# Patient Record
Sex: FEMALE | Race: BLACK OR AFRICAN AMERICAN | Employment: UNEMPLOYED | URBAN - METROPOLITAN AREA
[De-identification: names, ages, dates, MRNs, and addresses within clinical notes are randomized per-mention and may not be internally consistent; named-entity substitution may affect disease eponyms.]

---

## 2017-03-02 ENCOUNTER — HOSPITAL ENCOUNTER (INPATIENT)
Facility: CLINIC | Age: 32
LOS: 7 days | Discharge: ANOTHER HOSPITAL | DRG: 885 | End: 2017-03-09
Attending: FAMILY MEDICINE | Admitting: PSYCHIATRY & NEUROLOGY
Payer: COMMERCIAL

## 2017-03-02 DIAGNOSIS — F20.1 DISORGANIZED SCHIZOPHRENIA (H): ICD-10-CM

## 2017-03-02 DIAGNOSIS — F12.20 CANNABIS DEPENDENCE (H): ICD-10-CM

## 2017-03-02 DIAGNOSIS — K21.9 GASTROESOPHAGEAL REFLUX DISEASE, ESOPHAGITIS PRESENCE NOT SPECIFIED: Primary | ICD-10-CM

## 2017-03-02 DIAGNOSIS — R45.1 AGITATION: ICD-10-CM

## 2017-03-02 DIAGNOSIS — F25.0 SCHIZOAFFECTIVE DISORDER, BIPOLAR TYPE (H): ICD-10-CM

## 2017-03-02 PROCEDURE — 25000125 ZZHC RX 250: Performed by: PSYCHIATRY & NEUROLOGY

## 2017-03-02 PROCEDURE — 25000132 ZZH RX MED GY IP 250 OP 250 PS 637: Performed by: FAMILY MEDICINE

## 2017-03-02 PROCEDURE — 99285 EMERGENCY DEPT VISIT HI MDM: CPT | Mod: Z6 | Performed by: FAMILY MEDICINE

## 2017-03-02 PROCEDURE — 90791 PSYCH DIAGNOSTIC EVALUATION: CPT

## 2017-03-02 PROCEDURE — 12400003 ZZH R&B MH CRITICAL UMMC

## 2017-03-02 PROCEDURE — 99285 EMERGENCY DEPT VISIT HI MDM: CPT | Mod: 25 | Performed by: FAMILY MEDICINE

## 2017-03-02 PROCEDURE — 25000132 ZZH RX MED GY IP 250 OP 250 PS 637: Performed by: PSYCHIATRY & NEUROLOGY

## 2017-03-02 PROCEDURE — 25000132 ZZH RX MED GY IP 250 OP 250 PS 637: Performed by: NURSE PRACTITIONER

## 2017-03-02 RX ORDER — ONDANSETRON 4 MG/1
4 TABLET, ORALLY DISINTEGRATING ORAL 3 TIMES DAILY PRN
Status: DISCONTINUED | OUTPATIENT
Start: 2017-03-02 | End: 2017-03-09 | Stop reason: HOSPADM

## 2017-03-02 RX ORDER — DIPHENHYDRAMINE HYDROCHLORIDE 50 MG/ML
50 INJECTION INTRAMUSCULAR; INTRAVENOUS EVERY 4 HOURS PRN
Status: DISCONTINUED | OUTPATIENT
Start: 2017-03-02 | End: 2017-03-09 | Stop reason: HOSPADM

## 2017-03-02 RX ORDER — LORAZEPAM 1 MG/1
1-2 TABLET ORAL EVERY 4 HOURS PRN
Status: DISCONTINUED | OUTPATIENT
Start: 2017-03-02 | End: 2017-03-09 | Stop reason: HOSPADM

## 2017-03-02 RX ORDER — ALUMINA, MAGNESIA, AND SIMETHICONE 2400; 2400; 240 MG/30ML; MG/30ML; MG/30ML
30 SUSPENSION ORAL EVERY 4 HOURS PRN
Status: DISCONTINUED | OUTPATIENT
Start: 2017-03-02 | End: 2017-03-09 | Stop reason: HOSPADM

## 2017-03-02 RX ORDER — TRIAMCINOLONE ACETONIDE 5 MG/G
OINTMENT TOPICAL 2 TIMES DAILY
Status: DISCONTINUED | OUTPATIENT
Start: 2017-03-02 | End: 2017-03-09 | Stop reason: HOSPADM

## 2017-03-02 RX ORDER — AMOXICILLIN 250 MG
1 CAPSULE ORAL 2 TIMES DAILY
Status: DISCONTINUED | OUTPATIENT
Start: 2017-03-02 | End: 2017-03-09 | Stop reason: HOSPADM

## 2017-03-02 RX ORDER — LITHIUM CARBONATE 300 MG/1
600 TABLET, FILM COATED, EXTENDED RELEASE ORAL AT BEDTIME
Status: DISCONTINUED | OUTPATIENT
Start: 2017-03-02 | End: 2017-03-06

## 2017-03-02 RX ORDER — DIPHENHYDRAMINE HCL 50 MG
50 CAPSULE ORAL EVERY 4 HOURS PRN
Status: DISCONTINUED | OUTPATIENT
Start: 2017-03-02 | End: 2017-03-09 | Stop reason: HOSPADM

## 2017-03-02 RX ORDER — OXYCODONE AND ACETAMINOPHEN 5; 325 MG/1; MG/1
1 TABLET ORAL EVERY 6 HOURS PRN
Status: DISCONTINUED | OUTPATIENT
Start: 2017-03-02 | End: 2017-03-02

## 2017-03-02 RX ORDER — OLANZAPINE 5 MG/1
10 TABLET, ORALLY DISINTEGRATING ORAL ONCE
Status: COMPLETED | OUTPATIENT
Start: 2017-03-02 | End: 2017-03-02

## 2017-03-02 RX ORDER — IBUPROFEN 600 MG/1
600 TABLET, FILM COATED ORAL EVERY 6 HOURS PRN
Status: DISCONTINUED | OUTPATIENT
Start: 2017-03-02 | End: 2017-03-06

## 2017-03-02 RX ORDER — OLANZAPINE 10 MG/1
10 TABLET ORAL
Status: DISCONTINUED | OUTPATIENT
Start: 2017-03-02 | End: 2017-03-09 | Stop reason: HOSPADM

## 2017-03-02 RX ORDER — HALOPERIDOL 5 MG/1
5 TABLET ORAL EVERY 4 HOURS PRN
Status: DISCONTINUED | OUTPATIENT
Start: 2017-03-02 | End: 2017-03-09 | Stop reason: HOSPADM

## 2017-03-02 RX ORDER — ACETAMINOPHEN 325 MG/1
650 TABLET ORAL EVERY 4 HOURS PRN
Status: DISCONTINUED | OUTPATIENT
Start: 2017-03-02 | End: 2017-03-09 | Stop reason: HOSPADM

## 2017-03-02 RX ORDER — TRIAMCINOLONE ACETONIDE 5 MG/G
OINTMENT TOPICAL 2 TIMES DAILY
Status: ON HOLD | COMMUNITY
End: 2017-04-04

## 2017-03-02 RX ORDER — LITHIUM CARBONATE 300 MG/1
600 TABLET, FILM COATED, EXTENDED RELEASE ORAL AT BEDTIME
Status: ON HOLD | COMMUNITY
End: 2017-03-09

## 2017-03-02 RX ORDER — AMOXICILLIN 250 MG
1 CAPSULE ORAL 2 TIMES DAILY
Status: ON HOLD | COMMUNITY
End: 2017-03-11

## 2017-03-02 RX ORDER — OXYCODONE AND ACETAMINOPHEN 5; 325 MG/1; MG/1
1 TABLET ORAL EVERY 6 HOURS PRN
Status: ON HOLD | COMMUNITY
End: 2017-03-10

## 2017-03-02 RX ORDER — HALOPERIDOL 5 MG/ML
5 INJECTION INTRAMUSCULAR EVERY 4 HOURS PRN
Status: DISCONTINUED | OUTPATIENT
Start: 2017-03-02 | End: 2017-03-09 | Stop reason: HOSPADM

## 2017-03-02 RX ORDER — OLANZAPINE 10 MG/2ML
10 INJECTION, POWDER, FOR SOLUTION INTRAMUSCULAR
Status: DISCONTINUED | OUTPATIENT
Start: 2017-03-02 | End: 2017-03-09 | Stop reason: HOSPADM

## 2017-03-02 RX ORDER — ONDANSETRON 4 MG/1
4 TABLET, ORALLY DISINTEGRATING ORAL 3 TIMES DAILY PRN
Status: ON HOLD | COMMUNITY
End: 2017-03-10

## 2017-03-02 RX ORDER — RISPERIDONE 2 MG/1
4 TABLET ORAL AT BEDTIME
Status: DISCONTINUED | OUTPATIENT
Start: 2017-03-02 | End: 2017-03-03

## 2017-03-02 RX ORDER — HYDROXYZINE HYDROCHLORIDE 25 MG/1
25-50 TABLET, FILM COATED ORAL EVERY 4 HOURS PRN
Status: DISCONTINUED | OUTPATIENT
Start: 2017-03-02 | End: 2017-03-09 | Stop reason: HOSPADM

## 2017-03-02 RX ORDER — LORAZEPAM 2 MG/ML
1-2 INJECTION INTRAMUSCULAR EVERY 4 HOURS PRN
Status: DISCONTINUED | OUTPATIENT
Start: 2017-03-02 | End: 2017-03-09 | Stop reason: HOSPADM

## 2017-03-02 RX ORDER — TRAZODONE HYDROCHLORIDE 50 MG/1
50 TABLET, FILM COATED ORAL
Status: DISCONTINUED | OUTPATIENT
Start: 2017-03-02 | End: 2017-03-09 | Stop reason: HOSPADM

## 2017-03-02 RX ADMIN — OLANZAPINE 10 MG: 5 TABLET, ORALLY DISINTEGRATING ORAL at 11:36

## 2017-03-02 RX ADMIN — IBUPROFEN 600 MG: 600 TABLET ORAL at 19:20

## 2017-03-02 RX ADMIN — DIPHENHYDRAMINE HYDROCHLORIDE 50 MG: 50 CAPSULE ORAL at 18:55

## 2017-03-02 RX ADMIN — ALUMINUM HYDROXIDE, MAGNESIUM HYDROXIDE, AND DIMETHICONE 30 ML: 400; 400; 40 SUSPENSION ORAL at 19:20

## 2017-03-02 RX ADMIN — HALOPERIDOL 5 MG: 5 TABLET ORAL at 18:56

## 2017-03-02 RX ADMIN — HYDROXYZINE HYDROCHLORIDE 50 MG: 25 TABLET ORAL at 16:06

## 2017-03-02 RX ADMIN — OLANZAPINE 10 MG: 10 TABLET, FILM COATED ORAL at 16:07

## 2017-03-02 RX ADMIN — LORAZEPAM 2 MG: 1 TABLET ORAL at 18:55

## 2017-03-02 RX ADMIN — OLANZAPINE 10 MG: 10 TABLET, FILM COATED ORAL at 18:08

## 2017-03-02 RX ADMIN — ONDANSETRON 4 MG: 4 TABLET, ORALLY DISINTEGRATING ORAL at 18:08

## 2017-03-02 ASSESSMENT — ACTIVITIES OF DAILY LIVING (ADL)
DRESS: INDEPENDENT
GROOMING: INDEPENDENT
LAUNDRY: WITH SUPERVISION
ORAL_HYGIENE: INDEPENDENT

## 2017-03-02 ASSESSMENT — ENCOUNTER SYMPTOMS
HALLUCINATIONS: 1
AGITATION: 1
SLEEP DISTURBANCE: 1

## 2017-03-02 NOTE — PHARMACY-ADMISSION MEDICATION HISTORY
Admission Medication History status for the 3/2/2017 admission is complete.  See EPIC admission navigator for Prior to Admission medications.    Medication history sources:  Medication list from [facility]     Medication history source reliability: Good; medication list sent from pharmacy updated 2/27/17    Medication adherence:  Poor; unable to interview patient due to mental status, but ACT team reports she hasn't taken her medications for at least a week    Changes made to PTA medication list (reason)  Added: All medications were added except for the antacid and clozapine  Deleted:   - Aripiprazole, benztropine, divalproex, famotidine  Changed:   - Clozapine 300 mg po qHS per medication list (updated dose)    Additional medication history information (including reliability of information, actions taken by pharmacist):   - Patient was too paranoid to be interviewed, but ACT team sent a list of her current medications on file with [facility]. The team reports they don't believe the patient has taken her medications for at least a week.  - Patient was prescribed Percocet (20 tablets) from [facility] on 2/27/2017.    Time spent in this activity: 15 minutes    Medication history completed by: Renée Kitchen, PharmD    Prior to Admission medications    Medication Sig Last Dose Taking? Auth Provider   CLOZAPINE PO (CLOZARIL) Take 300 mg by mouth At Bedtime Past Month Yes Unknown, Entered By History   oxyCODONE-acetaminophen (PERCOCET) 5-325 MG per tablet Take 1 tablet by mouth every 6 hours as needed for moderate to severe pain Past Month Yes Unknown, Entered By History   RISPERIDONE PO Take 4 mg by mouth At Bedtime Past Month Yes Unknown, Entered By History   triamcinolone (KENALOG) 0.5 % OINT ointment Apply topically 2 times daily Past Month Yes Unknown, Entered By History   senna-docusate (SENOKOT-S;PERICOLACE) 8.6-50 MG per tablet Take 1 tablet by mouth 2 times daily Past Month Yes Unknown, Entered By History    ondansetron (ZOFRAN-ODT) 4 MG ODT tab Take 4 mg by mouth 3 times daily as needed for nausea Past Month Yes Unknown, Entered By History   lithium (ESKALITH/LITHOBID) 300 MG CR tablet Take 600 mg by mouth At Bedtime Past Month Yes Unknown, Entered By History   nicotine polacrilex (NICORETTE) 4 MG gum Place 4 mg inside cheek as needed for smoking cessation Past Month Yes Unknown, Entered By History   Alum & Mag Hydroxide-Simeth (ANTACID I PO) Take 200 mg by mouth 4 times daily as needed (chew tab) Past Month Yes Unknown, Entered By History

## 2017-03-02 NOTE — ED NOTES
Pt becoming agitated, requesting to leave. Informed of plan to admit, became more upset. Requested to be left alone.

## 2017-03-02 NOTE — ED NOTES
Bed: HW01  Expected date: 3/2/17  Expected time:   Means of arrival: Ambulance  Comments:  N711 31 f psych eval on hold

## 2017-03-02 NOTE — PROGRESS NOTES
03/02/17 1656   Patient Belongings   Did you bring any home meds/supplements to the hospital?  No   Patient Belongings cell phone/electronics;clothing;purse   Disposition of Belongings locker   Belongings Search Yes   Clothing Search Yes   1 phone  1   1 purse with makeup and change  1 shoes w laces  1 mehrdad jacket  1 earrings  1 belt  1 jeans  1 blouse    Sent to security  $70 cash    ADMISSION:  I am responsible for any personal items that are not sent to the safe or pharmacy. [facility] is not responsible for loss, theft or damage of any property in my possession.    Patient Signature _____________________ Date/Time _____________________    Staff Signature _______________________ Date/Time _____________________    2nd Staff person, if patient is unable/unwilling to sign  ___________________________________ Date/Time _____________________    DISCHARGE:  My personal items have been returned to me.   Patient Signature _____________________ Date/Time _____________________

## 2017-03-02 NOTE — ED NOTES
Per EMS; Patient has been having issues with N/V due to hernia. She has not been able to keep medications down and has been acting erratically. Mom called 911 for patient behaviors. Arrives on [facility].

## 2017-03-02 NOTE — PLAN OF CARE
Occupational Therapy:  Pt has yet to attend OT-facilitated group sessions.  Plan:  Pt will be encouraged to attend groups and be provided a self assessment form.  OT staff will explain the value of OT including pt in her treatment plan and offer options to meet her needs and identified goals.

## 2017-03-02 NOTE — ED NOTES
Spoke with Ashlyn from the ACT team. Pt has commitment paperwork if needed. Ashlyn can be reached at [phone]. Would like to be kept informed.

## 2017-03-02 NOTE — PROGRESS NOTES
Patient began to yell at another patient racial profanities. Talking to herself, appears to be responding to internal stimuli. Needed to be redirected by staff. Put her hand in this writers face when speaking with her. Offered her prn Zyprexa and Vistaril which she took. Agreed to go to her room to relax.

## 2017-03-02 NOTE — ED PROVIDER NOTES
"  History     Chief Complaint   Patient presents with     Paranoid     HPI  Daisy Curtis is a 31 year old female with a history of chronic abdominal pain, known ventral hernia, schizoaffective disorder, and history of polysubstance abuse (marijuana, alcohol) who presents via 911 due to inability to be safe. The patient has disorganized thought and does not provide a reliable history at this time. Likely still using street drugs    The patient was seen in clinic earlier today for abdominal pain, nausea and vomiting in the setting of known ventral hernia. EMS reports after the clinic appointment, patient was found in the streets and \"became a public hazard,\" so PD became involved. They felt the patient could not take care of herself and called for transport so that she may be further evaluated.     Patient's mother reports that the patient has not been eating, sleeping, or taking her medications. See below for meds.  In review of CareEverywhere- seen at [facility] on 2/28/17 - CT done and showed stable hernia with no evidence for incarceration or cause for the chronic abd pain and nausea.    I have reviewed the Medications, Allergies, Past Medical and Surgical History, and Social History in the Epic system.    No current facility-administered medications for this encounter.      Current Outpatient Prescriptions   Medication     CLOZAPINE PO (CLOZARIL)     oxyCODONE-acetaminophen (PERCOCET) 5-325 MG per tablet     RISPERIDONE PO     triamcinolone (KENALOG) 0.5 % OINT ointment     senna-docusate (SENOKOT-S;PERICOLACE) 8.6-50 MG per tablet     ondansetron (ZOFRAN-ODT) 4 MG ODT tab     lithium (ESKALITH/LITHOBID) 300 MG CR tablet     nicotine polacrilex (NICORETTE) 4 MG gum     [DISCONTINUED] Clozapine (CLOZARIL) 200 MG tablet     Alum & Mag Hydroxide-Simeth (ANTACID I PO)     Past Medical History   Diagnosis Date     Anxiety      Substance abuse      Uncomplicated asthma        Past Surgical History   Procedure " Laterality Date     Cholecystectomy       Gyn surgery            Orthopedic surgery       scoliosis repair       No family history on file.    Social History   Substance Use Topics     Smoking status: Current Every Day Smoker     Packs/day: 1.00     Smokeless tobacco: Not on file     Alcohol use No        Allergies   Allergen Reactions     Codeine Sulfate        Review of Systems   Unable to perform ROS: Psychiatric disorder   Psychiatric/Behavioral: Positive for agitation, behavioral problems, hallucinations and sleep disturbance.       Physical Exam   BP: 130/83  Pulse: 91  Temp: 98.7  F (37.1  C)  Resp: 20  SpO2: 98 %  Physical Exam   Constitutional: She appears well-developed and well-nourished. No distress.   Eating and no nausea   Cardiovascular: Normal rate and regular rhythm.    Pulmonary/Chest: No respiratory distress.   Abdominal: Soft. There is no tenderness.   Really will not allow a full exam but there is no evidence for tenderness    Neurological: She is alert.   Skin: Skin is warm and dry. She is not diaphoretic.   Psychiatric:   The pt is agitated and very guarded and suspicious  She responds to questions with jumping out of her chair and dancing.   She is moving about the room and wanting to search and peek into the draws   Nursing note and vitals reviewed.      ED Course     ED Course     Procedures        Urine requested but nurse cannot get due to suspiciousness of patient  Labs Ordered and Resulted from Time of ED Arrival Up to the Time of Departure from the ED - No data to display    Assessments & Plan (with Medical Decision Making)   Will place on 72 hour hold    I have reviewed the nursing notes.    I have reviewed the findings, diagnosis, plan and need for follow up with the patient.    New Prescriptions    No medications on file       Final diagnoses:   Disorganized schizophrenia (H)     I, Mario Ha, am serving as a trained medical scribe to document services personally  performed by Jamil Darby MD, based on the provider's statements to me.      I, Jamil Darby MD, was physically present and have reviewed and verified the accuracy of this note documented by Mario Ha.    3/2/2017   [facility], [place], EMERGENCY DEPARTMENT     Jamil Darby MD  03/02/17 0437

## 2017-03-02 NOTE — IP AVS SNAPSHOT
MRN:[id number]                      After Visit Summary   3/2/2017    Daisy Curtis    MRN: [id number]           Thank you!     Thank you for choosing [facility] for your care. Our goal is always to provide you with excellent care.        Patient Information     Date Of Birth          1985        About your hospital stay     You were admitted on:  March 2, 2017 You last received care in the:  UR 30NR    You were discharged on:  March 9, 2017       Who to Call     For medical emergencies, please call 911.  For non-urgent questions about your medical care, please call your primary care provider or clinic, None          Attending Provider     Provider Specialty    Jamil Darby MD Emergency Medicine    Tim Vargas MD Psychiatry       Primary Care Provider    Physician No Ref-Primary       No address on file        Further instructions from your care team       Behavioral Discharge Planning and Instructions      Summary:  You were admitted on 3/2/2017  For Psychotic Symptomology.  You were treated by Dr. Tim Vargas MD and discharged on 03/09/2017 from [facility] so that you could be admitted to the medical unit 10A due to severe pain and vomiting.      You  recommitted to [facility] on April 7, 2016.  You were  Provisionally Discharged from [facility] on September 24, 2016.  You were admitted to the [facility] on March 2, 2017.  Your Provisional Discharge was revoked on March 7, 2017.  You are now ready to be discharged from the [facility] to the following address (your mother s home):[address] [place] [address] [place] [address].  Your treatment team has worked with your ACT Team and Allyson Galeas and Martha Yip at [facility] to complete a Remote Provisional Discharge so that you may return back to the community.    Main Diagnosis:    Schizoaffective disorder, bipolar type, manic.   Marijuana use disorder.    Health Care Follow-up Appointments:   1. Medication Management Appointment: Monday, March 13th, 2017 @ 2:00pm with Dr. Gisela Day MD  2. Case Management Appointment: Tuesday, March 14th, 2017 @ 1:00pm with RICH Tobin, [facility] (Ph: [phone])  3. Nursing Appointment: Thursday, March 16, 2017 @ unknown time with Manpreet Sheehan RN (Ph: [phone])  Attend all scheduled appointments with your outpatient providers. Call at least 24 hours in advance if you need to reschedule an appointment to ensure continued access to your outpatient providers.   Major Treatments, Procedures and Findings:  You were provided with: a psychiatric assessment, assessed for medical stability, medication evaluation and/or management, group therapy, individual therapy, milieu management and medical interventions    Symptoms to Report: feeling more aggressive, increased confusion, losing more sleep, mood getting worse or thoughts of suicide    Early warning signs can include: increased depression or anxiety sleep disturbances increased thoughts or behaviors of suicide or self-harm  increased unusual thinking, such as paranoia or hearing voices    Safety and Wellness:  Take all medicines as directed.  Make no changes unless your doctor suggests them.      Follow treatment recommendations.  Refrain from alcohol and non-prescribed drugs.  If there is a concern for safety, call 911.    Resources:   COPE (Community Outreach for Psychiatric Emergencies): [phone].Available 24-hours a day, 7 days a week. Call a COPE professional when a severe disturbance of mood or thinking is impacting your safety. COPE professionals are available to go where you are, handle an immediate crisis, and provide a clinical assessment. If needed, COPE will arrange an emergency evaluation for inpatient psychiatric services. Telephone consultations are also available. Ask them  if you meet criteria for a crisis residence.   *You can also talk to COPE about crisis stabilization services if you are experiencing difficulty with the transition from the hospital.  [facility] Acute Psychiatric Services  Acute Psychiatric Services/Crisis Intervention: [phone]  24-hour walk-in crisis intervention and treatment of behavioral emergencies    Located at:  o [facility] [facility] -  in the Emergency room on the first floor of the [facility] Crisis [facility]idence  [address] [place] [address]  Phone: [phone]  This is a crisis residence where you can stay for a short time if you feel like you need to stabilize but do not need to go to the hospital.    Crisis Connection 24/7 Community Call Center: [phone]  Phone: [phone] outside the [facility] area: [phone]  [email or web address]   Hours: 24 hours/day, 7 days/week  Services: Free and confidential telephone counseling provided by trained volunteers supervised by professional staff. Early intervention and brief supportive counseling for callers with issues of depression, stress and anxiety, domestic violence, parent/child conflicts, family issues, loneliness, grief and loss, suicidal ideation and chronic mental illness.    [employer]  You and your family would benefit from the support groups at [employer]- [facility].   Www.namihelps.org    www.[facility].org    The [employer] for Mental Illness [facility] has a parent support and educator staff - Cristian Del Rosario - that can be a support for your parents. There are also many classes that are available to you and your family.  Cristian can be reached at [phone] xt 480 or through e-mail at [email or web address].  Daisy,  please take care and make your recovery a daily recovery. The treatment team has appreciated the opportunity to work with you. If you have any questions or  "concerns our unit number is [phone].  You may be receiving a follow-up phone call within the next three days from a representative from behavioral health.  You have identified the best phone number to reach you as [phone].          Pending Results     No orders found from 2017 to 3/3/2017.            Statement of Approval     Ordered          17 1745  I have reviewed and agree with all the recommendations and orders detailed in this document.  EFFECTIVE NOW     Approved and electronically signed by:  Tim Vargas MD             Admission Information     Date & Time Department Dept. Phone    3/2/2017 UR 30NR [phone]      Your Vitals Were     Blood Pressure Pulse Temperature Respirations Weight Pulse Oximetry    109/62 92 99.1  F (37.3  C) (Oral) 18 78 kg (172 lb) 97%    BMI (Body Mass Index)                   31.97 kg/m2           [email or web address] Information     [facility] lets you send messages to your doctor, view your test results, renew your prescriptions, schedule appointments and more. To sign up, go to [email or web address] . Click on \"Log in\" on the left side of the screen, which will take you to the Welcome page. Then click on \"Sign up Now\" on the right side of the page.     You will be asked to enter the access code listed below, as well as some personal information. Please follow the directions to create your username and password.     Your access code is: [email or web address]  Expires: 2017  1:03 PM     Your access code will  in 90 days. If you need help or a new code, please call your [facility] clinic or [phone].        Care EveryWhere ID     This is your Care EveryWhere ID. This could be used by other organizations to access your [facility] medical records  [id number]           Review of your medicines      START taking        Dose / Directions    benztropine 0.5 MG tablet   Commonly known as:  COGENTIN   Used for:  Disorganized schizophrenia (H)        Dose:  0.5 mg "   Take 1 tablet (0.5 mg) by mouth 2 times daily   Quantity:  60 tablet   Refills:  0       diazepam 10 MG tablet   Commonly known as:  VALIUM   Used for:  Disorganized schizophrenia (H)        Dose:  10 mg   Take 1 tablet (10 mg) by mouth 2 times daily   Quantity:  60 tablet   Refills:  0       pantoprazole 40 MG EC tablet   Commonly known as:  PROTONIX   Used for:  Gastroesophageal reflux disease, esophagitis presence not specified        Dose:  40 mg   Take 1 tablet (40 mg) by mouth every morning   Quantity:  30 tablet   Refills:  0       traZODone 50 MG tablet   Commonly known as:  DESYREL   Used for:  Disorganized schizophrenia (H)        Dose:  50 mg   Take 1 tablet (50 mg) by mouth nightly as needed for sleep (may repeat X 1)   Quantity:  30 tablet   Refills:  0         CONTINUE these medicines which may have CHANGED, or have new prescriptions. If we are uncertain of the size of tablets/capsules you have at home, strength may be listed as something that might have changed.        Dose / Directions    lithium 300 MG CR tablet   Commonly known as:  ESKALITH/LITHOBID   This may have changed:  when to take this   Used for:  Disorganized schizophrenia (H)        Dose:  600 mg   Take 2 tablets (600 mg) by mouth 2 times daily   Quantity:  60 tablet   Refills:  0       * risperiDONE 3 MG Tbdp ODT tab   This may have changed:  You were already taking a medication with the same name, and this prescription was added. Make sure you understand how and when to take each.   Used for:  Disorganized schizophrenia (H)        Dose:  3 mg   Place 1 tablet (3 mg) under the tongue At Bedtime   Quantity:  30 tablet   Refills:  0       * risperiDONE 1 MG ODT tab   Commonly known as:  risperDAL M-TABS   This may have changed:    - medication strength  - how much to take  - how to take this  - when to take this   Used for:  Disorganized schizophrenia (H)        Dose:  1 mg   Place 1 tablet (1 mg) under the tongue every morning    Quantity:  30 tablet   Refills:  0       * Notice:  This list has 2 medication(s) that are the same as other medications prescribed for you. Read the directions carefully, and ask your doctor or other care provider to review them with you.      CONTINUE these medicines which have NOT CHANGED        Dose / Directions    nicotine polacrilex 4 MG gum   Commonly known as:  NICORETTE        Dose:  4 mg   Place 4 mg inside cheek as needed for smoking cessation   Refills:  0       ondansetron 4 MG ODT tab   Commonly known as:  ZOFRAN-ODT        Dose:  4 mg   Take 4 mg by mouth 3 times daily as needed for nausea   Refills:  0       oxyCODONE-acetaminophen 5-325 MG per tablet   Commonly known as:  PERCOCET        Dose:  1 tablet   Take 1 tablet by mouth every 6 hours as needed for moderate to severe pain   Refills:  0       senna-docusate 8.6-50 MG per tablet   Commonly known as:  SENOKOT-S;PERICOLACE        Dose:  1 tablet   Take 1 tablet by mouth 2 times daily   Refills:  0       triamcinolone 0.5 % Oint ointment   Commonly known as:  KENALOG        Apply topically 2 times daily   Refills:  0            Where to get your medicines      These medications were sent to [facility] [place], [place] - [address]  [address] [address]     Phone:  [phone]     benztropine 0.5 MG tablet    lithium 300 MG CR tablet    pantoprazole 40 MG EC tablet    risperiDONE 1 MG ODT tab    risperiDONE 3 MG Tbdp ODT tab    traZODone 50 MG tablet         Some of these will need a paper prescription and others can be bought over the counter. Ask your nurse if you have questions.     Bring a paper prescription for each of these medications     diazepam 10 MG tablet                Protect others around you: Learn how to safely use, store and throw away your medicines at www.disposemymeds.org.             Medication List: This is a list of all your medications and when to take them. Check marks below  indicate your daily home schedule. Keep this list as a reference.      Medications           Morning Afternoon Evening Bedtime As Needed    benztropine 0.5 MG tablet   Commonly known as:  COGENTIN   Take 1 tablet (0.5 mg) by mouth 2 times daily   Last time this was given:  0.5 mg on 3/10/2017  8:22 AM                                diazepam 10 MG tablet   Commonly known as:  VALIUM   Take 1 tablet (10 mg) by mouth 2 times daily   Last time this was given:  10 mg on 3/10/2017  8:21 AM                                lithium 300 MG CR tablet   Commonly known as:  ESKALITH/LITHOBID   Take 2 tablets (600 mg) by mouth 2 times daily   Last time this was given:  600 mg on 3/10/2017  8:21 AM                                nicotine polacrilex 4 MG gum   Commonly known as:  NICORETTE   Place 4 mg inside cheek as needed for smoking cessation   Last time this was given:  4 mg on 3/10/2017  8:23 AM                                ondansetron 4 MG ODT tab   Commonly known as:  ZOFRAN-ODT   Take 4 mg by mouth 3 times daily as needed for nausea   Last time this was given:  4 mg on 3/10/2017 11:47 AM                                oxyCODONE-acetaminophen 5-325 MG per tablet   Commonly known as:  PERCOCET   Take 1 tablet by mouth every 6 hours as needed for moderate to severe pain                                pantoprazole 40 MG EC tablet   Commonly known as:  PROTONIX   Take 1 tablet (40 mg) by mouth every morning   Last time this was given:  40 mg on 3/10/2017  8:21 AM                                * risperiDONE 3 MG Tbdp ODT tab   Place 1 tablet (3 mg) under the tongue At Bedtime   Last time this was given:  3 mg on 3/8/2017  8:24 PM                                * risperiDONE 1 MG ODT tab   Commonly known as:  risperDAL M-TABS   Place 1 tablet (1 mg) under the tongue every morning   Last time this was given:  3 mg on 3/8/2017  8:24 PM                                senna-docusate 8.6-50 MG per tablet   Commonly known as:   SENOKOT-S;PERICOLACE   Take 1 tablet by mouth 2 times daily   Last time this was given:  1 tablet on 3/10/2017  8:22 AM                                traZODone 50 MG tablet   Commonly known as:  DESYREL   Take 1 tablet (50 mg) by mouth nightly as needed for sleep (may repeat X 1)   Last time this was given:  50 mg on 3/9/2017  4:11 AM                                triamcinolone 0.5 % Oint ointment   Commonly known as:  KENALOG   Apply topically 2 times daily   Last time this was given:  3/10/2017  9:20 AM                                * Notice:  This list has 2 medication(s) that are the same as other medications prescribed for you. Read the directions carefully, and ask your doctor or other care provider to review them with you.

## 2017-03-02 NOTE — PLAN OF CARE
Problem: Psychotic Symptoms  Goal: Psychotic Symptoms  Signs and symptoms of listed problems will be absent or manageable.  Outcome: No Change  Patient was not cooperative with staff and search. Ran out of admitting room, screamming, looking for her room and a place to lay down.  Staff were able to talk her into the search with much reluctance.  She is now in room resting.

## 2017-03-03 PROCEDURE — 99223 1ST HOSP IP/OBS HIGH 75: CPT | Mod: AI | Performed by: PSYCHIATRY & NEUROLOGY

## 2017-03-03 PROCEDURE — 90853 GROUP PSYCHOTHERAPY: CPT

## 2017-03-03 PROCEDURE — 12400001 ZZH R&B MH UMMC

## 2017-03-03 PROCEDURE — 25000132 ZZH RX MED GY IP 250 OP 250 PS 637: Performed by: PSYCHIATRY & NEUROLOGY

## 2017-03-03 PROCEDURE — 97150 GROUP THERAPEUTIC PROCEDURES: CPT | Mod: GO

## 2017-03-03 PROCEDURE — 25000132 ZZH RX MED GY IP 250 OP 250 PS 637: Performed by: NURSE PRACTITIONER

## 2017-03-03 RX ORDER — RISPERIDONE 1 MG/1
1 TABLET ORAL EVERY MORNING
Status: DISCONTINUED | OUTPATIENT
Start: 2017-03-04 | End: 2017-03-09

## 2017-03-03 RX ORDER — BENZTROPINE MESYLATE 0.5 MG/1
0.5 TABLET ORAL 2 TIMES DAILY
Status: DISCONTINUED | OUTPATIENT
Start: 2017-03-03 | End: 2017-03-09 | Stop reason: HOSPADM

## 2017-03-03 RX ORDER — RISPERIDONE 3 MG/1
3 TABLET ORAL AT BEDTIME
Status: DISCONTINUED | OUTPATIENT
Start: 2017-03-03 | End: 2017-03-09

## 2017-03-03 RX ADMIN — OLANZAPINE 10 MG: 10 TABLET, FILM COATED ORAL at 22:26

## 2017-03-03 RX ADMIN — BENZTROPINE MESYLATE 0.5 MG: 0.5 TABLET ORAL at 20:29

## 2017-03-03 RX ADMIN — LORAZEPAM 2 MG: 1 TABLET ORAL at 10:42

## 2017-03-03 RX ADMIN — LITHIUM CARBONATE 600 MG: 300 TABLET, FILM COATED, EXTENDED RELEASE ORAL at 20:30

## 2017-03-03 RX ADMIN — SENNOSIDES AND DOCUSATE SODIUM 1 TABLET: 8.6; 5 TABLET ORAL at 20:31

## 2017-03-03 RX ADMIN — RISPERIDONE 3 MG: 3 TABLET ORAL at 20:32

## 2017-03-03 RX ADMIN — SENNOSIDES AND DOCUSATE SODIUM 1 TABLET: 8.6; 5 TABLET ORAL at 08:56

## 2017-03-03 RX ADMIN — NICOTINE POLACRILEX 4 MG: 4 GUM, CHEWING ORAL at 12:15

## 2017-03-03 RX ADMIN — NICOTINE POLACRILEX 4 MG: 4 GUM, CHEWING ORAL at 10:53

## 2017-03-03 RX ADMIN — LORAZEPAM 2 MG: 1 TABLET ORAL at 22:25

## 2017-03-03 RX ADMIN — OLANZAPINE 10 MG: 10 TABLET, FILM COATED ORAL at 14:52

## 2017-03-03 RX ADMIN — LORAZEPAM 2 MG: 1 TABLET ORAL at 18:15

## 2017-03-03 RX ADMIN — DIPHENHYDRAMINE HYDROCHLORIDE 50 MG: 50 CAPSULE ORAL at 18:15

## 2017-03-03 RX ADMIN — DIPHENHYDRAMINE HYDROCHLORIDE 50 MG: 50 CAPSULE ORAL at 10:43

## 2017-03-03 RX ADMIN — TRIAMCINOLONE ACETONIDE: 5 OINTMENT TOPICAL at 08:56

## 2017-03-03 RX ADMIN — TRIAMCINOLONE ACETONIDE: 5 OINTMENT TOPICAL at 20:33

## 2017-03-03 ASSESSMENT — ACTIVITIES OF DAILY LIVING (ADL)
DRESS: INDEPENDENT
ORAL_HYGIENE: INDEPENDENT
GROOMING: INDEPENDENT

## 2017-03-03 NOTE — PROGRESS NOTES
"   03/03/17 1200   General Information   Date Initially Attended OT 03/03/17   Clinical Impression   Affect Irritable;Restricted;Fluctuates;Other (see comments)  (tense)   Appearance and ADLs General cleanliness observed in most areas   Attention to Internal Stimuli No observed signs   Interaction Skills Intrusive   Ability to Communicate Needs Demanding   Verbal Content Pressured   Ability to Maintain Boundaries Other (see comments)  (little awareness of personal space/boundaries of others.)   Participation Initiates participation   Concentration Concentrates 5-10 minutes   Ability to Concentrate With structure;With refocus;Easily distracted   Follows and Comprehends Directions Other (see comments)  (unwilling/unready to accept guidance with tasks.)   Memory Needs further assessment   Organization Needs 1:1 staff assistance   Decision Making Impulsive   Planning and Problem Solving Impulsive;Needs assistance;Unable to plan/problem solve   Ability to Apply and Learn Concepts Unable to apply   Frustrations / Stress Tolerance Easily frustrated;Inappropriate responses to frustration   Level of Insight No insight   Self Esteem Other (see comments)  (unable to identify personal strengths/self positives.)   Social Supports Other (see comments)  (\"family and friends\")   General Observation/Plan   General Observations/Plan See Comments  (Note:  3/03/2017.)   Occupational Therapy:  Pt was given and completed a written self assessment.  OT staff explained the value of including pt in her treatment plan and provided options to address her current needs/self-identified goals. Pt noted coming to hospital because \"Mom made me go.\"  Pt was restless, continually pacing, moving her legs and feet, moving quickly about the room during OT clinic. Initially would not accept guidance with task and materials, then became insistent and impatient to have her wants met quickly.  Poorly organized, unable to follow a demonstration, ended up " "asking writer to complete task for her because: \"I've got ants in my pants.\"   Goals for hospitalization (selected from list):  Find resources and support.  Look at how my self-destructive behavior affects me. Identify and express feelings in a better way.  Plan:  Encourage ongoing participation to meet self-stated goals, implement positive coping skills, engage in graded opportunities for success, and provide assessment ongoing.  Encourage feelings identification and expression in healthy ways.       "

## 2017-03-03 NOTE — PROGRESS NOTES
"Patient was asked if she could provide us with a urine sample but she said that she could not. Patient was asked if she felt uncomfortable and if she had consumed any alcohol before coming in. Patient became very agitated yelling, \" you are not my drug counselor bitch\".    Came out of her room and was irritated and agitated. Eating her dinner in her room currently. On call psychiatrist paged for additional prn's. Will put patient on SIO for nights and assess if she needs it for this shift if she becomes confrontational with peers or staff.    "

## 2017-03-03 NOTE — PROGRESS NOTES
Pt. Complained of breakfast giving her heart burn (Jordanian toast and sausage moe.) Staff made buttered toast with jelly which Pt. Ate.

## 2017-03-03 NOTE — PLAN OF CARE
Problem: Psychotic Symptoms  Goal: Psychotic Symptoms  Signs and symptoms of listed problems will be absent or manageable.   Outcome: No Change  Patient has had intrusive behavior; walking up to close to R.N.'s face, dancing energetically in the rosales, rapping loudly in the rosales, yelling on the phone as well as cursing on the phone. Patient has been restless and pacing in the rosales. Patient has also sat through community meeting and attended an O.T. Group. Patient took some Ativan and Benadryl when it was offered to her and  Was noticeably quieter and able to do some quiet activity in her room. Patient has been able to be redirectable today. Has been yelling on the phone about wanting to go to [place]. Feedback received from Shea CEJA Was that Daisy was disorganized, grabbing things she shouldn't of, unable to follow directions, marching and restless, wandering around the O.T. Room, and talking and standing to close to people.
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
## 2017-03-04 PROCEDURE — 12400003 ZZH R&B MH CRITICAL UMMC

## 2017-03-04 PROCEDURE — 90853 GROUP PSYCHOTHERAPY: CPT

## 2017-03-04 PROCEDURE — 25000132 ZZH RX MED GY IP 250 OP 250 PS 637: Performed by: NURSE PRACTITIONER

## 2017-03-04 PROCEDURE — 25000132 ZZH RX MED GY IP 250 OP 250 PS 637: Performed by: PSYCHIATRY & NEUROLOGY

## 2017-03-04 RX ADMIN — BENZTROPINE MESYLATE 0.5 MG: 0.5 TABLET ORAL at 08:08

## 2017-03-04 RX ADMIN — LORAZEPAM 2 MG: 1 TABLET ORAL at 08:09

## 2017-03-04 RX ADMIN — OLANZAPINE 10 MG: 10 TABLET, FILM COATED ORAL at 11:33

## 2017-03-04 RX ADMIN — OLANZAPINE 10 MG: 10 TABLET, FILM COATED ORAL at 17:15

## 2017-03-04 RX ADMIN — LORAZEPAM 2 MG: 1 TABLET ORAL at 16:31

## 2017-03-04 RX ADMIN — OLANZAPINE 10 MG: 10 TABLET, FILM COATED ORAL at 08:08

## 2017-03-04 RX ADMIN — LORAZEPAM 2 MG: 1 TABLET ORAL at 12:13

## 2017-03-04 RX ADMIN — RISPERIDONE 1 MG: 1 TABLET ORAL at 08:08

## 2017-03-04 RX ADMIN — NICOTINE POLACRILEX 4 MG: 4 GUM, CHEWING ORAL at 14:06

## 2017-03-04 RX ADMIN — SENNOSIDES AND DOCUSATE SODIUM 1 TABLET: 8.6; 5 TABLET ORAL at 08:08

## 2017-03-04 RX ADMIN — DIPHENHYDRAMINE HYDROCHLORIDE 50 MG: 50 CAPSULE ORAL at 16:31

## 2017-03-04 ASSESSMENT — ACTIVITIES OF DAILY LIVING (ADL)
ORAL_HYGIENE: INDEPENDENT
ORAL_HYGIENE: INDEPENDENT
DRESS: STREET CLOTHES;INDEPENDENT
GROOMING: INDEPENDENT
LAUNDRY: UNABLE TO COMPLETE
GROOMING: INDEPENDENT
DRESS: INDEPENDENT
LAUNDRY: WITH SUPERVISION

## 2017-03-04 NOTE — PROGRESS NOTES
On-call note:   Nursing staff approached me to report that the patient not longer requires 1:1 staffing in the evening. Described as sleeping through the night, without acute issues or intervention needed last night from her 1:1 staff person. Report also indicated that the patient displays more problematic behaviors during the day shift where she exhibits poor boundaries and touches other patients. 1:1 staffing during the day was recommended instead of nights.     Notes reviewed.  Plan: Change 1:1 staffing to daytime and d/c 1:1 staffing at nights. Primary team to follow-up on Monday.

## 2017-03-04 NOTE — PROGRESS NOTES
Pt needed frequent redirections d/t swearing, bizarre behaviors, statements.  Pt did floor exercises in the lounge + attempted to style a peers hair.

## 2017-03-04 NOTE — PROGRESS NOTES
[place] has [place] [facility] [place] and threatening towards her 1 to 1 staff. Patient making comments about punching them if they don't stop following her. Given prn Ativan and Benadryl but doesn't appear to help with her agitation.

## 2017-03-04 NOTE — H&P
"The patient was seen for 70 minutes on 2017.      40 minutes out of this time was spent on counseling and coordinating care, clarifying diagnostic prognostic issues and presence of support in community.      CHIEF COMPLAINT AND REASON FOR ADMISSION:  Daisy Curtis is a 31-year-old -American female with a diagnosis of schizoaffective disorder who was seen at the primary clinic for abdominal pain, nausea and vomiting in the setting of known ventral hernia.  Because of disorganization, patient was brought to the Emergency Department by the police department.      HISTORY OF PRESENT ILLNESS:  The patient does not appear to be a reliable historian.  She, in fact, was constantly on the move talking nonstop getting very close into my personal space and I had to ask her a number of times to maintain appropriate distance.  She was wearing a short top, low jeans.  I had to ask her to pull them up or change them to hospital garbs.  She was talking nonstop, was very difficult to understand.  She did say that she was here because of her mother, but could not or would not say why her mother was so concerned to make her admitted.  She rambled about her desire to go to [place] where \"my auntie lives.\"  She was hardly redirectable.  In addition to the above-mentioned issues, she has not been taking medication because she did not like blood draws required for being treated with clozapine.  She said that she has act an ACT team but also said that she does not answer the calls.  She says that she has 2 children; apparently the father of her son was killed a few years ago or .  She said that son is currently with his grandmother who is apparently mother of son's father not of the patient and her daughter is currently with her dad.  The patient reports that she has not been sleeping for more than 3-4 hours per night and said that she lost some weight.  She refused to stop eating during the interview, was chewing an " apple.  She repeatedly denied auditory and visual hallucinations.  She denied using street drugs.  I do not have a urine drug screen from the Emergency Department.  Patient refused to give urine sample due to her suspiciousness.  Patient was admitted on a 72-hour hold.      PAST MEDICAL HISTORY:  Significant for multiple hospitalizations for mental health.  She does have a history of abusing marijuana.  Lives with her mother.  According to DEC clinician's note, patient wanted money from her mom to buy a ticket to go to [place].  Mom did not agree.  According to her mother, the patient had been moaning with pain in her abdomen for the past couple of weeks, had been vomiting.  The patient was brought first to [facility] for abdominal pain.  However, after that visit, she found on the street and disorganized so the police department was called and the patient was brought in.  The patient currently according the same report is on commitment.  The patient's prescriber is Dr. Gonzalez at [facility].  The patient reportedly stopped taking medications about 2 weeks ago due to abdominal pain, but also has a history of refusing medication for other reasons and mentioned that she did not like blood draws.  The patient has a  Jyotsna Catherine, through [facility] team.  Psychiatric prescriber is Dr. Sehffield from [facility]. She was hospitalized multiple times, last time at [facility] in 06/2016, was committed via [facility] who recommended/ had her on multiple psychotropic medications including Clozaril for patient and she had a lot of salivation and also difficulties with breathing.      FAMILY AND SOCIAL HISTORY:  Her aunt had some sort of mental health diagnosis.  Brother and uncle both struggled with chemical dependency.  The patient grew up in [place] and [place].  She is an only child.  Her father has 5 other children in [place].  He apparently murdered a police  officer.  She grew up with her mother.  The patient completed a GED and also did some college.  Worked in fast food in retail.  She has 2 children but neither one is in her custody.      PAST MEDICAL HISTORY:  Significant for abdominal pain, uncomplicated asthma.        ALLERGIES:  Allergic to codeine.      HOME MEDICATIONS (that she has not been taking):   1.  Risperdal 4 mg each day at bedtime.     Kenalog 0.5% ointment apply 2 times a day.   2.  Senokot 8.3/50 mg per tablet, 1 tablet 2 times a day.   3.  Zofran 4 mg 3 times a day as needed for nausea.    4.  Lithium 600 mg at bedtime.     5.  Nicorette 4 mg.     6.  ? as needed for smoking cessation.     7.  Percocet 5/325 mg every 6 hours as needed for moderate to severe pain.         PHYSICAL EXAMINATION:     VITAL SIGNS:  Temperature 99.8, respirations 16, blood pressure 118/84, heart rate 106.    For rest of physical examination please refer to Dr. Darby's note of 03/02/2017.        REVIEW OF SYSTEMS:  12-point review of systems positive for mental health and recent abdominal pain, otherwise negative.      MENTAL STATUS EXAMINATION:  The patient as overweight -American female who is constantly on the move, disorganized, dancing while talking nonstop, loud at times, mumbles with an exaggerated affect.  She is able to provide brief answers to questions.  Says she cannot stop.  She has poor boundaries and gets into personal space.  She denied auditory or visual hallucinations.  Also, I am not sure if it is true.  She denied suicidal or homicidal thoughts.  Her ability to focus and concentrate is very poor.  She appears to be manic/psychotic, thought processes are disorganized.  Insight and judgment are both poor.      IMPRESSION:  Historical diagnosis of schizoaffective disorder, bipolar type, manic.  Marijuana use disorder.      TREATMENT PLAN:  The patient is currently on 72-hour hold; however, thought her commitment status should be clarified.  It  will be done by [facility].  She has been off all her medications for 1-2 weeks; indicated that she would not like to be prescribed Clozaril and I do not believe that she is a very reliable person to be prescribed it in the future.  I am going to discontinue Clozaril but will continue her on Risperdal; we will give her 3 mg at night instead of 4 mg, but give 1 mg in the morning.  Will continue the same dose of lithium and further adjustment based on patient's symptoms.  I think she will benefit from being on injectable Risperdal Consta.  Will use PRNs such as Zyprexa and Ativan to slow her down.  Will also started on Cogentin 0.5 mg 2 times a day to prevent extrapyramidal side effects.         NORBERTO BRIGHT MD             D: 2017 20:02   T: 2017 23:27   MT:       Name:     JULY UNDERWOOD   MRN:      [id number]        Account:      [id number]   :      1985           Admitted:     [id number]      Document: [id number]

## 2017-03-04 NOTE — PROGRESS NOTES
Pt was in the milieu.  She required frequent redirection re: not swearing, not touching others, not exercising/streatching on the floor.  Pt denies anx, dep, SI, SIB.  Pt swore, yelled at staff.

## 2017-03-04 NOTE — PROGRESS NOTES
Patient on the phone crying hysterically and having trouble calming down. Patient was asked several times if she wanted to speak with staff or lay down in her room. Patient declined. Patient requested broth and crackers.

## 2017-03-04 NOTE — PROGRESS NOTES
03/03/17 1391   Behavioral Health   Hallucinations denies / not responding to hallucinations   Thinking distractable   Orientation person: oriented   Memory baseline memory   Insight poor   Judgement impaired   Eye Contact at examiner   Affect tense   Suicidality other (see comments)  (none reported or observed)   Self Injury other (see comment)  (none reported or observed)   Activity other (see comment)  (community meeting)   Activities of Daily Living   Hygiene/Grooming independent   Oral Hygiene independent   Dress independent   Room Organization independent   Behavioral Health Interventions   Psychotic Symptoms maintain safety precautions;maintain safe secure environment   Social and Therapeutic Interventions (Psychotic Symptoms) encourage socialization with peers;encourage effective boundaries with peers;encourage participation in therapeutic groups and milieu activities

## 2017-03-05 PROCEDURE — 25000125 ZZHC RX 250: Performed by: PSYCHIATRY & NEUROLOGY

## 2017-03-05 PROCEDURE — 25000132 ZZH RX MED GY IP 250 OP 250 PS 637: Performed by: PSYCHIATRY & NEUROLOGY

## 2017-03-05 PROCEDURE — 12400003 ZZH R&B MH CRITICAL UMMC

## 2017-03-05 PROCEDURE — 25000132 ZZH RX MED GY IP 250 OP 250 PS 637: Performed by: NURSE PRACTITIONER

## 2017-03-05 RX ADMIN — ONDANSETRON 4 MG: 4 TABLET, ORALLY DISINTEGRATING ORAL at 05:31

## 2017-03-05 RX ADMIN — RISPERIDONE 1 MG: 1 TABLET ORAL at 08:22

## 2017-03-05 RX ADMIN — LORAZEPAM 2 MG: 1 TABLET ORAL at 13:12

## 2017-03-05 RX ADMIN — LORAZEPAM 2 MG: 1 TABLET ORAL at 05:00

## 2017-03-05 RX ADMIN — BENZTROPINE MESYLATE 0.5 MG: 0.5 TABLET ORAL at 20:00

## 2017-03-05 RX ADMIN — NICOTINE POLACRILEX 4 MG: 4 GUM, CHEWING ORAL at 16:18

## 2017-03-05 RX ADMIN — DIPHENHYDRAMINE HYDROCHLORIDE 50 MG: 50 CAPSULE ORAL at 17:06

## 2017-03-05 RX ADMIN — HALOPERIDOL 5 MG: 5 TABLET ORAL at 13:12

## 2017-03-05 RX ADMIN — BENZTROPINE MESYLATE 0.5 MG: 0.5 TABLET ORAL at 08:22

## 2017-03-05 RX ADMIN — NICOTINE POLACRILEX 4 MG: 4 GUM, CHEWING ORAL at 16:17

## 2017-03-05 RX ADMIN — LORAZEPAM 2 MG: 1 TABLET ORAL at 17:06

## 2017-03-05 RX ADMIN — RISPERIDONE 3 MG: 3 TABLET ORAL at 20:00

## 2017-03-05 RX ADMIN — ACETAMINOPHEN 650 MG: 325 TABLET, FILM COATED ORAL at 05:00

## 2017-03-05 RX ADMIN — SENNOSIDES AND DOCUSATE SODIUM 1 TABLET: 8.6; 5 TABLET ORAL at 20:00

## 2017-03-05 RX ADMIN — LITHIUM CARBONATE 600 MG: 300 TABLET, FILM COATED, EXTENDED RELEASE ORAL at 20:00

## 2017-03-05 RX ADMIN — LORAZEPAM 2 MG: 1 TABLET ORAL at 08:21

## 2017-03-05 RX ADMIN — TRIAMCINOLONE ACETONIDE: 5 OINTMENT TOPICAL at 20:00

## 2017-03-05 RX ADMIN — ALUMINUM HYDROXIDE, MAGNESIUM HYDROXIDE, AND DIMETHICONE 30 ML: 400; 400; 40 SUSPENSION ORAL at 08:32

## 2017-03-05 RX ADMIN — NICOTINE POLACRILEX 4 MG: 4 GUM, CHEWING ORAL at 20:44

## 2017-03-05 RX ADMIN — NICOTINE POLACRILEX 4 MG: 4 GUM, CHEWING ORAL at 07:26

## 2017-03-05 ASSESSMENT — ACTIVITIES OF DAILY LIVING (ADL)
DRESS: INDEPENDENT
ORAL_HYGIENE: INDEPENDENT
GROOMING: INDEPENDENT;PROMPTS;SHOWER
GROOMING: INDEPENDENT
ORAL_HYGIENE: INDEPENDENT
LAUNDRY: UNABLE TO COMPLETE
LAUNDRY: WITH SUPERVISION

## 2017-03-05 NOTE — PROGRESS NOTES
03/05/17 1151   Behavioral Health   Hallucinations denies / not responding to hallucinations   Thinking poor concentration;distractable   Orientation time: oriented;date: oriented;place: oriented;person: oriented   Memory baseline memory   Insight poor   Judgement impaired   Eye Contact at examiner   Affect sad;tense;blunted, flat;irritable   Mood anxious;depressed;labile   Physical Appearance/Attire neat;attire appropriate to age and situation;appears stated age   Hygiene well groomed   Suicidality other (see comments)  (None Stated)   Self Injury other (see comment)  (None Stated)   Activity restless;other (see comment)  (group, pacing, visible)   Speech clear;coherent   Medication Sensitivity no observed side effects;no stated side effects   Psychomotor / Gait steady;balanced;paces     Pt. Was observed making phone calls. Pt. Would like to go back to [place]. Pt. Was pacing, intrusive at times, sleeping/napping, and minimal social with others. Pt. Attended and participated in community meeting. Pt. Had an upset stomach. Pt. Appeared depressed, sad, anxious, irritable, and labile at times.

## 2017-03-05 NOTE — PROGRESS NOTES
Pt slept well until approximately 04:30 when she awoke and began grunting and moaning loudly in her room. Pt came running out of her room clutching her stomach, stating that her hernia hurt, and that she needed a hot pack. Pt has a noticeable, protrusive belly. Pt was running with a very odd gait; quick little steps that appeared the way a person looks when they need to urinate and are trying to hold it in. Pt needed constant redirection in the hallway at this point as she was making a lot of noise and running in the hallway. Tylenol and ativan administered PO with little to moderate results. Zofran administered a bit later for nausea. Pt is now laying in bed but continues to intermittently groan and make gutteral noises while in her room.

## 2017-03-05 NOTE — PROGRESS NOTES
03/04/17 2037   Behavioral Health   Thinking poor concentration;distractable   Orientation person: oriented;place: oriented   Insight poor   Judgement impaired   Eye Contact into space   Affect incongruent   Mood labile   Physical Appearance/Attire disheveled   Hygiene neglected grooming - unclean body, hair, teeth   Suicidality other (see comments)  (none reported)   Self Injury other (see comment)  (none reported)   Activity restless   Speech tangential;rambling;pressured   Psychomotor / Gait balanced

## 2017-03-05 NOTE — PLAN OF CARE
When staff walked into patient room he noticed that she had vomited multiple times in different places. The room was in a big mess with old food, and half filled cups and glasses through out. Linens were stained with emesis. Patient was spitting into old cups, and then preceded to go into bathroom and throw up in the toilet.  She was given Zofran on night shift, but staff did offer her some sprite to settle her stomach.  She took shower , put on clean scrubs while staff cleaned her room. No temp but elevated . Will continue to monitor VS  , encourage fluids.

## 2017-03-05 NOTE — PLAN OF CARE
No more emesis this shift, HR down to 100. Less restless after cogentin given  Less intrusive behaviors. Will continue to monitor.

## 2017-03-06 PROCEDURE — 12400003 ZZH R&B MH CRITICAL UMMC

## 2017-03-06 PROCEDURE — 25000132 ZZH RX MED GY IP 250 OP 250 PS 637: Performed by: NURSE PRACTITIONER

## 2017-03-06 PROCEDURE — 99233 SBSQ HOSP IP/OBS HIGH 50: CPT | Performed by: PSYCHIATRY & NEUROLOGY

## 2017-03-06 PROCEDURE — 25000125 ZZHC RX 250: Performed by: PSYCHIATRY & NEUROLOGY

## 2017-03-06 PROCEDURE — 25000132 ZZH RX MED GY IP 250 OP 250 PS 637: Performed by: PSYCHIATRY & NEUROLOGY

## 2017-03-06 RX ORDER — DIAZEPAM 5 MG
10 TABLET ORAL 3 TIMES DAILY
Status: DISCONTINUED | OUTPATIENT
Start: 2017-03-06 | End: 2017-03-08

## 2017-03-06 RX ORDER — LITHIUM CARBONATE 300 MG/1
600 TABLET, FILM COATED, EXTENDED RELEASE ORAL 2 TIMES DAILY
Status: DISCONTINUED | OUTPATIENT
Start: 2017-03-06 | End: 2017-03-09 | Stop reason: HOSPADM

## 2017-03-06 RX ADMIN — DIAZEPAM 10 MG: 5 TABLET ORAL at 20:07

## 2017-03-06 RX ADMIN — TRIAMCINOLONE ACETONIDE: 5 OINTMENT TOPICAL at 09:05

## 2017-03-06 RX ADMIN — DIPHENHYDRAMINE HYDROCHLORIDE 50 MG: 50 CAPSULE ORAL at 10:28

## 2017-03-06 RX ADMIN — DIPHENHYDRAMINE HYDROCHLORIDE 50 MG: 50 CAPSULE ORAL at 14:19

## 2017-03-06 RX ADMIN — LORAZEPAM 2 MG: 1 TABLET ORAL at 23:19

## 2017-03-06 RX ADMIN — ONDANSETRON 4 MG: 4 TABLET, ORALLY DISINTEGRATING ORAL at 23:47

## 2017-03-06 RX ADMIN — OLANZAPINE 10 MG: 10 TABLET, FILM COATED ORAL at 14:34

## 2017-03-06 RX ADMIN — ACETAMINOPHEN 650 MG: 325 TABLET, FILM COATED ORAL at 23:49

## 2017-03-06 RX ADMIN — LORAZEPAM 2 MG: 1 TABLET ORAL at 14:19

## 2017-03-06 RX ADMIN — LORAZEPAM 2 MG: 1 TABLET ORAL at 10:28

## 2017-03-06 RX ADMIN — RISPERIDONE 3 MG: 3 TABLET ORAL at 20:07

## 2017-03-06 RX ADMIN — HALOPERIDOL 5 MG: 5 TABLET ORAL at 14:34

## 2017-03-06 RX ADMIN — SENNOSIDES AND DOCUSATE SODIUM 1 TABLET: 8.6; 5 TABLET ORAL at 09:05

## 2017-03-06 RX ADMIN — ALUMINUM HYDROXIDE, MAGNESIUM HYDROXIDE, AND DIMETHICONE 30 ML: 400; 400; 40 SUSPENSION ORAL at 23:47

## 2017-03-06 RX ADMIN — DIPHENHYDRAMINE HYDROCHLORIDE 50 MG: 50 CAPSULE ORAL at 23:19

## 2017-03-06 RX ADMIN — LITHIUM CARBONATE 600 MG: 300 TABLET, FILM COATED, EXTENDED RELEASE ORAL at 20:08

## 2017-03-06 RX ADMIN — OLANZAPINE 10 MG: 10 TABLET, FILM COATED ORAL at 10:59

## 2017-03-06 RX ADMIN — BENZTROPINE MESYLATE 0.5 MG: 0.5 TABLET ORAL at 20:07

## 2017-03-06 RX ADMIN — SENNOSIDES AND DOCUSATE SODIUM 1 TABLET: 8.6; 5 TABLET ORAL at 20:08

## 2017-03-06 RX ADMIN — NICOTINE POLACRILEX 4 MG: 4 GUM, CHEWING ORAL at 08:49

## 2017-03-06 RX ADMIN — BENZTROPINE MESYLATE 0.5 MG: 0.5 TABLET ORAL at 09:04

## 2017-03-06 RX ADMIN — RISPERIDONE 1 MG: 1 TABLET ORAL at 09:03
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
## 2017-03-06 NOTE — PROGRESS NOTES
[facility] met with patient's ACT RN DEBO Case [phone] To provide with an update of patient care.  Patient's  will be back in the office tomorrow, the ACT team with consult and connect with treatment team about appropriate discharge plans.      [facility] received voice mail. From Ashlyn Mendiola, ACT , she reported that she was requesting that the patient's provisional discharge be revoked.  Ashlny did send letter supporting revocation via fax this afternoon.  One copy placed in patient;s chart on unit.  Other copy placed in bin to be scanned immediately.

## 2017-03-06 NOTE — PROGRESS NOTES
Patient has been needing constant redirection this morning for being loud and leaving garbage on the tables. Quite restless.  Drinking coffee excessively.  Patient a bit hyper- verbal and irritable. Patient was given prn Ativan, Benadryl and Zyprexa.

## 2017-03-06 NOTE — PLAN OF CARE
Late Note:    Patient has been out and in the lounge majority of the shift. Patient continues to be restless and anxious. Patient denies suicidal ideation and self injurious thoughts. Patient denies auditory and visual hallucinations but appears distracted and like she responding to internal stimuli. Patient continues to need a bit of redirection for intrusive behaviors but much better then the past few days. Patient was upsetting other patient's in the lounge by being loud and leaving her garbage on the table, took redirection well. Requested prn Ativan and Benadryl once.    Patient ate dinner, attended community meeting and has been on the phone constantly.     Patient evaluation continues. Assessed mood,anxiety,thoughts and behavior.     Patient gradually progressing towards goals.    Patient is encouraged to participate in groups and assisted to develop healthy coping skills.     VS reviewed: /88  Pulse 146  Temp 98.1  F (36.7  C)  Resp 20  Wt 77.1 kg (170 lb)  SpO2 97%  BMI 31.6 kg/m2    Length of stay: 4    Refer to daily team meeting notes for individualized plan of care. Nursing will continue to assess.

## 2017-03-07 ENCOUNTER — APPOINTMENT (OUTPATIENT)
Dept: CT IMAGING | Facility: CLINIC | Age: 32
DRG: 885 | End: 2017-03-07
Attending: PHYSICIAN ASSISTANT
Payer: COMMERCIAL

## 2017-03-07 LAB
ALBUMIN SERPL-MCNC: 3.6 G/DL (ref 3.4–5)
ALP SERPL-CCNC: 87 U/L (ref 40–150)
ALT SERPL W P-5'-P-CCNC: 43 U/L (ref 0–50)
ANION GAP SERPL CALCULATED.3IONS-SCNC: 9 MMOL/L (ref 3–14)
AST SERPL W P-5'-P-CCNC: 24 U/L (ref 0–45)
BILIRUB SERPL-MCNC: 0.4 MG/DL (ref 0.2–1.3)
BUN SERPL-MCNC: 5 MG/DL (ref 7–30)
CALCIUM SERPL-MCNC: 9.5 MG/DL (ref 8.5–10.1)
CHLORIDE SERPL-SCNC: 107 MMOL/L (ref 94–109)
CO2 SERPL-SCNC: 26 MMOL/L (ref 20–32)
CREAT SERPL-MCNC: 0.77 MG/DL (ref 0.52–1.04)
CRP SERPL-MCNC: 9.7 MG/L (ref 0–8)
ERYTHROCYTE [DISTWIDTH] IN BLOOD BY AUTOMATED COUNT: 12.1 % (ref 10–15)
GFR SERPL CREATININE-BSD FRML MDRD: 87 ML/MIN/1.7M2
GLUCOSE SERPL-MCNC: 113 MG/DL (ref 70–99)
HCT VFR BLD AUTO: 37.1 % (ref 35–47)
HGB BLD-MCNC: 12.1 G/DL (ref 11.7–15.7)
LACTATE BLD-SCNC: 2.1 MMOL/L (ref 0.7–2.1)
MCH RBC QN AUTO: 31.2 PG (ref 26.5–33)
MCHC RBC AUTO-ENTMCNC: 32.6 G/DL (ref 31.5–36.5)
MCV RBC AUTO: 96 FL (ref 78–100)
PLATELET # BLD AUTO: 353 10E9/L (ref 150–450)
POTASSIUM SERPL-SCNC: 4.3 MMOL/L (ref 3.4–5.3)
PROT SERPL-MCNC: 7.5 G/DL (ref 6.8–8.8)
RBC # BLD AUTO: 3.88 10E12/L (ref 3.8–5.2)
SODIUM SERPL-SCNC: 142 MMOL/L (ref 133–144)
WBC # BLD AUTO: 8.9 10E9/L (ref 4–11)

## 2017-03-07 PROCEDURE — 25000128 H RX IP 250 OP 636: Performed by: NURSE PRACTITIONER

## 2017-03-07 PROCEDURE — 36415 COLL VENOUS BLD VENIPUNCTURE: CPT | Performed by: PHYSICIAN ASSISTANT

## 2017-03-07 PROCEDURE — 85027 COMPLETE CBC AUTOMATED: CPT | Performed by: PHYSICIAN ASSISTANT

## 2017-03-07 PROCEDURE — 74177 CT ABD & PELVIS W/CONTRAST: CPT

## 2017-03-07 PROCEDURE — 25000128 H RX IP 250 OP 636: Performed by: STUDENT IN AN ORGANIZED HEALTH CARE EDUCATION/TRAINING PROGRAM

## 2017-03-07 PROCEDURE — 86140 C-REACTIVE PROTEIN: CPT | Performed by: PHYSICIAN ASSISTANT

## 2017-03-07 PROCEDURE — 99221 1ST HOSP IP/OBS SF/LOW 40: CPT | Performed by: PHYSICIAN ASSISTANT

## 2017-03-07 PROCEDURE — 12400003 ZZH R&B MH CRITICAL UMMC

## 2017-03-07 PROCEDURE — 25000132 ZZH RX MED GY IP 250 OP 250 PS 637: Performed by: NURSE PRACTITIONER

## 2017-03-07 PROCEDURE — 25000128 H RX IP 250 OP 636: Performed by: PHYSICIAN ASSISTANT

## 2017-03-07 PROCEDURE — 83605 ASSAY OF LACTIC ACID: CPT | Performed by: PHYSICIAN ASSISTANT

## 2017-03-07 PROCEDURE — 25000125 ZZHC RX 250: Performed by: PSYCHIATRY & NEUROLOGY

## 2017-03-07 PROCEDURE — 80053 COMPREHEN METABOLIC PANEL: CPT | Performed by: PHYSICIAN ASSISTANT

## 2017-03-07 PROCEDURE — S0166 INJ OLANZAPINE 2.5MG: HCPCS | Performed by: PSYCHIATRY & NEUROLOGY

## 2017-03-07 PROCEDURE — 25500064 ZZH RX 255 OP 636: Performed by: PSYCHIATRY & NEUROLOGY

## 2017-03-07 PROCEDURE — 25000132 ZZH RX MED GY IP 250 OP 250 PS 637: Performed by: PSYCHIATRY & NEUROLOGY

## 2017-03-07 RX ORDER — IOPAMIDOL 755 MG/ML
84 INJECTION, SOLUTION INTRAVASCULAR ONCE
Status: COMPLETED | OUTPATIENT
Start: 2017-03-07 | End: 2017-03-07

## 2017-03-07 RX ORDER — KETOROLAC TROMETHAMINE 30 MG/ML
30 INJECTION, SOLUTION INTRAMUSCULAR; INTRAVENOUS EVERY 6 HOURS PRN
Status: DISCONTINUED | OUTPATIENT
Start: 2017-03-07 | End: 2017-03-09 | Stop reason: HOSPADM

## 2017-03-07 RX ORDER — HYDROMORPHONE HYDROCHLORIDE 1 MG/ML
0.4 INJECTION, SOLUTION INTRAMUSCULAR; INTRAVENOUS; SUBCUTANEOUS ONCE
Status: COMPLETED | OUTPATIENT
Start: 2017-03-07 | End: 2017-03-07

## 2017-03-07 RX ORDER — PANTOPRAZOLE SODIUM 40 MG/1
40 TABLET, DELAYED RELEASE ORAL EVERY MORNING
Status: DISCONTINUED | OUTPATIENT
Start: 2017-03-08 | End: 2017-03-09 | Stop reason: HOSPADM

## 2017-03-07 RX ORDER — KETOROLAC TROMETHAMINE 30 MG/ML
30 INJECTION, SOLUTION INTRAMUSCULAR; INTRAVENOUS EVERY 6 HOURS PRN
Status: DISCONTINUED | OUTPATIENT
Start: 2017-03-07 | End: 2017-03-07

## 2017-03-07 RX ADMIN — OLANZAPINE 10 MG: 10 TABLET, FILM COATED ORAL at 18:50

## 2017-03-07 RX ADMIN — KETOROLAC TROMETHAMINE 30 MG: 30 INJECTION, SOLUTION INTRAMUSCULAR at 06:26

## 2017-03-07 RX ADMIN — Medication 61 ML: at 13:20

## 2017-03-07 RX ADMIN — LORAZEPAM 2 MG: 2 INJECTION INTRAMUSCULAR; INTRAVENOUS at 04:14

## 2017-03-07 RX ADMIN — IOPAMIDOL 84 ML: 755 INJECTION, SOLUTION INTRAVENOUS at 13:19

## 2017-03-07 RX ADMIN — OLANZAPINE 10 MG: 10 INJECTION, POWDER, FOR SOLUTION INTRAMUSCULAR at 03:07

## 2017-03-07 RX ADMIN — SENNOSIDES AND DOCUSATE SODIUM 1 TABLET: 8.6; 5 TABLET ORAL at 14:43

## 2017-03-07 RX ADMIN — BENZTROPINE MESYLATE 0.5 MG: 0.5 TABLET ORAL at 14:43

## 2017-03-07 RX ADMIN — DIAZEPAM 10 MG: 5 TABLET ORAL at 09:12

## 2017-03-07 RX ADMIN — HALOPERIDOL 5 MG: 5 TABLET ORAL at 02:24

## 2017-03-07 RX ADMIN — HYDROMORPHONE HYDROCHLORIDE 0.4 MG: 10 INJECTION, SOLUTION INTRAMUSCULAR; INTRAVENOUS; SUBCUTANEOUS at 10:14

## 2017-03-07 RX ADMIN — RISPERIDONE 1 MG: 1 TABLET ORAL at 14:42

## 2017-03-07 RX ADMIN — LORAZEPAM 2 MG: 1 TABLET ORAL at 17:20

## 2017-03-07 RX ADMIN — LITHIUM CARBONATE 600 MG: 300 TABLET, FILM COATED, EXTENDED RELEASE ORAL at 14:43

## 2017-03-07 ASSESSMENT — ACTIVITIES OF DAILY LIVING (ADL)
DRESS: INDEPENDENT
ORAL_HYGIENE: INDEPENDENT
DRESS: SCRUBS (BEHAVIORAL HEALTH)
GROOMING: HANDWASHING
ORAL_HYGIENE: PROMPTS
HYGIENE/GROOMING: PROMPTS
LAUNDRY: UNABLE TO COMPLETE

## 2017-03-07 NOTE — PROGRESS NOTES
03/07/17 1100   Behavioral Health   Hallucinations denies / not responding to hallucinations   Thinking confused   Orientation situation, disoriented   Insight poor   Judgement impaired   Eye Contact at examiner   Affect irritable;tense   Mood irritable;labile   Physical Appearance/Attire attire appropriate to age and situation;disheveled   Hygiene well groomed;other (see comment)  (Showered)   Suicidality other (see comments)  (HARITHA)   Self Injury other (see comment)  (HARITHA)   Activity hyperactive (agitated, impulsive);isolative   Speech coherent;flight of ideas;pressured   Medication Sensitivity (WDL) WDL   Psychomotor / Gait unsteady;agitated;hyperactive;paces   Psycho Education   Type of Intervention 1:1 intervention   Response participates, initiates socially appropriate   Activities of Daily Living   Hygiene/Grooming prompts   Oral Hygiene prompts   Dress independent   Laundry unable to complete   Room Organization unable   patient spent the shift reporting high levels of pain or sleeping in her room.

## 2017-03-07 NOTE — PROGRESS NOTES
Daisy Curtis faxed over patient's medication list: dosages, start and end dates listed. Placed in patient's chart.

## 2017-03-07 NOTE — PROGRESS NOTES
03/06/17 2120   Behavioral Health   Thinking confused   Insight poor   Eye Contact into space   Affect angry;tense;irritable   Physical Appearance/Attire untidy   Antwon Risk Assessment   Sensory Perception 2-->very limited     Pt did not want to talk. Confused, disorganized partly unkempt and generally irritable.

## 2017-03-07 NOTE — PROGRESS NOTES
Court paperwork faxed @[phone] revoking patient's provisional discharge from [facility]. Recommitted as mentally ill to [facility].

## 2017-03-07 NOTE — PROGRESS NOTES
Pt awake again with similar behaviors; On-call psychiatry and house doctor notified. House doctor ordered PRN toradol for abd pain.

## 2017-03-07 NOTE — PROGRESS NOTES
Patient has had a very rough shift. Pt has not slept longer than 45 minutes all night. Pt has been frequently complaining of abdominal pain and has had 2 emeses of mostly undigested food and beverages. No blood noted in vomit. Pt has been loudly groaning, grunting, sobbing and running in and out of her room.  Pt has been given many medications overnight to assist with pain and disorganized behavior. Pt received tylenol 650mg at 23:49 along with maalox and zofran. These medications did not appear to be helpful with patient's pain, nor with her nausea as she vomited x2 after administration. Pt received haldol 5mg PO at 2:24 but this medication did nothing for the patient. She continued running in and out of her room, laying on the hallway floor, and loudly begging for help. At 3:07 pt was cooperative and agreeable to having an injection of IM Zyprexa 10mg administered. An hour later, pt was still displaying the same behaviors and still wanted another shot of something. Ativan 2mg IM administered without issue at 4:14am. Pt was finally able to get some rest after this medication.

## 2017-03-07 NOTE — PROGRESS NOTES
[facility]   CLINICAL TREATMENT COORDINATOR PROGRESS NOTE      DATA:     [facility] contacted patient's  Jyotsna [phone].  She reported that patient has been dealing with some physical health matters that have caused her to stop taking her medications consistently because she could not keep anything down.  She reported that patient decompensated.  Jyotsna stated that the ACT team has discussed having the hernia surgery as this may be helpful in decreasing some of her physical pain and discomfort.  She also stated that this may help get patient back to taking her medications and hopefully keeping them down.  She stated that at this time the hernia surgery is voluntary and that the patient has be declining it as it may leave scarring.    Jyotsna stated that patient typically returns to her mother's home and the ACT team tries to see her about 3 times a week.  She stated that this would be an appropriate discharge plan.  [facility] informed Jyotsna that MD wanted to discuss medication treatment plan with her ACT psychiatrist to discuss whether or not the Clozaril should be restarted as it was not when she was admitted.  Jyotsna did not have Dr. Gisela Day MD phone number but stated that she will be in the office tomorrow and that treatment team could contact her and she will assist with provider to provider contact to discuss medication plan.      INTERVENTION:     Collateral contact with     ASSESSMENT:     No contact with patient today.    PLAN:     Patient is transferring to [facility] under Dr. Schultz.  Treatment team should follow up with patient's ACT team about have a provider to provider contact regarding medication plan.      ACT team was also in the process of revoking patient's provisional discharge.  Letter of support is inpatient's chart.  Jyotsna stated that she did speak to someone at the Courts about this due to patient not being committed to [facility].  [facility] stated  that the team would more than likely have to complete a remote provisional discharge once she is ready.  Follow up with  if revocation of PD is not received in the next couple of days.     Primary Care: Khadijah Quintero [phone]  ACT Team Mental Health Rsources:  : Jyotsna Catherine [phone]  ACT RN: Manpreet Sheehan [phone]  ACT : Ai Mendiola [phone]  Psychiatrist: Dr. Gisela Day MD  [facility] [address] CTC will follow up with Jyotsna in the morning once made aware that the transfer actual took place and provider her with the necessary contact information for the CTC that will follow the patient.

## 2017-03-07 NOTE — CONSULTS
Internal Medicine Initial Visit    Daisy Curtis MRN# [id number]   Age: 31 year old YOB: 1985   Date of Admission: 3/2/2017     Reason for consult: Nausea, Abdominal Pain       Requesting physician Dr. Tim Vargas      SUBJECTIVE   HPI:   Daisy Curtis is a 31 year old female with history of substance abuse, anxiety, schizoaffective disorder, ventral hernia, and asthma admitted to [facility] [address] after being BIBA due to erratic behavior and safety concerns. Medicine was consulted to evaluate patient's nausea and abdominal pain.    Patient was initially brought into the ED on 3/2 due to safety concerns and was then transferred to psychiatry for stabilization.     Patient complains of significant abdominal pain. She states she has had this for 8+ years, although believes it has worsened over the past week. She has a known ventral hernia which was most recently evaluated 2/28/17 at [facility]. CT scan 2/26 read from [facility]: mild fat stranding involving the omentum contained within a ventral hernia, suggesting inflammation (but appears less severe than on previous exam 11/15/2016); wall thickening and hypoattenuation of a short segment of transverse colon underlying the hernia could be 2/2 technical factors but could also signify inflammation that could result from transient herniation; hepatic steatosis. Of note, patient has had 10 CT scans of abdomen since 2014 for evaluation of abdominal  Pain. Clinic evaluation on 2/28 noted hernia to be reducible, and planned on elective repair to be scheduled in the upcoming week or two. Patient has also been having persistent nausea/vomiting 2/2 the abdominal pain. Upon examination, patient screaming for a CT scan and IM toradol. No fevers or chills. No chest pain.    History obtained per patient and chart review.     Past Medical History:     Past Medical History   Diagnosis Date     Anxiety      Substance abuse      Uncomplicated asthma        Past Surgical History:      Past Surgical History   Procedure Laterality Date     Cholecystectomy       Gyn surgery            Orthopedic surgery       scoliosis repair       Medications prior to admission:     Prior to Admission Medications   Prescriptions Last Dose Informant Patient Reported? Taking?   RISPERIDONE PO Past Month Pharmacy Yes Yes   Sig: Take 4 mg by mouth At Bedtime   lithium (ESKALITH/LITHOBID) 300 MG CR tablet Past Month Pharmacy Yes Yes   Sig: Take 600 mg by mouth At Bedtime   nicotine polacrilex (NICORETTE) 4 MG gum Past Month Pharmacy Yes Yes   Sig: Place 4 mg inside cheek as needed for smoking cessation   ondansetron (ZOFRAN-ODT) 4 MG ODT tab Past Month Pharmacy Yes Yes   Sig: Take 4 mg by mouth 3 times daily as needed for nausea   oxyCODONE-acetaminophen (PERCOCET) 5-325 MG per tablet Past Month Pharmacy Yes Yes   Sig: Take 1 tablet by mouth every 6 hours as needed for moderate to severe pain   senna-docusate (SENOKOT-S;PERICOLACE) 8.6-50 MG per tablet Past Month Pharmacy Yes Yes   Sig: Take 1 tablet by mouth 2 times daily   triamcinolone (KENALOG) 0.5 % OINT ointment Past Month Pharmacy Yes Yes   Sig: Apply topically 2 times daily      Facility-Administered Medications: None         Allergies:     Allergies   Allergen Reactions     Codeine Sulfate          Social History:   Unable to obtain 2/2 patient's acute abdominal pain.     Family History:   No family history on file.      Review of Systems:   Ten point review of systems negative except as stated above in History of Present Illness.    OBJECTIVE   Physical Exam:   Blood pressure (!) 145/94, pulse 86, temperature 98.6  F (37  C), temperature source Oral, resp. rate 18, weight 77.1 kg (170 lb), SpO2 97 %, not currently breastfeeding.  General: Patient appears uncomfortable, rocking back and forth on bed, intermittently groaning/screaming.  HEENT: NC/AT. Anicteric sclera. Eyes symmetric and free of discharge bilaterally. Mucous  "membranes moist.  Neck: Supple  Cardiovascular: RRR. S1,S2. No murmurs appreciated.  Lungs: Normal respiratory effort on RA. Lungs CTAB without wheezes, rales, or rhonchi.  Abdomen: Soft, nondistended. Diffuse mild tenderness. Supra-umbilical hernia reducible. Bowel sounds normoactive.  Extremities: Warm & well perfused. No peripheral edema.  Skin: No rashes, jaundice, or lesions on exposed areas of skin.  Neurologic: Answers questions with screaming \"yes/no\". Moves all extremities symmetrically.     Laboratory Data:   CMP    Recent Labs  Lab 03/07/17  1037      POTASSIUM 4.3   CHLORIDE 107   CO2 26   ANIONGAP 9   *   BUN 5*   CR 0.77   GFRESTIMATED 87   GFRESTBLACK >90African American GFR Calc   GIBRAN 9.5   PROTTOTAL 7.5   ALBUMIN 3.6   BILITOTAL 0.4   ALKPHOS 87   AST 24   ALT 43       CBC    Recent Labs  Lab 03/07/17  1037   WBC 8.9   RBC 3.88   HGB 12.1   HCT 37.1   MCV 96   MCH 31.2   MCHC 32.6   RDW 12.1         Assessment and Plan/Recommendations:   Daisy Curtis is a 31 year old female with history of substance abuse, anxiety, schizoaffective disorder, ventral hernia, and asthma admitted to [facility] [address] after being BIBA due to erratic behavior and safety concerns. Medicine was consulted to evaluate patient's nausea and abdominal pain.    Substance Abuse, Schizoaffective Disorder, Anxiety. Admitted due to erratic behavior and safety concerns.  - Management per psychiatry    Ventral Hernia, Nausea, Abdominal Pain. Ventral hernia noted on CT 1/2013 from OSH, has had multiple scans since. CT Abd/Pelvis 2/26 with mild fat stranding involving the omentum contained within ventral hernia, suggesting inflammation (although improved from 11/15/16); wallthickening and hypoattenuation of a short segment of transverse colon underlying the hernia, possibly signifying inflammation from a transient hernia. Evaluated in clinic 2/28/17 at which time hernia was noted to be reducible. Planned on " undergoing elective surgical repair in the next week or two; however, patient was then admitted to psych for concerns of safety and erratic behavior. Today patient with several episodes of emesis despite zofran; screaming, thrashing around room due to pain unrelieved with IM toradol. CBC today with no leukocytosis, CMP with normal lytes, liver enzymes, T bili. CRP mildly elevated at 9.7, LA of 2.1. Gave dilaudid x1 for pain relief. Patient currently afebrile, mildly hypertensive with BP of 145/94.  - Discussed case with Dr. Glass who recommended stat CT Abd/Pelvis  - Continue zofran PRN for nausea  - Tylenol PRN for pain  - Consider general surgery consult pending CT results    Medicine will continue to follow for CT results. Please page the Internal Medicine job code pager for any intercurrent medical issues which arise. Thank you for the opportunity to be a part of this patient's care.    Venus Chaudhari PA-C  Hospitalist Service  [phone]    Addendum:    Preliminary read of CT abdomen/pelvis today with no acute abnormality in the abdomen or pelvis, no definite cause for abdominal pain identified; small fat-containing ventral hernia in the supraumbilical region; mild diffuse fatty infiltration of the liver.     Discussed findings with Dr. Glass. No apparent source for patient's significant abdominal pain/nausea. Possible patient is experiencing acid reflux, will start on oral protonix. Recommend patient follow up with surgery as an OP to pursue elective repair of her ventral hernia.     Medicine will sign off at this time. Please feel free to contact with any intercurrent medical issues that arise.

## 2017-03-08 PROCEDURE — 12400003 ZZH R&B MH CRITICAL UMMC

## 2017-03-08 PROCEDURE — 25000132 ZZH RX MED GY IP 250 OP 250 PS 637: Performed by: PSYCHIATRY & NEUROLOGY

## 2017-03-08 PROCEDURE — 99232 SBSQ HOSP IP/OBS MODERATE 35: CPT | Performed by: PSYCHIATRY & NEUROLOGY

## 2017-03-08 PROCEDURE — 99207 ZZC CDG-MDM COMPONENT: MEETS MODERATE - DOWN CODED: CPT | Performed by: PSYCHIATRY & NEUROLOGY

## 2017-03-08 PROCEDURE — 25000128 H RX IP 250 OP 636: Performed by: PSYCHIATRY & NEUROLOGY

## 2017-03-08 PROCEDURE — 25000132 ZZH RX MED GY IP 250 OP 250 PS 637: Performed by: NURSE PRACTITIONER

## 2017-03-08 PROCEDURE — 25000132 ZZH RX MED GY IP 250 OP 250 PS 637: Performed by: PHYSICIAN ASSISTANT

## 2017-03-08 RX ORDER — DIAZEPAM 5 MG
10 TABLET ORAL 2 TIMES DAILY
Status: DISCONTINUED | OUTPATIENT
Start: 2017-03-08 | End: 2017-03-09 | Stop reason: HOSPADM

## 2017-03-08 RX ADMIN — BENZTROPINE MESYLATE 0.5 MG: 0.5 TABLET ORAL at 07:38

## 2017-03-08 RX ADMIN — SENNOSIDES AND DOCUSATE SODIUM 1 TABLET: 8.6; 5 TABLET ORAL at 07:38

## 2017-03-08 RX ADMIN — LITHIUM CARBONATE 600 MG: 300 TABLET, FILM COATED, EXTENDED RELEASE ORAL at 20:22

## 2017-03-08 RX ADMIN — BENZTROPINE MESYLATE 0.5 MG: 0.5 TABLET ORAL at 20:22

## 2017-03-08 RX ADMIN — RISPERIDONE 1 MG: 1 TABLET ORAL at 07:39

## 2017-03-08 RX ADMIN — SENNOSIDES AND DOCUSATE SODIUM 1 TABLET: 8.6; 5 TABLET ORAL at 20:22

## 2017-03-08 RX ADMIN — RISPERIDONE 3 MG: 3 TABLET ORAL at 20:24

## 2017-03-08 RX ADMIN — LITHIUM CARBONATE 600 MG: 300 TABLET, FILM COATED, EXTENDED RELEASE ORAL at 07:39

## 2017-03-08 RX ADMIN — LORAZEPAM 2 MG: 1 TABLET ORAL at 06:57

## 2017-03-08 RX ADMIN — DIAZEPAM 10 MG: 5 TABLET ORAL at 10:49

## 2017-03-08 RX ADMIN — DIAZEPAM 10 MG: 5 TABLET ORAL at 20:22

## 2017-03-08 RX ADMIN — PANTOPRAZOLE SODIUM 40 MG: 40 TABLET, DELAYED RELEASE ORAL at 07:39

## 2017-03-08 RX ADMIN — RISPERIDONE 25 MG: KIT at 16:46

## 2017-03-08 RX ADMIN — NICOTINE POLACRILEX 4 MG: 4 GUM, CHEWING ORAL at 10:14

## 2017-03-08 NOTE — PROGRESS NOTES
03/08/17 1235   Behavioral Health   Hallucinations denies / not responding to hallucinations   Thinking distractable;poor concentration;other (see comment)  (tangential, disorganized, impulsive, flight of ideas)   Orientation person: oriented;place: oriented   Memory baseline memory   Insight poor   Judgement impaired   Affect full range affect   Mood elated   Physical Appearance/Attire multiple layers;flamboyant;appears stated age;other (see comment)  (needed prompting to put on more approriate clothing)   Hygiene other (see comment)  (adequate)   Suicidality other (see comments)  (SI not endorsed)   Self Injury other (see comment)  (SIB urges not endorsed today; no SIB's observed)   Activity hyperactive (agitated, impulsive)   Speech flight of ideas;pressured;tangential;clear;coherent;other (see comments)  (pt. needed redirects re sexually explicit references)   Psychomotor / Gait balanced;steady   Sleep/Rest/Relaxation   Day/Evening Time Hours napping;resting in bed   Safety   Suicidality status 15   Behavioral Health Interventions   Psychotic Symptoms maintain safety precautions;maintain safe secure environment;redirection of intrusive behaviors;establish therapeutic relationship;assist with developing & utilizing healthy coping strategies;build upon strengths;monitor confusion, memory loss, decision making ability and reorient / intervent as needed   Social and Therapeutic Interventions (Psychotic Symptoms) encourage effective boundaries with peers       Patient has been visible and active in the milieu intermittently throughout this shift. When not in the milieu, she was napping. When active in the milieu, she was restless, hyperverbal, intrusive, and sexually inappropriate (in terms of her mannerisms, mode of dress, and comments)  at times but generally responsive to staff redirection.  Her mood has been much more stable overall and she has not been verbally or physically threatening or abusive to peers  today.  She was unable to verbalize understanding/insight about why she was being held from group programming, but did ultimately accept that reality.  She has been med compliant as well and cooperative in organizing and cleaning her room.  Overall, she has had a significantly better shift than those preceding it.   Sandeep Stuart   03/08/2017
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
## 2017-03-08 NOTE — PROGRESS NOTES
03/07/17 9301   Significant Event   Significant Event Other (see comments)  (shift summary)     Patient was withdrawn and isolative most of the evening, but was present in the milieu.  Appeared irritable, and agitated earlier in the shift, but calm down after taking medication.

## 2017-03-09 ENCOUNTER — HOSPITAL ENCOUNTER (OUTPATIENT)
Facility: CLINIC | Age: 32
Setting detail: OBSERVATION
Discharge: HOME | End: 2017-03-11
Attending: INTERNAL MEDICINE | Admitting: INTERNAL MEDICINE
Payer: COMMERCIAL

## 2017-03-09 VITALS
TEMPERATURE: 99.1 F | OXYGEN SATURATION: 97 % | BODY MASS INDEX: 31.97 KG/M2 | WEIGHT: 172 LBS | SYSTOLIC BLOOD PRESSURE: 109 MMHG | RESPIRATION RATE: 18 BRPM | HEART RATE: 92 BPM | DIASTOLIC BLOOD PRESSURE: 62 MMHG

## 2017-03-09 DIAGNOSIS — R11.10 INTRACTABLE VOMITING, PRESENCE OF NAUSEA NOT SPECIFIED, UNSPECIFIED VOMITING TYPE: ICD-10-CM

## 2017-03-09 DIAGNOSIS — K59.01 SLOW TRANSIT CONSTIPATION: ICD-10-CM

## 2017-03-09 DIAGNOSIS — F25.0 SCHIZOAFFECTIVE DISORDER, BIPOLAR TYPE (H): Primary | ICD-10-CM

## 2017-03-09 LAB
ANION GAP SERPL CALCULATED.3IONS-SCNC: 9 MMOL/L (ref 3–14)
BUN SERPL-MCNC: 5 MG/DL (ref 7–30)
CALCIUM SERPL-MCNC: 9.3 MG/DL (ref 8.5–10.1)
CHLORIDE SERPL-SCNC: 107 MMOL/L (ref 94–109)
CO2 SERPL-SCNC: 25 MMOL/L (ref 20–32)
CREAT SERPL-MCNC: 0.8 MG/DL (ref 0.52–1.04)
CRP SERPL-MCNC: 6 MG/L (ref 0–8)
ERYTHROCYTE [DISTWIDTH] IN BLOOD BY AUTOMATED COUNT: 12 % (ref 10–15)
GFR SERPL CREATININE-BSD FRML MDRD: 83 ML/MIN/1.7M2
GLUCOSE SERPL-MCNC: 96 MG/DL (ref 70–99)
HCT VFR BLD AUTO: 36.6 % (ref 35–47)
HGB BLD-MCNC: 12.1 G/DL (ref 11.7–15.7)
MCH RBC QN AUTO: 31.3 PG (ref 26.5–33)
MCHC RBC AUTO-ENTMCNC: 33.1 G/DL (ref 31.5–36.5)
MCV RBC AUTO: 95 FL (ref 78–100)
PLATELET # BLD AUTO: 331 10E9/L (ref 150–450)
POTASSIUM SERPL-SCNC: 4.2 MMOL/L (ref 3.4–5.3)
PROCALCITONIN SERPL-MCNC: NORMAL NG/ML
RBC # BLD AUTO: 3.87 10E12/L (ref 3.8–5.2)
SODIUM SERPL-SCNC: 141 MMOL/L (ref 133–144)
WBC # BLD AUTO: 10.6 10E9/L (ref 4–11)

## 2017-03-09 PROCEDURE — 85027 COMPLETE CBC AUTOMATED: CPT | Performed by: PHYSICIAN ASSISTANT

## 2017-03-09 PROCEDURE — 12000001 ZZH R&B MED SURG/OB UMMC

## 2017-03-09 PROCEDURE — 25000132 ZZH RX MED GY IP 250 OP 250 PS 637: Performed by: PSYCHIATRY & NEUROLOGY

## 2017-03-09 PROCEDURE — 25000132 ZZH RX MED GY IP 250 OP 250 PS 637: Performed by: PHYSICIAN ASSISTANT

## 2017-03-09 PROCEDURE — 86140 C-REACTIVE PROTEIN: CPT | Performed by: PHYSICIAN ASSISTANT

## 2017-03-09 PROCEDURE — 99220 ZZC INITIAL OBSERVATION CARE,LEVL III: CPT | Performed by: INTERNAL MEDICINE

## 2017-03-09 PROCEDURE — 80048 BASIC METABOLIC PNL TOTAL CA: CPT | Performed by: PHYSICIAN ASSISTANT

## 2017-03-09 PROCEDURE — 25000125 ZZHC RX 250: Performed by: PSYCHIATRY & NEUROLOGY

## 2017-03-09 PROCEDURE — S0166 INJ OLANZAPINE 2.5MG: HCPCS | Performed by: PSYCHIATRY & NEUROLOGY

## 2017-03-09 PROCEDURE — 25000128 H RX IP 250 OP 636

## 2017-03-09 PROCEDURE — 99207 ZZC APP CREDIT; MD BILLING SHARED VISIT: CPT | Performed by: PHYSICIAN ASSISTANT

## 2017-03-09 PROCEDURE — 99239 HOSP IP/OBS DSCHRG MGMT >30: CPT | Performed by: PSYCHIATRY & NEUROLOGY

## 2017-03-09 PROCEDURE — 84145 PROCALCITONIN (PCT): CPT | Performed by: PHYSICIAN ASSISTANT

## 2017-03-09 PROCEDURE — 25000125 ZZHC RX 250: Performed by: PHYSICIAN ASSISTANT

## 2017-03-09 PROCEDURE — 25000128 H RX IP 250 OP 636: Performed by: NURSE PRACTITIONER

## 2017-03-09 PROCEDURE — 25000132 ZZH RX MED GY IP 250 OP 250 PS 637: Performed by: NURSE PRACTITIONER

## 2017-03-09 PROCEDURE — 25000128 H RX IP 250 OP 636: Performed by: STUDENT IN AN ORGANIZED HEALTH CARE EDUCATION/TRAINING PROGRAM

## 2017-03-09 PROCEDURE — 25000132 ZZH RX MED GY IP 250 OP 250 PS 637: Performed by: INTERNAL MEDICINE

## 2017-03-09 PROCEDURE — 36415 COLL VENOUS BLD VENIPUNCTURE: CPT | Performed by: PHYSICIAN ASSISTANT

## 2017-03-09 PROCEDURE — 25000128 H RX IP 250 OP 636: Performed by: PSYCHIATRY & NEUROLOGY

## 2017-03-09 RX ORDER — SUCRALFATE ORAL 1 G/10ML
1 SUSPENSION ORAL
Status: DISCONTINUED | OUTPATIENT
Start: 2017-03-09 | End: 2017-03-11 | Stop reason: HOSPADM

## 2017-03-09 RX ORDER — DIAZEPAM 5 MG
10 TABLET ORAL 2 TIMES DAILY
Status: DISCONTINUED | OUTPATIENT
Start: 2017-03-09 | End: 2017-03-11 | Stop reason: HOSPADM

## 2017-03-09 RX ORDER — ONDANSETRON 4 MG/1
4 TABLET, ORALLY DISINTEGRATING ORAL 3 TIMES DAILY PRN
Status: DISCONTINUED | OUTPATIENT
Start: 2017-03-09 | End: 2017-03-10

## 2017-03-09 RX ORDER — TRAZODONE HYDROCHLORIDE 50 MG/1
50 TABLET, FILM COATED ORAL
Status: DISCONTINUED | OUTPATIENT
Start: 2017-03-09 | End: 2017-03-11 | Stop reason: HOSPADM

## 2017-03-09 RX ORDER — PANTOPRAZOLE SODIUM 40 MG/1
40 TABLET, DELAYED RELEASE ORAL EVERY MORNING
Status: DISCONTINUED | OUTPATIENT
Start: 2017-03-10 | End: 2017-03-09

## 2017-03-09 RX ORDER — PROCHLORPERAZINE 25 MG
25 SUPPOSITORY, RECTAL RECTAL EVERY 12 HOURS PRN
Status: DISCONTINUED | OUTPATIENT
Start: 2017-03-09 | End: 2017-03-11 | Stop reason: HOSPADM

## 2017-03-09 RX ORDER — RISPERIDONE 1 MG/1
3 TABLET ORAL AT BEDTIME
Status: DISCONTINUED | OUTPATIENT
Start: 2017-03-09 | End: 2017-03-11 | Stop reason: HOSPADM

## 2017-03-09 RX ORDER — BENZTROPINE MESYLATE 0.5 MG/1
0.5 TABLET ORAL 2 TIMES DAILY
Status: DISCONTINUED | OUTPATIENT
Start: 2017-03-09 | End: 2017-03-11 | Stop reason: HOSPADM

## 2017-03-09 RX ORDER — PANTOPRAZOLE SODIUM 40 MG/1
40 TABLET, DELAYED RELEASE ORAL EVERY MORNING
Qty: 30 TABLET | Refills: 0 | Status: ON HOLD | OUTPATIENT
Start: 2017-03-09 | End: 2017-04-04

## 2017-03-09 RX ORDER — PANTOPRAZOLE SODIUM 40 MG/1
40 TABLET, DELAYED RELEASE ORAL
Status: DISCONTINUED | OUTPATIENT
Start: 2017-03-10 | End: 2017-03-09

## 2017-03-09 RX ORDER — AMOXICILLIN 250 MG
1 CAPSULE ORAL 2 TIMES DAILY
Status: DISCONTINUED | OUTPATIENT
Start: 2017-03-09 | End: 2017-03-11 | Stop reason: HOSPADM

## 2017-03-09 RX ORDER — LITHIUM CARBONATE 300 MG/1
600 TABLET, FILM COATED, EXTENDED RELEASE ORAL 2 TIMES DAILY
Qty: 60 TABLET | Refills: 0 | Status: ON HOLD | OUTPATIENT
Start: 2017-03-09 | End: 2017-04-04

## 2017-03-09 RX ORDER — RISPERIDONE 3 MG/1
3 TABLET, ORALLY DISINTEGRATING ORAL AT BEDTIME
Status: DISCONTINUED | OUTPATIENT
Start: 2017-03-09 | End: 2017-03-09 | Stop reason: HOSPADM

## 2017-03-09 RX ORDER — DIAZEPAM 10 MG/2ML
INJECTION, SOLUTION INTRAMUSCULAR; INTRAVENOUS
Status: COMPLETED
Start: 2017-03-09 | End: 2017-03-09

## 2017-03-09 RX ORDER — TRIAMCINOLONE ACETONIDE 5 MG/G
OINTMENT TOPICAL 2 TIMES DAILY
Status: DISCONTINUED | OUTPATIENT
Start: 2017-03-09 | End: 2017-03-11 | Stop reason: HOSPADM

## 2017-03-09 RX ORDER — BENZTROPINE MESYLATE 0.5 MG/1
0.5 TABLET ORAL 2 TIMES DAILY
Qty: 60 TABLET | Refills: 0 | Status: ON HOLD | OUTPATIENT
Start: 2017-03-09 | End: 2017-04-04

## 2017-03-09 RX ORDER — RISPERIDONE 1 MG/1
1 TABLET, ORALLY DISINTEGRATING ORAL EVERY MORNING
Qty: 30 TABLET | Refills: 0 | Status: ON HOLD | OUTPATIENT
Start: 2017-03-10 | End: 2017-04-04

## 2017-03-09 RX ORDER — PANTOPRAZOLE SODIUM 40 MG/1
40 TABLET, DELAYED RELEASE ORAL
Status: DISCONTINUED | OUTPATIENT
Start: 2017-03-09 | End: 2017-03-11 | Stop reason: HOSPADM

## 2017-03-09 RX ORDER — RISPERIDONE 3 MG/1
3 TABLET, ORALLY DISINTEGRATING ORAL AT BEDTIME
Qty: 30 TABLET | Refills: 0 | Status: ON HOLD | OUTPATIENT
Start: 2017-03-09 | End: 2017-04-04

## 2017-03-09 RX ORDER — DIAZEPAM 10 MG/2ML
10 INJECTION, SOLUTION INTRAMUSCULAR; INTRAVENOUS EVERY 4 HOURS PRN
Status: DISCONTINUED | OUTPATIENT
Start: 2017-03-09 | End: 2017-03-09 | Stop reason: HOSPADM

## 2017-03-09 RX ORDER — RISPERIDONE 1 MG/1
1 TABLET ORAL EVERY MORNING
Status: DISCONTINUED | OUTPATIENT
Start: 2017-03-10 | End: 2017-03-11 | Stop reason: HOSPADM

## 2017-03-09 RX ORDER — PROCHLORPERAZINE MALEATE 5 MG
5-10 TABLET ORAL EVERY 6 HOURS PRN
Status: DISCONTINUED | OUTPATIENT
Start: 2017-03-09 | End: 2017-03-11 | Stop reason: HOSPADM

## 2017-03-09 RX ORDER — DIAZEPAM 10 MG
10 TABLET ORAL 2 TIMES DAILY
Qty: 60 TABLET | Refills: 0 | Status: ON HOLD | OUTPATIENT
Start: 2017-03-09 | End: 2017-03-10

## 2017-03-09 RX ORDER — LITHIUM CARBONATE 300 MG/1
600 TABLET, FILM COATED, EXTENDED RELEASE ORAL 2 TIMES DAILY
Status: DISCONTINUED | OUTPATIENT
Start: 2017-03-09 | End: 2017-03-11 | Stop reason: HOSPADM

## 2017-03-09 RX ORDER — RISPERIDONE 0.5 MG/1
1 TABLET, ORALLY DISINTEGRATING ORAL EVERY MORNING
Status: DISCONTINUED | OUTPATIENT
Start: 2017-03-10 | End: 2017-03-09 | Stop reason: HOSPADM

## 2017-03-09 RX ORDER — ACETAMINOPHEN 325 MG/1
650 TABLET ORAL EVERY 4 HOURS PRN
Status: DISCONTINUED | OUTPATIENT
Start: 2017-03-09 | End: 2017-03-11 | Stop reason: HOSPADM

## 2017-03-09 RX ORDER — TRAZODONE HYDROCHLORIDE 50 MG/1
50 TABLET, FILM COATED ORAL
Qty: 30 TABLET | Refills: 0 | Status: ON HOLD | OUTPATIENT
Start: 2017-03-09 | End: 2017-04-04

## 2017-03-09 RX ORDER — NALOXONE HYDROCHLORIDE 0.4 MG/ML
.1-.4 INJECTION, SOLUTION INTRAMUSCULAR; INTRAVENOUS; SUBCUTANEOUS
Status: DISCONTINUED | OUTPATIENT
Start: 2017-03-09 | End: 2017-03-11 | Stop reason: HOSPADM

## 2017-03-09 RX ADMIN — TRAZODONE HYDROCHLORIDE 50 MG: 50 TABLET ORAL at 04:11

## 2017-03-09 RX ADMIN — SENNOSIDES AND DOCUSATE SODIUM 1 TABLET: 8.6; 5 TABLET ORAL at 22:23

## 2017-03-09 RX ADMIN — ONDANSETRON 4 MG: 4 TABLET, ORALLY DISINTEGRATING ORAL at 04:13

## 2017-03-09 RX ADMIN — KETOROLAC TROMETHAMINE 30 MG: 30 INJECTION, SOLUTION INTRAMUSCULAR at 14:21

## 2017-03-09 RX ADMIN — DIAZEPAM 10 MG: 5 INJECTION, SOLUTION INTRAMUSCULAR; INTRAVENOUS at 15:49

## 2017-03-09 RX ADMIN — RISPERIDONE 3 MG: 1 TABLET ORAL at 22:23

## 2017-03-09 RX ADMIN — DIAZEPAM 10 MG: 5 INJECTION, SOLUTION INTRAMUSCULAR; INTRAVENOUS at 09:22

## 2017-03-09 RX ADMIN — LIDOCAINE HYDROCHLORIDE 30 ML: 20 SOLUTION ORAL; TOPICAL at 10:20

## 2017-03-09 RX ADMIN — Medication 1 G: at 22:23

## 2017-03-09 RX ADMIN — ACETAMINOPHEN 650 MG: 325 TABLET, FILM COATED ORAL at 04:11

## 2017-03-09 RX ADMIN — SENNOSIDES AND DOCUSATE SODIUM 1 TABLET: 8.6; 5 TABLET ORAL at 20:58

## 2017-03-09 RX ADMIN — HYDROXYZINE HYDROCHLORIDE 50 MG: 25 TABLET ORAL at 15:21

## 2017-03-09 RX ADMIN — BENZTROPINE MESYLATE 0.5 MG: 0.5 TABLET ORAL at 20:58

## 2017-03-09 RX ADMIN — HALOPERIDOL LACTATE 5 MG: 5 INJECTION, SOLUTION INTRAMUSCULAR at 11:13

## 2017-03-09 RX ADMIN — LORAZEPAM 2 MG: 2 INJECTION INTRAMUSCULAR; INTRAVENOUS at 11:14

## 2017-03-09 RX ADMIN — LITHIUM CARBONATE 600 MG: 300 TABLET, FILM COATED, EXTENDED RELEASE ORAL at 20:58

## 2017-03-09 RX ADMIN — OLANZAPINE 10 MG: 10 INJECTION, POWDER, FOR SOLUTION INTRAMUSCULAR at 05:52

## 2017-03-09 RX ADMIN — KETOROLAC TROMETHAMINE 30 MG: 30 INJECTION, SOLUTION INTRAMUSCULAR at 05:18

## 2017-03-09 RX ADMIN — ONDANSETRON 4 MG: 4 TABLET, ORALLY DISINTEGRATING ORAL at 15:21

## 2017-03-09 RX ADMIN — PANTOPRAZOLE SODIUM 40 MG: 40 TABLET, DELAYED RELEASE ORAL at 22:25

## 2017-03-09 ASSESSMENT — ACTIVITIES OF DAILY LIVING (ADL)
DRESS: INDEPENDENT
GROOMING: PROMPTS
GROOMING: INDEPENDENT
ORAL_HYGIENE: INDEPENDENT
DRESS: SCRUBS (BEHAVIORAL HEALTH);INDEPENDENT
LAUNDRY: WITH SUPERVISION
ORAL_HYGIENE: INDEPENDENT
LAUNDRY: UNABLE TO COMPLETE

## 2017-03-09 NOTE — DISCHARGE SUMMARY
"Psychiatric Discharge Summary    Daisy Curtis MRN# [id number]   Age: 31 year old YOB: 1985     Date of Admission:  3/2/2017  Date of Discharge:  3/9/2017  Admitting Physician:  Tim Vargas MD  Discharge Physician:  Tim Vargas MD (Contact: Pager: [phone])         Event Leading to Hospitalization:   Daisy Curtis is a 31-year-old -American female with a diagnosis of schizoaffective disorder who was seen at the primary clinic for abdominal pain, nausea and vomiting in the setting of known ventral hernia. Because of disorganization, patient was brought to the Emergency Department by the police department.       HISTORY OF PRESENT ILLNESS: The patient does not appear to be a reliable historian. She, in fact, was constantly on the move talking nonstop getting very close into my personal space and I had to ask her a number of times to maintain appropriate distance. She was wearing a short top, low jeans. I had to ask her to pull them up or change them to hospital garbs. She was talking nonstop, was very difficult to understand. She did say that she was here because of her mother, but could not or would not say why her mother was so concerned to make her admitted. She rambled about her desire to go to [place] where \"my auntie lives.\" She was hardly redirectable. In addition to the above-mentioned issues, she has not been taking medication because she did not like blood draws required for being treated with clozapine. She said that she has act an ACT team but also said that she does not answer the calls. She says that she has 2 children; apparently the father of her son was killed a few years ago or . She said that son is currently with his grandmother who is apparently mother of son's father not of the patient and her daughter is currently with her dad. The patient reports that she has not been sleeping for more than 3-4 hours per night and said that she lost " some weight. She refused to stop eating during the interview, was chewing an apple. She repeatedly denied auditory and visual hallucinations. She denied using street drugs. I do not have a urine drug screen from the Emergency Department. Patient refused to give urine sample due to her suspiciousness. Patient was admitted on a 72-hour hold.       PAST MEDICAL HISTORY: Significant for multiple hospitalizations for mental health. She does have a history of abusing marijuana. Lives with her mother. According to DEC clinician's note, patient wanted money from her mom to buy a ticket to go to [place]. Mom did not agree. According to her mother, the patient had been moaning with pain in her abdomen for the past couple of weeks, had been vomiting. The patient was brought first to [facility] for abdominal pain. However, after that visit, she found on the street and disorganized so the police department was called and the patient was brought in. The patient currently according the same report is on commitment. The patient's prescriber is Dr. Gonzalez at [facility]. The patient reportedly stopped taking medications about 2 weeks ago due to abdominal pain, but also has a history of refusing medication for other reasons and mentioned that she did not like blood draws. The patient has a  Jyotsna Catherine, through [facility] team. Psychiatric prescriber is Dr. Sheffield from [facility]. She was hospitalized multiple times, last time at [facility] in 06/2016, was committed via [facility] in [place] who recommended/ had her on multiple psychotropic medications including Clozaril for patient and she had a lot of salivation and also difficulties with breathing.            See Admission note by Tim Vargas MD for additional details.          DIagnoses:     Historical diagnosis of schizoaffective disorder, bipolar type, manic. Marijuana use disorder. Very likely, antisocial personality  disorder.         Labs:     Results for JULY UNDERWOOD (MRN [id number]) as of 3/9/2017 17:58   Ref. Range 3/7/2017 10:37 3/7/2017 13:20 3/9/2017 15:24   Sodium Latest Ref Range: 133 - 144 mmol/L 142  141   Potassium Latest Ref Range: 3.4 - 5.3 mmol/L 4.3  4.2   Chloride Latest Ref Range: 94 - 109 mmol/L 107  107   Carbon Dioxide Latest Ref Range: 20 - 32 mmol/L 26  25   Urea Nitrogen Latest Ref Range: 7 - 30 mg/dL 5 (L)  5 (L)   Creatinine Latest Ref Range: 0.52 - 1.04 mg/dL 0.77  0.80   GFR Estimate Latest Ref Range: >60 mL/min/1.7m2 87  83   GFR Estimate If Black Latest Ref Range: >60 mL/min/1.7m2 >90...  >90...   Calcium Latest Ref Range: 8.5 - 10.1 mg/dL 9.5  9.3   Anion Gap Latest Ref Range: 3 - 14 mmol/L 9  9   Albumin Latest Ref Range: 3.4 - 5.0 g/dL 3.6     Protein Total Latest Ref Range: 6.8 - 8.8 g/dL 7.5     Bilirubin Total Latest Ref Range: 0.2 - 1.3 mg/dL 0.4     Alkaline Phosphatase Latest Ref Range: 40 - 150 U/L 87     ALT Latest Ref Range: 0 - 50 U/L 43     AST Latest Ref Range: 0 - 45 U/L 24     CRP Inflammation Latest Ref Range: 0.0 - 8.0 mg/L 9.7 (H)  6.0   Lactic Acid Latest Ref Range: 0.7 - 2.1 mmol/L 2.1     Procalcitonin Latest Units: ng/ml   <0.05...   Glucose Latest Ref Range: 70 - 99 mg/dL 113 (H)  96   WBC Latest Ref Range: 4.0 - 11.0 10e9/L 8.9  10.6   Hemoglobin Latest Ref Range: 11.7 - 15.7 g/dL 12.1  12.1   Hematocrit Latest Ref Range: 35.0 - 47.0 % 37.1  36.6   Platelet Count Latest Ref Range: 150 - 450 10e9/L 353  331   RBC Count Latest Ref Range: 3.8 - 5.2 10e12/L 3.88  3.87   MCV Latest Ref Range: 78 - 100 fl 96  95   MCH Latest Ref Range: 26.5 - 33.0 pg 31.2  31.3   MCHC Latest Ref Range: 31.5 - 36.5 g/dL 32.6  33.1   RDW Latest Ref Range: 10.0 - 15.0 % 12.1  12.0   CT ABDOMEN AND PELVIS WITH CONTRAST 3/7/2017 1:20 PM      HISTORY: Worsening abdominal pain. Known ventral hernia.     TECHNIQUE: 84 mL Isovue 370 IV were administered. After contrast  administration,  volumetric helical sections were acquired from the  lung bases to the ischial tuberosities. Coronal images were also  reconstructed. Radiation dose for this scan was reduced using  automated exposure control, adjustment of the mA and/or kV according  to patient size, or iterative reconstruction technique.     COMPARISON: None.      FINDINGS: There is a small fat-containing ventral hernia in the  supraumbilical region. No evidence for bowel obstruction. The appendix  is well seen and is unremarkable. No evidence for colitis or  diverticulitis. Prior cholecystectomy. There is mild diffuse fatty  infiltration of the liver. The liver, spleen, adrenal glands,  pancreas, and kidneys are otherwise unremarkable. No hydronephrosis.  No free fluid in the pelvis. The visualized lung bases are clear.  Postoperative changes of spinal alicia placement in the lumbar spine,  with what appears to be a chronic deformity of the upper lumbar  vertebral bodies and associated lumbar curve, convex left.         IMPRESSION:   1. No acute abnormality in the abdomen or pelvis. No definite cause  for abdominal pain is identified.  2. Small fat-containing ventral hernia in the supraumbilical region.  3. Mild diffuse fatty infiltration of the liver.     MAXIM PEREZ MD         Consults:   Consultation during this admission received from internal medicine due to abdominal pain, nausea. For more details, please, see Internal Medicine notes. Appreciate the input from medical team.          Hospital Course:   Daisy Curtis was admitted to Station 30 with attending Tim Vargas MD. The patient was placed under status 15 (15 minute checks) to ensure patient safety. Patient presented as very manic, she was constantly on the go, getting into other people personal spaces, needed constant redirections. She admitted that she didn't want to take Clozaril and was off it for the last few days or couple of weeks. This is why Clozaril was  not restarted. Patient's provisional discharge was revoked and her legal status is fully committed and Jarvised. The patient was continued on oral Risperdal, then, was given Risperdal Consta. Patient was also started on oral Diazepam tid and given frequent prns of antipsychotics and Ativan. She responded to all these medications and, by the end of hospitalization was much more redirectable, there was no evidence of psychotic symptoms. She started sleeping less, became less intrusive. Patient still had episodes of agitation, mostly, connected with her abdominal pain. She frequently complained of abdominal pain, was nauseous and vomited multiple times during this hospitalization. She was consulted by Internal Medicine service, had abdominal CT that showed small ventral hernia, no other pathology. Daisy's GI symptoms continued to worsen, on 3/9/2017 she was seen by Dr. Glass and was transferred to the medical floor.   Daisy Curtis did not participate in groups and was visible in the milieu.     The patient's symptoms of julio have improved.     Daisy Curtis was transfered to Unit 10A. At the time of discharge Daisy Curtis was determined to not be a danger to herself or others.          Discharge Medications:     Current Discharge Medication List      START taking these medications    Details   diazepam (VALIUM) 10 MG tablet Take 1 tablet (10 mg) by mouth 2 times daily  Qty: 60 tablet, Refills: 0    Associated Diagnoses: Disorganized schizophrenia (H)      traZODone (DESYREL) 50 MG tablet Take 1 tablet (50 mg) by mouth nightly as needed for sleep (may repeat X 1)  Qty: 30 tablet, Refills: 0    Associated Diagnoses: Disorganized schizophrenia (H)      benztropine (COGENTIN) 0.5 MG tablet Take 1 tablet (0.5 mg) by mouth 2 times daily  Qty: 60 tablet, Refills: 0    Associated Diagnoses: Disorganized schizophrenia (H)      pantoprazole (PROTONIX) 40 MG EC tablet Take 1 tablet (40 mg) by  mouth every morning  Qty: 30 tablet, Refills: 0    Associated Diagnoses: Gastroesophageal reflux disease, esophagitis presence not specified         CONTINUE these medications which have CHANGED    Details   lithium (ESKALITH/LITHOBID) 300 MG CR tablet Take 2 tablets (600 mg) by mouth 2 times daily  Qty: 60 tablet, Refills: 0    Associated Diagnoses: Disorganized schizophrenia (H)      !! risperiDONE (RISPERDAL M-TABS) 1 MG ODT tab Place 1 tablet (1 mg) under the tongue every morning  Qty: 30 tablet, Refills: 0    Associated Diagnoses: Disorganized schizophrenia (H)      !! risperiDONE 3 MG TBDP ODT tab Place 1 tablet (3 mg) under the tongue At Bedtime  Qty: 30 tablet, Refills: 0    Associated Diagnoses: Disorganized schizophrenia (H)       !! - Potential duplicate medications found. Please discuss with provider.      CONTINUE these medications which have NOT CHANGED    Details   oxyCODONE-acetaminophen (PERCOCET) 5-325 MG per tablet Take 1 tablet by mouth every 6 hours as needed for moderate to severe pain      triamcinolone (KENALOG) 0.5 % OINT ointment Apply topically 2 times daily      senna-docusate (SENOKOT-S;PERICOLACE) 8.6-50 MG per tablet Take 1 tablet by mouth 2 times daily      ondansetron (ZOFRAN-ODT) 4 MG ODT tab Take 4 mg by mouth 3 times daily as needed for nausea      nicotine polacrilex (NICORETTE) 4 MG gum Place 4 mg inside cheek as needed for smoking cessation                  Psychiatric Examination:   Appearance:  appeared as age stated and somnolent  Attitude:  guarded and somewhat cooperative  Eye Contact:  fair  Mood:  anxious and irritable  Affect:  constricted mobility  Speech:  decreased prosody  Psychomotor Behavior:  no evidence of tardive dyskinesia, dystonia, or tics  Thought Process:  goal oriented  Associations:  loosening of associations present  Thought Content:  no evidence of suicidal ideation or homicidal ideation and no evidence of psychotic thought  Insight:   limited  Judgment:  limited  Oriented to:  time, person, and place  Attention Span and Concentration:  poor  Recent and Remote Memory:  fair  Language: Able to name objects, Able to repeat phrases and Able to read and write  Fund of Knowledge: low-normal  Muscle Strength and Tone: normal  Gait and Station: Normal         Discharge Plan:   Transferred to 10 A.     Attestation:  The patient has been seen and evaluated by me,  Tim Vargas MD 3/9/2017 for 40 min between 13:00 and 13:40.

## 2017-03-09 NOTE — IP AVS SNAPSHOT
[facility] [address]    [address]    [place] [address]    Phone:  [phone]                                       After Visit Summary   3/9/2017    Daisy Curtis    MRN: [id number]           After Visit Summary Signature Page     I have received my discharge instructions, and my questions have been answered. I have discussed any challenges I see with this plan with the nurse or doctor.    ..........................................................................................................................................  Patient/Patient Representative Signature      ..........................................................................................................................................  Patient Representative Print Name and Relationship to Patient    ..................................................               ................................................  Date                                            Time    ..........................................................................................................................................  Reviewed by Signature/Title    ...................................................              ..............................................  Date                                                            Time

## 2017-03-09 NOTE — LETTER
Transition Communication Hand-off for Care Transitions to Next Level of Care Provider    Name: Daisy Curtis  MRN #: [id number]  Primary Care Provider: Kary Quintero   Primary Clinic: [facility]  [address]  [place] [address]     Reason for Hospitalization:  intractable vomiting  Intractable vomiting  Nausea & vomiting  Admit Date/Time: 3/9/2017  9:15 PM  Discharge Date: 3/11/17  Payor Source: Payor: MEDICA / Plan: MEDICA ACCESS ABILITY [place] / Product Type: HMO /     Reason for Communication Hand-off Referral: Notify PCP of admit    Discharge Plan: Home       Concern for non-adherence with plan of care:   Y/N No  Discharge Needs Assessment: None                 Quita Boone    AVS/Discharge Summary is the source of truth; this is a helpful guide for improved communication of patient story

## 2017-03-09 NOTE — PROGRESS NOTES
"Late note regarding patient:     Pt approached staff regarding a \"problem with her pituitary gland.\" Then showed staff at desk that she is lactating; a small amount of liquid was visible on patient's hand after she manually extracted milk from her left breast.      "

## 2017-03-09 NOTE — PROGRESS NOTES
[facility], [place]   Psychiatric Progress Note        Interim History:   The patient's care was discussed with the treatment team during the daily team meeting and/or staff's chart notes were reviewed.  Staff report patient has been more cooperative with redirections and medications. She showed agitation, but mostly because of abdominal pain. Was moaning and groaning, nauseous. Was given Zofran, IM Zyprexa, Toradol, Ativan. Could not keep meds down.          Medications:       risperiDONE microspheres  25 mg Intramuscular Q14 Days     diazepam  10 mg Oral BID     pantoprazole  40 mg Oral QAM     lithium  600 mg Oral BID     risperiDONE  1 mg Oral QAM     risperiDONE (risperDAL) tablet 3 mg  3 mg Oral At Bedtime     benztropine  0.5 mg Oral BID     senna-docusate  1 tablet Oral BID     triamcinolone   Topical BID          Allergies:     Allergies   Allergen Reactions     Codeine Sulfate           Labs:   No results found for this or any previous visit (from the past 24 hour(s)). Results for JULY UNDERWOOD (MRN [id number]) as of 3/8/2017 16:32   Ref. Range 3/7/2017 10:37 3/7/2017 13:20   Sodium Latest Ref Range: 133 - 144 mmol/L 142    Potassium Latest Ref Range: 3.4 - 5.3 mmol/L 4.3    Chloride Latest Ref Range: 94 - 109 mmol/L 107    Carbon Dioxide Latest Ref Range: 20 - 32 mmol/L 26    Urea Nitrogen Latest Ref Range: 7 - 30 mg/dL 5 (L)    Creatinine Latest Ref Range: 0.52 - 1.04 mg/dL 0.77    GFR Estimate Latest Ref Range: >60 mL/min/1.7m2 87    GFR Estimate If Black Latest Ref Range: >60 mL/min/1.7m2 >90...    Calcium Latest Ref Range: 8.5 - 10.1 mg/dL 9.5    Anion Gap Latest Ref Range: 3 - 14 mmol/L 9    Albumin Latest Ref Range: 3.4 - 5.0 g/dL 3.6    Protein Total Latest Ref Range: 6.8 - 8.8 g/dL 7.5    Bilirubin Total Latest Ref Range: 0.2 - 1.3 mg/dL 0.4    Alkaline Phosphatase Latest Ref Range: 40 - 150 U/L 87    ALT Latest Ref Range: 0 - 50 U/L 43    AST Latest Ref  Range: 0 - 45 U/L 24    CRP Inflammation Latest Ref Range: 0.0 - 8.0 mg/L 9.7 (H)    Lactic Acid Latest Ref Range: 0.7 - 2.1 mmol/L 2.1    Glucose Latest Ref Range: 70 - 99 mg/dL 113 (H)    WBC Latest Ref Range: 4.0 - 11.0 10e9/L 8.9    Hemoglobin Latest Ref Range: 11.7 - 15.7 g/dL 12.1    Hematocrit Latest Ref Range: 35.0 - 47.0 % 37.1    Platelet Count Latest Ref Range: 150 - 450 10e9/L 353    RBC Count Latest Ref Range: 3.8 - 5.2 10e12/L 3.88    MCV Latest Ref Range: 78 - 100 fl 96    MCH Latest Ref Range: 26.5 - 33.0 pg 31.2    MCHC Latest Ref Range: 31.5 - 36.5 g/dL 32.6    RDW Latest Ref Range: 10.0 - 15.0 % 12.1    CT ABDOMEN PELVIS W CONTRAST Unknown  Rpt          Psychiatric Examination:   BP (!) 145/94 (BP Location: Right arm)  Pulse 109  Temp 97.3  F (36.3  C) (Oral)  Resp 18  Wt 78 kg (172 lb)  SpO2 97%  BMI 31.97 kg/m2  Weight is 172 lbs 0 oz  Body mass index is 31.97 kg/(m^2).    Appearance: awake, alert, appeared as age stated, poorly groomed and unkempt  Attitude:  More cooperative  Eye Contact: less intense  Mood:  Could be labile because of pain  Affect:  intensity is exaggerated  Speech: fast, but not pressured speech  Psychomotor Behavior:  no evidence of tardive dyskinesia, dystonia, or tics and physical agitation  Throught Process:  More organized  Associations:  loosening of associations present  Thought Content:  no evidence of suicidal ideation or homicidal ideation and no evidence of psychotic thought  Insight:  limited but improving  Judgement:  limited but improving  Oriented to:  time, person, and place  Attention Span and Concentration:  limited  Recent and Remote Memory:  limited         Precautions:     Behavioral Orders   Procedures     Code 1 - Restrict to Unit     Code 2     Fall precautions     Routine Programming     As clinically indicated     Status 15     Every 15 minutes.     Status Individual Observation     SIO status x 24 hours  Patient is becoming increasingly agitated by  having staff follow her,  Will have designated staff to monitor patient while in hallway and in lounge area.     Order Specific Question:   CONTINUOUS 24 hours / day     Answer:   Distance and Exceptions     Order Specific Question:   Distance     Answer:   5 feet     Order Specific Question:   Exceptions     Answer:   bathroom and shower     Order Specific Question:   WHILE AWAKE     Answer:   Distance and Exceptions     Order Specific Question:   Distance     Answer:   5 feet     Order Specific Question:   Exceptions     Answer:   bathroom and shower          DIagnoses:     Schizoaffective disorder, bipolar type; Marijuana use disorder.        Plan:   Patient appears to be doing better, better sleep, more redirectable. She is also more cooperative. Her periodical agitation seems to be because of severe abdominal pain. She doesn't show acute manic symptoms anymore. Will talk to IM re: transferring patient to a medical floor. Will change oral Risperdal to M-tab form. Will continue meanwhile to provide support and structure, use prns for agitation. Patient got an injection of Risperdal Consta yesterday. Today she complained of lactating? Will draw Prolactin level.

## 2017-03-09 NOTE — IP AVS SNAPSHOT
MRN:[id number]                      After Visit Summary   3/9/2017    Daisy Curtis    MRN: [id number]           Thank you!     Thank you for choosing [facility] for your care. Our goal is always to provide you with excellent care. Hearing back from our patients is one way we can continue to improve our services. Please take a few minutes to complete the written survey that you may receive in the mail after you visit with us. Thank you!        Patient Information     Date Of Birth          1985        About your hospital stay     You were admitted on:  March 9, 2017 You last received care in the:  [facility]    You were discharged on:  March 11, 2017       Who to Call     For medical emergencies, please call 911.  For non-urgent questions about your medical care, please call your primary care provider or clinic, None          Attending Provider     Provider Specialty    Laci Glass MD Internal Medicine       Primary Care Provider    Physician No Ref-Primary       No address on file        After Care Instructions     Discharge Instructions       ACT team involvement - recommendations as per psychiatry.  Appointment with primary MD in next week.                  Pending Results     No orders found from 3/7/2017 to 3/10/2017.            Statement of Approval     Ordered          03/11/17 0843  I have reviewed and agree with all the recommendations and orders detailed in this document.     Approved and electronically signed by:  Laci Glass MD           03/11/17 3499  I have reviewed and agree with all the recommendations and orders detailed in this document.     Approved and electronically signed by:  Laci Glass MD             Admission Information     Date & Time Provider Department Dept. Phone    3/9/2017 Laci Glass MD [facility] Unit [facility]A [phone]      Your Vitals Were     Blood Pressure Pulse Temperature Respirations Height Weight    97/57  "(BP Location: Right arm) 84 97.3  F (36.3  C) (Axillary) 16 1.575 m (5' 2\") 75.9 kg (167 lb 6.4 oz)    Pulse Oximetry BMI (Body Mass Index)                97% 30.62 kg/m2          [facility] Information     [facility] lets you send messages to your doctor, view your test results, renew your prescriptions, schedule appointments and more. To sign up, go to [email or web address] . Click on \"Log in\" on the left side of the screen, which will take you to the Welcome page. Then click on \"Sign up Now\" on the right side of the page.     You will be asked to enter the access code listed below, as well as some personal information. Please follow the directions to create your username and password.     Your access code is: [email or web address]  Expires: 2017  1:03 PM     Your access code will  in 90 days. If you need help or a new code, please call your [place] clinic or [phone].        Care EveryWhere ID     This is your Care EveryWhere ID. This could be used by other organizations to access your [place] medical records  [id number]           Review of your medicines      START taking        Dose / Directions    sucralfate 1 GM/10ML suspension   Commonly known as:  CARAFATE        Dose:  1 g   Take 10 mLs (1 g) by mouth 4 times daily (before meals and nightly)   Quantity:  1200 mL   Refills:  0         CONTINUE these medicines which may have CHANGED, or have new prescriptions. If we are uncertain of the size of tablets/capsules you have at home, strength may be listed as something that might have changed.        Dose / Directions    diazepam 10 MG tablet   Commonly known as:  VALIUM   This may have changed:  when to take this   Used for:  Schizoaffective disorder, bipolar type (H)        Dose:  10 mg   Take 1 tablet (10 mg) by mouth daily for 5 days   Quantity:  5 tablet   Refills:  0       senna-docusate 8.6-50 MG per tablet   Commonly known as:  SENOKOT-S;PERICOLACE   This may have changed:    - when to take this  - " reasons to take this   Used for:  Slow transit constipation        Dose:  1 tablet   Take 1 tablet by mouth 2 times daily as needed for constipation   Quantity:  30 tablet   Refills:  0         CONTINUE these medicines which have NOT CHANGED        Dose / Directions    benztropine 0.5 MG tablet   Commonly known as:  COGENTIN   Used for:  Disorganized schizophrenia (H)        Dose:  0.5 mg   Take 1 tablet (0.5 mg) by mouth 2 times daily   Quantity:  60 tablet   Refills:  0       lithium 300 MG CR tablet   Commonly known as:  ESKALITH/LITHOBID   Used for:  Disorganized schizophrenia (H)        Dose:  600 mg   Take 2 tablets (600 mg) by mouth 2 times daily   Quantity:  60 tablet   Refills:  0       nicotine polacrilex 4 MG gum   Commonly known as:  NICORETTE        Dose:  4 mg   Place 4 mg inside cheek as needed for smoking cessation   Refills:  0       ondansetron 4 MG ODT tab   Commonly known as:  ZOFRAN-ODT        Dose:  4 mg   Take 1 tablet (4 mg) by mouth 3 times daily as needed for nausea   Quantity:  30 tablet   Refills:  0       pantoprazole 40 MG EC tablet   Commonly known as:  PROTONIX   Used for:  Gastroesophageal reflux disease, esophagitis presence not specified        Dose:  40 mg   Take 1 tablet (40 mg) by mouth every morning   Quantity:  30 tablet   Refills:  0       * risperiDONE 3 MG Tbdp ODT tab   Used for:  Disorganized schizophrenia (H)        Dose:  3 mg   Place 1 tablet (3 mg) under the tongue At Bedtime   Quantity:  30 tablet   Refills:  0       * risperiDONE 1 MG ODT tab   Commonly known as:  risperDAL M-TABS   Used for:  Disorganized schizophrenia (H)        Dose:  1 mg   Place 1 tablet (1 mg) under the tongue every morning   Quantity:  30 tablet   Refills:  0       traZODone 50 MG tablet   Commonly known as:  DESYREL   Used for:  Disorganized schizophrenia (H)        Dose:  50 mg   Take 1 tablet (50 mg) by mouth nightly as needed for sleep (may repeat X 1)   Quantity:  30 tablet   Refills:  0        triamcinolone 0.5 % Oint ointment   Commonly known as:  KENALOG        Apply topically 2 times daily   Refills:  0       * Notice:  This list has 2 medication(s) that are the same as other medications prescribed for you. Read the directions carefully, and ask your doctor or other care provider to review them with you.      STOP taking     oxyCODONE-acetaminophen 5-325 MG per tablet   Commonly known as:  PERCOCET                Where to get your medicines      These medications were sent to [facility] [place], [place] - [address]  [address], [facility] [address]     Phone:  [phone]     ondansetron 4 MG ODT tab    senna-docusate 8.6-50 MG per tablet    sucralfate 1 GM/10ML suspension         Some of these will need a paper prescription and others can be bought over the counter. Ask your nurse if you have questions.     Bring a paper prescription for each of these medications     diazepam 10 MG tablet                Protect others around you: Learn how to safely use, store and throw away your medicines at www.disposemymeds.org.             Medication List: This is a list of all your medications and when to take them. Check marks below indicate your daily home schedule. Keep this list as a reference.      Medications           Morning Afternoon Evening Bedtime As Needed    benztropine 0.5 MG tablet   Commonly known as:  COGENTIN   Take 1 tablet (0.5 mg) by mouth 2 times daily   Last time this was given:  0.5 mg on 3/11/2017  8:51 AM         8:00 am        8:00 pm               diazepam 10 MG tablet   Commonly known as:  VALIUM   Take 1 tablet (10 mg) by mouth daily for 5 days   Last time this was given:  10 mg on 3/11/2017  8:50 AM         8:00 am                       lithium 300 MG CR tablet   Commonly known as:  ESKALITH/LITHOBID   Take 2 tablets (600 mg) by mouth 2 times daily   Last time this was given:  600 mg on 3/11/2017  8:51 AM         8:00 am         8:00 pm                nicotine polacrilex 4 MG gum   Commonly known as:  NICORETTE   Place 4 mg inside cheek as needed for smoking cessation   Last time this was given:  4 mg on 3/10/2017  8:23 AM                                ondansetron 4 MG ODT tab   Commonly known as:  ZOFRAN-ODT   Take 1 tablet (4 mg) by mouth 3 times daily as needed for nausea   Last time this was given:  4 mg on 3/10/2017  7:58 PM                                pantoprazole 40 MG EC tablet   Commonly known as:  PROTONIX   Take 1 tablet (40 mg) by mouth every morning   Last time this was given:  40 mg on 3/11/2017  8:51 AM         8:00 am                       * risperiDONE 3 MG Tbdp ODT tab   Place 1 tablet (3 mg) under the tongue At Bedtime                     9:00 pm           * risperiDONE 1 MG ODT tab   Commonly known as:  risperDAL M-TABS   Place 1 tablet (1 mg) under the tongue every morning         8:00 am                       senna-docusate 8.6-50 MG per tablet   Commonly known as:  SENOKOT-S;PERICOLACE   Take 1 tablet by mouth 2 times daily as needed for constipation   Last time this was given:  1 tablet on 3/10/2017  8:22 AM         8:00 am        8:00 pm               sucralfate 1 GM/10ML suspension   Commonly known as:  CARAFATE   Take 10 mLs (1 g) by mouth 4 times daily (before meals and nightly)   Last time this was given:  1 g on 3/11/2017  8:54 AM         7:30 am    11:30    4:00 pm    8:00 pm           traZODone 50 MG tablet   Commonly known as:  DESYREL   Take 1 tablet (50 mg) by mouth nightly as needed for sleep (may repeat X 1)   Last time this was given:  50 mg on 3/10/2017  8:13 PM                 8:00 pm               triamcinolone 0.5 % Oint ointment   Commonly known as:  KENALOG   Apply topically 2 times daily   Last time this was given:  3/10/2017  8:17 PM                 8:00 pm               * Notice:  This list has 2 medication(s) that are the same as other medications prescribed for you. Read the directions carefully, and  ask your doctor or other care provider to review them with you.

## 2017-03-09 NOTE — PROGRESS NOTES
Patient has been in pain and feeling nauseated through out the day. Had 3 emesis this shift, having hard time keeping anything down. Taking only sips of fluid. All meds given today IM.  Im Valium, Toradol, Ativan, Haldol.  Patient has been cooperative, but is in a lot of distress, moaning calling out for help.  Internal medicine came to see patient so is aware of situation. Will continue to monitor

## 2017-03-09 NOTE — PROGRESS NOTES
"Pt.is on status individual observation (S.I.O) 24 hours. Pt.woke up at 0345 and requested for three different juice drinks - orange, apple, and cranberry mixed in one cup. She requested for more within ten minutes. Pt.complained her \"tummy hurts\". Said she had \"hernia\". Administered PRN tylenol and trazodone. Pt.said she was nauseous and requested for Zofran. Gave her warm packs. Pt.had emesis around 0440 and stated she was in pain.     0450: Pt.came out walking in the hallway grunting and screaming. Was redirected by staff to be calm or be in her room as other patients were sleeping. Pt.yelled and cursed \"shut the fuck up bitch; kiss my black ass\". Pt.continued to stay out in the hallway agitated, dysregulated, screaming and cursing out staff.  Administered PRN toradol IM as ordered. Requested for ginger ale. Administered IM zyprexa as pt.stated \"give me a shot. I cannot take anything down\".    0600: Pt.still awake, posturing, threatening, yelling,and cursing at staff. Pt.called staff \"dumb ass, fuck you bitch, I'm gonna punch you if you come near me\". Pt.made racist and derogatory remarks to staff \" bitch, go back to your country dumb ass, get out of my face, you are ugly\". Pt.again continued to disrespect, threaten, and make demeaning derogatory remarks to staff stating \"you look like a poodle, my auntie's dog is better than you\". Pt's behavior and constant yelling was disruptive to the unit causing more patients to wake up.     Pt.spitting on the floor in the hallway. Told staff to \"clean it up bitch. It is not my job to clean it bitch\". Pt.continued to target her 1:1 staff and call her different derogatory names.         "

## 2017-03-09 NOTE — PROGRESS NOTES
"Pt moaned, groaned + cried off/on during the shift.  Pt complained about having \"very bad pain!\"  She was encouraged to remain in her room to rest + allow the meds to work but pt would often come out of her room to sit on the floor in the rosales or pace.  Pt would call out \"Help Me!\"  Pt appeared not being able to sit still, often jogging in place.    "

## 2017-03-09 NOTE — PLAN OF CARE
Problem: Psychotic Symptoms  Goal: Social and Therapeutic (Psychotic Symptoms)  Signs and symptoms of listed problems will be absent or manageable.   Outcome: Therapy, progress toward functional goals is gradual           Patient spent the early part of the shift in the lounge area. Patient has been brighter and appropriate this evening. Patient had no outbursts or use of inappropriate language. Patient denies suicidal ideation and self injurious thoughts. Denies depression but continues to appear anxious. Ate dinner, attended community meeting, and made some phone calls. Med compliant, no prn's requested.      Patient evaluation continues. Assessed mood,anxiety,thoughts and behavior.      Patient gradually progressing towards goals.     Patient is encouraged to participate in groups and assisted to develop healthy coping skills.      VS reviewed: BP (!) 145/94 (BP Location: Right arm)  Pulse 109  Temp 98.2  F (36.8  C) (Tympanic)  Resp 16  Wt 77.6 kg (171 lb)  SpO2 97%  BMI 31.79 kg/m2     Length of stay: 6     Refer to daily team meeting notes for individualized plan of care. Nursing will continue to assess.

## 2017-03-10 PROBLEM — R11.2 NAUSEA & VOMITING: Status: ACTIVE | Noted: 2017-03-10

## 2017-03-10 LAB
ANION GAP SERPL CALCULATED.3IONS-SCNC: 5 MMOL/L (ref 3–14)
BUN SERPL-MCNC: 9 MG/DL (ref 7–30)
CALCIUM SERPL-MCNC: 9.9 MG/DL (ref 8.5–10.1)
CHLORIDE SERPL-SCNC: 110 MMOL/L (ref 94–109)
CO2 SERPL-SCNC: 28 MMOL/L (ref 20–32)
CREAT SERPL-MCNC: 0.85 MG/DL (ref 0.52–1.04)
GFR SERPL CREATININE-BSD FRML MDRD: 78 ML/MIN/1.7M2
GLUCOSE SERPL-MCNC: 99 MG/DL (ref 70–99)
POTASSIUM SERPL-SCNC: 4.1 MMOL/L (ref 3.4–5.3)
SODIUM SERPL-SCNC: 143 MMOL/L (ref 133–144)

## 2017-03-10 PROCEDURE — 25000132 ZZH RX MED GY IP 250 OP 250 PS 637: Performed by: INTERNAL MEDICINE

## 2017-03-10 PROCEDURE — 99214 OFFICE O/P EST MOD 30 MIN: CPT | Performed by: PSYCHIATRY & NEUROLOGY

## 2017-03-10 PROCEDURE — G0378 HOSPITAL OBSERVATION PER HR: HCPCS

## 2017-03-10 PROCEDURE — 25000128 H RX IP 250 OP 636: Performed by: INTERNAL MEDICINE

## 2017-03-10 PROCEDURE — 25000125 ZZHC RX 250: Performed by: INTERNAL MEDICINE

## 2017-03-10 PROCEDURE — 96372 THER/PROPH/DIAG INJ SC/IM: CPT

## 2017-03-10 PROCEDURE — 99225 ZZC SUBSEQUENT OBSERVATION CARE,LEVEL II: CPT | Performed by: INTERNAL MEDICINE

## 2017-03-10 PROCEDURE — 36416 COLLJ CAPILLARY BLOOD SPEC: CPT | Performed by: PHYSICIAN ASSISTANT

## 2017-03-10 PROCEDURE — 80048 BASIC METABOLIC PNL TOTAL CA: CPT | Performed by: PHYSICIAN ASSISTANT

## 2017-03-10 PROCEDURE — 25000132 ZZH RX MED GY IP 250 OP 250 PS 637: Performed by: PHYSICIAN ASSISTANT

## 2017-03-10 RX ORDER — ONDANSETRON 4 MG/1
4 TABLET, ORALLY DISINTEGRATING ORAL 3 TIMES DAILY PRN
Qty: 30 TABLET | Refills: 0 | Status: ON HOLD | OUTPATIENT
Start: 2017-03-10 | End: 2017-04-04

## 2017-03-10 RX ORDER — ONDANSETRON 2 MG/ML
4 INJECTION INTRAMUSCULAR; INTRAVENOUS EVERY 6 HOURS PRN
Status: DISCONTINUED | OUTPATIENT
Start: 2017-03-10 | End: 2017-03-11 | Stop reason: HOSPADM

## 2017-03-10 RX ORDER — DIAZEPAM 10 MG
10 TABLET ORAL DAILY
Qty: 5 TABLET | Refills: 0 | Status: SHIPPED | OUTPATIENT
Start: 2017-03-10 | End: 2017-03-15

## 2017-03-10 RX ORDER — KETOROLAC TROMETHAMINE 30 MG/ML
15 INJECTION, SOLUTION INTRAMUSCULAR; INTRAVENOUS ONCE
Status: COMPLETED | OUTPATIENT
Start: 2017-03-10 | End: 2017-03-10

## 2017-03-10 RX ORDER — SUCRALFATE ORAL 1 G/10ML
1 SUSPENSION ORAL
Qty: 1200 ML | Refills: 0 | Status: ON HOLD | OUTPATIENT
Start: 2017-03-10 | End: 2017-04-04

## 2017-03-10 RX ORDER — ONDANSETRON 4 MG/1
4 TABLET, ORALLY DISINTEGRATING ORAL 3 TIMES DAILY
Status: DISCONTINUED | OUTPATIENT
Start: 2017-03-10 | End: 2017-03-11 | Stop reason: HOSPADM

## 2017-03-10 RX ADMIN — BENZTROPINE MESYLATE 0.5 MG: 0.5 TABLET ORAL at 20:51

## 2017-03-10 RX ADMIN — LITHIUM CARBONATE 600 MG: 300 TABLET, FILM COATED, EXTENDED RELEASE ORAL at 08:21

## 2017-03-10 RX ADMIN — TRAZODONE HYDROCHLORIDE 50 MG: 50 TABLET ORAL at 20:13

## 2017-03-10 RX ADMIN — Medication 1 G: at 18:13

## 2017-03-10 RX ADMIN — ONDANSETRON 4 MG: 4 TABLET, ORALLY DISINTEGRATING ORAL at 11:47

## 2017-03-10 RX ADMIN — Medication 1 G: at 11:48

## 2017-03-10 RX ADMIN — ONDANSETRON 4 MG: 4 TABLET, ORALLY DISINTEGRATING ORAL at 19:58

## 2017-03-10 RX ADMIN — LITHIUM CARBONATE 600 MG: 300 TABLET, FILM COATED, EXTENDED RELEASE ORAL at 19:58

## 2017-03-10 RX ADMIN — RISPERIDONE 3 MG: 1 TABLET ORAL at 23:00

## 2017-03-10 RX ADMIN — NICOTINE POLACRILEX 4 MG: 2 GUM, CHEWING ORAL at 08:23

## 2017-03-10 RX ADMIN — KETOROLAC TROMETHAMINE 15 MG: 30 INJECTION, SOLUTION INTRAMUSCULAR at 18:13

## 2017-03-10 RX ADMIN — DIAZEPAM 10 MG: 5 TABLET ORAL at 19:58

## 2017-03-10 RX ADMIN — TRIAMCINOLONE ACETONIDE: 5 OINTMENT TOPICAL at 20:17

## 2017-03-10 RX ADMIN — SENNOSIDES AND DOCUSATE SODIUM 1 TABLET: 8.6; 5 TABLET ORAL at 08:22

## 2017-03-10 RX ADMIN — TRIAMCINOLONE ACETONIDE: 5 OINTMENT TOPICAL at 09:20

## 2017-03-10 RX ADMIN — DIAZEPAM 10 MG: 5 TABLET ORAL at 08:21

## 2017-03-10 RX ADMIN — RISPERIDONE 1 MG: 1 TABLET ORAL at 08:22

## 2017-03-10 RX ADMIN — PANTOPRAZOLE SODIUM 40 MG: 40 TABLET, DELAYED RELEASE ORAL at 18:13

## 2017-03-10 RX ADMIN — PROCHLORPERAZINE MALEATE 5 MG: 5 TABLET, FILM COATED ORAL at 21:39

## 2017-03-10 RX ADMIN — BENZTROPINE MESYLATE 0.5 MG: 0.5 TABLET ORAL at 08:22

## 2017-03-10 RX ADMIN — Medication 1 G: at 08:21

## 2017-03-10 RX ADMIN — ACETAMINOPHEN 650 MG: 325 TABLET, FILM COATED ORAL at 19:58

## 2017-03-10 RX ADMIN — PANTOPRAZOLE SODIUM 40 MG: 40 TABLET, DELAYED RELEASE ORAL at 08:21

## 2017-03-10 NOTE — PLAN OF CARE
Problem: Goal Outcome Summary  Goal: Goal Outcome Summary  Outcome: Improving  Observation goals        1200  Tolerating diet without N/v: YES  Stable from psychiatric standpoint: Awaiting consult     1400  Tolerating diet without N/v: YES  Stable from psychiatric standpoint: CONSULT COMPLETE     Pt has been resting in bed. She has ambulated in room and has been cooperative with cares. Denies N/V, CP, SOB. Denies abdominal discomfort. Continue with POC.

## 2017-03-10 NOTE — PROGRESS NOTES
Patient transferred to 10 A with staff and security. Patient slept for majority of the evening shift. Was given IM Valium earlier in shift for agitation and restlessness. Patient at that time was moaning and complaining of stomach pain. Patient awaken to be transferred, not complaining of any physical pain at this time. Patient denies suicidal ideation and self injurious thoughts. Patient denies depression and anxiety. Denies auditory or visual hallucinations. Patient was given her HS medications, PO Valium was held due to patient still sleeping and not needing it.     Patient states that she has voided, but has not had a bowl movement today. Did not eat dinner, has been drinking fluids. Requested and given a pudding as she was leaving the unit. Patient has had no emesis on this shift.

## 2017-03-10 NOTE — PROGRESS NOTES
3/10/17 [facility] contacted patient's ACT team , Jyotsna Catherine (R) [phone] to inform her that patient was transferred to the medical unit 10A.  [facility] provider her with the unit phone number.  Jyotsna stated that she will let the ACT team RN, Manpreet know as he would have more of the patient's medical information.  She stated that they will send someone out from their team to see her today.      [facility] also called patient's mother to inform her of the transfer, just to make sure she was aware.  CTC had to leave a voice mail.  I also provided her with the contact information for medical unit and contact information for this CTC.    Completed AVS and PD documents.   [facility] has worked with ACT Team with  R, Melissa Garcia, with [facility] ([phone]) and Allyson Galeas ([phone]) and Martha Yip([phone]) at [facility] to complete a Remote Provisional Discharge so that you may return back to the community.  Documents were faxed to them at [facility], [facility] and to ACT Team.  Spoke with Allyson Galeas ([facility]) ran the discharge plan past her and she stated that it seemed like a good plan.  Asked that I send the PD with plan and AVS to Martha HASSAN As she was last to work with patient.  She stated that Martha was in the office and should agree with the plan.  Contacted Martha and received her voice mail.  Informed of the discharge plan and that CTC would be leaving at 2pm today told her that all documents were being faxed over to her right away..    Copy of AVS and PD doucment were also tubed to patient on station 10A.  Informed staff that was assisting with patient's discharge that patient should use     Discharge Instructions:  Use the [employer] [facility] [employer] [facility] for transportation to health care services.  [phone].    patient's mother should be contacted so that her discharge time is coordinated with her so that she can expect the patient home.      Catia  RICH Cee, [facility]  Clinical Treatment Coordinator  Station 30

## 2017-03-10 NOTE — H&P
Internal Medicine History and Physical    Patient Name: Daisy Curtis MRN# [id number]   Age: 31 year old YOB: 1985     Date of Admission: 3/9/2017    Primary care provider: None  Date of Service: 3/9/2017  Admitting Team: [facility]         Assessment and Plan:   Daisy Curtis is a 31 year old female with history of substance abuse, anxiety, schizoaffective disorder, ventral hernia, and asthma who was transferred from the psychiatry unit to the internal medicine team due to intractable nausea, vomiting, and abdominal pain.    Intractable Nausea, Vomiting, Abdominal Pain. Appears chronic, noted as far back as 2012 in Care Everywhere. Has had multiple ED visits with requests for pain control. No apparent source ever identified, although associated in past with cannabinoid hyperemesis syndrome and ventral hernia (dx 1/2013). EGD 12/2014 with mild inflammation of esophagus (likely 2/2 vomiting), otherwise WNL. Hx of cannabis abuse. CBC, CRP, Procal WNL today. K, Na, Chloride normal, no anion gap. Differential includes transient herniation vs cyclic vomiting syndrome vs cannabinoid hyperemesis vs psychogenic vs severe GERD vs gastroparesis. Pt afebrile, abdomen non-tender on exam.  - Continue zofran, compazine PRN  - Prilosec QD  - Patient does not appear to need IVF at this time, encourage PO intake  - May consider gastric emptying study in future  - Avoid narcotics if possible    Ventral Hernia. Noted initially on CT 1/2013 from OSH. Most recently evaluated in clinic 2/28/17 at which time planned for elective surgery at [facility] (scheduled 3/16). Possible factor in nausea/vomiting outlined above, although CT scan 3/7 without evidence of incarceration or active inflammation. CBC without leukocytosis, BMP with normal lytes, CRP and Procal WNL today. Patient currently afebrile, /76, HR 98.  - Follow up with [facility] for scheduled hernia repair 3/16  -  Consider General Surgery consult if concern for hernia incarceration    Schizoaffective Disorder, Anxiety. Admitted to psychiatry 3/2 for acute decompensation, stabilized with addition of scheduled diazepam and risperdal IM. Required frequent PRN antipsychotics and benzos.  - Continue diazepam 10 mg BID, benztropine 0.5 mg BID, lithium 300 mg BID, and risperidone 1 mg QAM, 3 mg QHS  - Continue Trazodone PRN for sleep  - Consider psychiatry consult if patient decompensates    CODE: Full Code  Diet/IVF: Regular diet as tolerated  DVT ppx: Mechanical  Disposition/Admission Status: Inpatient pending adequate oral intake and control of N/V    Venus Chaudhari PA-C  Hospitalist Service  Pager: [phone]           Chief Complaint:   Intractable Nausea and Vomiting         HPI:   Daisy Curtis is a 31 year old female with history of substance abuse, anxiety, schizoaffective disorder, ventral hernia, and asthma who was transferred from the psychiatry unit to the internal medicine team due to intractable nausea, vomiting, and abdominal pain.    Patient initially presented to the ED 3/2 due to erratic behavior and safety concerns and was then admitted to psychiatry [address] [place] [address] for psychiatric stabilization. Initially patient was very manic, but symptoms improved with addition of diazepam, IM risperdal, and frequent PRN ativan and antipsychotics. However, throughout her stay, patient had frequent episodes of abdominal pain, nausea, and emesis. Internal medicine was consulted to evaluate patient's symptoms. CT abdomen/pelvis was obtained on 3/7 which demonstrated a small fat-containing ventral hernia in the supraumbilical region and no acute abnormality as definite cause for her abdominal pain. Patient's abdominal pain appeared to improve transiently with the aid of toradol and dilaudid. Despite these symptoms, patient's abdominal exam remained benign. Her ventral hernia was notably reducible, and there was no  tenderness on exam. Psychiatry felt patient was stable from their standpoint to discharge on 3/9, and recommended discharging to a medical floor for further evaluation and stabilization of physical symptoms.     Reviewing patient's chart, it appears she has a significant history of recurrent vomiting and abdominal pain resulting in frequent ED visits (as far back as ). No apparent source for the symptoms was ever clearly identified, although patient was found to have a supraumbilical hernia 2013. Since, she has had 13 CT scans to evaluate complaints of abdominal pain. When discussing her symptoms, patient notes she has had issues with abdominal pain and vomiting for 8 years. She attributes the pain to her hernia and is very fixated on having this repaired. Otherwise patient currently denies fevers, chills, HA, chest pain, SOB, dysuria, hematuria, constipation, or diarrhea.         Past Medical History:     Past Medical History   Diagnosis Date     Anxiety      Substance abuse      Uncomplicated asthma           Past Surgical History:     Past Surgical History   Procedure Laterality Date     Cholecystectomy       Gyn surgery            Orthopedic surgery       scoliosis repair          Social History:   Tobacco: smokes 1 ppd  Alcohol: Denies  Illicit substances: Denies         Family History:   No family history on file.         Allergies:      Allergies   Allergen Reactions     Codeine Sulfate             Medications:     No current facility-administered medications for this encounter.      Current Outpatient Prescriptions   Medication     diazepam (VALIUM) 10 MG tablet     traZODone (DESYREL) 50 MG tablet     benztropine (COGENTIN) 0.5 MG tablet     lithium (ESKALITH/LITHOBID) 300 MG CR tablet     [START ON 3/10/2017] risperiDONE (RISPERDAL M-TABS) 1 MG ODT tab     risperiDONE 3 MG TBDP ODT tab     pantoprazole (PROTONIX) 40 MG EC tablet     Facility-Administered Medications Ordered in Other  Encounters   Medication     diazepam (VALIUM) injection 10 mg     [START ON 3/10/2017] risperiDONE (risperDAL M-TABS) ODT tab 1 mg     risperiDONE ODT tab TBDP 3 mg     risperiDONE microspheres (risperDAL CONSTA) injection 25 mg     diazepam (VALIUM) tablet 10 mg     ketorolac (TORADOL) injection 30 mg     pantoprazole (PROTONIX) EC tablet 40 mg     lithium (ESKALITH/LITHOBID) CR tablet 600 mg     benztropine (COGENTIN) tablet 0.5 mg     ondansetron (ZOFRAN-ODT) ODT tab 4 mg     nicotine polacrilex (NICORETTE) gum 4 mg     senna-docusate (SENOKOT-S;PERICOLACE) 8.6-50 MG per tablet 1 tablet     triamcinolone (KENALOG) 0.5 % ointment     hydrOXYzine (ATARAX) tablet 25-50 mg     OLANZapine (zyPREXA) tablet 10 mg     OLANZapine (zyPREXA) injection 10 mg     LORazepam (ATIVAN) injection 1-2 mg    Or     LORazepam (ATIVAN) tablet 1-2 mg     acetaminophen (TYLENOL) tablet 650 mg     haloperidol (HALDOL) tablet 5 mg    Or     haloperidol lactate (HALDOL) injection 5 mg     diphenhydrAMINE (BENADRYL) capsule 50 mg    Or     diphenhydrAMINE (BENADRYL) injection 50 mg     magnesium hydroxide (MILK OF MAGNESIA) suspension 30 mL     alum & mag hydroxide-simethicone (MYLANTA ES/MAALOX  ES) suspension 30 mL     traZODone (DESYREL) tablet 50 mg            Review of Systems:   A complete ROS was performed and is negative other than what is stated in the HPI.          Physical Exam:   not currently breastfeeding.  General:  female pacing the room, non-toxic appearing.   HEENT: NC/AT. Anicteric sclera. EOMI. Bilateral rhinorrhea. Eyes symmetric and free of discharge bilaterally. Mucous membranes moist.  Neck: Supple  CV: RRR, S1/S2. No appreciable murmurs.  PULMONARY: Normal effort on room air. Lungs CTAB without wheezes, rales or rhonchi.  GI: Soft, non-tender and non-distended. Bowel sounds normoactive. Supraumbilical hernia reducible.  Extremities/MSK: No peripheral edema. Warm & well perfused. No joint tenderness  or swelling.  Skin: No jaundice, rashes or lesions evident on exposed skin.  Neuro: A&O X 3. Moves all extremities symmetrically. No focal deficits.    Tubes/Lines/Devices: None       Data:   I reviewed all pertinent data including CBC, BMP, CRP, Procal, CT Abd/Pelvis

## 2017-03-10 NOTE — DISCHARGE INSTRUCTIONS
Behavioral Discharge Planning and Instructions      Summary:  You were admitted on 3/2/2017  For Psychotic Symptomology.  You were treated by Dr. Tim Vargas MD and discharged on 03/09/2017 from Station [facility] so that you could be admitted to the medical unit 10A due to severe pain and vomiting.      [facility].  You were  Provisionally Discharged from [facility] on September 24, 2016.  You were admitted to the [facility] on March 2, 2017.  Your Provisional Discharge was revoked on March 7, 2017.  You are now ready to be discharged from the [facility] to the following address (your mother s home):[address] [place] [address] [place] [address].  Your treatment team has worked with your ACT Team and Allyson Galeas and Martha Yip at [facility] to complete a Remote Provisional Discharge so that you may return back to the community.    Main Diagnosis:   Schizoaffective disorder, bipolar type, manic.   Marijuana use disorder.    Health Care Follow-up Appointments:   1. Medication Management Appointment: Monday, March 13th, 2017 @ 2:00pm with Dr. Gisela Day MD  2. Case Management Appointment: Tuesday, March 14th, 2017 @ 1:00pm with RICH Tobin, [facility] (Ph: [phone])  3. Nursing Appointment: Thursday, March 16, 2017 @ unknown time with Manpreet Sheehan RN (Ph: [phone])  Attend all scheduled appointments with your outpatient providers. Call at least 24 hours in advance if you need to reschedule an appointment to ensure continued access to your outpatient providers.   Major Treatments, Procedures and Findings:  You were provided with: a psychiatric assessment, assessed for medical stability, medication evaluation and/or management, group therapy, individual therapy, milieu management and medical interventions    Symptoms to Report: feeling more  aggressive, increased confusion, losing more sleep, mood getting worse or thoughts of suicide    Early warning signs can include: increased depression or anxiety sleep disturbances increased thoughts or behaviors of suicide or self-harm  increased unusual thinking, such as paranoia or hearing voices    Safety and Wellness:  Take all medicines as directed.  Make no changes unless your doctor suggests them.      Follow treatment recommendations.  Refrain from alcohol and non-prescribed drugs.  If there is a concern for safety, call 911.    Resources:   COPE (Community Outreach for Psychiatric Emergencies): [phone].Available 24-hours a day, 7 days a week. Call a COPE professional when a severe disturbance of mood or thinking is impacting your safety. COPE professionals are available to go where you are, handle an immediate crisis, and provide a clinical assessment. If needed, COPE will arrange an emergency evaluation for inpatient psychiatric services. Telephone consultations are also available. Ask them if you meet criteria for a crisis residence.   *You can also talk to CHRISTOPHER about crisis stabilization services if you are experiencing difficulty with the transition from the hospital.  [facility] Acute Psychiatric Services  Acute Psychiatric Services/Crisis Intervention: [phone]  24-hour walk-in crisis intervention and treatment of behavioral emergencies    Located at:  [address] -  in the Emergency room on the first floor of the [address] [address]  Phone: [phone]  This is a crisis residence where you can stay for a short time if you feel like you need to stabilize but do not need to go to the hospital.    Crisis Connection 24/7 Community Call Center: [phone]  Phone: [phone] outside the [facility] area: [phone]  www.crisis.org   Hours: 24 hours/day, 7 days/week  Services: Free and  confidential telephone counseling provided by trained volunteers supervised by professional staff. Early intervention and brief supportive counseling for callers with issues of depression, stress and anxiety, domestic violence, parent/child conflicts, family issues, loneliness, grief and loss, suicidal ideation and chronic mental illness.    [facility] [place] [facility]  You and your family would benefit from the support groups at [facility].   [email or web address]    [email or web address]    The [facility] [place] [facility] has a parent support and educator staff - Cristian Del Rosario - that can be a support for your parents. There are also many classes that are available to you and your family.  Cristian can be reached at [phone] xt 106 or through e-mail at [email or web address].  Daisy,  please take care and make your recovery a daily recovery. The treatment team has appreciated the opportunity to work with you. If you have any questions or concerns our unit number is [phone].  You may be receiving a follow-up phone call within the next three days from a representative from behavioral health.  You have identified the best phone number to reach you as [phone].

## 2017-03-10 NOTE — PROGRESS NOTES
Care Coordinator- Discharge Planning     Admission Date/Time:  3/9/2017  Attending MD:  Laci Glass MD     Data  Date of initial CC assessment:  3/10/17  Chart reviewed, discussed with interdisciplinary team.   Patient was admitted for: nausea and vomiting     Assessment  CC met with patient, requested ride from [employer], states that her will be home at time of DC and is supportive. Denied any significant concerns regarding discharge.    Coordination of Care and Referrals: CC set up ride with [employer] [facility] for Saturday 3/11/17, [employer] or [employer] will p/u patient in lobby at 12 noon.  10A nursing station contact phone #  provided.      Plan  Anticipated Discharge Date:  3/11/17  Anticipated Discharge Plan:  Home    CTS Handoff completed:  YES    Quita Boone RN

## 2017-03-10 NOTE — PROGRESS NOTES
[facility]   CLINICAL TREATMENT COORDINATOR PROGRESS NOTE      DATA:     [facility] contacted patient's ACT team , Jyotsna Catherine (R) [phone] to inform her that patient was transferred to the medical unit 10A.  [facility] provider her with the unit phone number.  Jyotsna stated that she will let the ACT team RNManpreet know as he would have more of the patient's medical information.  She stated that they will send someone out from their team to see her today.      [facility] also called patient's mother to inform her of the transfer, just to make sure she was aware.  CTC had to leave a voice mail.  I also provided her with the contact information for medical unit and contact information for this CTC.      Completed AVS and PD documents.   [facility] has worked with ACT Team with  R, Melissa Garcia, with [facility] ([phone]) and Allyson Galeas ([phone]) and Martha Yip([phone]) at [facility] to complete a Remote Provisional Discharge so that you may return back to the community.  Documents were faxed to them at [facility] and to ACT Team.  Spoke with Allyson Galeas ([facility]) ran the discharge plan past her and she stated that it seemed like a good plan.  Asked that I send the PD with plan and AVS to Martha HASSAN As she was last to work with patient.  She stated that Martha was in the office and should agree with the plan.  Contacted Martha and received her voice mail.  Informed of the discharge plan and that CTC would be leaving at 2pm today told her that all documents were being faxed over to her right away..    Copy of AVS and PD doucment were also tubed to patient on station 10A.  Informed Celso (10A) that the psychiatric discharge plan is a go and that things were worked out with [facility] .  Informed him that copies will be tubed for patient's records.        INTERVENTION:     Phone contact with ACT Team    Jyotsna GLASGOW [phone]    ASSESSMENT:     N/A    PLAN:     ACT Team RN, Manpreet Sheehan, [phone]    RICH Arizmendi, [facility]  Clinical Treatment Coordinator   Station 30  [phone]    Discharge Instructions:  Use the [employer] [facility] for transportation to health care services.  [phone].    patient's mother should be contacted so that her discharge time is coordinated with her so that she can expect the patient home.      RICH Arizmendi, [facility]  Clinical Treatment Coordinator  Station 30

## 2017-03-10 NOTE — PROGRESS NOTES
"[facility] Internal Medicine Progress Note            Interval History:   Transferred to medical unit last evening.  Seen this AM with RN.  Indicates feeling well.  Wants to go home.  Up to BR without LH.  No nausea, reflux, abd pian.  Loose BM this AM.  Voiding OK.  No CP, SOB.             Medications:   All medications reviewed today          Physical Exam:   Blood pressure 114/66, pulse 75, temperature 97.9  F (36.6  C), temperature source Oral, resp. rate 16, height 1.575 m (5' 2\"), weight 75.9 kg (167 lb 6.4 oz), SpO2 96 %, not currently breastfeeding.    Intake/Output Summary (Last 24 hours) at 03/10/17 0906  Last data filed at 03/10/17 0550   Gross per 24 hour   Intake              480 ml   Output              300 ml   Net              180 ml       General:  Alert.  Appropriate.  No distress.     Heent:      Neck:    Skin:    Chest:  clear    Cardiac:  Reg without gallop, murmur.    Abdomen:  Non distended, soft, non tender.  BS normal.  Ventral abdominal hernia remains reducible.     Extremities:  Perfused.  No edema, calf, thigh tenderness.      Neuro:            Data:     Results for orders placed or performed during the hospital encounter of 03/09/17 (from the past 24 hour(s))   Basic metabolic panel   Result Value Ref Range    Sodium 143 133 - 144 mmol/L    Potassium 4.1 3.4 - 5.3 mmol/L    Chloride 110 (H) 94 - 109 mmol/L    Carbon Dioxide 28 20 - 32 mmol/L    Anion Gap 5 3 - 14 mmol/L    Glucose 99 70 - 99 mg/dL    Urea Nitrogen 9 7 - 30 mg/dL    Creatinine 0.85 0.52 - 1.04 mg/dL    GFR Estimate 78 >60 mL/min/1.7m2    GFR Estimate If Black >90   GFR Calc   >60 mL/min/1.7m2    Calcium 9.9 8.5 - 10.1 mg/dL                Assessment and Plan:   1) Recurrent nausea and vomiting with diffuse abdominal discomfort. Benign exam. Known ventral abdominal hernia without clinical evidence for incarceration. Cyclical vomiting related to cannabis use (drug abuse screen never collected on " admission to psychiatry). Potential acid peptic process i.e. Gastritis or reflux esophagitis or PUD. Doubt biliary process.  No clear basis for gastroparesis. No new implicated meds.  Presently improved.   2) Schizoaffective disorder, manic improved per psychiatry.  3) Asthma, compensated.  4) Ventral abdominal hernia, reducible. Benign exam.  5) S/P cholecystectomy.      PLAN:  1)  ADAT.  2)  Ambulate, same meds/orders - BID PPI and carafate.  2)  Trend labs  3)  Anticipate DC 3/11 if medically stable.  Ask psychiatry to see - to ensure OK from their perspective.  Attempted to reach Dr. ERVIN but on vacation.   ADD:  Per CC who spoke with Dr. ERVIN from psychiatry recommends valium taper with 10 mg daily X 5 days after DC with DC. Script written.          Attestation:  I have reviewed today's vital signs, notes, medications, labs and imaging.     Laci Glass MD
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
## 2017-03-10 NOTE — CONSULTS
"INITIAL PSYCHIATRIC CONSULTATION      REQUESTING PHYSICIAN:  Dr. Glass.      REASON FOR CONSULTATION:  Stability to be discharged to home.      IDENTIFYING INFORMATION:  Daisy Curtis is a 31-year-old -American female, single, who lives with her mother.  She has 2 kids.  She has an ACT team who can be reached at [phone].      \"Can I go home today?\"      HISTORY OF PRESENT ILLNESS:  The patient was admitted to mental health for a diagnosis of schizoaffective disorder.  She had disorganization.  She also has a medical problem.  She has abdominal pain, nausea, vomiting in the setting of ventral hernia.  While she was admitted on 03/03 and discharged on 03/09 by Dr. Vargas, she was more stable.  She initially came in on a 72-hour hold.  She was Griffith'd.  She was discharged on a provisional discharge with a stay of commitment with a court date coming up in April.  The ACT team was involved.  They are going to give her Risperdal injections.  She had 1 injection of Risperdal already given on 03/08 and she is going to get another one in 15 days.  Dr. Gisela Gonzalez is the psychiatrist at ACT team.  During my interview today with her, she denies any symptoms of depression, anxiety.  She does have some paranoia which is mild, but denies any auditory or visual hallucinations, denies any suicidal or homicidal ideation, plan or intent.  She denies using any drugs.  She smokes 1 pack a day, but she is hospitalized and not smoking.      Records do indicate that when she gets depressed, she gets standoffish.  She does not converse.  When she gets mood swings, she becomes impulsive, racing thoughts.  At this time, patient denies any symptoms of depression, anxiety, psychosis, julio.  Denies any mood swings.  Denies any impulsiveness.  Denies any racing thoughts.  She does not have any sleep problems, does not have any appetite problems.  She is, however, perseverative has some baseline paranoia and some rapid " speech.  Otherwise, patient denies any other symptoms.      Records indicate that patient when she does get hypermanic, she is very disorganized and has auditory and visual hallucinations.      PAST PSYCHIATRIC HISTORY:  She was psychiatrically hospitalized several times.  She was on a commitment several times.  She was in several treatments as well.  The patient was in treatment in [facility].  She has postpartum psychosis as well.      The patient has a longstanding history of schizoaffective disorder.  The patient previously was also on Clozaril, according to her.      FAMILY HISTORY:  Uncle has alcoholism.  Maternal aunt has manic depression.      SOCIAL HISTORY:  Born in [place].  Moved here.  Father was in shelter and she never met him.  Father apparently killed a  and he was killed in shelter.  Mom had by a boyfriend who also passed away.  Her mom is supportive to her.  She has an ACT team.      PAST MEDICAL HISTORY:  Please review the detailed physical examination, review of systems done on this patient by Dr. Glass on 03/10/2017.  The patient's vitals are as below:  Blood pressure of 114/66, pulse of 75, temperature 97.9.  The patient has recurrent nausea, vomiting with diffuse abdominal discomfort, asthma, ventral abdominal hernia, status post cholecystectomy.      MENTAL STATUS EXAMINATION:  The patient is a 31-year-old -American female who appears her stated age.  She has adequate grooming, adequate hygiene, maintains good eye contact, cooperative.  She has no psychomotor abnormalities.  No gait problems.  Mood is labile.  Affect is congruent.  Speech is increased in rate.  Volume is loud, but she is redirectable.  No loose associations.  Does not have any suicidal or homicidal ideation, plan or intent.  Denied auditory or visual hallucinations.  Insight and judgment are partial.  Alert and oriented x3.  Recent and remote memory, language, fund of knowledge are all adequate.       DIAGNOSES:   Axis I:  Schizoaffective disorder.      RECOMMENDATIONS:   1.  Medical stabilization as per Internal Medicine.   2.  Continue present psychiatric medications, which include benztropine 0.5 mg twice a day.  Lithium 600 mg twice a day, Valium 10 mg twice a day, Risperdal 3 mg at bedtime and 1 mg in the morning.   3.  The patient to get Risperdal shot; the last shot was .   4.  The patient does not need to be transferred back to inpatient psychiatry at this time.  Patient is not suicidal, not homicidal.  She is not having any symptoms of depression or julio.  She does not need to be transferred to inpatient psychiatry.   5.  Continue involvement with the ACT team.  ACT team to follow up.  Follow up with Dr. Gisela Gonzalez.        These recommendations were given to Dr. Glass.  Thank you for this interesting consult.         GEE MARTINEZ MD             D: 03/10/2017 12:24   T: 03/10/2017 13:10   MT: DAVID      Name:     JULY UNDERWOOD   MRN:      [id number]        Account:       [id number]   :      1985           Consult Date:  03/10/2017      Document: [id number]       cc: Laci Glass MD

## 2017-03-10 NOTE — PLAN OF CARE
Problem: Goal Outcome Summary  Goal: Goal Outcome Summary  Outcome: No Change  VSS. Drowsy during interactions. Confrontational with staff. Tolerating regular diet, denies nausea. Writer encouraged pt to void throughout the night, pt declined until early this AM. Voided 300mL of dark orange urine. PRV 6mL. Sitter at bedside. Call light in reach. Will continue to monitor.

## 2017-03-11 VITALS
TEMPERATURE: 97.3 F | OXYGEN SATURATION: 97 % | SYSTOLIC BLOOD PRESSURE: 97 MMHG | HEIGHT: 62 IN | RESPIRATION RATE: 16 BRPM | WEIGHT: 167.4 LBS | DIASTOLIC BLOOD PRESSURE: 57 MMHG | HEART RATE: 84 BPM | BODY MASS INDEX: 30.8 KG/M2

## 2017-03-11 LAB
ANION GAP SERPL CALCULATED.3IONS-SCNC: 3 MMOL/L (ref 3–14)
BUN SERPL-MCNC: 9 MG/DL (ref 7–30)
CALCIUM SERPL-MCNC: 8.6 MG/DL (ref 8.5–10.1)
CHLORIDE SERPL-SCNC: 112 MMOL/L (ref 94–109)
CO2 SERPL-SCNC: 25 MMOL/L (ref 20–32)
CREAT SERPL-MCNC: 0.81 MG/DL (ref 0.52–1.04)
CRP SERPL-MCNC: 4.8 MG/L (ref 0–8)
GFR SERPL CREATININE-BSD FRML MDRD: 82 ML/MIN/1.7M2
GLUCOSE SERPL-MCNC: 88 MG/DL (ref 70–99)
LITHIUM SERPL-SCNC: 1.2 MMOL/L (ref 0.6–1.2)
MAGNESIUM SERPL-MCNC: 2.1 MG/DL (ref 1.6–2.3)
POTASSIUM SERPL-SCNC: 4.4 MMOL/L (ref 3.4–5.3)
SODIUM SERPL-SCNC: 140 MMOL/L (ref 133–144)

## 2017-03-11 PROCEDURE — G0378 HOSPITAL OBSERVATION PER HR: HCPCS

## 2017-03-11 PROCEDURE — 25000132 ZZH RX MED GY IP 250 OP 250 PS 637: Performed by: INTERNAL MEDICINE

## 2017-03-11 PROCEDURE — 80178 ASSAY OF LITHIUM: CPT | Performed by: INTERNAL MEDICINE

## 2017-03-11 PROCEDURE — 36416 COLLJ CAPILLARY BLOOD SPEC: CPT | Performed by: INTERNAL MEDICINE

## 2017-03-11 PROCEDURE — 99217 ZZC OBSERVATION CARE DISCHARGE: CPT | Performed by: INTERNAL MEDICINE

## 2017-03-11 PROCEDURE — 80048 BASIC METABOLIC PNL TOTAL CA: CPT | Performed by: INTERNAL MEDICINE

## 2017-03-11 PROCEDURE — 83735 ASSAY OF MAGNESIUM: CPT | Performed by: INTERNAL MEDICINE

## 2017-03-11 PROCEDURE — 86140 C-REACTIVE PROTEIN: CPT | Performed by: INTERNAL MEDICINE

## 2017-03-11 PROCEDURE — 25000132 ZZH RX MED GY IP 250 OP 250 PS 637: Performed by: PHYSICIAN ASSISTANT

## 2017-03-11 RX ORDER — AMOXICILLIN 250 MG
1 CAPSULE ORAL 2 TIMES DAILY PRN
Qty: 30 TABLET | Refills: 0 | Status: ON HOLD | OUTPATIENT
Start: 2017-03-11 | End: 2017-04-04

## 2017-03-11 RX ADMIN — PANTOPRAZOLE SODIUM 40 MG: 40 TABLET, DELAYED RELEASE ORAL at 08:51

## 2017-03-11 RX ADMIN — DIAZEPAM 10 MG: 5 TABLET ORAL at 08:50

## 2017-03-11 RX ADMIN — SENNOSIDES AND DOCUSATE SODIUM 1 TABLET: 8.6; 5 TABLET ORAL at 11:10

## 2017-03-11 RX ADMIN — LITHIUM CARBONATE 600 MG: 300 TABLET, FILM COATED, EXTENDED RELEASE ORAL at 08:51

## 2017-03-11 RX ADMIN — RISPERIDONE 1 MG: 1 TABLET ORAL at 11:10

## 2017-03-11 RX ADMIN — BENZTROPINE MESYLATE 0.5 MG: 0.5 TABLET ORAL at 08:51

## 2017-03-11 RX ADMIN — Medication 1 G: at 08:54

## 2017-03-11 RX ADMIN — Medication 1 G: at 11:10

## 2017-03-11 NOTE — PROGRESS NOTES
"[facility] Internal Medicine Progress Note            Interval History:   Record reviewed.  Seen with RN.  Indication of complaints of abdominal pain last PM  (received IM toradol one time).  Recorded emesis.  Slept well over night.  Remains anxious to go home.  Awakened from sleep.  Indicates feeling well \"much improved\".    Up to BR without LH.  No ongoing nausea, reflux, abd pain.  BM 3/10. .  Voiding OK.  No CP, SOB.  Indicates \"spit up\" last night - not really emesis.  No preceding nausea. Tolerating diet per staff.  Similar symptoms dating back 3-4 years.  ACT team involved with DC plan.  Psychiatry follow up reviewed.  Also discussed with Dr. ERVIN - recommended valium taper to 10 mg daily for 5 days then DC.  Pt gave permission to contact Mother maribel.  Panfilo arranged for noon.  Mother to be home when pt arrives.             Medications:   All medications reviewed today          Physical Exam:   Blood pressure 97/57, pulse 84, temperature 97.3  F (36.3  C), temperature source Axillary, resp. rate 16, height 1.575 m (5' 2\"), weight 75.9 kg (167 lb 6.4 oz), SpO2 97 %, not currently breastfeeding.    Intake/Output Summary (Last 24 hours) at 03/10/17 0906  Last data filed at 03/10/17 0550   Gross per 24 hour   Intake              480 ml   Output              300 ml   Net              180 ml          General:  Alert.  Appropriate.  No distress.     Heent:      Neck:    Skin:    Chest:  clear    Cardiac:  Reg without gallop, murmur.    Abdomen:  Non distended, soft, non tender. No guarding, organomegaly or mass.   BS normal.  Ventral abdominal hernia remains reducible.     Extremities:  Perfused.  No edema, calf, thigh tenderness.      Neuro:            Data:     Results for orders placed or performed during the hospital encounter of 03/09/17 (from the past 24 hour(s))   Psychiatry IP Consult: schozoaffective DO, manic - assess stability for DC home.; Consultant may enter orders: Yes; Patient to be seen: " Routine; Call back #: [phone]    Narrative    Lita Noe MD     3/10/2017 12:17 PM  Pt seen for on 3/10/17 initial psych consult for 60 min     Last Basic Metabolic Panel:  Lab Results   Component Value Date     03/10/2017      Lab Results   Component Value Date    POTASSIUM 4.1 03/10/2017     Lab Results   Component Value Date    CHLORIDE 110 03/10/2017     Lab Results   Component Value Date    GIBRAN 9.9 03/10/2017     Lab Results   Component Value Date    CO2 28 03/10/2017     Lab Results   Component Value Date    BUN 9 03/10/2017     Lab Results   Component Value Date    CR 0.85 03/10/2017     Lab Results   Component Value Date    GLC 99 03/10/2017   procalcitonin < 0.05  CRP 6  WBC 10.6             Assessment and Plan:   1) Recurrent nausea and vomiting with diffuse abdominal discomfort. Benign exam. Known ventral abdominal hernia without clinical evidence for incarceration. Cyclical vomiting related to cannabis use (drug abuse screen never collected on admission to psychiatry). Potential acid peptic process i.e. Gastritis or reflux esophagitis or PUD. Doubt biliary process.  No clear basis for gastroparesis. No new implicated meds.  Presently improved.  Abdominal exam remains benign.  Chronic issue per pt's history.   2) Schizoaffective disorder, manic improved per psychiatry.  3) Asthma, compensated.  4) Ventral abdominal hernia, reducible. Benign exam.  5) S/P cholecystectomy.  Normal LFT's.       PLAN:  1)  Clinically appears stable for DC with benign abdominal exam, tolerating diet without appreciable emesis and apparent chronic nature of symptoms.  Psychiatry feels stable from MH perspective.  2)  Review AM albs (CRP, lithium level, BMP, Mg) before DC.  3)  ACT team involved.  4)  Meds/orders reviewed (psyche meds ordered by psychiatry except valium taper 10 mg daily X 5 days as per Dr. ERVIN).  Continue PPI and carafate with scheduled zofran.  5)  Check with patient's Mother to ensure  comfortable with dispo.   > 30 min. Mother called - message left.    ADD:  Labs Ok - lithium level 1.2. CRP 4.8       Attestation:  I have reviewed today's vital signs, notes, medications, labs and imaging.     Laci Glass MD

## 2017-03-11 NOTE — PLAN OF CARE
Problem: Goal Outcome Summary  Goal: Goal Outcome Summary  Outcome: Adequate for Discharge Date Met:  03/11/17  Pt up ad pravin in room and hallway with 1:1 sitter. Pt anxious in asking many questions but cooperative in her cares. Denies abd pain. Abd soft. BS +. Pt passing flatus. Last BM 3/10/17. Pt denies nausea. Able to eat half of breakfast without difficulty. Frequently drinking coffee. MD in and left message for patients mother to call him. No return call noted. Second call placed with message left to call unit/MD. No return call. Charge nurse notified and stated that ACT nurse has been notified of discharge and planning to meet patient at home. Discharge instructions and filled home medications reviewed with patient. Patient states she understands instructions and has no questions. Pt taken to lobby by 1:1 sitter with all belongings/home medications for cab ride home at 1200.

## 2017-03-11 NOTE — PLAN OF CARE
Problem: Goal Outcome Summary  Goal: Goal Outcome Summary  Outcome: Improving  1600: Tolerating diet without N/v: YES  Stable from psychiatric standpoint: CONSULT COMPLETE- cleared to d/c home cheryl.  1800: 1600: Tolerating diet without N/v: YES  Stable from psychiatric standpoint: CONSULT COMPLETE- cleared to d/c home cheryl.     Patient c/o abdominal pain this PM, MD updated IM Toradol 15 mg given with relief. Will continue with POC.

## 2017-03-11 NOTE — PLAN OF CARE
"Problem: Goal Outcome Summary  Goal: Goal Outcome Summary  Outcome: No Change  8pm:complaints of abdominal pain.She wants \"a shot\".Able to redirect and was given oral zofran,tylenol.valium 10mg,and other 8pm scheduled pills.Pt did have a small emesis of previous food she earlier ate but no pills seen.Out pacing the hallway with sitter.States had BM 3/10.Denies problems voiding.  10pm:Still awaiting for \"a shot\",further explained to wait for oral pills to kick in.Cooperative.Pt was later given compazine 5mg oral and later slept.  12MN:Sleeping comfortably.  2AM:Still sleeping,seen repositioning self on bed.  4AM:Sleeping.  6AM:Sleeping/resting comfortably.          "

## 2017-03-11 NOTE — DISCHARGE SUMMARY
Internal Medicine Discharge Summary   Date of Service: 3/11/2017    Daisy Curtis MRN# [id number]   YOB: 1985 Age: 31 year old     Date of Admission:  3/9/2017  Date of Discharge:  3/11/2017   Admitting Provider:  Laci Glass MD  Discharge Provider:  Laci Glass MD  Discharging Service:  Internal Medicine     Primary Provider: No Ref-Primary, Physician         Reason for Admission:   31 year old female with history of substance abuse, anxiety, schizoaffective disorder, ventral hernia, and asthma transferred from the psychiatry unit to the internal medicine team due to intractable nausea, vomiting, and abdominal pain.   Admission to in patient psychiatry 3/2/17 for schizoaffective disorder bipolar type with manic features.  Similar issues of nausea, vomiting and abdominal pain dating back to at least 2012.   Issue of ventral abdominal hernia with CT imaging [facility] 2/26/17 demonstrating Mild fat stranding involving the omentum contained within a ventral hernia suggesting inflammation with wall thickening and hypoattenuation of a short segment of transverse colon underlying the hernia which could signify inflammation. Repeat abd/pelvic CT here 3/7 no inflammatory change or incarceration. Cannabis abuse per psychiatry. EGD 12/14 mild inflammation of the esophagus possibly related to recurrent emesis. Symptom  relief with zofran and valium.  Occasional reflux. Diffuse abdominal discomfort. Last BM 2-3 days prior to transfer.  Concern by psychiatry that symptoms could not adequately be addressed on the psychiatry unit prompting medicine transfer as above.           Discharge Diagnoses:   1) Recurrent nausea,  vomiting andabdominal discomfort, exact etiology unclear.  Known ventral abdominal hernia without clinical evidence for incarceration. Cyclical vomiting related to cannabis use possible.  Potential acid peptic process i.e. Gastritis or reflux esophagitis or PUD. Doubt biliary  "process. No clear basis for gastroparesis. No new implicated meds. Improved.  2) Schizoaffective disorder, manic improved per psychiatry.  3) Asthma, compensated.  4) Ventral abdominal hernia, reducible. Benign exam.  5) S/P cholecystectomy. Normal LFT's.          Procedures & Significant Findings:   CT abd/pelvis 3/2/17 -  1. No acute abnormality in the abdomen or pelvis. No definite cause for abdominal pain is identified.  2. Small fat-containing ventral hernia in the supraumbilical region.   3. Mild diffuse fatty infiltration of the liver         Consultations:   Lita Noe MD, Psychiatry.         Hospital Course by Problem:    1) Recurrent nausea and vomiting with diffuse abdominal discomfort. Admitted to the medical service where placed on IV fluid support.  PPI, carafate for reflux.  Scheduled zofran for nausea.  Abdominal exam remained benign.  AM 3/10 patient indicated she was feeling well. Wanted to go home. Up to BR without LH. No nausea, reflux, abd pain. Loose BM.  Voiding OK. No CP, SOB.  Elected to monitor another day to ensure clinically stable.   Later during the day 3/10pt complained of recurrent abdominal pain.  Given 1 dose of IM toradol.  Otherwise appeared to saran when distracted.  Indication that she \"spit up\" without sonia nausea or vomiting.  By AM 3/11 patient remained anxious to go home.  Indicated feeling well \"much improved\". Up to BR without LH. No ongoing nausea, reflux, abd pain. BM 3/10. Voiding OK. No CP, SOB.  Tolerating diet per staff. Similar symptoms again dating back 3-4 years without clear acute issue clinically. . Known ventral abdominal hernia without clinical evidence for incarceration. Cyclical vomiting related to cannabis use a possibility (drug abuse screen never collected on admission to psychiatry).  Potential acid peptic process i.e. Gastritis or reflux esophagitis or PUD improved on PPI and carafate. . Biliary process felt to be unlikley.  No clear basis for " "gastroparesis. No new implicated meds.  As clinically improved, tolerating a diet with benign  abdominal exam patient felt to be stable and appropriate for DC home.  Mother and ACT Team to be present at patient's home on her arrival.  PMD follow up recommended.    2) Schizoaffective disorder, manic improved per psychiatry.  Follow up consultation by Dr. Noe.  Noted last risperdal injection 3/8.   To continue same psyche meds as ordered.  Continued ACT team involvement.  Dispo coordinated between CC's on psychiatry and medical unit.  Decision to taper valium to 10 mg daily X 5 days then off implemented as recommended by in pt psychiatrist.  3) Asthma, remained compensated.  4) Ventral abdominal hernia, reducible. Benign exam.  No evidence for incarceration.  Elective repair planned.   5) S/P cholecystectomy. Normal LFT's.       Physical Exam on day of discharge:  Blood pressure 97/57, pulse 84, temperature 97.3  F (36.3  C), temperature source Axillary, resp. rate 16, height 1.575 m (5' 2\"), weight 75.9 kg (167 lb 6.4 oz), SpO2 97 %, not currently breastfeeding.  GENERAL: Alert and orientated x 3.  No distress.  Off O2.    HEENT:  Unremarkable.   NECK:  No nodes.  CV: reg without gallop, murmur.  RESPIRATORY: Chest - clear.   GI:  Non distended, soft, non tender. No guarding, organomegaly or mass.  BS normal. Ventral abdominal hernia remains reducible.   EXTREMITIES: Well perfused.  No edema. No calf, posterior thigh tenderness.    SKIN: No rash.     Lines/Tubes/Drains:   Peripheral IV 03/07/17 Left;Anterior Hand (Active)   Number of days:4              Pending Results:     Unresulted Labs Ordered in the Past 30 Days of this Admission     No orders found from 1/8/2017 to 3/10/2017.               Discharge Medications:     Current Discharge Medication List      START taking these medications    Details   sucralfate (CARAFATE) 1 GM/10ML suspension Take 10 mLs (1 g) by mouth 4 times daily (before meals and " nightly)  Qty: 1200 mL, Refills: 0    Associated Diagnoses: Intractable vomiting, presence of nausea not specified, unspecified vomiting type         CONTINUE these medications which have CHANGED    Details   senna-docusate (SENOKOT-S;PERICOLACE) 8.6-50 MG per tablet Take 1 tablet by mouth 2 times daily as needed for constipation  Qty: 30 tablet, Refills: 0    Associated Diagnoses: Slow transit constipation      diazepam (VALIUM) 10 MG tablet Take 1 tablet (10 mg) by mouth daily for 5 days  Qty: 5 tablet, Refills: 0    Associated Diagnoses: Schizoaffective disorder, bipolar type (H)      ondansetron (ZOFRAN-ODT) 4 MG ODT tab Take 1 tablet (4 mg) by mouth 3 times daily as needed for nausea  Qty: 30 tablet, Refills: 0    Associated Diagnoses: Intractable vomiting, presence of nausea not specified, unspecified vomiting type         CONTINUE these medications which have NOT CHANGED    Details   traZODone (DESYREL) 50 MG tablet Take 1 tablet (50 mg) by mouth nightly as needed for sleep (may repeat X 1)  Qty: 30 tablet, Refills: 0    Associated Diagnoses: Disorganized schizophrenia (H)      benztropine (COGENTIN) 0.5 MG tablet Take 1 tablet (0.5 mg) by mouth 2 times daily  Qty: 60 tablet, Refills: 0    Associated Diagnoses: Disorganized schizophrenia (H)      lithium (ESKALITH/LITHOBID) 300 MG CR tablet Take 2 tablets (600 mg) by mouth 2 times daily  Qty: 60 tablet, Refills: 0    Associated Diagnoses: Disorganized schizophrenia (H)      !! risperiDONE (RISPERDAL M-TABS) 1 MG ODT tab Place 1 tablet (1 mg) under the tongue every morning  Qty: 30 tablet, Refills: 0    Associated Diagnoses: Disorganized schizophrenia (H)      !! risperiDONE 3 MG TBDP ODT tab Place 1 tablet (3 mg) under the tongue At Bedtime  Qty: 30 tablet, Refills: 0    Associated Diagnoses: Disorganized schizophrenia (H)      pantoprazole (PROTONIX) 40 MG EC tablet Take 1 tablet (40 mg) by mouth every morning  Qty: 30 tablet, Refills: 0    Associated  Diagnoses: Gastroesophageal reflux disease, esophagitis presence not specified      triamcinolone (KENALOG) 0.5 % OINT ointment Apply topically 2 times daily      nicotine polacrilex (NICORETTE) 4 MG gum Place 4 mg inside cheek as needed for smoking cessation       !! - Potential duplicate medications found. Please discuss with provider.      STOP taking these medications       oxyCODONE-acetaminophen (PERCOCET) 5-325 MG per tablet Comments:   Reason for Stopping:                    Discharge Instructions and Follow-Up:     Discharge Procedure Orders  Discharge Instructions   Order Comments: ACT team involvement - recommendations as per psychiatry.  Appointment with primary MD in next week.                 Discharge Disposition:   Dispo as arranged by psychiatry with continued ACT Team involvement.  PMD follow up regarding GI complaints and scheduling of ventral abdominal hernia repair.          Condition on Discharge:   Discharge condition: Good   Code status on discharge: Full Code        > 30 minutes spent in discharge, including >50% in counseling and coordination of care, medication review and plan of care recommended on follow up.     Patient was evaluated on day of discharge by attending physician, Laci Glass MD, who agrees with plan of care.     It was our pleasure to care for Daisy Curtis during this hospitalization. Please do not hesitate to contact me should there be questions regarding the hospital course or discharge plan.      Laci Glass MD  Hospitalist Service  Pager: [phone]

## 2017-03-20 ENCOUNTER — TELEPHONE (OUTPATIENT)
Dept: BEHAVIORAL HEALTH | Facility: CLINIC | Age: 32
End: 2017-03-20

## 2017-03-20 ENCOUNTER — HOSPITAL ENCOUNTER (INPATIENT)
Facility: CLINIC | Age: 32
LOS: 15 days | Discharge: HOME | DRG: 885 | End: 2017-04-04
Attending: FAMILY MEDICINE | Admitting: PSYCHIATRY & NEUROLOGY
Payer: COMMERCIAL

## 2017-03-20 DIAGNOSIS — R11.2 NAUSEA AND VOMITING, INTRACTABILITY OF VOMITING NOT SPECIFIED, UNSPECIFIED VOMITING TYPE: ICD-10-CM

## 2017-03-20 DIAGNOSIS — R23.8 SKIN IRRITATION: ICD-10-CM

## 2017-03-20 DIAGNOSIS — F25.0 SCHIZOAFFECTIVE DISORDER, MANIC TYPE (H): ICD-10-CM

## 2017-03-20 DIAGNOSIS — K21.9 GASTROESOPHAGEAL REFLUX DISEASE WITHOUT ESOPHAGITIS: ICD-10-CM

## 2017-03-20 DIAGNOSIS — R79.89 ELEVATED PROLACTIN LEVEL: ICD-10-CM

## 2017-03-20 DIAGNOSIS — R11.10 INTRACTABLE VOMITING, PRESENCE OF NAUSEA NOT SPECIFIED, UNSPECIFIED VOMITING TYPE: ICD-10-CM

## 2017-03-20 DIAGNOSIS — F25.0 SCHIZOAFFECTIVE DISORDER, BIPOLAR TYPE (H): Primary | ICD-10-CM

## 2017-03-20 DIAGNOSIS — K59.01 SLOW TRANSIT CONSTIPATION: ICD-10-CM

## 2017-03-20 PROBLEM — F69 BEHAVIOR CONCERN IN ADULT: Status: ACTIVE | Noted: 2017-03-20

## 2017-03-20 LAB
ALBUMIN SERPL-MCNC: 4.1 G/DL (ref 3.4–5)
ALP SERPL-CCNC: 95 U/L (ref 40–150)
ALT SERPL W P-5'-P-CCNC: 27 U/L (ref 0–50)
AMPHETAMINES UR QL SCN: ABNORMAL
ANION GAP SERPL CALCULATED.3IONS-SCNC: 9 MMOL/L (ref 3–14)
AST SERPL W P-5'-P-CCNC: 17 U/L (ref 0–45)
BARBITURATES UR QL: ABNORMAL
BENZODIAZ UR QL: ABNORMAL
BILIRUB SERPL-MCNC: 0.2 MG/DL (ref 0.2–1.3)
BUN SERPL-MCNC: 6 MG/DL (ref 7–30)
CALCIUM SERPL-MCNC: 9.4 MG/DL (ref 8.5–10.1)
CANNABINOIDS UR QL SCN: ABNORMAL
CHLORIDE SERPL-SCNC: 108 MMOL/L (ref 94–109)
CO2 SERPL-SCNC: 24 MMOL/L (ref 20–32)
COCAINE UR QL: ABNORMAL
CREAT SERPL-MCNC: 0.69 MG/DL (ref 0.52–1.04)
DIFFERENTIAL METHOD BLD: NORMAL
ERYTHROCYTE [DISTWIDTH] IN BLOOD BY AUTOMATED COUNT: NORMAL % (ref 10–15)
ETHANOL UR QL SCN: ABNORMAL
GFR SERPL CREATININE-BSD FRML MDRD: ABNORMAL ML/MIN/1.7M2
GLUCOSE SERPL-MCNC: 137 MG/DL (ref 70–99)
HCG UR QL: NEGATIVE
HCT VFR BLD AUTO: NORMAL % (ref 35–47)
HGB BLD-MCNC: NORMAL G/DL (ref 11.7–15.7)
LITHIUM SERPL-SCNC: <0.2 MMOL/L (ref 0.6–1.2)
MCH RBC QN AUTO: NORMAL PG (ref 26.5–33)
MCHC RBC AUTO-ENTMCNC: NORMAL G/DL (ref 31.5–36.5)
MCV RBC AUTO: NORMAL FL (ref 78–100)
OPIATES UR QL SCN: ABNORMAL
PLATELET # BLD AUTO: NORMAL 10E9/L (ref 150–450)
POTASSIUM SERPL-SCNC: 3.7 MMOL/L (ref 3.4–5.3)
PROT SERPL-MCNC: 8.4 G/DL (ref 6.8–8.8)
RBC # BLD AUTO: NORMAL 10E12/L (ref 3.8–5.2)
SODIUM SERPL-SCNC: 141 MMOL/L (ref 133–144)
WBC # BLD AUTO: NORMAL 10E9/L (ref 4–11)

## 2017-03-20 PROCEDURE — 80320 DRUG SCREEN QUANTALCOHOLS: CPT | Performed by: FAMILY MEDICINE

## 2017-03-20 PROCEDURE — 80178 ASSAY OF LITHIUM: CPT | Performed by: FAMILY MEDICINE

## 2017-03-20 PROCEDURE — 25000132 ZZH RX MED GY IP 250 OP 250 PS 637: Performed by: PSYCHIATRY & NEUROLOGY

## 2017-03-20 PROCEDURE — 80307 DRUG TEST PRSMV CHEM ANLYZR: CPT | Performed by: FAMILY MEDICINE

## 2017-03-20 PROCEDURE — 99284 EMERGENCY DEPT VISIT MOD MDM: CPT | Mod: Z6 | Performed by: FAMILY MEDICINE

## 2017-03-20 PROCEDURE — 81025 URINE PREGNANCY TEST: CPT | Performed by: FAMILY MEDICINE

## 2017-03-20 PROCEDURE — 99285 EMERGENCY DEPT VISIT HI MDM: CPT | Performed by: FAMILY MEDICINE

## 2017-03-20 PROCEDURE — 36415 COLL VENOUS BLD VENIPUNCTURE: CPT | Performed by: FAMILY MEDICINE

## 2017-03-20 PROCEDURE — 12400003 ZZH R&B MH CRITICAL UMMC

## 2017-03-20 PROCEDURE — 80053 COMPREHEN METABOLIC PANEL: CPT | Performed by: FAMILY MEDICINE

## 2017-03-20 PROCEDURE — 90791 PSYCH DIAGNOSTIC EVALUATION: CPT

## 2017-03-20 RX ORDER — TRIAMCINOLONE ACETONIDE 5 MG/G
OINTMENT TOPICAL 2 TIMES DAILY
Status: DISCONTINUED | OUTPATIENT
Start: 2017-03-20 | End: 2017-04-04 | Stop reason: HOSPADM

## 2017-03-20 RX ORDER — OLANZAPINE 5 MG/1
5-10 TABLET, ORALLY DISINTEGRATING ORAL
Status: DISCONTINUED | OUTPATIENT
Start: 2017-03-20 | End: 2017-04-04 | Stop reason: HOSPADM

## 2017-03-20 RX ORDER — TRAZODONE HYDROCHLORIDE 50 MG/1
50 TABLET, FILM COATED ORAL
Status: DISCONTINUED | OUTPATIENT
Start: 2017-03-20 | End: 2017-04-04 | Stop reason: HOSPADM

## 2017-03-20 RX ORDER — RISPERIDONE 1 MG/1
1 TABLET, ORALLY DISINTEGRATING ORAL EVERY MORNING
Status: DISCONTINUED | OUTPATIENT
Start: 2017-03-21 | End: 2017-03-27

## 2017-03-20 RX ORDER — ONDANSETRON 4 MG/1
4 TABLET, ORALLY DISINTEGRATING ORAL 3 TIMES DAILY PRN
Status: DISCONTINUED | OUTPATIENT
Start: 2017-03-20 | End: 2017-04-04 | Stop reason: HOSPADM

## 2017-03-20 RX ORDER — AMOXICILLIN 250 MG
1 CAPSULE ORAL 2 TIMES DAILY PRN
Status: DISCONTINUED | OUTPATIENT
Start: 2017-03-20 | End: 2017-04-04 | Stop reason: HOSPADM

## 2017-03-20 RX ORDER — PANTOPRAZOLE SODIUM 40 MG/1
40 TABLET, DELAYED RELEASE ORAL EVERY MORNING
Status: DISCONTINUED | OUTPATIENT
Start: 2017-03-21 | End: 2017-04-04 | Stop reason: HOSPADM

## 2017-03-20 RX ORDER — OLANZAPINE 10 MG/2ML
5-10 INJECTION, POWDER, FOR SOLUTION INTRAMUSCULAR
Status: DISCONTINUED | OUTPATIENT
Start: 2017-03-20 | End: 2017-04-04 | Stop reason: HOSPADM

## 2017-03-20 RX ORDER — LITHIUM CARBONATE 300 MG/1
600 TABLET, FILM COATED, EXTENDED RELEASE ORAL 2 TIMES DAILY
Status: DISCONTINUED | OUTPATIENT
Start: 2017-03-20 | End: 2017-04-04 | Stop reason: HOSPADM

## 2017-03-20 RX ORDER — HYDROXYZINE HYDROCHLORIDE 25 MG/1
25-50 TABLET, FILM COATED ORAL EVERY 4 HOURS PRN
Status: DISCONTINUED | OUTPATIENT
Start: 2017-03-20 | End: 2017-04-04 | Stop reason: HOSPADM

## 2017-03-20 RX ORDER — RISPERIDONE 3 MG/1
3 TABLET, ORALLY DISINTEGRATING ORAL AT BEDTIME
Status: DISCONTINUED | OUTPATIENT
Start: 2017-03-20 | End: 2017-03-27

## 2017-03-20 RX ORDER — DIAZEPAM 5 MG
5-20 TABLET ORAL EVERY 30 MIN PRN
Status: DISCONTINUED | OUTPATIENT
Start: 2017-03-20 | End: 2017-03-21

## 2017-03-20 RX ORDER — SUCRALFATE ORAL 1 G/10ML
1 SUSPENSION ORAL
Status: DISCONTINUED | OUTPATIENT
Start: 2017-03-20 | End: 2017-04-04 | Stop reason: HOSPADM

## 2017-03-20 RX ORDER — ALUMINA, MAGNESIA, AND SIMETHICONE 2400; 2400; 240 MG/30ML; MG/30ML; MG/30ML
30 SUSPENSION ORAL EVERY 4 HOURS PRN
Status: DISCONTINUED | OUTPATIENT
Start: 2017-03-20 | End: 2017-04-04 | Stop reason: HOSPADM

## 2017-03-20 RX ORDER — BENZTROPINE MESYLATE 0.5 MG/1
0.5 TABLET ORAL 2 TIMES DAILY
Status: DISCONTINUED | OUTPATIENT
Start: 2017-03-20 | End: 2017-04-04 | Stop reason: HOSPADM

## 2017-03-20 RX ORDER — ACETAMINOPHEN 325 MG/1
650 TABLET ORAL EVERY 4 HOURS PRN
Status: DISCONTINUED | OUTPATIENT
Start: 2017-03-20 | End: 2017-04-04 | Stop reason: HOSPADM

## 2017-03-20 RX ADMIN — BENZTROPINE MESYLATE 0.5 MG: 0.5 TABLET ORAL at 20:15

## 2017-03-20 RX ADMIN — RISPERIDONE 3 MG: 3 TABLET, ORALLY DISINTEGRATING ORAL at 20:16

## 2017-03-20 RX ADMIN — OLANZAPINE 10 MG: 5 TABLET, ORALLY DISINTEGRATING ORAL at 23:25

## 2017-03-20 RX ADMIN — LITHIUM CARBONATE 600 MG: 300 TABLET, EXTENDED RELEASE ORAL at 20:15

## 2017-03-20 RX ADMIN — Medication 1 G: at 21:00

## 2017-03-20 ASSESSMENT — ENCOUNTER SYMPTOMS
CONFUSION: 0
ABDOMINAL PAIN: 0
COLOR CHANGE: 0
DECREASED CONCENTRATION: 1
FEVER: 0
WEAKNESS: 0
ARTHRALGIAS: 0
TROUBLE SWALLOWING: 0
VOMITING: 0
NERVOUS/ANXIOUS: 1
EYE REDNESS: 0
DIFFICULTY URINATING: 0
FATIGUE: 0
DYSPHORIC MOOD: 1
ACTIVITY CHANGE: 1
NAUSEA: 0
NECK STIFFNESS: 0
FLANK PAIN: 0
SHORTNESS OF BREATH: 0
SLEEP DISTURBANCE: 1
HEADACHES: 0
APPETITE CHANGE: 0

## 2017-03-20 ASSESSMENT — ACTIVITIES OF DAILY LIVING (ADL)
GROOMING: INDEPENDENT
ORAL_HYGIENE: INDEPENDENT
LAUNDRY: WITH SUPERVISION
DRESS: INDEPENDENT

## 2017-03-20 NOTE — PROGRESS NOTES
Cell phone, Identification card, 1 pair blue sweat pants, 1 shirt, 1 pair of underwear, 1 blue jacket,1 pair of shoes, 1 brown purse containing mascara, eyeshadow, eyeliner, toothpaste, and 1 blue wallet containing a metro pass and business card.    ADMISSION:  I am responsible for any personal items that are not sent to the safe or pharmacy. [facility] is not responsible for loss, theft or damage of any property in my possession.    Patient Signature _____________________ Date/Time _____________________    Staff Signature _______________________ Date/Time _____________________    2nd Staff person, if patient is unable/unwilling to sign  ___________________________________ Date/Time _____________________    DISCHARGE:  My personal items have been returned to me.   Patient Signature _____________________ Date/Time _____________________

## 2017-03-20 NOTE — IP AVS SNAPSHOT
MRN:[id number]                      After Visit Summary   3/20/2017    Daisy Curtis    MRN: [id number]           Thank you!     Thank you for choosing [facility] for your care. Our goal is always to provide you with excellent care.        Patient Information     Date Of Birth          1985        About your hospital stay     You were admitted on:  March 20, 2017 You last received care in the:   12NB    You were discharged on:  April 4, 2017       Who to Call     For medical emergencies, please call 911.  For non-urgent questions about your medical care, please call your primary care provider or clinic, [phone]          Attending Provider     Provider Specialty    Davon Siu MD Emergency Medicine    Norma Schultz MD Psychiatry       Primary Care Provider Office Phone # Fax #    Kary Quintero [phone]       [facility] [address] [facility] [address]        Further instructions from your care team       Behavioral Discharge Planning and Instructions      Summary:  You were admitted to the [facility] Station 12.  During your hospitalization, you met daily with the staff and were encouraged to attend all therapeutic programming. You met with the Clinical Treatment Coordinator and participated in your discharge planning. Medications were adjusted.    You were dually committed to the [facility] and the [facility] of Human Services on March 28, 2017.  You are being discharged on a Provisional Discharge Agreement which shall remain in effect for the duration of the Commitment.  Your Commitment expires on April 7, 2018.    You were also court ordered to take the medications the doctor ordered for you.       Primary Diagnoses:   1. Schizoaffective disorder, bipolar type, current episode is manic with psychotic features.   2. Benzodiazepine use disorder.   3. Historical  diagnosis of cannabis and cocaine use disorder.   4. Antisocial personality disorder.       Mental Health Follow-Up:  - ACT Team through [employer]:  : Jyotsna Catherine [phone]  ACT RN: Manpreet Sheehan [phone]  ACT : Ai Mendiola [phone]  Psychiatrist: Dr. Gisela Day MD  Primary Care: Khadijah Quintero [phone]    Attend all scheduled appointments with your outpatient providers. Call at least 24 hours in advance if you need to reschedule an appointment to ensure continued access to your outpatient providers.   Major Treatments, Procedures and Findings:  You were provided with: a psychiatric assessment, medication evaluation and/or management, group therapy and milieu management    Symptoms to Report: feeling more aggressive, increased confusion, losing more sleep, mood getting worse or thoughts of suicide    Early warning signs can include: increased depression or anxiety sleep disturbances increased thoughts or behaviors of suicide or self-harm  increased unusual thinking, such as paranoia or hearing voices    Safety and Wellness:  Take all medicines as directed.  Make no changes unless your doctor suggests them.      Follow treatment recommendations.  Refrain from alcohol and non-prescribed drugs.  Ask your support system to help you reduce your access to items that could harm yourself or others. Items could include:  Firearms  Medicines (both prescribed and over-the-counter)  Knives and other sharp objects  Ropes and like materials  Car keys  If there is a concern for safety, call 911. If there is a concern for safety, call 911.    Resources:   Crisis Intervention: 978.433.2174 or 355-625-4139 (TTY: 485.390.8107).  Call anytime for help.  National [place] on Mental Illness (www.[place].pari.org): 351.365.3558 or 477-031-5747.  [place] Association for Children's Mental Health (www.macmh.org): 628.503.8969.  Alcoholics Anonymous  "(www.alcoholics-anonymous.org): Check your phone book for your local chapter.  Suicide Awareness Voices of Education (SAVE) (www.save.org): 254-363-IVLK (8039)  National Suicide Prevention Line (www.mentalhealthmn.org): 118-940-TQKV (4289)  Mental Health Consumer/Survivor Network of [place] (www.mhcsn.net): 700.185.9038 or 065-822-1681  Mental Health Association of [place] (www.mentalhealth.org): 812.268.9197 or 766-764-1323  Self- Management and Recovery Training., SMART-- Toll free: 425.612.6796  www.[employer]  [facility] Crisis (COPE) Response - Adult 589 789-3185  [facility] Crisis Response - Adult [phone]  Text 4 Life: txt \"LIFE\" to 84627 for immediate support and crisis intervention  Crisis text line: Text \"START\" to 409-652. Free, confidential, 24/7.  Crisis Intervention: 324.706.8943 or 961-795-2779. Call anytime for help.   [facility] Crisis Team - Child: [phone]  [facility] Mental [facility] Crisis Response Team - Child: [phone]    The treatment team has appreciated the opportunity to work with you.  If you have any questions or concerns our unit number is [phone].          Pending Results     No orders found from 3/18/2017 to 3/21/2017.            Admission Information     Date & Time Provider Department Dept. Phone    3/20/2017 Norma Schultz MD UR 12NB [phone]      Your Vitals Were     Blood Pressure Pulse Temperature Respirations Pulse Oximetry       114/71 85 97.6  F (36.4  C) (Tympanic) 16 99%       Care EveryWhere ID     This is your Care EveryWhere ID. This could be used by other organizations to access your [facility] medical records  [id number]           Review of your medicines      START taking        Dose / Directions    ARIPiprazole 2 MG tablet   Commonly known as:  ABILIFY   Used for:  Schizoaffective disorder, bipolar type (H), Elevated prolactin level (H)        Dose:  2 mg   Take 1 tablet (2 mg) by mouth daily "   Quantity:  30 tablet   Refills:  0       risperiDONE microspheres 50 MG injection   Commonly known as:  risperDAL CONSTA   Used for:  Schizoaffective disorder, bipolar type (H)        Dose:  50 mg   Start taking on:  4/18/2017   Inject 2 mLs (50 mg) into the muscle every 14 days   Quantity:  4 mL   Refills:  0         CONTINUE these medicines which may have CHANGED, or have new prescriptions. If we are uncertain of the size of tablets/capsules you have at home, strength may be listed as something that might have changed.        Dose / Directions    * risperiDONE 2 MG ODT tab   Commonly known as:  risperDAL M-TABS   This may have changed:    - medication strength  - how much to take   Used for:  Schizoaffective disorder, bipolar type (H)        Dose:  2 mg   Place 1 tablet (2 mg) under the tongue every morning   Quantity:  30 tablet   Refills:  0       * risperiDONE 3 MG Tbdp ODT tab   This may have changed:  how much to take   Used for:  Schizoaffective disorder, bipolar type (H)        Dose:  6 mg   Place 2 tablets (6 mg) under the tongue At Bedtime   Quantity:  60 tablet   Refills:  0       triamcinolone 0.5 % Oint ointment   Commonly known as:  KENALOG   This may have changed:  how much to take   Used for:  Skin irritation        Dose:  1 applicator   Apply 1 g topically 2 times daily   Quantity:  60 g   Refills:  0       * Notice:  This list has 2 medication(s) that are the same as other medications prescribed for you. Read the directions carefully, and ask your doctor or other care provider to review them with you.      CONTINUE these medicines which have NOT CHANGED        Dose / Directions    benztropine 0.5 MG tablet   Commonly known as:  COGENTIN   Used for:  Schizoaffective disorder, bipolar type (H)        Dose:  0.5 mg   Take 1 tablet (0.5 mg) by mouth 2 times daily   Quantity:  60 tablet   Refills:  0       lithium 300 MG CR tablet   Commonly known as:  ESKALITH/LITHOBID   Used for:  Schizoaffective  disorder, bipolar type (H)        Dose:  600 mg   Take 2 tablets (600 mg) by mouth 2 times daily   Quantity:  120 tablet   Refills:  0       pantoprazole 40 MG EC tablet   Commonly known as:  PROTONIX   Used for:  Gastroesophageal reflux disease without esophagitis        Dose:  40 mg   Take 1 tablet (40 mg) by mouth every morning   Quantity:  30 tablet   Refills:  0       senna-docusate 8.6-50 MG per tablet   Commonly known as:  SENOKOT-S;PERICOLACE   Used for:  Slow transit constipation        Dose:  1 tablet   Take 1 tablet by mouth 2 times daily as needed for constipation   Quantity:  100 tablet   Refills:  0       sucralfate 1 GM/10ML suspension   Commonly known as:  CARAFATE   Used for:  Gastroesophageal reflux disease without esophagitis, Intractable vomiting, presence of nausea not specified, unspecified vomiting type, Nausea and vomiting, intractability of vomiting not specified, unspecified vomiting type        Dose:  1 g   Take 10 mLs (1 g) by mouth 4 times daily (before meals and nightly)   Quantity:  1200 mL   Refills:  0         STOP taking     nicotine polacrilex 4 MG gum   Commonly known as:  NICORETTE           ondansetron 4 MG ODT tab   Commonly known as:  ZOFRAN-ODT           traZODone 50 MG tablet   Commonly known as:  DESYREL                Where to get your medicines      These medications were sent to [facility] [place], [place] - [address]  [address] [address]     Phone:  [phone]     ARIPiprazole 2 MG tablet    benztropine 0.5 MG tablet    lithium 300 MG CR tablet    pantoprazole 40 MG EC tablet    risperiDONE 2 MG ODT tab    risperiDONE 3 MG Tbdp ODT tab    senna-docusate 8.6-50 MG per tablet    sucralfate 1 GM/10ML suspension    triamcinolone 0.5 % Oint ointment                Protect others around you: Learn how to safely use, store and throw away your medicines at www.disposemymeds.org.             Medication List: This is a list of all  your medications and when to take them. Check marks below indicate your daily home schedule. Keep this list as a reference.      Medications           Morning Afternoon Evening Bedtime As Needed    ARIPiprazole 2 MG tablet   Commonly known as:  ABILIFY   Take 1 tablet (2 mg) by mouth daily   Last time this was given:  2 mg on 4/4/2017  3:09 PM                                benztropine 0.5 MG tablet   Commonly known as:  COGENTIN   Take 1 tablet (0.5 mg) by mouth 2 times daily   Last time this was given:  0.5 mg on 4/4/2017  8:22 AM                                lithium 300 MG CR tablet   Commonly known as:  ESKALITH/LITHOBID   Take 2 tablets (600 mg) by mouth 2 times daily   Last time this was given:  600 mg on 4/4/2017  8:22 AM                                pantoprazole 40 MG EC tablet   Commonly known as:  PROTONIX   Take 1 tablet (40 mg) by mouth every morning   Last time this was given:  40 mg on 4/4/2017  8:22 AM                                * risperiDONE 2 MG ODT tab   Commonly known as:  risperDAL M-TABS   Place 1 tablet (2 mg) under the tongue every morning   Last time this was given:  2 mg on 4/4/2017  8:22 AM                                * risperiDONE 3 MG Tbdp ODT tab   Place 2 tablets (6 mg) under the tongue At Bedtime   Last time this was given:  2 mg on 4/4/2017  8:22 AM                                risperiDONE microspheres 50 MG injection   Commonly known as:  risperDAL CONSTA   Inject 2 mLs (50 mg) into the muscle every 14 days   Start taking on:  4/18/2017   Last time this was given:  50 mg on 4/4/2017  3:10 PM                                senna-docusate 8.6-50 MG per tablet   Commonly known as:  SENOKOT-S;PERICOLACE   Take 1 tablet by mouth 2 times daily as needed for constipation                                sucralfate 1 GM/10ML suspension   Commonly known as:  CARAFATE   Take 10 mLs (1 g) by mouth 4 times daily (before meals and nightly)   Last time this was given:  1 g on 4/4/2017  11:59 AM                                triamcinolone 0.5 % Oint ointment   Commonly known as:  KENALOG   Apply 1 g topically 2 times daily   Last time this was given:  4/4/2017 11:57 AM                                * Notice:  This list has 2 medication(s) that are the same as other medications prescribed for you. Read the directions carefully, and ask your doctor or other care provider to review them with you.

## 2017-03-20 NOTE — TELEPHONE ENCOUNTER
S - Connor BEC  calling with clinical on 32 yo female presenting to [facility] ED with disorganized and psychotic behaviors.     B - pt dropped off by mom this morning, initially complaining of anxiety.  Now appears to be having some psychosis.  Pt unwilling to give a lot of information.  Pt smiling inappropriately, pacing, minimally responsive.  Pt appears somewhat confused, making odd statements.  Pt was recently admitted to psych, was transferred to medical for tx of the hernia.  Pt not giving info on med compliance or follow up.  Pt previously admitted for disorganized and inappropriate behavior.  BEC  trying to contact mom, unable to get a hold of her.  Pt appears to be responding to internal stimuli but denies hallucinations.  Pt medically cleared.  Utox + benzos (not prescribed any benzos). Hcg neg.  Pt does have hx of substance abuse.     A -  from past chart notes, pt is on commitment, unsure if provisionally discharged. BEC  will attempt to contact . Pt will be placed on hold    R - Dr. Schultz accepted / 12

## 2017-03-20 NOTE — ED PROVIDER NOTES
History     Chief Complaint   Patient presents with     Panic Attack     HPI  Daisy Curtis is a 31 year old female with history of substance abuse, anxiety, schizoaffective disorder, ventral hernia, and asthma who presents to the Emergency Department with complaint of anxiety and panic attack. Patient's Mother dropped her off at the ED today but did not want to stay with the patient. Patient states she experienced anxiety a few hours ago, but upon questioning she is unable to identify symptoms or trigger. Of note, patient was seen in the ED earlier this month due to inability to be safe. Patient was subsequently admitted for psychiatric reasons.  Unclear patient is currently taking medications.  Patient notes some decreased sleep last night.  The patient denies any Hallucinations currently at this point.    PAST MEDICAL HISTORY  Past Medical History   Diagnosis Date     Anxiety      Substance abuse      Uncomplicated asthma      Ventral hernia      PAST SURGICAL HISTORY  Past Surgical History   Procedure Laterality Date     Cholecystectomy       Gyn surgery            Orthopedic surgery       scoliosis repair     FAMILY HISTORY  No family history on file.  SOCIAL HISTORY  Social History   Substance Use Topics     Smoking status: Former Smoker     Packs/day: 1.00     Smokeless tobacco: Not on file     Alcohol use No     MEDICATIONS  No current facility-administered medications for this encounter.      Current Outpatient Prescriptions   Medication     senna-docusate (SENOKOT-S;PERICOLACE) 8.6-50 MG per tablet     traZODone (DESYREL) 50 MG tablet     benztropine (COGENTIN) 0.5 MG tablet     lithium (ESKALITH/LITHOBID) 300 MG CR tablet     risperiDONE (RISPERDAL M-TABS) 1 MG ODT tab     risperiDONE 3 MG TBDP ODT tab     triamcinolone (KENALOG) 0.5 % OINT ointment     nicotine polacrilex (NICORETTE) 4 MG gum     ondansetron (ZOFRAN-ODT) 4 MG ODT tab     sucralfate (CARAFATE) 1 GM/10ML suspension      pantoprazole (PROTONIX) 40 MG EC tablet     ALLERGIES  Allergies   Allergen Reactions     Codeine Sulfate          I have reviewed the Medications, Allergies, Past Medical and Surgical History, and Social History in the Epic system.    Review of Systems   Constitutional: Positive for activity change (increasing hyperactivity concern for manic behavior). Negative for appetite change, fatigue and fever.   HENT: Negative for congestion and trouble swallowing.    Eyes: Negative for redness and visual disturbance.   Respiratory: Negative for shortness of breath.    Cardiovascular: Negative for chest pain.   Gastrointestinal: Negative for abdominal pain, nausea and vomiting.   Genitourinary: Negative for difficulty urinating and flank pain.   Musculoskeletal: Negative for arthralgias and neck stiffness.   Skin: Negative for color change.   Neurological: Negative for weakness and headaches.   Psychiatric/Behavioral: Positive for decreased concentration, dysphoric mood and sleep disturbance. Negative for confusion. Hallucinations: unclear. The patient is nervous/anxious.    All other systems reviewed and are negative.      Physical Exam   BP: 141/83  Pulse: 140  Temp: 99.4  F (37.4  C)  Resp: 20  SpO2: 97 %  Physical Exam   Constitutional: She appears well-developed and well-nourished. She appears distressed.   Patient pacing here in the ER but not threatening.  Mildly agitated also anxious seems to be preoccupied with some of the thought process although was not responding to internal stimuli   HENT:   Head: Normocephalic and atraumatic.   Mouth/Throat: Oropharynx is clear and moist. No oropharyngeal exudate.   Eyes: Pupils are equal, round, and reactive to light. No scleral icterus.   Neck: Normal range of motion. Neck supple. No JVD present.   Cardiovascular: Regular rhythm, normal heart sounds and intact distal pulses.    Pulmonary/Chest: Breath sounds normal. No stridor. No respiratory distress.   Abdominal: Soft.  Bowel sounds are normal. There is no tenderness.   Musculoskeletal: She exhibits no edema or tenderness.   Neurological: She is alert. She has normal reflexes. No cranial nerve deficit.   No focal up ambulating   Skin: Skin is warm and dry. No rash noted. She is not diaphoretic. No erythema. No pallor.   Psychiatric:   Physical patient appears to be somewhat manic somewhat distracted with concerns of some internal stimuli but nonthreatening here denies being active suicidal homicidal denies any obvious hallucinations at this point.  Patient is up pacing here in the ER but directable   Nursing note and vitals reviewed.      ED Course     ED Course     Procedures         Patient ER was evaluated by  concerned about her current mental health status.  I did evaluate the patient also is noted.  Labs are drawn patient's lithium level was negative.  Other labs appear to be stable this point.  Urine tox screen positive for benzodiazepine.  Urine pregnancy test negative    Patient cooperative throughout the her course in ER and admitted to station 12 patient this point is voluntary.    Critical Care time:  none               Labs Ordered and Resulted from Time of ED Arrival Up to the Time of Departure from the ED   DRUG ABUSE SCREEN 6 CHEM DEP URINE ([facility]) - Abnormal; Notable for the following:        Result Value    Benzodiazepine Qual Urine   (*)     Value: Positive   Cutoff for a positive benzodiazepine is greater than 200 ng/mL. This is an   unconfirmed screening result to be used for medical purposes only.      All other components within normal limits   COMPREHENSIVE METABOLIC PANEL - Abnormal; Notable for the following:     Glucose 137 (*)     Urea Nitrogen 6 (*)     All other components within normal limits   LITHIUM LEVEL - Abnormal; Notable for the following:     Lithium Level <0.2 (*)     All other components within normal limits   HCG QUALITATIVE URINE   CBC WITH PLATELETS DIFFERENTIAL     Results for  orders placed or performed during the hospital encounter of 03/20/17   Drug abuse screen 6 urine (tox)   Result Value Ref Range    Amphetamine Qual Urine  NEG     Negative   Cutoff for a negative amphetamine is 500 ng/mL or less.      Barbiturates Qual Urine  NEG     Negative   Cutoff for a negative barbiturate is 200 ng/mL or less.      Benzodiazepine Qual Urine (A) NEG     Positive   Cutoff for a positive benzodiazepine is greater than 200 ng/mL. This is an   unconfirmed screening result to be used for medical purposes only.      Cannabinoids Qual Urine  NEG     Negative   Cutoff for a negative cannabinoid is 50 ng/mL or less.      Cocaine Qual Urine  NEG     Negative   Cutoff for a negative cocaine is 300 ng/mL or less.      Ethanol Qual Urine  NEG     Negative   Cutoff for a negative urine ethanol is 0.05 g/dL or less      Opiates Qualitative Urine  NEG     Negative   Cutoff for a negative opiate is 300 ng/mL or less.     HCG qualitative urine   Result Value Ref Range    HCG Qual Urine Negative NEG   CBC with platelets differential   Result Value Ref Range    WBC  4.0 - 11.0 10e9/L     Canceled, Test credited   Unsatisfactory specimen - clotted  CALLED TO NORBERTO LOW RN AT 1139 ON 3.20.17 BY CC      RBC Count  3.8 - 5.2 10e12/L     Canceled, Test credited   Unsatisfactory specimen - clotted  CALLED TO NORBERTO LOW RN AT 1139 ON 3.20.17 BY CC      Hemoglobin  11.7 - 15.7 g/dL     Canceled, Test credited   Unsatisfactory specimen - clotted  CALLED TO NORBERTO LOW RN AT 1139 ON 3.20.17 BY CC      Hematocrit  35.0 - 47.0 %     Canceled, Test credited   Unsatisfactory specimen - clotted  CALLED TO NORBERTO LOW RN AT 1139 ON 3.20.17 BY CC      MCV  78 - 100 fl     Canceled, Test credited   Unsatisfactory specimen - clotted  CALLED TO NORBERTO LOW RN AT 1139 ON 3.20.17 BY CC      MCH  26.5 - 33.0 pg     Canceled, Test credited   Unsatisfactory specimen - clotted  CALLED TO NORBERTO LOW RN AT 1139 ON  3.20.17 BY CC      MCHC  31.5 - 36.5 g/dL     Canceled, Test credited   Unsatisfactory specimen - clotted  CALLED TO NORBERTO LOW RN AT 1139 ON 3.20.17 BY CC      RDW  10.0 - 15.0 %     Canceled, Test credited   Unsatisfactory specimen - clotted  CALLED TO NORBERTO LOW RN AT 1139 ON 3.20.17 BY CC      Platelet Count  150 - 450 10e9/L     Canceled, Test credited   Unsatisfactory specimen - clotted  CALLED TO NORBERTO LOW RN AT 1139 ON 3.20.17 BY CC      Diff Method       Canceled, Test credited   Unsatisfactory specimen - clotted  CALLED TO NORBERTO LOW RN AT 1139 ON 3.20.17 BY CC     Comprehensive metabolic panel   Result Value Ref Range    Sodium 141 133 - 144 mmol/L    Potassium 3.7 3.4 - 5.3 mmol/L    Chloride 108 94 - 109 mmol/L    Carbon Dioxide 24 20 - 32 mmol/L    Anion Gap 9 3 - 14 mmol/L    Glucose 137 (H) 70 - 99 mg/dL    Urea Nitrogen 6 (L) 7 - 30 mg/dL    Creatinine 0.69 0.52 - 1.04 mg/dL    GFR Estimate >90  Non  GFR Calc   >60 mL/min/1.7m2    GFR Estimate If Black >90   GFR Calc   >60 mL/min/1.7m2    Calcium 9.4 8.5 - 10.1 mg/dL    Bilirubin Total 0.2 0.2 - 1.3 mg/dL    Albumin 4.1 3.4 - 5.0 g/dL    Protein Total 8.4 6.8 - 8.8 g/dL    Alkaline Phosphatase 95 40 - 150 U/L    ALT 27 0 - 50 U/L    AST 17 0 - 45 U/L   Lithium level   Result Value Ref Range    Lithium Level <0.2 (L) 0.6 - 1.2 mmol/L         Assessments & Plan (with Medical Decision Making)  31-year-old female history of schizoaffective disorder with manic symptoms currently at this point.  Concern of bed complaints at this point lithium level is negative.  Labs otherwise stable urine drug screen positive for benzos.  Patient here in the ER will be admitted for concern of schizoaffective disorder with julio decompensation but otherwise cooperative nonthreatening currently voluntary admitted to station 12.           I have reviewed the nursing notes.    I have reviewed the findings, diagnosis, plan and  need for follow up with the patient.    New Prescriptions    No medications on file       Final diagnoses:   Schizoaffective disorder, manic type (H)     I, Mariaa Goode, am serving as a trained medical scribe to document services personally performed by Davon Siu MD, based on the provider's statements to me.   Davon BOLTON MD, was physically present and have reviewed and verified the accuracy of this note documented by Mariaa Goode.      3/20/2017   [facility], [place], EMERGENCY DEPARTMENT    This note was created at least in part by the use of dragon voice dictation system. Inadvertent typographical errors may still exist.  Davon Siu MD.         Davon Siu MD  03/20/17 [id number]

## 2017-03-20 NOTE — IP AVS SNAPSHOT
[address] [address]    Phone:  [phone]                                       After Visit Summary   3/20/2017    Daisy Curtis    MRN: [id number]           After Visit Summary Signature Page     I have received my discharge instructions, and my questions have been answered. I have discussed any challenges I see with this plan with the nurse or doctor.    ..........................................................................................................................................  Patient/Patient Representative Signature      ..........................................................................................................................................  Patient Representative Print Name and Relationship to Patient    ..................................................               ................................................  Date                                            Time    ..........................................................................................................................................  Reviewed by Signature/Title    ...................................................              ..............................................  Date                                                            Time

## 2017-03-21 PROCEDURE — 25000132 ZZH RX MED GY IP 250 OP 250 PS 637: Performed by: PSYCHIATRY & NEUROLOGY

## 2017-03-21 PROCEDURE — 99223 1ST HOSP IP/OBS HIGH 75: CPT | Mod: AI | Performed by: PSYCHIATRY & NEUROLOGY

## 2017-03-21 PROCEDURE — 12400003 ZZH R&B MH CRITICAL UMMC

## 2017-03-21 PROCEDURE — 99207 ZZC CDG-MDM COMPONENT: MEETS HIGH - UP CODED: CPT | Performed by: PSYCHIATRY & NEUROLOGY

## 2017-03-21 RX ORDER — PHENOBARBITAL 16.2 MG/1
32.4 TABLET ORAL 3 TIMES DAILY
Status: DISCONTINUED | OUTPATIENT
Start: 2017-03-21 | End: 2017-03-21

## 2017-03-21 RX ORDER — PHENOBARBITAL 16.2 MG/1
32.4 TABLET ORAL 3 TIMES DAILY
Status: DISCONTINUED | OUTPATIENT
Start: 2017-03-21 | End: 2017-03-23

## 2017-03-21 RX ADMIN — Medication 1 G: at 21:10

## 2017-03-21 RX ADMIN — RISPERIDONE 1 MG: 1 TABLET, ORALLY DISINTEGRATING ORAL at 11:40

## 2017-03-21 RX ADMIN — PHENOBARBITAL 32.4 MG: 16.2 TABLET ORAL at 19:37

## 2017-03-21 RX ADMIN — LITHIUM CARBONATE 600 MG: 300 TABLET, EXTENDED RELEASE ORAL at 19:37

## 2017-03-21 RX ADMIN — TRAZODONE HYDROCHLORIDE 50 MG: 50 TABLET ORAL at 21:10

## 2017-03-21 RX ADMIN — LITHIUM CARBONATE 600 MG: 300 TABLET, EXTENDED RELEASE ORAL at 11:40

## 2017-03-21 RX ADMIN — OLANZAPINE 10 MG: 5 TABLET, ORALLY DISINTEGRATING ORAL at 21:10

## 2017-03-21 RX ADMIN — BENZTROPINE MESYLATE 0.5 MG: 0.5 TABLET ORAL at 19:37

## 2017-03-21 RX ADMIN — PHENOBARBITAL 32.4 MG: 16.2 TABLET ORAL at 14:07

## 2017-03-21 RX ADMIN — PANTOPRAZOLE SODIUM 40 MG: 40 TABLET, DELAYED RELEASE ORAL at 11:40

## 2017-03-21 RX ADMIN — BENZTROPINE MESYLATE 0.5 MG: 0.5 TABLET ORAL at 11:40

## 2017-03-21 RX ADMIN — RISPERIDONE 3 MG: 3 TABLET, ORALLY DISINTEGRATING ORAL at 19:37

## 2017-03-21 ASSESSMENT — ACTIVITIES OF DAILY LIVING (ADL)
DRESS: SCRUBS (BEHAVIORAL HEALTH)
LAUNDRY: WITH SUPERVISION
ORAL_HYGIENE: INDEPENDENT
GROOMING: PROMPTS
GROOMING: HANDWASHING;INDEPENDENT
DRESS: SCRUBS (BEHAVIORAL HEALTH);INDEPENDENT
LAUNDRY: UNABLE TO COMPLETE
ORAL_HYGIENE: PROMPTS
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
## 2017-03-21 NOTE — PROGRESS NOTES
This writer left a vm for pt's CCM lead- Ashlyn.  Asked for a return call.  Asking about revoking pt's provisional discharge.

## 2017-03-21 NOTE — PROGRESS NOTES
This writer spoke to pt's ACT .  She is getting started on revocation of his PD.  She also stated that they have started the process for continuing his commitment, but have not heard about court dates yet.  They were unaware that he was hospitalized until I called and reported it to her.  She will send over paperwork regarding the revocation when it is completed.
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
## 2017-03-21 NOTE — PLAN OF CARE
Problem: General Plan of Care (Inpatient Behavioral)  Goal: Individualization/Patient Specific Goal (IP Behavioral)  The patient and/or their representative will achieve their patient-specific goals related to the plan of care.    The patient-specific goals include:   Pt refused to meet with writer and was agitated

## 2017-03-21 NOTE — PROGRESS NOTES
Spoke with Act Team DAMION Case [id number].  Patient has not received her Risperdal Consta since most recent discharge from the hospital. Last injection 3/08/17, 25 mg.

## 2017-03-21 NOTE — PLAN OF CARE
Problem: General Plan of Care (Inpatient Behavioral)  Goal: Team Discussion  Team Plan:   BEHAVIORAL TEAM DISCUSSION     Continued Stay Criteria/Rationale: New Patient admitted for psychotic symptoms  Plan: Conduct Initial Psychosocial assessment, meet with Psychiatrist and treatment team.    Participants: Dr. Norma Schultz, Ynes Navarro RN, Hannah JOSEPH, [facility], Lindy Boone RN  Summary/Recommendation: [facility] to work with pt's ACT team to make appropriate referrals and recommendations   Medical/Physical: see medical chart  Progress: continue to assess

## 2017-03-21 NOTE — PROVIDER NOTIFICATION
"   03/20/17 1845   Legal Status at Admission   Legal Status at Admission (health officer hold, signed 3/20/17 at 0900. See note.)     Upon admission, writer and charge RN spoke with ED regarding patient's legal status. RN in ED stated that the physicians there \"do not place patients on 72 hour holds because they are not psych doctors\". The ED stated that the patient is to be on the health officer hold until assessed by the psychiatrist. Writer called on-call psychiatrist for orders, and explained the legal status, to which he stated that ER physicians are indeed allowed to place patients on 72 hour hold, and that in this situation the attending psychiatrist will need to assess patient in the morning. Writer called the attending psychiatrist (Dr. Schultz) and explained this to him. He did not want to accept the patient without a hold ordered, explained that admission orders had already been obtained, agreed to see patient and assess in the morning.   "

## 2017-03-21 NOTE — PROGRESS NOTES
This writer left vm for pt's CCM, asked for them to return my call so we can discuss revoking the pt's provisional discharge.     Pt is now placed on a 72-hr hold

## 2017-03-21 NOTE — PLAN OF CARE
Problem: Behavioral Disturbance  Intervention: Social and Therapeutic Interv (Behavioral Disturbance)     Pt was restless, wandering around all day.  Invited to group a few times, agreed to come once, but never did attend.

## 2017-03-22 PROCEDURE — 12400003 ZZH R&B MH CRITICAL UMMC

## 2017-03-22 PROCEDURE — 25000128 H RX IP 250 OP 636: Performed by: PSYCHIATRY & NEUROLOGY

## 2017-03-22 PROCEDURE — 25000132 ZZH RX MED GY IP 250 OP 250 PS 637: Performed by: PSYCHIATRY & NEUROLOGY

## 2017-03-22 PROCEDURE — 99232 SBSQ HOSP IP/OBS MODERATE 35: CPT | Performed by: PSYCHIATRY & NEUROLOGY

## 2017-03-22 PROCEDURE — 90853 GROUP PSYCHOTHERAPY: CPT

## 2017-03-22 RX ADMIN — Medication 1 G: at 19:12

## 2017-03-22 RX ADMIN — PHENOBARBITAL 32.4 MG: 16.2 TABLET ORAL at 19:12

## 2017-03-22 RX ADMIN — BENZTROPINE MESYLATE 0.5 MG: 0.5 TABLET ORAL at 19:11

## 2017-03-22 RX ADMIN — LITHIUM CARBONATE 600 MG: 300 TABLET, EXTENDED RELEASE ORAL at 08:25

## 2017-03-22 RX ADMIN — Medication 1 G: at 08:25

## 2017-03-22 RX ADMIN — PHENOBARBITAL 32.4 MG: 16.2 TABLET ORAL at 16:23

## 2017-03-22 RX ADMIN — Medication 1 G: at 12:05

## 2017-03-22 RX ADMIN — PANTOPRAZOLE SODIUM 40 MG: 40 TABLET, DELAYED RELEASE ORAL at 08:25

## 2017-03-22 RX ADMIN — RISPERIDONE 3 MG: 3 TABLET, ORALLY DISINTEGRATING ORAL at 19:11

## 2017-03-22 RX ADMIN — RISPERIDONE 50 MG: KIT at 16:32

## 2017-03-22 RX ADMIN — TRIAMCINOLONE ACETONIDE: 5 OINTMENT TOPICAL at 08:25

## 2017-03-22 RX ADMIN — RISPERIDONE 1 MG: 1 TABLET, ORALLY DISINTEGRATING ORAL at 08:25

## 2017-03-22 RX ADMIN — Medication 1 G: at 16:24

## 2017-03-22 RX ADMIN — PHENOBARBITAL 32.4 MG: 16.2 TABLET ORAL at 08:25

## 2017-03-22 RX ADMIN — LITHIUM CARBONATE 600 MG: 300 TABLET, EXTENDED RELEASE ORAL at 19:11

## 2017-03-22 RX ADMIN — BENZTROPINE MESYLATE 0.5 MG: 0.5 TABLET ORAL at 08:25

## 2017-03-22 ASSESSMENT — ACTIVITIES OF DAILY LIVING (ADL)
GROOMING: INDEPENDENT
DRESS: SCRUBS (BEHAVIORAL HEALTH)
ORAL_HYGIENE: INDEPENDENT
DRESS: SCRUBS (BEHAVIORAL HEALTH)
GROOMING: INDEPENDENT
ORAL_HYGIENE: INDEPENDENT

## 2017-03-22 NOTE — PROGRESS NOTES
[facility], [place]   Psychiatric Progress Note        Interim History:   The patient's care was discussed with the treatment team during the daily team meeting and/or staff's chart notes were reviewed.  Staff report patient is oppositional and disruptive. Responding to internal stimuli and disorganized. Agitated, verbally hostile and posturing. Compliant with medications with prompting. Refusing to cooperative with MSSA. Slept 4hrs last night. Suspicious, paranoid and guarded with symptoms. poor ADL.     The patient was agitated and verbally hostile. Denied all symptoms and demanded discharge. The patient was pressured and disorganized.  Noted doing well. Had difficulty staying herbie task. Denied dep, anx and racing thoughts. Tolerating medications well. Denied SI and SHARRON. The patient became more agitated and loud when informed of the care plan and legal status. I ended the encounter to avoid further escalation.    Discussed medications and care plan.        Medications:       risperiDONE microspheres  50 mg Intramuscular Q14 Days     PHENobarbital  32.4 mg Oral TID     benztropine  0.5 mg Oral BID     lithium  600 mg Oral BID     pantoprazole  40 mg Oral QAM     risperiDONE  1 mg Sublingual QAM     risperiDONE  3 mg Sublingual At Bedtime     sucralfate  1 g Oral 4x Daily AC & HS     triamcinolone   Topical BID          Allergies:     Allergies   Allergen Reactions     Codeine Sulfate           Labs:   No results found for this or any previous visit (from the past 24 hour(s)).       Psychiatric Examination:     Vitals:    03/21/17 0600 03/21/17 1300 03/21/17 1405 03/22/17 0743   BP: (!) 141/94 123/72     Pulse: 104 84     Resp: 16 16  16   Temp: 97.6  F (36.4  C)  100.3  F (37.9  C) 98.5  F (36.9  C)   TempSrc: Oral  Oral Oral   SpO2: 99%        Weight is 0 lbs 0 oz  There is no height or weight on file to calculate BMI.    Appearance: appeared as age stated, poorly groomed, awake, alert  and severe distress  Attitude:  evasive, guarded and uncooperative  Eye Contact:  intense  Mood:  angry  Affect:  intensity is exaggerated, labile and guarded  Speech:  pressured speech  Psychomotor Behavior:  no evidence of tardive dyskinesia, dystonia, or tics and intact station, gait and muscle tone  Throught Process:  disorganized and illogical  Associations:  loosening of associations present  Thought Content:  no evidence of suicidal ideation or homicidal ideation, no auditory hallucinations present, no visual hallucinations present, patient appears to be responding to internal stimuli, obsessions present and paranoia  Insight:  limited  Judgement:  limited  Oriented to:  time, person, and place  Attention Span and Concentration:  intact  Recent and Remote Memory:  intact            Precautions:     Behavioral Orders   Procedures     Assault precautions     Code 1 - Restrict to Unit     Routine Programming     As clinically indicated     Status 15     Every 15 minutes.          Diagnoses:   1.  Schizoaffective disorder, bipolar type, current episode is manic with psychotic features.     2.  Benzodiazepine use disorder.   3.  Historical diagnosis of cannabis and cocaine use disorder.   4.  Antisocial personality disorder.        Plan:     Medications:  1.  Lithium was restarted at 600 mg twice a day.  Will repeat level in about 4-5 days and adjust the dose accordingly.     2.  Benztropine restarted at 0.5 mg twice a day.   3.  Risperdal M-Tab restarted at 1 mg q.a.m. and 3 mg each night at bedtime, may consider switching to Invega and subsequently Invega Sustenna if compliance is questionable.   4.  Risperdal Consta restarted at 50 mg q.2 weeks and was given on 3/22. .   5.  The patient was placed on MSSA for benzodiazepine withdrawal but not cooperative with vitals. Protocol was discontinued. Continued Phenobarbital taper (32.4 mg t.i.d.) The patient is a very poor candidate for controlled medications due to  extensive history of past chemical use.   6.  PRN hydroxyzine for anxiety, Zyprexa for agitation will be offered.     Legal Status and Disposition:   1. The patient was placed on a 72-hour hold due to active julio and psychosis.  She is currently on a provisional discharge versus stay of commitment. Recommend revoking PD and extending commitment, due to long history of noncompliance with medications.  2. The patient will likely be granted discharge home once active julio and psychosis resolve, complete detoxification and safety established in the community.
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
## 2017-03-22 NOTE — PROGRESS NOTES
"Patient has been extremely irritable when awake and has only slept a total of 4 hours during the night, frequently pacing in the hallway and becoming increasingly hostile towards RN staff.  Appeared to be watching when a male patient went to the bathroom, and he was walked back to his room by a male psych associate for safety.  Slow to respond at times, as if there is some thought blocking going on. Patient refused to allow any MSSA checks being done on her and refused any and all vital signs being done.  Given snacks, but refused all PRN meds offered. Later she accused this writer of putting something in her herbal tea, then began shouting \"HERBAL tea\" - quieted with male psych associate telling her to just calm down.  Remains relatively calm, but has a marked Intensity to her stance and stare when she is asked if staff can help her at all.  Will have to watch her around other patients in this Pod as she doesn't tolerate closeness of any kind, and some patient in this Pod like to be close to other patients when they talk to them.  Continue to watch closely - may have to restrict to her room if she becomes more hostile.  If continues to escalate but refuses PRNs, will have to call a show of force to have her take the medications.  "

## 2017-03-22 NOTE — PROGRESS NOTES
Pt's mother spoke to the OP CCM and told her that she didn't get a med sheet when the pt d/c'd last time from here.  Mom states she didn't know that she was taking Lithium, therefore the assumption is that she has been off of her lithium since her recent d/c.      Lab draw also shows low lithium level.     Pt has court for the extension of her commitment on March 28th @ 1pm.   to transport if she is still here.

## 2017-03-22 NOTE — PROGRESS NOTES
Patient was given NUNEZ this afternoon. She was asked repeatedly before giving this if she was willing. She agreed each time. When RN came to give it she started to walk to her room but then became paranoid and angry. After some gentle reminders about our previous conversations she did agree and did the IM with no issues.

## 2017-03-22 NOTE — PROGRESS NOTES
"Pt refused 2pm dose of Phenobarbital, stating she \"doesnt want it now\" and that \"I don't need it.\" Dose was rescheduled as pt went on to state \"maybe later\" while walking away. [facility] per MD Marion as pt is non receptive to VS assessment, or [facility] prototocal interventions and does not allow for effective assessment. Further, pt does not appear to be exhibiting any symptoms of withdrawal at this time. Will continue to monitor for possible withdrawal process closely and intervene as needed.   "

## 2017-03-23 PROCEDURE — 12400003 ZZH R&B MH CRITICAL UMMC

## 2017-03-23 PROCEDURE — H2032 ACTIVITY THERAPY, PER 15 MIN: HCPCS

## 2017-03-23 PROCEDURE — 25000132 ZZH RX MED GY IP 250 OP 250 PS 637: Performed by: PSYCHIATRY & NEUROLOGY

## 2017-03-23 PROCEDURE — 99232 SBSQ HOSP IP/OBS MODERATE 35: CPT | Performed by: PSYCHIATRY & NEUROLOGY

## 2017-03-23 RX ORDER — PHENOBARBITAL 16.2 MG/1
16.2 TABLET ORAL 3 TIMES DAILY
Status: DISCONTINUED | OUTPATIENT
Start: 2017-03-23 | End: 2017-03-27

## 2017-03-23 RX ADMIN — LITHIUM CARBONATE 600 MG: 300 TABLET, EXTENDED RELEASE ORAL at 07:40

## 2017-03-23 RX ADMIN — LITHIUM CARBONATE 600 MG: 300 TABLET, EXTENDED RELEASE ORAL at 20:15

## 2017-03-23 RX ADMIN — BENZTROPINE MESYLATE 0.5 MG: 0.5 TABLET ORAL at 07:40

## 2017-03-23 RX ADMIN — RISPERIDONE 3 MG: 3 TABLET, ORALLY DISINTEGRATING ORAL at 20:16

## 2017-03-23 RX ADMIN — Medication 1 G: at 07:40

## 2017-03-23 RX ADMIN — RISPERIDONE 1 MG: 1 TABLET, ORALLY DISINTEGRATING ORAL at 07:40

## 2017-03-23 RX ADMIN — PHENOBARBITAL 16.2 MG: 16.2 TABLET ORAL at 20:15

## 2017-03-23 RX ADMIN — Medication 1 G: at 21:15

## 2017-03-23 RX ADMIN — PHENOBARBITAL 32.4 MG: 16.2 TABLET ORAL at 07:40

## 2017-03-23 RX ADMIN — BENZTROPINE MESYLATE 0.5 MG: 0.5 TABLET ORAL at 20:15

## 2017-03-23 RX ADMIN — Medication 1 G: at 11:20

## 2017-03-23 RX ADMIN — PANTOPRAZOLE SODIUM 40 MG: 40 TABLET, DELAYED RELEASE ORAL at 07:40

## 2017-03-23 RX ADMIN — Medication 1 G: at 16:13

## 2017-03-23 RX ADMIN — PHENOBARBITAL 16.2 MG: 16.2 TABLET ORAL at 14:05

## 2017-03-23 ASSESSMENT — ACTIVITIES OF DAILY LIVING (ADL)
ORAL_HYGIENE: PROMPTS
LAUNDRY: UNABLE TO COMPLETE
GROOMING: INDEPENDENT;PROMPTS
GROOMING: HANDWASHING
LAUNDRY: UNABLE TO COMPLETE
DRESS: SCRUBS (BEHAVIORAL HEALTH)
DRESS: SCRUBS (BEHAVIORAL HEALTH)
LAUNDRY: UNABLE TO COMPLETE
ORAL_HYGIENE: PROMPTS
DRESS: SCRUBS (BEHAVIORAL HEALTH);PROMPTS;INDEPENDENT
ORAL_HYGIENE: INDEPENDENT;PROMPTS
GROOMING: HANDWASHING

## 2017-03-23 NOTE — PROGRESS NOTES
Pt refuses vital signs, making MSSA scoring impossible. Pt finally allowed her vital signs taken with a lot prompting.

## 2017-03-23 NOTE — PROGRESS NOTES
Pt was active in the lounge for the majority of the shift. She spent brief periods of time resting in her room. Was relatively quiet and polite to staff. Pt did have some paranoid comments and a outburst toward the RNs in the nursing room claiming that they had been promising her coffee for hours when she had not asked any staff or nurses for coffee at anytime during the shift. Outside of this she was appropriate in the lounge, ate her meals, and watched some TV.          03/22/17 2123   Behavioral Health   Hallucinations appears responding;auditory   Thinking poor concentration;paranoid;confused;distractable   Orientation person: oriented;place: oriented   Memory baseline memory   Insight poor   Judgement impaired   Eye Contact at examiner;staring   Affect blunted, flat;tense;irritable   Mood mood is calm;irritable   Physical Appearance/Attire disheveled;untidy   Hygiene neglected grooming - unclean body, hair, teeth   Suicidality other (see comments)  (None stated, none observed )   Self Injury other (see comment)  (None stated, none observed)   Activity other (see comment)  (Active in the milieu)   Speech coherent;clear;pressured   Activities of Daily Living   Hygiene/Grooming independent   Oral Hygiene independent   Dress scrubs (behavioral health)   Room Organization independent   Activity   Activity Level of Assistance independent   Behavioral Health Interventions   Behavioral Disturbance maintain safety precautions;reality orientation;simple, clear language;decrease environmental stimulation;redirection of aggressive behaviors;provide emotional support;establish therapeutic relationship;build upon strengths;monitor need for prn medication   Social and Therapeutic Interventions (Behavioral Disturbance) encourage socialization with peers;encourage effective boundaries with peers;encourage participation in therapeutic groups and milieu activities

## 2017-03-23 NOTE — PROGRESS NOTES
[facility], [place]   Psychiatric Progress Note        Interim History:   The patient's care was discussed with the treatment team during the daily team meeting and/or staff's chart notes were reviewed.  Staff report patient is oppositional and disruptive. Agreed to Consta injection last evening. Need prompting with medications but compliant. Responding to internal stimuli and disorganized. Pacing at times, agitated and posturing.  Slept 6hrs last night. Dismissive of symptoms and poor insight. Poor ADL.     The patient was very guarded and dismissive of symptoms, hoping to be granted discharge. Tells me that she is tolerating medications well and denied side effects. Denied dep, anx and racing thoughts. Denied hallucinations and paranoia. Reviewed care plan and hope to discharge after court hearing. Denied SI and SHARRON. Denied withdrawal and gravings.     Discussed medications and care plan.        Medications:       risperiDONE microspheres  50 mg Intramuscular Q14 Days     PHENobarbital  32.4 mg Oral TID     benztropine  0.5 mg Oral BID     lithium  600 mg Oral BID     pantoprazole  40 mg Oral QAM     risperiDONE  1 mg Sublingual QAM     risperiDONE  3 mg Sublingual At Bedtime     sucralfate  1 g Oral 4x Daily AC & HS     triamcinolone   Topical BID          Allergies:     Allergies   Allergen Reactions     Codeine Sulfate           Labs:   No results found for this or any previous visit (from the past 24 hour(s)).       Psychiatric Examination:     Vitals:    03/21/17 1300 03/21/17 1405 03/22/17 0743 03/23/17 0800   BP: 123/72      Pulse: 84      Resp: 16  16 14   Temp:   98.5  F (36.9  C)    TempSrc:  Oral Oral    SpO2:         Weight is 0 lbs 0 oz  There is no height or weight on file to calculate BMI.    Appearance: appeared as age stated, poorly groomed, awake, alert and severe distress  Attitude:  evasive, guarded and uncooperative  Eye Contact:  intense  Mood:  angry  Affect:   intensity is exaggerated, labile and guarded  Speech:  pressured speech  Psychomotor Behavior:  no evidence of tardive dyskinesia, dystonia, or tics and intact station, gait and muscle tone  Throught Process:  disorganized and illogical  Associations:  loosening of associations present  Thought Content:  no evidence of suicidal ideation or homicidal ideation, no auditory hallucinations present, no visual hallucinations present, patient appears to be responding to internal stimuli, obsessions present and paranoia  Insight:  limited  Judgement:  limited  Oriented to:  time, person, and place  Attention Span and Concentration:  intact  Recent and Remote Memory:  intact            Precautions:     Behavioral Orders   Procedures     Assault precautions     Code 1 - Restrict to Unit     Routine Programming     As clinically indicated     Status 15     Every 15 minutes.          Diagnoses:   1.  Schizoaffective disorder, bipolar type, current episode is manic with psychotic features.     2.  Benzodiazepine use disorder.   3.  Historical diagnosis of cannabis and cocaine use disorder.   4.  Antisocial personality disorder.        Plan:     Medications:  1.  Lithium was restarted at 600 mg twice a day.  Will repeat Jenner level on 3/27 and adjust the dose accordingly.     2.  Benztropine restarted at 0.5 mg twice a day.   3.  Risperdal M-Tab restarted at 1 mg q.a.m. and 3 mg each night at bedtime, may consider switching to Invega and subsequently Invega Sustenna if compliance is questionable.   4.  Risperdal Consta restarted at 50 mg q.2 weeks and was given on 3/22. .   5.  The patient was placed on MSSA for benzodiazepine withdrawal but not cooperative with vitals. Protocol was discontinued. Continued Phenobarbital taper (lower to 16.2 mg t.i.d.) The patient is a very poor candidate for controlled medications due to extensive history of past chemical use.   6.  PRN hydroxyzine for anxiety, Zyprexa for agitation will be  offered.     Legal Status and Disposition:   1. The patient was placed on a 72-hour hold due to active julio and psychosis.  She is currently on court hold. Recommend revoking PD and extending commitment, due to long history of noncompliance with medications. Court heating to extent civil commitment is scheduled on 3/28.   2. The patient will likely be granted discharge home once active julio and psychosis resolve, complete detoxification and safety established in the community.

## 2017-03-23 NOTE — PLAN OF CARE
"Problem: Behavioral Disturbance  Goal: Behavioral Disturbance  Signs and symptoms of listed problems will be absent or manageable.    Patient, while hospitalized, will:  -attend unit programming to develop health coping skills  -be medication compliant to promote mental health  -verbalize 2 coping skills that promote mental health   -verbalize decrease in psychotic signs/symptoms  -verbalize decrease in depressive signs/symptoms  -will verbalize a decrease in anxiety   -will participate in discharge planning    Patient, prior to dishcharge, will:  -verbalize an understanding of medication regimen to promote mental health  -verbalize absence of SI/SIB to promote safety  -develop a safety plan to promote safety  -will identify a support system to promote safety       Outcome: Therapy, progress toward functional goals is gradual  Pt continues to present with significant psychiatric instability and behavioral disruption. Thinking is delusional, and paranoid, and pt is observed to be responding to internal stimuli. When asked if pt would like an egg crate mattress for comfort, pt because abruptly tense, fearful, and anxious evidenced by wide eyes and shaking head. Pt was unable to discuss her fears at this time saying \"no! No! No... i need to think about it.\" Minutes later pt approached writer calmly to say \"yeah, I'd like the egg crate.\" Pt appears to have though blocking and has difficulty tracking, secondary to distraction and and Int Stim. Despite obviious discomfort d/t psychiatric symptoms, pt denies symptoms and is guarded and vigilant. Speech is pressured at times however, overall pt is difficult to understand as she volume is low, and mumbles. This is further complicated by rather nonsensical and tangential speech at times in response to internal stimuli. Hygiene is neglected and pt refuses to shower or complete ADLs, and requires assistance to make her bed and tidy her room, largely related to paranoia and " disorganization. Affect is notable for increased intensity overall, but is anxious when approached. Expression is flat, and insight is very limited. Mood is generally calm this shift, accepting of reassurance and support. Pt is medication compliant with AM medications. Denies SEs to medications and physical health concerns at this time. Will continue to monitor closely and reassess.

## 2017-03-24 PROCEDURE — 25000132 ZZH RX MED GY IP 250 OP 250 PS 637: Performed by: PSYCHIATRY & NEUROLOGY

## 2017-03-24 PROCEDURE — H2032 ACTIVITY THERAPY, PER 15 MIN: HCPCS

## 2017-03-24 PROCEDURE — 12400003 ZZH R&B MH CRITICAL UMMC

## 2017-03-24 RX ADMIN — Medication 1 G: at 21:06

## 2017-03-24 RX ADMIN — LITHIUM CARBONATE 600 MG: 300 TABLET, EXTENDED RELEASE ORAL at 08:45

## 2017-03-24 RX ADMIN — Medication 1 G: at 16:36

## 2017-03-24 RX ADMIN — Medication 1 G: at 08:45

## 2017-03-24 RX ADMIN — BENZTROPINE MESYLATE 0.5 MG: 0.5 TABLET ORAL at 08:45

## 2017-03-24 RX ADMIN — PHENOBARBITAL 16.2 MG: 16.2 TABLET ORAL at 21:06

## 2017-03-24 RX ADMIN — TRIAMCINOLONE ACETONIDE: 5 OINTMENT TOPICAL at 08:49

## 2017-03-24 RX ADMIN — PANTOPRAZOLE SODIUM 40 MG: 40 TABLET, DELAYED RELEASE ORAL at 08:45

## 2017-03-24 RX ADMIN — LITHIUM CARBONATE 600 MG: 300 TABLET, EXTENDED RELEASE ORAL at 21:06

## 2017-03-24 RX ADMIN — RISPERIDONE 3 MG: 3 TABLET, ORALLY DISINTEGRATING ORAL at 21:06

## 2017-03-24 RX ADMIN — BENZTROPINE MESYLATE 0.5 MG: 0.5 TABLET ORAL at 21:06

## 2017-03-24 RX ADMIN — TRIAMCINOLONE ACETONIDE: 5 OINTMENT TOPICAL at 21:07

## 2017-03-24 RX ADMIN — RISPERIDONE 1 MG: 1 TABLET, ORALLY DISINTEGRATING ORAL at 08:45

## 2017-03-24 RX ADMIN — PHENOBARBITAL 16.2 MG: 16.2 TABLET ORAL at 08:45

## 2017-03-24 RX ADMIN — Medication 1 G: at 12:07

## 2017-03-24 RX ADMIN — PHENOBARBITAL 16.2 MG: 16.2 TABLET ORAL at 14:16

## 2017-03-24 ASSESSMENT — ACTIVITIES OF DAILY LIVING (ADL)
ORAL_HYGIENE: PROMPTS
LAUNDRY: UNABLE TO COMPLETE
DRESS: SCRUBS (BEHAVIORAL HEALTH)
ORAL_HYGIENE: PROMPTS
GROOMING: HANDWASHING
DRESS: SCRUBS (BEHAVIORAL HEALTH)
LAUNDRY: UNABLE TO COMPLETE
HYGIENE/GROOMING: PROMPTS

## 2017-03-24 NOTE — PROGRESS NOTES
03/24/17 1519   Significant Event   Significant Event Other (see comments)  (shift summary)   Pt had an uneventful shift. She was intermittently in the milieu but withdrawn from social interaction. When approached by writer she was delayed and gave one word answers without making eye contact. No issues or requests.

## 2017-03-25 PROCEDURE — 25000132 ZZH RX MED GY IP 250 OP 250 PS 637: Performed by: PSYCHIATRY & NEUROLOGY

## 2017-03-25 PROCEDURE — 12400003 ZZH R&B MH CRITICAL UMMC

## 2017-03-25 RX ADMIN — Medication 1 G: at 16:46

## 2017-03-25 RX ADMIN — PHENOBARBITAL 16.2 MG: 16.2 TABLET ORAL at 13:53

## 2017-03-25 RX ADMIN — PHENOBARBITAL 16.2 MG: 16.2 TABLET ORAL at 21:05

## 2017-03-25 RX ADMIN — RISPERIDONE 1 MG: 1 TABLET, ORALLY DISINTEGRATING ORAL at 08:17

## 2017-03-25 RX ADMIN — BENZTROPINE MESYLATE 0.5 MG: 0.5 TABLET ORAL at 21:04

## 2017-03-25 RX ADMIN — LITHIUM CARBONATE 600 MG: 300 TABLET, EXTENDED RELEASE ORAL at 08:17

## 2017-03-25 RX ADMIN — HYDROXYZINE HYDROCHLORIDE 50 MG: 25 TABLET ORAL at 17:46

## 2017-03-25 RX ADMIN — PHENOBARBITAL 16.2 MG: 16.2 TABLET ORAL at 08:17

## 2017-03-25 RX ADMIN — LITHIUM CARBONATE 600 MG: 300 TABLET, EXTENDED RELEASE ORAL at 21:04

## 2017-03-25 RX ADMIN — BENZTROPINE MESYLATE 0.5 MG: 0.5 TABLET ORAL at 08:18

## 2017-03-25 RX ADMIN — OLANZAPINE 10 MG: 5 TABLET, ORALLY DISINTEGRATING ORAL at 17:46

## 2017-03-25 RX ADMIN — PANTOPRAZOLE SODIUM 40 MG: 40 TABLET, DELAYED RELEASE ORAL at 08:18

## 2017-03-25 RX ADMIN — Medication 1 G: at 08:16

## 2017-03-25 RX ADMIN — RISPERIDONE 3 MG: 3 TABLET, ORALLY DISINTEGRATING ORAL at 21:05

## 2017-03-25 RX ADMIN — Medication 1 G: at 11:57

## 2017-03-25 RX ADMIN — TRIAMCINOLONE ACETONIDE: 5 OINTMENT TOPICAL at 08:16

## 2017-03-25 RX ADMIN — Medication 1 G: at 21:06

## 2017-03-25 ASSESSMENT — ACTIVITIES OF DAILY LIVING (ADL)
LAUNDRY: UNABLE TO COMPLETE
HYGIENE/GROOMING: PROMPTS
GROOMING: INDEPENDENT
DRESS: SCRUBS (BEHAVIORAL HEALTH)
ORAL_HYGIENE: INDEPENDENT
ORAL_HYGIENE: PROMPTS
DRESS: SCRUBS (BEHAVIORAL HEALTH);INDEPENDENT

## 2017-03-25 NOTE — PROGRESS NOTES
Patient presents with flat, tense affect and remains guarded in interactions with staff. Continues to appear paranoid of staff and peers. Patient intermittently in the milieu this evening, but generally isolated to self. Patient was observed staring oddly at writer throughout shift, but denied any concerns, physical discomfort or emotional distress. Will continue to monitor and assess.       03/24/17 0638   Behavioral Health   Hallucinations denies / not responding to hallucinations   Thinking paranoid;delusional;distractable;confused   Orientation person: oriented   Memory confabulation   Insight poor;denial of illness   Judgement impaired   Eye Contact at examiner   Affect blunted, flat;tense   Mood mood is calm   Physical Appearance/Attire untidy   Hygiene neglected grooming - unclean body, hair, teeth   Suicidality other (see comments)  (none endorsed or observed)   Self Injury other (see comment)  (none endorsed or observed)   Activity isolative   Speech clear;coherent   Medication Sensitivity no stated side effects;no observed side effects   Psychomotor / Gait balanced;steady   Activities of Daily Living   Hygiene/Grooming prompts   Oral Hygiene prompts   Dress scrubs (behavioral health)   Laundry unable to complete   Room Organization independent   Behavioral Health Interventions   Behavioral Disturbance maintain safety precautions;monitor need to revise level of observation;maintain safe secure environment;reality orientation;simple, clear language;decrease environmental stimulation;assist in development of alternative methods of expressive communication;encourage clear communication of needs;redirection of intrusive behaviors;redirection of aggressive behaviors;assist patient in developing safety plan;encourage nutrition and hydration;encourage participation / independence with adls;provide emotional support;establish therapeutic relationship;assist with developing & utilizing healthy coping  strategies;provide positive feedback for use of effective coping skills;build upon strengths;assess patient response to medication;assess medication adherance;monitor need for prn medication;monitor confusion, memory loss, decision making ability and reorient / intervent as needed;assess & implement appropriate substance withdrawal protocol;monitor substance withdrawal process   Social and Therapeutic Interventions (Behavioral Disturbance) encourage socialization with peers;encourage effective boundaries with peers;encourage participation in therapeutic groups and milieu activities

## 2017-03-25 NOTE — PLAN OF CARE
Problem: Behavioral Disturbance  Goal: Behavioral Disturbance  Signs and symptoms of listed problems will be absent or manageable.    Patient, while hospitalized, will:  -attend unit programming to develop health coping skills  -be medication compliant to promote mental health  -verbalize 2 coping skills that promote mental health   -verbalize decrease in psychotic signs/symptoms  -verbalize decrease in depressive signs/symptoms  -will verbalize a decrease in anxiety   -will participate in discharge planning    Patient, prior to dishcharge, will:  -verbalize an understanding of medication regimen to promote mental health  -verbalize absence of SI/SIB to promote safety  -develop a safety plan to promote safety  -will identify a support system to promote safety       Outcome: No Change  Patient presents with poor insight and impaired judgement. Patient is withdrawn and disoriented to situation. Patient exhibits paranoid and confused/ disorganized thinking.  Patient appears internally preoccupied and can be seen responding to internal stimuli with subvocalizations. At approximately 0820, prior to taking her AM medications, the patient explained to staff that she was from [place] and immediately said  shhhh  while putting her finger over her mouth. She explained that she  can t let people around here know that.  Patient is calm and cooperative.   Patient is compliant with all medications. Patient reports no adverse side effects and no adverse side effects have been seen be staff.  Patient has three abrasions on her left leg. Pea size abrasion over patella (anterior), dime sized abrasion in the middle of her calf (anterior), and a pea sized abrasion on her lower calf (anterior). The patient explains that these abrasions are from past scars reopening. Patient has been applying triamcinolone to the locations (see MAR). No discharge or active bleeding has been seen. Locations show to be healing with no sign of infection.  Patient had a tympanic temperature of 99.9 F at 0800 and an oral temperature of 99.9 F at 1357. Patient reports that she is not having any symptoms of illness.

## 2017-03-26 PROCEDURE — 25000132 ZZH RX MED GY IP 250 OP 250 PS 637: Performed by: PSYCHIATRY & NEUROLOGY

## 2017-03-26 PROCEDURE — 12400003 ZZH R&B MH CRITICAL UMMC

## 2017-03-26 RX ADMIN — PHENOBARBITAL 16.2 MG: 16.2 TABLET ORAL at 19:51

## 2017-03-26 RX ADMIN — TRAZODONE HYDROCHLORIDE 50 MG: 50 TABLET ORAL at 22:25

## 2017-03-26 RX ADMIN — RISPERIDONE 3 MG: 3 TABLET, ORALLY DISINTEGRATING ORAL at 19:51

## 2017-03-26 RX ADMIN — BENZTROPINE MESYLATE 0.5 MG: 0.5 TABLET ORAL at 19:51

## 2017-03-26 RX ADMIN — PHENOBARBITAL 16.2 MG: 16.2 TABLET ORAL at 09:33

## 2017-03-26 RX ADMIN — LITHIUM CARBONATE 600 MG: 300 TABLET, EXTENDED RELEASE ORAL at 09:33

## 2017-03-26 RX ADMIN — PHENOBARBITAL 16.2 MG: 16.2 TABLET ORAL at 13:47

## 2017-03-26 RX ADMIN — Medication 1 G: at 21:07

## 2017-03-26 RX ADMIN — LITHIUM CARBONATE 600 MG: 300 TABLET, EXTENDED RELEASE ORAL at 19:51

## 2017-03-26 RX ADMIN — PANTOPRAZOLE SODIUM 40 MG: 40 TABLET, DELAYED RELEASE ORAL at 09:33

## 2017-03-26 RX ADMIN — Medication 1 G: at 16:50

## 2017-03-26 RX ADMIN — Medication 1 G: at 11:16

## 2017-03-26 RX ADMIN — Medication 1 G: at 09:34

## 2017-03-26 RX ADMIN — RISPERIDONE 1 MG: 1 TABLET, ORALLY DISINTEGRATING ORAL at 09:33

## 2017-03-26 RX ADMIN — BENZTROPINE MESYLATE 0.5 MG: 0.5 TABLET ORAL at 09:34

## 2017-03-26 RX ADMIN — OLANZAPINE 10 MG: 5 TABLET, ORALLY DISINTEGRATING ORAL at 22:25

## 2017-03-26 ASSESSMENT — ACTIVITIES OF DAILY LIVING (ADL)
DRESS: SCRUBS (BEHAVIORAL HEALTH)
ORAL_HYGIENE: INDEPENDENT
GROOMING: INDEPENDENT

## 2017-03-26 NOTE — PROGRESS NOTES
Pt had a loud, angry outburst at dinner time. Pt was posturing and hostile when asked to go to her room. Was given Zyprexa 10 mg and Vistaril 50 mg orally. Pt skipped dinner, her BP 88/57 and HR 82, lying down in bed. Provider notified. Encouraged to push fluids. Vital signs improved on recheck. BP 97/53, HR 87, while in bed. Pt refused orthostatic BP. Pt reports having auditory hallucinations. Will provide with fluids and continue to monitor.

## 2017-03-26 NOTE — PROGRESS NOTES
Patient continues to present as guarded, tense and paranoid, at times also agitated and irritable. Patient had an outburst of anger this shift which she attributed in part to derogatory auditory hallucinations she experienced around supper time. Patient required firm redirection to leave the lounge area during this outburst, but did proceed to her room and was isolative to her room for remainder of evening. Patient made no complaints of physical comfort and denied any concerns at time of check-in. Will continue to monitor and assess.           03/25/17 2151   Behavioral Health   Hallucinations appears responding   Thinking paranoid;delusional;distractable   Orientation person: oriented;place: oriented   Memory baseline memory   Insight poor   Judgement impaired   Eye Contact at examiner   Affect tense;angry;blunted, flat   Mood irritable;anxious   Physical Appearance/Attire untidy;disheveled;appears stated age   Hygiene neglected grooming - unclean body, hair, teeth   Suicidality other (see comments)  (none endorsed or observed)   Self Injury other (see comment)  (none endorsed or observed)   Activity withdrawn;isolative   Speech clear;coherent   Medication Sensitivity no stated side effects;no observed side effects   Psychomotor / Gait balanced;steady   Coping/Psychosocial   Verbalized Emotional State anger   Supportive Measures active listening utilized;self-care encouraged;verbalization of feelings encouraged;self-responsibility promoted;relaxation techniques promoted   Trust Relationship/Rapport empathic listening provided;thoughts/feelings acknowledged   Activities of Daily Living   Hygiene/Grooming prompts   Oral Hygiene prompts   Dress scrubs (behavioral health)   Laundry unable to complete   Behavioral Health Interventions   Behavioral Disturbance maintain safety precautions;monitor need to revise level of observation;maintain safe secure environment;reality orientation;simple, clear language;decrease  environmental stimulation;assist in development of alternative methods of expressive communication;encourage clear communication of needs;redirection of intrusive behaviors;redirection of aggressive behaviors;assist patient in developing safety plan;encourage nutrition and hydration;encourage participation / independence with adls;provide emotional support;establish therapeutic relationship;assist with developing & utilizing healthy coping strategies;provide positive feedback for use of effective coping skills;build upon strengths;assess patient response to medication;assess medication adherance;monitor need for prn medication;monitor confusion, memory loss, decision making ability and reorient / intervent as needed;assess & implement appropriate substance withdrawal protocol;monitor substance withdrawal process   Social and Therapeutic Interventions (Behavioral Disturbance) encourage socialization with peers;encourage effective boundaries with peers;encourage participation in therapeutic groups and milieu activities

## 2017-03-27 LAB
ALBUMIN SERPL-MCNC: 3.4 G/DL (ref 3.4–5)
ALP SERPL-CCNC: 79 U/L (ref 40–150)
ALT SERPL W P-5'-P-CCNC: 23 U/L (ref 0–50)
ANION GAP SERPL CALCULATED.3IONS-SCNC: 6 MMOL/L (ref 3–14)
AST SERPL W P-5'-P-CCNC: 13 U/L (ref 0–45)
BASOPHILS # BLD AUTO: 0 10E9/L (ref 0–0.2)
BASOPHILS NFR BLD AUTO: 0.2 %
BILIRUB SERPL-MCNC: 0.2 MG/DL (ref 0.2–1.3)
BUN SERPL-MCNC: 7 MG/DL (ref 7–30)
CALCIUM SERPL-MCNC: 9.3 MG/DL (ref 8.5–10.1)
CHLORIDE SERPL-SCNC: 109 MMOL/L (ref 94–109)
CO2 SERPL-SCNC: 26 MMOL/L (ref 20–32)
CREAT SERPL-MCNC: 0.62 MG/DL (ref 0.52–1.04)
DIFFERENTIAL METHOD BLD: NORMAL
EOSINOPHIL # BLD AUTO: 0.2 10E9/L (ref 0–0.7)
EOSINOPHIL NFR BLD AUTO: 3.2 %
ERYTHROCYTE [DISTWIDTH] IN BLOOD BY AUTOMATED COUNT: 11.8 % (ref 10–15)
GFR SERPL CREATININE-BSD FRML MDRD: NORMAL ML/MIN/1.7M2
GLUCOSE SERPL-MCNC: 83 MG/DL (ref 70–99)
HCT VFR BLD AUTO: 38.1 % (ref 35–47)
HGB BLD-MCNC: 12.1 G/DL (ref 11.7–15.7)
IMM GRANULOCYTES # BLD: 0 10E9/L (ref 0–0.4)
IMM GRANULOCYTES NFR BLD: 0.2 %
LITHIUM SERPL-SCNC: 1.1 MMOL/L (ref 0.6–1.2)
LYMPHOCYTES # BLD AUTO: 2.1 10E9/L (ref 0.8–5.3)
LYMPHOCYTES NFR BLD AUTO: 38.9 %
MCH RBC QN AUTO: 30.3 PG (ref 26.5–33)
MCHC RBC AUTO-ENTMCNC: 31.8 G/DL (ref 31.5–36.5)
MCV RBC AUTO: 95 FL (ref 78–100)
MONOCYTES # BLD AUTO: 0.4 10E9/L (ref 0–1.3)
MONOCYTES NFR BLD AUTO: 6.8 %
NEUTROPHILS # BLD AUTO: 2.7 10E9/L (ref 1.6–8.3)
NEUTROPHILS NFR BLD AUTO: 50.7 %
NRBC # BLD AUTO: 0 10*3/UL
NRBC BLD AUTO-RTO: 0 /100
PLATELET # BLD AUTO: 315 10E9/L (ref 150–450)
POTASSIUM SERPL-SCNC: 4.1 MMOL/L (ref 3.4–5.3)
PROT SERPL-MCNC: 7.1 G/DL (ref 6.8–8.8)
RBC # BLD AUTO: 4 10E12/L (ref 3.8–5.2)
SODIUM SERPL-SCNC: 141 MMOL/L (ref 133–144)
WBC # BLD AUTO: 5.3 10E9/L (ref 4–11)

## 2017-03-27 PROCEDURE — 36415 COLL VENOUS BLD VENIPUNCTURE: CPT | Performed by: PSYCHIATRY & NEUROLOGY

## 2017-03-27 PROCEDURE — 25000132 ZZH RX MED GY IP 250 OP 250 PS 637: Performed by: PSYCHIATRY & NEUROLOGY

## 2017-03-27 PROCEDURE — 99232 SBSQ HOSP IP/OBS MODERATE 35: CPT | Performed by: PSYCHIATRY & NEUROLOGY

## 2017-03-27 PROCEDURE — 85025 COMPLETE CBC W/AUTO DIFF WBC: CPT | Performed by: PSYCHIATRY & NEUROLOGY

## 2017-03-27 PROCEDURE — 80178 ASSAY OF LITHIUM: CPT | Performed by: PSYCHIATRY & NEUROLOGY

## 2017-03-27 PROCEDURE — 80053 COMPREHEN METABOLIC PANEL: CPT | Performed by: PSYCHIATRY & NEUROLOGY

## 2017-03-27 PROCEDURE — 12400003 ZZH R&B MH CRITICAL UMMC

## 2017-03-27 PROCEDURE — 90853 GROUP PSYCHOTHERAPY: CPT

## 2017-03-27 RX ORDER — RISPERIDONE 2 MG/1
2 TABLET, ORALLY DISINTEGRATING ORAL EVERY MORNING
Status: DISCONTINUED | OUTPATIENT
Start: 2017-03-28 | End: 2017-04-04 | Stop reason: HOSPADM

## 2017-03-27 RX ORDER — RISPERIDONE 3 MG/1
3 TABLET, ORALLY DISINTEGRATING ORAL AT BEDTIME
Status: DISCONTINUED | OUTPATIENT
Start: 2017-03-27 | End: 2017-03-27

## 2017-03-27 RX ORDER — RISPERIDONE 2 MG/1
4 TABLET, ORALLY DISINTEGRATING ORAL AT BEDTIME
Status: DISCONTINUED | OUTPATIENT
Start: 2017-03-27 | End: 2017-03-31

## 2017-03-27 RX ORDER — RISPERIDONE 3 MG/1
3 TABLET, ORALLY DISINTEGRATING ORAL 2 TIMES DAILY
Status: DISCONTINUED | OUTPATIENT
Start: 2017-03-27 | End: 2017-03-27

## 2017-03-27 RX ADMIN — BENZTROPINE MESYLATE 0.5 MG: 0.5 TABLET ORAL at 19:59

## 2017-03-27 RX ADMIN — Medication 1 G: at 19:59

## 2017-03-27 RX ADMIN — LITHIUM CARBONATE 600 MG: 300 TABLET, EXTENDED RELEASE ORAL at 19:59

## 2017-03-27 RX ADMIN — BENZTROPINE MESYLATE 0.5 MG: 0.5 TABLET ORAL at 08:33

## 2017-03-27 RX ADMIN — HYDROXYZINE HYDROCHLORIDE 50 MG: 25 TABLET ORAL at 04:27

## 2017-03-27 RX ADMIN — Medication 1 G: at 11:26

## 2017-03-27 RX ADMIN — LITHIUM CARBONATE 600 MG: 300 TABLET, EXTENDED RELEASE ORAL at 08:33

## 2017-03-27 RX ADMIN — RISPERIDONE 1 MG: 1 TABLET, ORALLY DISINTEGRATING ORAL at 08:32

## 2017-03-27 RX ADMIN — ACETAMINOPHEN 650 MG: 325 TABLET, FILM COATED ORAL at 04:42

## 2017-03-27 RX ADMIN — PANTOPRAZOLE SODIUM 40 MG: 40 TABLET, DELAYED RELEASE ORAL at 08:33

## 2017-03-27 RX ADMIN — ALUMINUM HYDROXIDE, MAGNESIUM HYDROXIDE, AND DIMETHICONE 30 ML: 400; 400; 40 SUSPENSION ORAL at 16:40

## 2017-03-27 RX ADMIN — Medication 8.1 MG: at 16:26

## 2017-03-27 RX ADMIN — Medication 1 G: at 08:32

## 2017-03-27 RX ADMIN — RISPERIDONE 4 MG: 2 TABLET, ORALLY DISINTEGRATING ORAL at 19:59

## 2017-03-27 RX ADMIN — TRIAMCINOLONE ACETONIDE: 5 OINTMENT TOPICAL at 08:33

## 2017-03-27 RX ADMIN — OLANZAPINE 10 MG: 5 TABLET, ORALLY DISINTEGRATING ORAL at 17:14

## 2017-03-27 RX ADMIN — Medication 1 G: at 16:26

## 2017-03-27 RX ADMIN — PHENOBARBITAL 16.2 MG: 16.2 TABLET ORAL at 08:32

## 2017-03-27 RX ADMIN — HYDROXYZINE HYDROCHLORIDE 50 MG: 25 TABLET ORAL at 17:14

## 2017-03-27 ASSESSMENT — ACTIVITIES OF DAILY LIVING (ADL)
ORAL_HYGIENE: PROMPTS
TOILETING: 0-->INDEPENDENT
TRANSFERRING: 0-->INDEPENDENT
EATING: 0-->INDEPENDENT
CHANGE_IN_FUNCTIONAL_STATUS_SINCE_ONSET_OF_CURRENT_ILLNESS/INJURY: YES
DRESS: 0-->INDEPENDENT
BATHING: 0-->INDEPENDENT
COGNITION: 2 - DIFFICULTY WITH ORGANIZING THOUGHTS
ORAL_HYGIENE: INDEPENDENT
AMBULATION: 0-->INDEPENDENT
AMBULATION: 0-->INDEPENDENT
DRESS: SCRUBS (BEHAVIORAL HEALTH)
BATHING: 0-->INDEPENDENT
FALL_HISTORY_WITHIN_LAST_SIX_MONTHS: NO
RETIRED_EATING: 0-->INDEPENDENT
LAUNDRY: UNABLE TO COMPLETE
GROOMING: INDEPENDENT
COMMUNICATION: 2-->DIFFICULTY SPEAKING (NOT RELATED TO LANGUAGE BARRIER)
TOILETING: 0-->INDEPENDENT
LAUNDRY: UNABLE TO COMPLETE
DRESS: 0-->INDEPENDENT
WHICH_OF_THE_ABOVE_FUNCTIONAL_RISKS_HAD_A_RECENT_ONSET_OR_CHANGE?: COGNITION
SWALLOWING: 0-->SWALLOWS FOODS/LIQUIDS WITHOUT DIFFICULTY
RETIRED_COMMUNICATION: 0-->UNDERSTANDS/COMMUNICATES WITHOUT DIFFICULTY
GROOMING: INDEPENDENT
SWALLOWING: 0-->SWALLOWS FOODS/LIQUIDS WITHOUT DIFFICULTY
TRANSFERRING: 0-->INDEPENDENT
DRESS: INDEPENDENT;SCRUBS (BEHAVIORAL HEALTH)

## 2017-03-27 NOTE — PLAN OF CARE
Problem: Behavioral Disturbance  Intervention: Social and Therapeutic Interv (Behavioral Disturbance)     Pt attended the morning OT leisure group.  Pt was either mumbling or more clearly talking to herself most of the session.  Frequently making comments to the one other peer in group, calling him by a different name and talking to him as if she knew him, referencing him being at a club; other pt just remained quiet and seemed uncomfortable. Pt is tangential as she talks to writer often starting mid-thought, or talking to me about people in her life as if I know them.     Regarding the game, fairly simple game, though needed help with each turn, was never able to grasp what she was suppose to do.

## 2017-03-27 NOTE — PROGRESS NOTES
Pt spent the shift sleeping periodically and then pacing in the rosales. Pt would switch from one topic to another in the middle of a story or sentence, and when questioned about this would express that she was not sure what she was talking about. Pt had one agitated verbal outburst at an RN but was otherwise appropriate. Pt ate dinner and was visible in the lounge. Pt mentioned her children and how she misses them and is trying to get in contact with them.          03/26/17 7452   Behavioral Health   Hallucinations appears responding   Thinking confused;distractable;paranoid;poor concentration   Orientation person: oriented   Memory baseline memory   Insight poor   Judgement impaired   Eye Contact at examiner   Affect tense;blunted, flat   Mood anxious;irritable   Physical Appearance/Attire disheveled;untidy   Hygiene neglected grooming - unclean body, hair, teeth   Suicidality other (see comments)  (None stated)   Self Injury other (see comment)  (None stated)   Activity restless   Speech clear;tangential;pressured   Activities of Daily Living   Hygiene/Grooming independent   Oral Hygiene independent   Dress scrubs (behavioral health)   Room Organization independent   Activity   Activity Level of Assistance independent

## 2017-03-27 NOTE — PROGRESS NOTES
This writer returned call to pt's ACT team CCM- Cecile Cabella - [place]: [phone].  Informed her that we are recommending a full commitment and will make the recommendation to the court.  Also want to confirm that this pt can return home to live with mother upon d/c w/ ACT team in place.  Asked her to call to discuss further.

## 2017-03-27 NOTE — PLAN OF CARE
Problem: Behavioral Disturbance  Goal: Behavioral Disturbance  Signs and symptoms of listed problems will be absent or manageable.    Patient, while hospitalized, will:  -attend unit programming to develop health coping skills  -be medication compliant to promote mental health  -verbalize 2 coping skills that promote mental health   -verbalize decrease in psychotic signs/symptoms  -verbalize decrease in depressive signs/symptoms  -will verbalize a decrease in anxiety   -will participate in discharge planning    Patient, prior to dishcharge, will:  -verbalize an understanding of medication regimen to promote mental health  -verbalize absence of SI/SIB to promote safety  -develop a safety plan to promote safety  -will identify a support system to promote safety       Outcome: No Change  Pt presents as pleasant but confused, disorganized. She makes nonsensical, tangential statements throughout the shift and during any conversation with her. She appears to be responding to internal stimuli with thought blocking present. Her affect is blunted, flat. She states that she is happy. She denies SI, SIB, HI, hallucinations and paranoid thinking though she is responding to internal stimuli and has paranoia sx. She has been calm and cooperative with all redirection. Pt needs prompting for ADLs.

## 2017-03-27 NOTE — PROGRESS NOTES
"[facility], [place]   Psychiatric Progress Note        Interim History:   The patient's care was discussed with the treatment team during the daily team meeting and/or staff's chart notes were reviewed.  Staff report patient continue to be labile with increased irritability in the evenings. Preoccupied, responding to internal stimuli, and distracted. Conversing with self and disorganized. Dismissive of symptoms and poor insight.  Oppositional but easier to redirect and less disruptive. Compliant with medications and PRNs when offered. Sleeping well. Poor ADL.     The patient was disorganized and had difficulty staying on task. Internal stimuli suspected but denied hallucinations and paranoia. Denied racing thoughts, dep and anx. Tolerating medications well. Tells me that she is \"scared\" about court hearing tomorrow. Denied SI, SHARRON and HI. She wants to \"get off the commitment\" and move to [place]. Sleeping well and appetite intact. Tolerating medications well and receptive to proposed changes.     Discussed medications and care plan.        Medications:       PHENobarbital  16.2 mg Oral TID     risperiDONE microspheres  50 mg Intramuscular Q14 Days     benztropine  0.5 mg Oral BID     lithium  600 mg Oral BID     pantoprazole  40 mg Oral QAM     risperiDONE  1 mg Sublingual QAM     risperiDONE  3 mg Sublingual At Bedtime     sucralfate  1 g Oral 4x Daily AC & HS     triamcinolone   Topical BID          Allergies:     Allergies   Allergen Reactions     Codeine Sulfate           Labs:     Recent Results (from the past 24 hour(s))   Lithium level    Collection Time: 03/27/17  8:24 AM   Result Value Ref Range    Lithium Level 1.1 0.6 - 1.2 mmol/L   CBC with platelets differential    Collection Time: 03/27/17  8:24 AM   Result Value Ref Range    WBC 5.3 4.0 - 11.0 10e9/L    RBC Count 4.00 3.8 - 5.2 10e12/L    Hemoglobin 12.1 11.7 - 15.7 g/dL    Hematocrit 38.1 35.0 - 47.0 %    MCV 95 78 - 100 " fl    MCH 30.3 26.5 - 33.0 pg    MCHC 31.8 31.5 - 36.5 g/dL    RDW 11.8 10.0 - 15.0 %    Platelet Count 315 150 - 450 10e9/L    Diff Method Automated Method     % Neutrophils 50.7 %    % Lymphocytes 38.9 %    % Monocytes 6.8 %    % Eosinophils 3.2 %    % Basophils 0.2 %    % Immature Granulocytes 0.2 %    Nucleated RBCs 0 0 /100    Absolute Neutrophil 2.7 1.6 - 8.3 10e9/L    Absolute Lymphocytes 2.1 0.8 - 5.3 10e9/L    Absolute Monocytes 0.4 0.0 - 1.3 10e9/L    Absolute Eosinophils 0.2 0.0 - 0.7 10e9/L    Absolute Basophils 0.0 0.0 - 0.2 10e9/L    Abs Immature Granulocytes 0.0 0 - 0.4 10e9/L    Absolute Nucleated RBC 0.0    Comprehensive metabolic panel    Collection Time: 03/27/17  8:24 AM   Result Value Ref Range    Sodium 141 133 - 144 mmol/L    Potassium 4.1 3.4 - 5.3 mmol/L    Chloride 109 94 - 109 mmol/L    Carbon Dioxide 26 20 - 32 mmol/L    Anion Gap 6 3 - 14 mmol/L    Glucose 83 70 - 99 mg/dL    Urea Nitrogen 7 7 - 30 mg/dL    Creatinine 0.62 0.52 - 1.04 mg/dL    GFR Estimate >90  Non  GFR Calc   >60 mL/min/1.7m2    GFR Estimate If Black >90   GFR Calc   >60 mL/min/1.7m2    Calcium 9.3 8.5 - 10.1 mg/dL    Bilirubin Total 0.2 0.2 - 1.3 mg/dL    Albumin 3.4 3.4 - 5.0 g/dL    Protein Total 7.1 6.8 - 8.8 g/dL    Alkaline Phosphatase 79 40 - 150 U/L    ALT 23 0 - 50 U/L    AST 13 0 - 45 U/L          Psychiatric Examination:     Vitals:    03/25/17 2042 03/26/17 0945 03/26/17 1945 03/27/17 0959   BP:  119/74 107/68 138/83   Pulse: 87 82  88   Resp:    18   Temp:  99  F (37.2  C)  97.8  F (36.6  C)   TempSrc:    Oral   SpO2:         Weight is 0 lbs 0 oz  There is no height or weight on file to calculate BMI.    Appearance: appeared as age stated, poorly groomed, awake, alert and severe distress  Attitude:  evasive, guarded and uncooperative  Eye Contact:  intense  Mood:  angry  Affect:  intensity is exaggerated, labile and guarded  Speech:  pressured speech  Psychomotor Behavior:   no evidence of tardive dyskinesia, dystonia, or tics and intact station, gait and muscle tone  Throught Process:  disorganized and illogical  Associations:  loosening of associations present  Thought Content:  no evidence of suicidal ideation or homicidal ideation, no auditory hallucinations present, no visual hallucinations present, patient appears to be responding to internal stimuli, obsessions present and paranoia  Insight:  limited  Judgement:  limited  Oriented to:  time, person, and place  Attention Span and Concentration:  intact  Recent and Remote Memory:  intact            Precautions:     Behavioral Orders   Procedures     Assault precautions     Code 1 - Restrict to Unit     Routine Programming     As clinically indicated     Status 15     Every 15 minutes.          Diagnoses:   1.  Schizoaffective disorder, bipolar type, current episode is manic with psychotic features.     2.  Benzodiazepine use disorder.   3.  Historical diagnosis of cannabis and cocaine use disorder.   4.  Antisocial personality disorder.        Plan:     Medications:  1.  Lithium was restarted at 600 mg twice a day.  Lithium level on 3/27 was 1.1. Will repeat labs in 2-3 days and adjust the dose accordingly.     2.  Benztropine restarted at 0.5 mg twice a day.   3.  Risperdal M-Tab restarted and titrated to 2 mg q.a.m. and 4 mg each night at bedtime, may consider switching to Invega and subsequently Invega Sustenna if compliance is questionable.   4.  Risperdal Consta restarted at 50 mg q.2 weeks and was given on 3/22. .   5.  The patient was placed on MSSA for benzodiazepine withdrawal but not cooperative with vitals. Protocol was discontinued. Continued Phenobarbital taper (lower to 8.1 mg t.i.d. #4 then stop). The patient is a very poor candidate for controlled medications due to extensive history of past chemical use.   6.  PRN hydroxyzine for anxiety, Zyprexa for agitation will be offered.     Legal Status and Disposition:   1.  The patient was placed on a 72-hour hold due to active julio and psychosis.  She is currently on court hold. Recommend revoking PD and extending commitment, due to long history of noncompliance with medications. Court heating to extent civil commitment is scheduled on 3/28.   2. The patient will likely be granted discharge home once active julio and psychosis resolve, complete detoxification and safety established in the community.

## 2017-03-28 PROCEDURE — 12400003 ZZH R&B MH CRITICAL UMMC

## 2017-03-28 PROCEDURE — 97150 GROUP THERAPEUTIC PROCEDURES: CPT | Mod: GO

## 2017-03-28 PROCEDURE — 99232 SBSQ HOSP IP/OBS MODERATE 35: CPT | Performed by: PSYCHIATRY & NEUROLOGY

## 2017-03-28 PROCEDURE — 25000132 ZZH RX MED GY IP 250 OP 250 PS 637: Performed by: PSYCHIATRY & NEUROLOGY

## 2017-03-28 RX ADMIN — Medication 1 G: at 08:04

## 2017-03-28 RX ADMIN — Medication 1 G: at 16:00

## 2017-03-28 RX ADMIN — BENZTROPINE MESYLATE 0.5 MG: 0.5 TABLET ORAL at 20:32

## 2017-03-28 RX ADMIN — Medication 1 G: at 20:30

## 2017-03-28 RX ADMIN — OLANZAPINE 10 MG: 5 TABLET, ORALLY DISINTEGRATING ORAL at 16:00

## 2017-03-28 RX ADMIN — Medication 1 G: at 11:35

## 2017-03-28 RX ADMIN — LITHIUM CARBONATE 600 MG: 300 TABLET, EXTENDED RELEASE ORAL at 20:32

## 2017-03-28 RX ADMIN — PANTOPRAZOLE SODIUM 40 MG: 40 TABLET, DELAYED RELEASE ORAL at 08:42

## 2017-03-28 RX ADMIN — LITHIUM CARBONATE 600 MG: 300 TABLET, EXTENDED RELEASE ORAL at 08:42

## 2017-03-28 RX ADMIN — TRIAMCINOLONE ACETONIDE: 5 OINTMENT TOPICAL at 08:42

## 2017-03-28 RX ADMIN — Medication 8.1 MG: at 08:42

## 2017-03-28 RX ADMIN — RISPERIDONE 2 MG: 2 TABLET, ORALLY DISINTEGRATING ORAL at 08:42

## 2017-03-28 RX ADMIN — RISPERIDONE 4 MG: 2 TABLET, ORALLY DISINTEGRATING ORAL at 20:30

## 2017-03-28 RX ADMIN — Medication 8.1 MG: at 14:00

## 2017-03-28 RX ADMIN — HYDROXYZINE HYDROCHLORIDE 50 MG: 25 TABLET ORAL at 02:13

## 2017-03-28 RX ADMIN — BENZTROPINE MESYLATE 0.5 MG: 0.5 TABLET ORAL at 08:42

## 2017-03-28 ASSESSMENT — ACTIVITIES OF DAILY LIVING (ADL)
ORAL_HYGIENE: INDEPENDENT
HYGIENE/GROOMING: INDEPENDENT
DRESS: SCRUBS (BEHAVIORAL HEALTH)
DRESS: SCRUBS (BEHAVIORAL HEALTH)
ORAL_HYGIENE: INDEPENDENT
GROOMING: INDEPENDENT

## 2017-03-28 NOTE — PROGRESS NOTES
Joselito [facility] asst. [facility] atty is planning to come along with the pt's CCM, defense atty and examiner to meet with the pt on the unit since the transportation wasn't set up.  They will be here at 12:30pm to meet with her.

## 2017-03-28 NOTE — PROGRESS NOTES
Patient less irritable than has been reported. Had one brief outburst when a male peer (S. V.) was encroaching on her space at the med window. Continues to appear responding to internal stimuli.     03/27/17 2111   Behavioral Health   Hallucinations appears responding;denies / not responding to hallucinations   Thinking delusional;paranoid;distractable   Orientation person: oriented;place: oriented;date: oriented   Memory baseline memory   Insight poor   Judgement impaired   Eye Contact at examiner   Affect full range affect;other (see comments)  (Less irritable than has been reported)   Mood mood is calm;other (see comments)  (For this patient)   Physical Appearance/Attire untidy;disheveled   Hygiene neglected grooming - unclean body, hair, teeth   Suicidality other (see comments)  (Denies)   Self Injury other (see comment)  (No SIB observed)   Activity restless   Speech rambling;pressured   Medication Sensitivity no stated side effects;no observed side effects   Psychomotor / Gait balanced;steady   Substance Withdrawal Interventions   Social and Therapeutic Interventions (Substance Withdrawal) encourage effective boundaries with peers   Sleep/Rest/Relaxation   Day/Evening Time Hours up all shift   Psycho Education   Type of Intervention 1:1 intervention   Response refuses   Hours 0.5   Treatment Detail Triggers  (remains disorganized)   Daily Care   Activity up ad pravin   Activities of Daily Living   Hygiene/Grooming independent   Oral Hygiene independent   Dress scrubs (behavioral health)   Laundry unable to complete   Room Organization prompts   Activity   Activity Level of Assistance independent   Behavioral Health Interventions   Behavioral Disturbance maintain safety precautions;decrease environmental stimulation   Social and Therapeutic Interventions (Behavioral Disturbance) encourage effective boundaries with peers

## 2017-03-28 NOTE — PROGRESS NOTES
This writer spoke to the County atty Kevin Caldwell and the pt's defense atty; there was an administrative error in the county atty's transportation.  They did not set up  transport.  They are looking into options and may be sending an examiner to the hospital. Pt's commitment ends on April 7th so they have some time.  They will be in touch with me to let us know when the court date will be set up.

## 2017-03-28 NOTE — PROGRESS NOTES
[facility], [place]   Psychiatric Progress Note        Interim History:   The patient's care was discussed with the treatment team during the daily team meeting and/or staff's chart notes were reviewed.  Staff report patient continue to be labile but easier to redirect and has had no significant outbursts.  Dismissive of symptoms and poor insight.  Preoccupied, responding to internal stimuli, and distracted. Makes bizarre statements.  Conversing with self and disorganized.  Compliant with medications and PRNs when offered. Sleeping well. Poor ADL.     The patient noted that she is doing well. Denied racing thoughts but speech was pressured and had difficulty stay on task. Dismissive of symptoms and denied hallucinations. No SI or SHARRON. Tolerating medications well. Believes that she is sleeping well and appetite intact. Poor insight, believes that she will be granted discharge after court hearing scheduled later today.    Discussed medications and care plan.        Medications:       PHENobarbital  8.1 mg Oral TID     risperiDONE  2 mg Sublingual QAM     risperiDONE  4 mg Sublingual At Bedtime     risperiDONE microspheres  50 mg Intramuscular Q14 Days     benztropine  0.5 mg Oral BID     lithium  600 mg Oral BID     pantoprazole  40 mg Oral QAM     sucralfate  1 g Oral 4x Daily AC & HS     triamcinolone   Topical BID          Allergies:     Allergies   Allergen Reactions     Codeine Sulfate           Labs:     No results found for this or any previous visit (from the past 24 hour(s)).       Psychiatric Examination:     Vitals:    03/26/17 0945 03/26/17 1945 03/27/17 0959 03/28/17 0833   BP: 119/74 107/68 138/83 125/85   Pulse: 82  88 85   Resp:   18 18   Temp: 99  F (37.2  C)  97.8  F (36.6  C) 97.5  F (36.4  C)   TempSrc:   Oral Oral   SpO2:         Weight is 0 lbs 0 oz  There is no height or weight on file to calculate BMI.    Appearance: appeared as age stated, poorly groomed, awake,  "alert and no apparent distress  Attitude:  evasive, guarded and more cooperative  Eye Contact:  intense  Mood:  \"OK\"  Affect:  guarded but less labile.   Speech:  pressured speech  Psychomotor Behavior:  no evidence of tardive dyskinesia, dystonia, or tics and intact station, gait and muscle tone  Throught Process:  disorganized and evidence of thought blocking present  Associations:  no loose associations  Thought Content:  no evidence of suicidal ideation or homicidal ideation, no auditory hallucinations present, no visual hallucinations present, patient appears to be responding to internal stimuli, obsessions present and paranoia  Insight:  limited  Judgement:  limited  Oriented to:  time, person, and place  Attention Span and Concentration:  intact  Recent and Remote Memory:  intact            Precautions:     Behavioral Orders   Procedures     Assault precautions     Code 1 - Restrict to Unit     Routine Programming     As clinically indicated     Status 15     Every 15 minutes.          Diagnoses:   1.  Schizoaffective disorder, bipolar type, current episode is manic with psychotic features.     2.  Benzodiazepine use disorder.   3.  Historical diagnosis of cannabis and cocaine use disorder.   4.  Antisocial personality disorder.        Plan:     Medications:  1.  Lithium was restarted at 600 mg twice a day.  Lithium level on 3/27 was 1.1. Will repeat labs in 2-3 days and adjust the dose accordingly.     2.  Benztropine restarted at 0.5 mg twice a day.   3.  Risperdal M-Tab restarted and titrated to 2 mg q.a.m. and 4 mg each night at bedtime, may consider switching to Invega and subsequently Invega Sustenna if compliance is questionable.   4.  Risperdal Consta restarted at 50 mg q.2 weeks and was given on 3/22. .   5.  The patient was placed on MSSA for benzodiazepine withdrawal but not cooperative with vitals. Protocol was discontinued. Phenobarbital taper will be completed later today. The patient is a very " poor candidate for controlled medications due to extensive history of past chemical use.   6.  PRN hydroxyzine for anxiety, Zyprexa for agitation will be offered.     Legal Status and Disposition:   1. The patient was placed on a 72-hour hold due to active julio and psychosis.  She is currently on court hold. Recommend revoking PD and extending commitment, due to long history of noncompliance with medications. Court heating to extent civil commitment is scheduled on 3/28.   2. The patient will likely be granted discharge home once active julio and psychosis resolve, complete detoxification and safety established in the community.

## 2017-03-28 NOTE — PROGRESS NOTES
This writer met with the county atty, defense atty and [facility].  They did the examination here due to the mix up.  They will send their decision in the next day or so.

## 2017-03-29 PROCEDURE — 25000132 ZZH RX MED GY IP 250 OP 250 PS 637: Performed by: PSYCHIATRY & NEUROLOGY

## 2017-03-29 PROCEDURE — 12400003 ZZH R&B MH CRITICAL UMMC

## 2017-03-29 PROCEDURE — 99232 SBSQ HOSP IP/OBS MODERATE 35: CPT | Performed by: PSYCHIATRY & NEUROLOGY

## 2017-03-29 RX ORDER — HALOPERIDOL 5 MG/ML
5-10 INJECTION INTRAMUSCULAR EVERY 4 HOURS PRN
Status: DISCONTINUED | OUTPATIENT
Start: 2017-03-29 | End: 2017-03-29

## 2017-03-29 RX ORDER — HALOPERIDOL 2 MG/ML
5-10 SOLUTION ORAL EVERY 4 HOURS PRN
Status: DISCONTINUED | OUTPATIENT
Start: 2017-03-29 | End: 2017-03-29

## 2017-03-29 RX ORDER — DIPHENHYDRAMINE HCL 12.5MG/5ML
50 LIQUID (ML) ORAL EVERY 4 HOURS PRN
Status: DISCONTINUED | OUTPATIENT
Start: 2017-03-29 | End: 2017-03-29

## 2017-03-29 RX ORDER — LORAZEPAM 2 MG/ML
1-2 CONCENTRATE ORAL EVERY 4 HOURS PRN
Status: DISCONTINUED | OUTPATIENT
Start: 2017-03-29 | End: 2017-03-29

## 2017-03-29 RX ORDER — LORAZEPAM 2 MG/ML
1-2 INJECTION INTRAMUSCULAR EVERY 4 HOURS PRN
Status: DISCONTINUED | OUTPATIENT
Start: 2017-03-29 | End: 2017-03-29

## 2017-03-29 RX ORDER — DIPHENHYDRAMINE HCL 50 MG
50 CAPSULE ORAL EVERY 4 HOURS PRN
Status: DISCONTINUED | OUTPATIENT
Start: 2017-03-29 | End: 2017-03-29

## 2017-03-29 RX ADMIN — Medication 1 G: at 16:12

## 2017-03-29 RX ADMIN — RISPERIDONE 2 MG: 2 TABLET, ORALLY DISINTEGRATING ORAL at 08:46

## 2017-03-29 RX ADMIN — RISPERIDONE 4 MG: 2 TABLET, ORALLY DISINTEGRATING ORAL at 19:15

## 2017-03-29 RX ADMIN — BENZTROPINE MESYLATE 0.5 MG: 0.5 TABLET ORAL at 20:37

## 2017-03-29 RX ADMIN — LITHIUM CARBONATE 600 MG: 300 TABLET, EXTENDED RELEASE ORAL at 20:37

## 2017-03-29 RX ADMIN — TRIAMCINOLONE ACETONIDE: 5 OINTMENT TOPICAL at 09:13

## 2017-03-29 RX ADMIN — Medication 1 G: at 08:45

## 2017-03-29 RX ADMIN — OLANZAPINE 10 MG: 5 TABLET, ORALLY DISINTEGRATING ORAL at 16:12

## 2017-03-29 RX ADMIN — Medication 1 G: at 20:37

## 2017-03-29 RX ADMIN — LITHIUM CARBONATE 600 MG: 300 TABLET, EXTENDED RELEASE ORAL at 08:45

## 2017-03-29 RX ADMIN — NICOTINE POLACRILEX 4 MG: 2 GUM, CHEWING ORAL at 09:13

## 2017-03-29 RX ADMIN — PANTOPRAZOLE SODIUM 40 MG: 40 TABLET, DELAYED RELEASE ORAL at 08:45

## 2017-03-29 RX ADMIN — BENZTROPINE MESYLATE 0.5 MG: 0.5 TABLET ORAL at 08:46

## 2017-03-29 RX ADMIN — Medication 1 G: at 13:14

## 2017-03-29 ASSESSMENT — ACTIVITIES OF DAILY LIVING (ADL)
DRESS: SCRUBS (BEHAVIORAL HEALTH);INDEPENDENT
ORAL_HYGIENE: INDEPENDENT
ORAL_HYGIENE: INDEPENDENT
LAUNDRY: WITH SUPERVISION
DRESS: SCRUBS (BEHAVIORAL HEALTH);INDEPENDENT
GROOMING: INDEPENDENT
GROOMING: HANDWASHING;INDEPENDENT

## 2017-03-29 NOTE — PROGRESS NOTES
Pt presented as irritable, pacing the halls and repeatedly coming in and out of her room. Pt would have nonsensical conversations. She appears paranoid, looking at staff and peers out the corner of her eyes. She got into a verbal altercation with a female peer, but followed redirection.  Pt was  from peer; no further issues to report.        03/28/17 2100 Behavioral Health   Hallucinations appears responding   Thinking delusional;paranoid;poor concentration   Orientation person: oriented   Memory baseline memory   Insight poor   Judgement impaired   Eye Contact out of corner of eyes;at examiner   Affect tense;irritable   Mood irritable   Physical Appearance/Attire attire appropriate to age and situation   Hygiene neglected grooming - unclean body, hair, teeth   Activity hyperactive (agitated, impulsive);restless   Speech flight of ideas;rambling   Medication Sensitivity no observed side effects   Psychomotor / Gait balanced   Activities of Daily Living   Hygiene/Grooming independent   Oral Hygiene independent   Dress scrubs (behavioral health)   Room Organization independent   Activity   Activity Level of Assistance independent   Behavioral Health Interventions   Behavioral Disturbance maintain safety precautions;monitor need to revise level of observation;maintain safe secure environment;reality orientation;simple, clear language;decrease environmental stimulation;assist in development of alternative methods of expressive communication;encourage clear communication of needs;redirection of intrusive behaviors;redirection of aggressive behaviors;assist patient in developing safety plan;encourage nutrition and hydration;encourage participation / independence with adls;provide emotional support;establish therapeutic relationship;assist with developing & utilizing healthy coping strategies;provide positive feedback for use of effective coping skills;build upon strengths;assess patient response to  medication;assess medication adherance;monitor need for prn medication;monitor confusion, memory loss, decision making ability and reorient / intervent as needed   Social and Therapeutic Interventions (Behavioral Disturbance) encourage socialization with peers;encourage effective boundaries with peers;encourage participation in therapeutic groups and milieu activities
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
## 2017-03-29 NOTE — PROGRESS NOTES
[facility], [place]   Psychiatric Progress Note        Interim History:   The patient's care was discussed with the treatment team during the daily team meeting and/or staff's chart notes were reviewed.  Staff report patient continue to be labile with bouts of severe irritability. Loud, pressured, disorganized and responding to internal stimuli. Compliant with medications. Slept well. Dismissive of symptoms and poor insight. Makes bizarre comments. Poor ADL.     The patient was calm and cooperative, but was guarded.  Denied hallucination, racing thoughts and paranoia. Noted that she is tolerating medications well and had no physical complaints. Denied dep and anxiety. No SI or SHARRON. Sleep and appetite intact. Hesitant and not fully receptive to starting another neuroleptic but agreed to consider in future encounter.     Discussed medications and care plan.        Medications:       risperiDONE  2 mg Sublingual QAM     risperiDONE  4 mg Sublingual At Bedtime     risperiDONE microspheres  50 mg Intramuscular Q14 Days     benztropine  0.5 mg Oral BID     lithium  600 mg Oral BID     pantoprazole  40 mg Oral QAM     sucralfate  1 g Oral 4x Daily AC & HS     triamcinolone   Topical BID          Allergies:     Allergies   Allergen Reactions     Codeine Sulfate           Labs:     No results found for this or any previous visit (from the past 24 hour(s)).       Psychiatric Examination:     Vitals:    03/27/17 0959 03/28/17 0833 03/28/17 1601 03/29/17 0800   BP: 138/83 125/85 158/83    Pulse: 88 85 88    Resp: 18 18  16   Temp: 97.8  F (36.6  C) 97.5  F (36.4  C) 98.4  F (36.9  C) 98.9  F (37.2  C)   TempSrc: Oral Oral Tympanic Oral   SpO2:         Weight is 0 lbs 0 oz  There is no height or weight on file to calculate BMI.    Appearance: appeared as age stated, poorly groomed, awake, alert and no apparent distress  Attitude:  cooperative, evasive and guarded  Eye Contact:  intense  Mood:   "\"Fine\"  Affect:  guarded and restricted range.   Speech:  pressured speech improved.   Psychomotor Behavior:  no evidence of tardive dyskinesia, dystonia, or tics and intact station, gait and muscle tone  Throught Process:  linear and but concrete   Associations:  no loose associations  Thought Content:  no evidence of suicidal ideation or homicidal ideation, no auditory hallucinations present, no visual hallucinations present, patient appears to be responding to internal stimuli, obsessions present and paranoia  Insight:  limited  Judgement:  limited  Oriented to:  time, person, and place  Attention Span and Concentration:  intact  Recent and Remote Memory:  intact            Precautions:     Behavioral Orders   Procedures     Assault precautions     Code 1 - Restrict to Unit     Routine Programming     As clinically indicated     Status 15     Every 15 minutes.          Diagnoses:   1.  Schizoaffective disorder, bipolar type, current episode is manic with psychotic features.     2.  Benzodiazepine use disorder.   3.  Historical diagnosis of cannabis and cocaine use disorder.   4.  Antisocial personality disorder.        Plan:     Medications:  1.  Lithium was restarted at 600 mg twice a day.  Lithium level on 3/27 was 1.1. Will repeat labs in 2 days and adjust the dose accordingly.     2.  Benztropine restarted at 0.5 mg twice a day.   3.  Risperdal M-Tab restarted and titrated to 2 mg q.a.m. and 4 mg each night at bedtime, may consider switching to Invega and subsequently Invega Sustenna if compliance is questionable.   4.  Risperdal Consta restarted at 50 mg q.2 weeks and was given on 3/22. .   5.  The patient was placed on MSSA for benzodiazepine withdrawal but not cooperative with vitals. Protocol was discontinued. Phenobarbital taper completed. The patient is a very poor candidate for controlled medications due to extensive history of past chemical use.   6.  PRN hydroxyzine for anxiety, Zyprexa for agitation " will be offered.     Legal Status and Disposition:   1. The patient was placed on a 72-hour hold due to active julio and psychosis.  She is currently on court hold. Recommend revoking PD and extending commitment, due to long history of noncompliance with medications. Court heating to extend civil commitment is scheduled on 3/28.   2. The patient will likely be granted discharge home once active julio and psychosis resolve, complete detoxification and safety established in the community.

## 2017-03-29 NOTE — PROGRESS NOTES
Left message with [facility] DEL[facility] Rubalcava to inquire about decision of commitment. Requested return call.

## 2017-03-29 NOTE — PLAN OF CARE
"Problem: Behavioral Disturbance  Goal: Behavioral Disturbance  Signs and symptoms of listed problems will be absent or manageable.    Patient, while hospitalized, will:  -attend unit programming to develop health coping skills  -be medication compliant to promote mental health  -verbalize 2 coping skills that promote mental health   -verbalize decrease in psychotic signs/symptoms  -verbalize decrease in depressive signs/symptoms  -will verbalize a decrease in anxiety   -will participate in discharge planning    Patient, prior to dishcharge, will:  -verbalize an understanding of medication regimen to promote mental health  -verbalize absence of SI/SIB to promote safety  -develop a safety plan to promote safety  -will identify a support system to promote safety       Outcome: No Change  RN ASSESSMENT   Patient presents visible in the milieu, social with staff and peers. Restless and fidgety and times. She continues to present disorganized and pressured and often approached this writer with odd comments. The comments would include partial lines from the pop songs or random statements that did not make much sense. Affect and eye contact noted to be tense even when patient presented as friendly. Many requests for candy. Patient would also approach the med room making the request , but would immediately walk away stating, \" Never mind\"   Current legal status court hold. Refer to case manger notes for discharge planing and recommendations. Continue with current treatment plan and recommendations. Continue to monitor and reassess symptoms. Monitor response to medications. Monitor progress towards treatment goals. Encourage groups and participation.       "

## 2017-03-30 PROCEDURE — 25000132 ZZH RX MED GY IP 250 OP 250 PS 637: Performed by: PSYCHIATRY & NEUROLOGY

## 2017-03-30 PROCEDURE — 12400003 ZZH R&B MH CRITICAL UMMC

## 2017-03-30 PROCEDURE — 90853 GROUP PSYCHOTHERAPY: CPT

## 2017-03-30 PROCEDURE — H2032 ACTIVITY THERAPY, PER 15 MIN: HCPCS

## 2017-03-30 RX ADMIN — BENZTROPINE MESYLATE 0.5 MG: 0.5 TABLET ORAL at 20:08

## 2017-03-30 RX ADMIN — Medication 1 G: at 08:08

## 2017-03-30 RX ADMIN — Medication 1 G: at 13:11

## 2017-03-30 RX ADMIN — LITHIUM CARBONATE 600 MG: 300 TABLET, EXTENDED RELEASE ORAL at 08:09

## 2017-03-30 RX ADMIN — RISPERIDONE 2 MG: 2 TABLET, ORALLY DISINTEGRATING ORAL at 08:09

## 2017-03-30 RX ADMIN — BENZTROPINE MESYLATE 0.5 MG: 0.5 TABLET ORAL at 08:08

## 2017-03-30 RX ADMIN — Medication 1 G: at 20:08

## 2017-03-30 RX ADMIN — NICOTINE POLACRILEX 4 MG: 2 GUM, CHEWING ORAL at 20:09

## 2017-03-30 RX ADMIN — TRIAMCINOLONE ACETONIDE: 5 OINTMENT TOPICAL at 08:23

## 2017-03-30 RX ADMIN — Medication 1 G: at 16:54

## 2017-03-30 RX ADMIN — NICOTINE POLACRILEX 4 MG: 2 GUM, CHEWING ORAL at 13:11

## 2017-03-30 RX ADMIN — LITHIUM CARBONATE 600 MG: 300 TABLET, EXTENDED RELEASE ORAL at 20:08

## 2017-03-30 RX ADMIN — RISPERIDONE 4 MG: 2 TABLET, ORALLY DISINTEGRATING ORAL at 20:08

## 2017-03-30 RX ADMIN — PANTOPRAZOLE SODIUM 40 MG: 40 TABLET, DELAYED RELEASE ORAL at 08:09

## 2017-03-30 ASSESSMENT — ACTIVITIES OF DAILY LIVING (ADL)
DRESS: INDEPENDENT;PROMPTS;SCRUBS (BEHAVIORAL HEALTH)
GROOMING: INDEPENDENT;PROMPTS
DRESS: INDEPENDENT
ORAL_HYGIENE: INDEPENDENT
GROOMING: INDEPENDENT
ORAL_HYGIENE: INDEPENDENT
LAUNDRY: UNABLE TO COMPLETE

## 2017-03-30 NOTE — PROGRESS NOTES
Pt was loud and swearing on the phone at times.  Pt did not respond well to redirection for her language and was swearing at staff.  She was in seclusion this evening (see RN note).  After seclusion, Pt was calm and able to stay in her room.       03/29/17 2200   Behavioral Health   Hallucinations appears responding   Thinking paranoid;poor concentration   Orientation person: oriented;place: oriented   Memory baseline memory   Insight poor;denial of illness   Judgement impaired   Eye Contact at examiner;staring  (intense)   Affect tense;irritable;angry   Mood labile;irritable   Physical Appearance/Attire disheveled;untidy   Hygiene neglected grooming - unclean body, hair, teeth   Suicidality (denies)   Self Injury (denies)   Activity restless   Speech pressured;rambling;flight of ideas   Medication Sensitivity no observed side effects   Psychomotor / Gait agitated

## 2017-03-30 NOTE — PROGRESS NOTES
Daisy had a pretty good shift, requiring no seclusions/restraints, and minimal redirection. She went to some of the groups and was appropriate in them. At times she was loud in the milieu, but she was not necessarily inappropriate; at other times, she didn't make much sense making loose associations.    Daisy denies any and all mental health symptoms, including SI/SIB/HI and AVH.     03/30/17 1404   Behavioral Health   Hallucinations appears responding;other (see comment)  (pt denies)   Thinking distractable;poor concentration   Orientation person: oriented   Memory baseline memory   Insight denial of illness;poor   Judgement impaired   Eye Contact at examiner;staring   Affect tense;irritable;blunted, flat   Mood irritable   Physical Appearance/Attire attire appropriate to age and situation;appears stated age;disheveled   Hygiene neglected grooming - unclean body, hair, teeth   Suicidality other (see comments)  (pt denies)   Self Injury other (see comment)  (pt denies, none observed)   Activity other (see comment)  (pt attended groups; not very social)   Speech flight of ideas;incoherent   Medication Sensitivity no stated side effects;no observed side effects   Psychomotor / Gait balanced;slow   Activities of Daily Living   Hygiene/Grooming independent;prompts   Oral Hygiene independent   Dress independent;prompts;scrubs (behavioral health)   Laundry unable to complete   Room Organization independent   Activity   Activity Level of Assistance independent   Behavioral Health Interventions   Behavioral Disturbance reality orientation;simple, clear language;decrease environmental stimulation;redirection of intrusive behaviors   Social and Therapeutic Interventions (Behavioral Disturbance) encourage effective boundaries with peers

## 2017-03-30 NOTE — PLAN OF CARE
"Problem: Behavioral Disturbance  Intervention: Social and Therapeutic Interv (Behavioral Disturbance)     Pt attended the morning OT group, initially was the only person present.  Talkative with writer - daily pt asks questions about my dog, asks what he's doing right now, what he'll be doing tonight, then asks me to open the OT cabinet to see the posted photo.  Pt becomes tangential, talks about a variety of other topics - shows me a tattoo she got when her son's father was murdered, talks about her grandma being a teacher in [place], sees a peer walk by with an \"X\" on the back of his hand, questions him what club he was at, brings up her father shot and killed a  and was in halfway 20 years.     When the peer she had a verbal altercation came to group, she readily apologized for her behaviors without any prompting, and was relaxed with him sitting by her the duration of group.  Played [place], appeared to be distracted internal stimuli and would need reminders when it was her turn.        "

## 2017-03-30 NOTE — PROGRESS NOTES
03/29/17 2035   Education   Discontinuation Criteria Cessation of behavior that precipitated seclusion or restraint   Criteria Explained Yes   Patient's Response VU   Restraint Type   Seclusion (BH) Discontinued     Patient is calm and able to contract for safety. She understands and agrees with the space restriction from other patients and peers.

## 2017-03-30 NOTE — PROGRESS NOTES
Received return call from [facility]. Reports that the commitment will be continued and paperwork should come by today. Pt will d/c home with [facility] services already intact when she has stabilized to her baseline.

## 2017-03-30 NOTE — PROGRESS NOTES
03/29/17 1915   Seclusion or Restraint Order   Length of Order 4   Order Obtained Yes   Attending Physician Notified Yes   Leadership   Seclus/Restraint >12H or 2x/24H Notified   Assessment   Less Restrictive Alternative Decreased stimulation;Verbal de-escalation;Medication administration;Reassurance / Support;Immediate action required, no least restrictive measures could be attempted   Risk Factors N   Justification   Clinical Justification All   Education   Discontinuation Criteria Cessation of behavior that precipitated seclusion or restraint   Criteria Explained Yes   Patient's Response VU   Restraint Type   Seclusion (BH) Start     Patient was in the hallway when visiting started. Another patient (AB) and their mother walked by her. The other patient accidentally bumped her and she became very reactive. She started to yell at them. Staff intervened and the peer and visitor were moved to safety. She continued to yell and posture at staff and another visitor. She was asked to leave the lounge area and refused. A code was called. All visitors were brought off the unit for safety. She was walked to seclusion by security as another patient was also having a code at the same time. She was given her HS dose of Risperdal at this time. Earlier she was given a PRN of Zyprexa as she was misconstruing things in the lounge and making verbal threats to staff. This is a continual issue with her making threats or starting to posture at staff, peers, and now visitors. Patient will be placed on a 10 foot space restriction from others with a [facility] staff.

## 2017-03-31 LAB
ALBUMIN SERPL-MCNC: 3.3 G/DL (ref 3.4–5)
ALP SERPL-CCNC: 75 U/L (ref 40–150)
ALT SERPL W P-5'-P-CCNC: 25 U/L (ref 0–50)
ANION GAP SERPL CALCULATED.3IONS-SCNC: 7 MMOL/L (ref 3–14)
AST SERPL W P-5'-P-CCNC: 10 U/L (ref 0–45)
BILIRUB SERPL-MCNC: 0.3 MG/DL (ref 0.2–1.3)
BUN SERPL-MCNC: 11 MG/DL (ref 7–30)
CALCIUM SERPL-MCNC: 9.4 MG/DL (ref 8.5–10.1)
CHLORIDE SERPL-SCNC: 107 MMOL/L (ref 94–109)
CO2 SERPL-SCNC: 28 MMOL/L (ref 20–32)
CREAT SERPL-MCNC: 0.63 MG/DL (ref 0.52–1.04)
ERYTHROCYTE [DISTWIDTH] IN BLOOD BY AUTOMATED COUNT: 11.6 % (ref 10–15)
GFR SERPL CREATININE-BSD FRML MDRD: ABNORMAL ML/MIN/1.7M2
GLUCOSE SERPL-MCNC: 89 MG/DL (ref 70–99)
HCT VFR BLD AUTO: 38.7 % (ref 35–47)
HGB BLD-MCNC: 12.6 G/DL (ref 11.7–15.7)
LITHIUM SERPL-SCNC: 1 MMOL/L (ref 0.6–1.2)
MCH RBC QN AUTO: 30.7 PG (ref 26.5–33)
MCHC RBC AUTO-ENTMCNC: 32.6 G/DL (ref 31.5–36.5)
MCV RBC AUTO: 94 FL (ref 78–100)
PLATELET # BLD AUTO: 331 10E9/L (ref 150–450)
POTASSIUM SERPL-SCNC: 3.9 MMOL/L (ref 3.4–5.3)
PROT SERPL-MCNC: 7.3 G/DL (ref 6.8–8.8)
RBC # BLD AUTO: 4.1 10E12/L (ref 3.8–5.2)
SODIUM SERPL-SCNC: 142 MMOL/L (ref 133–144)
WBC # BLD AUTO: 7.1 10E9/L (ref 4–11)

## 2017-03-31 PROCEDURE — 12400003 ZZH R&B MH CRITICAL UMMC

## 2017-03-31 PROCEDURE — 80053 COMPREHEN METABOLIC PANEL: CPT | Performed by: PSYCHIATRY & NEUROLOGY

## 2017-03-31 PROCEDURE — 80178 ASSAY OF LITHIUM: CPT | Performed by: PSYCHIATRY & NEUROLOGY

## 2017-03-31 PROCEDURE — 25000132 ZZH RX MED GY IP 250 OP 250 PS 637: Performed by: PSYCHIATRY & NEUROLOGY

## 2017-03-31 PROCEDURE — 99232 SBSQ HOSP IP/OBS MODERATE 35: CPT | Performed by: PSYCHIATRY & NEUROLOGY

## 2017-03-31 PROCEDURE — 85027 COMPLETE CBC AUTOMATED: CPT | Performed by: PSYCHIATRY & NEUROLOGY

## 2017-03-31 PROCEDURE — 36415 COLL VENOUS BLD VENIPUNCTURE: CPT | Performed by: PSYCHIATRY & NEUROLOGY

## 2017-03-31 PROCEDURE — 90853 GROUP PSYCHOTHERAPY: CPT

## 2017-03-31 RX ORDER — RISPERIDONE 3 MG/1
6 TABLET, ORALLY DISINTEGRATING ORAL AT BEDTIME
Status: DISCONTINUED | OUTPATIENT
Start: 2017-03-31 | End: 2017-04-04 | Stop reason: HOSPADM

## 2017-03-31 RX ADMIN — NICOTINE POLACRILEX 4 MG: 2 GUM, CHEWING ORAL at 15:57

## 2017-03-31 RX ADMIN — RISPERIDONE 6 MG: 3 TABLET, ORALLY DISINTEGRATING ORAL at 20:58

## 2017-03-31 RX ADMIN — LITHIUM CARBONATE 600 MG: 300 TABLET, EXTENDED RELEASE ORAL at 20:58

## 2017-03-31 RX ADMIN — BENZTROPINE MESYLATE 0.5 MG: 0.5 TABLET ORAL at 20:58

## 2017-03-31 RX ADMIN — Medication 1 G: at 08:35

## 2017-03-31 RX ADMIN — RISPERIDONE 2 MG: 2 TABLET, ORALLY DISINTEGRATING ORAL at 08:35

## 2017-03-31 RX ADMIN — NICOTINE POLACRILEX 4 MG: 2 GUM, CHEWING ORAL at 08:35

## 2017-03-31 RX ADMIN — Medication 1 G: at 11:53

## 2017-03-31 RX ADMIN — BENZTROPINE MESYLATE 0.5 MG: 0.5 TABLET ORAL at 08:35

## 2017-03-31 RX ADMIN — LITHIUM CARBONATE 600 MG: 300 TABLET, EXTENDED RELEASE ORAL at 08:35

## 2017-03-31 RX ADMIN — PANTOPRAZOLE SODIUM 40 MG: 40 TABLET, DELAYED RELEASE ORAL at 08:35

## 2017-03-31 RX ADMIN — Medication 1 G: at 21:01

## 2017-03-31 RX ADMIN — OLANZAPINE 10 MG: 5 TABLET, ORALLY DISINTEGRATING ORAL at 16:16

## 2017-03-31 RX ADMIN — NICOTINE POLACRILEX 4 MG: 2 GUM, CHEWING ORAL at 11:55

## 2017-03-31 RX ADMIN — Medication 1 G: at 16:16

## 2017-03-31 ASSESSMENT — ACTIVITIES OF DAILY LIVING (ADL)
GROOMING: INDEPENDENT
HYGIENE/GROOMING: INDEPENDENT
DRESS: SCRUBS (BEHAVIORAL HEALTH)
DRESS: SCRUBS (BEHAVIORAL HEALTH)
LAUNDRY: UNABLE TO COMPLETE
ORAL_HYGIENE: INDEPENDENT
ORAL_HYGIENE: INDEPENDENT

## 2017-03-31 NOTE — PLAN OF CARE
Problem: Behavioral Disturbance  Intervention: Social and Therapeutic Interv (Behavioral Disturbance)     Pt was the only person to attend morning groups, was given a variety of options, though just wanted to play [place].  Seemed more focused today as compared to yesterday.

## 2017-03-31 NOTE — PROGRESS NOTES
Daisy was out talking to staff and peers for most of the day. She participated in groups and later went to her room for a nap. She ate all her meals and took a shower this afternoon.

## 2017-03-31 NOTE — PROGRESS NOTES
Writer placed call to [facility] Civil Commitment and spoke with Marya to follow-up on paperwork.  Marya states that since this commitment is an extension the paperwork will probably not be finished until early next week.     Update: Received commitment paperwork, placed copy in pt chart and provided pt with a copy.

## 2017-03-31 NOTE — PROGRESS NOTES
03/30/17 2230   Behavioral Health   Hallucinations appears responding   Thinking distractable   Orientation person: oriented   Memory baseline memory   Insight poor   Judgement impaired   Affect blunted, flat   Mood mood is calm   Suicidality other (see comments)  (none reported/or observed)   Self Injury other (see comment)  (none reported or observed)   Activity isolative  (in room majority of shifts/ did  make a few phone calls)   Activities of Daily Living   Hygiene/Grooming independent   Oral Hygiene independent   Dress independent   Room Organization independent   Behavioral Health Interventions   Behavioral Disturbance maintain safety precautions;maintain safe secure environment   Social and Therapeutic Interventions (Behavioral Disturbance) encourage socialization with peers;encourage effective boundaries with peers;encourage participation in therapeutic groups and milieu activities

## 2017-03-31 NOTE — PROGRESS NOTES
[facility], [place]   Psychiatric Progress Note        Interim History:   The patient's care was discussed with the treatment team during the daily team meeting and/or staff's chart notes were reviewed.  Staff report patient has been compliant with medications. Pressured and labile but requiring no seclusions or restraints, and minimal redirection. Internally preoccupied and distracted. Improved boundaries. Slept well. poor ADLs.  Dismissive of symptoms and poor insight.    The patient was less pressured but had was labile and elated with inappropriate laughter.  She was dismissive of symptoms and focused on discharge. She was not receptive to adding or switching neuroleptics but agreed to adjust the dose. Thoughts were a bit more on task and less disorganized. Tells me that she is sleeping well and denied racing thoughts and hallucinations. Encouraged to improve hygiene and attend to ADL. No SI or SHARRON. She later became fixated on not having a period for 4-5 months and believes that she might have been given birth controls.     Discussed medications and care plan.        Medications:       risperiDONE  6 mg Sublingual At Bedtime     risperiDONE  2 mg Sublingual QAM     risperiDONE microspheres  50 mg Intramuscular Q14 Days     benztropine  0.5 mg Oral BID     lithium  600 mg Oral BID     pantoprazole  40 mg Oral QAM     sucralfate  1 g Oral 4x Daily AC & HS     triamcinolone   Topical BID          Allergies:     Allergies   Allergen Reactions     Codeine Sulfate           Labs:     Recent Results (from the past 24 hour(s))   Lithium level    Collection Time: 03/31/17  8:19 AM   Result Value Ref Range    Lithium Level 1.0 0.6 - 1.2 mmol/L   CBC with platelets    Collection Time: 03/31/17  8:19 AM   Result Value Ref Range    WBC 7.1 4.0 - 11.0 10e9/L    RBC Count 4.10 3.8 - 5.2 10e12/L    Hemoglobin 12.6 11.7 - 15.7 g/dL    Hematocrit 38.7 35.0 - 47.0 %    MCV 94 78 - 100 fl    MCH 30.7  26.5 - 33.0 pg    MCHC 32.6 31.5 - 36.5 g/dL    RDW 11.6 10.0 - 15.0 %    Platelet Count 331 150 - 450 10e9/L   Comprehensive metabolic panel    Collection Time: 03/31/17  8:19 AM   Result Value Ref Range    Sodium 142 133 - 144 mmol/L    Potassium 3.9 3.4 - 5.3 mmol/L    Chloride 107 94 - 109 mmol/L    Carbon Dioxide 28 20 - 32 mmol/L    Anion Gap 7 3 - 14 mmol/L    Glucose 89 70 - 99 mg/dL    Urea Nitrogen 11 7 - 30 mg/dL    Creatinine 0.63 0.52 - 1.04 mg/dL    GFR Estimate >90  Non  GFR Calc   >60 mL/min/1.7m2    GFR Estimate If Black >90   GFR Calc   >60 mL/min/1.7m2    Calcium 9.4 8.5 - 10.1 mg/dL    Bilirubin Total 0.3 0.2 - 1.3 mg/dL    Albumin 3.3 (L) 3.4 - 5.0 g/dL    Protein Total 7.3 6.8 - 8.8 g/dL    Alkaline Phosphatase 75 40 - 150 U/L    ALT 25 0 - 50 U/L    AST 10 0 - 45 U/L          Psychiatric Examination:     Vitals:    03/28/17 1601 03/29/17 0800 03/30/17 0755 03/31/17 0859   BP: 158/83  114/71    Pulse: 88  85    Resp:  16  (P) 16   Temp: 98.4  F (36.9  C) 98.9  F (37.2  C) 98.1  F (36.7  C) (P) 98.5  F (36.9  C)   TempSrc: Tympanic Oral Oral (P) Oral   SpO2:         Weight is 0 lbs 0 oz  There is no height or weight on file to calculate BMI.    Appearance: appeared as age stated, poorly groomed, awake, alert and no apparent distress  Attitude:  cooperative and evasive  Eye Contact:  good  Mood:  good  Affect:  less guarded and less labile but elated. .   Speech:  pressured speech improved.   Psychomotor Behavior:  no evidence of tardive dyskinesia, dystonia, or tics and intact station, gait and muscle tone  Throught Process:  goal oriented and but concrete   Associations:  no loose associations  Thought Content:  no evidence of suicidal ideation or homicidal ideation, no auditory hallucinations present, no visual hallucinations present, patient appears to be responding to internal stimuli, obsessions present and paranoia subsiding.  Insight:  fair  Judgement:   fair  Oriented to:  time, person, and place  Attention Span and Concentration:  intact  Recent and Remote Memory:  intact            Precautions:     Behavioral Orders   Procedures     Assault precautions     Code 1 - Restrict to Unit     Routine Programming     As clinically indicated     Status 15     Every 15 minutes.          Diagnoses:   1.  Schizoaffective disorder, bipolar type, current episode is manic with psychotic features.     2.  Benzodiazepine use disorder.   3.  Historical diagnosis of cannabis and cocaine use disorder.   4.  Antisocial personality disorder.        Plan:     Medications:  1.  Lithium was restarted at 600 mg twice a day.  Lithium level on 3/27 was 1.1. Will repeat labs in 2 days and adjust the dose accordingly.     2.  Benztropine restarted at 0.5 mg twice a day.   3.  Risperdal M-Tab restarted and titrated to 2 mg q.a.m. and 6 mg each night at bedtime, may consider switching to Invega and subsequently Invega Sustenna if compliance is questionable.   4.  Risperdal Consta restarted at 50 mg q.2 weeks and was given on 3/22. .   5.  The patient was placed on MSSA for benzodiazepine withdrawal but not cooperative with vitals. Protocol was discontinued. Phenobarbital taper completed. The patient is a very poor candidate for controlled medications due to extensive history of past chemical use.   6.  PRN hydroxyzine for anxiety, Zyprexa for agitation will be offered.     Legal Status and Disposition:   1. The patient was placed on a 72-hour hold due to active julio and psychosis.  She is currently on court hold. Recommend revoking PD and extending commitment, due to long history of noncompliance with medications. Court heating to extend civil commitment is scheduled on 3/28.   2. The patient will likely be granted discharge home once active julio and psychosis resolve, complete detoxification and safety established in the community. Potentially discharge next week if symptoms continue to  improve at current rate.

## 2017-04-01 PROCEDURE — 25000132 ZZH RX MED GY IP 250 OP 250 PS 637: Performed by: PSYCHIATRY & NEUROLOGY

## 2017-04-01 PROCEDURE — 12400003 ZZH R&B MH CRITICAL UMMC

## 2017-04-01 RX ADMIN — LITHIUM CARBONATE 600 MG: 300 TABLET, EXTENDED RELEASE ORAL at 09:19

## 2017-04-01 RX ADMIN — NICOTINE POLACRILEX 4 MG: 2 GUM, CHEWING ORAL at 09:03

## 2017-04-01 RX ADMIN — BENZTROPINE MESYLATE 0.5 MG: 0.5 TABLET ORAL at 09:19

## 2017-04-01 RX ADMIN — LITHIUM CARBONATE 600 MG: 300 TABLET, EXTENDED RELEASE ORAL at 19:40

## 2017-04-01 RX ADMIN — BENZTROPINE MESYLATE 0.5 MG: 0.5 TABLET ORAL at 19:41

## 2017-04-01 RX ADMIN — RISPERIDONE 6 MG: 3 TABLET, ORALLY DISINTEGRATING ORAL at 19:41

## 2017-04-01 RX ADMIN — Medication 1 G: at 19:40

## 2017-04-01 RX ADMIN — PANTOPRAZOLE SODIUM 40 MG: 40 TABLET, DELAYED RELEASE ORAL at 09:18

## 2017-04-01 RX ADMIN — NICOTINE POLACRILEX 4 MG: 2 GUM, CHEWING ORAL at 12:41

## 2017-04-01 RX ADMIN — OLANZAPINE 10 MG: 5 TABLET, ORALLY DISINTEGRATING ORAL at 16:59

## 2017-04-01 RX ADMIN — Medication 1 G: at 12:41

## 2017-04-01 RX ADMIN — Medication 1 G: at 16:59

## 2017-04-01 RX ADMIN — NICOTINE POLACRILEX 4 MG: 2 GUM, CHEWING ORAL at 11:28

## 2017-04-01 RX ADMIN — NICOTINE POLACRILEX 4 MG: 2 GUM, CHEWING ORAL at 19:40

## 2017-04-01 RX ADMIN — Medication 1 G: at 09:19

## 2017-04-01 RX ADMIN — TRIAMCINOLONE ACETONIDE: 5 OINTMENT TOPICAL at 09:20

## 2017-04-01 RX ADMIN — RISPERIDONE 2 MG: 2 TABLET, ORALLY DISINTEGRATING ORAL at 09:19

## 2017-04-01 ASSESSMENT — ACTIVITIES OF DAILY LIVING (ADL)
ORAL_HYGIENE: INDEPENDENT
LAUNDRY: UNABLE TO COMPLETE
GROOMING: INDEPENDENT
DRESS: SCRUBS (BEHAVIORAL HEALTH)

## 2017-04-01 NOTE — PLAN OF CARE
"Problem: Behavioral Disturbance  Goal: Behavioral Disturbance  Signs and symptoms of listed problems will be absent or manageable.    Patient, while hospitalized, will:  -attend unit programming to develop health coping skills  -be medication compliant to promote mental health  -verbalize 2 coping skills that promote mental health   -verbalize decrease in psychotic signs/symptoms  -verbalize decrease in depressive signs/symptoms  -will verbalize a decrease in anxiety   -will participate in discharge planning    Patient, prior to dishcharge, will:  -verbalize an understanding of medication regimen to promote mental health  -verbalize absence of SI/SIB to promote safety  -develop a safety plan to promote safety  -will identify a support system to promote safety       Outcome: Therapy, progress towards functional goals is fair  Pt did not have any verbal outbursts this evening. She was frequently joking and laughing with staff. Continues to make nonsensical statements and questions at times. \"How come white girls never wanna hang out? Are you scared I'm gonna do your Dad?\" \"Is it too late for me to be a  and kill people? Can I be a nurse?\" \"Can I get a license to kill?\" Pt asks all these questions while laughing. Accepting of redirection. Compliant with all scheduled medications. Will continue to monitor closely.       "

## 2017-04-02 PROCEDURE — 25000132 ZZH RX MED GY IP 250 OP 250 PS 637: Performed by: PSYCHIATRY & NEUROLOGY

## 2017-04-02 PROCEDURE — 12400003 ZZH R&B MH CRITICAL UMMC

## 2017-04-02 RX ADMIN — Medication 1 G: at 08:41

## 2017-04-02 RX ADMIN — BENZTROPINE MESYLATE 0.5 MG: 0.5 TABLET ORAL at 19:10

## 2017-04-02 RX ADMIN — OLANZAPINE 10 MG: 5 TABLET, ORALLY DISINTEGRATING ORAL at 17:20

## 2017-04-02 RX ADMIN — NICOTINE POLACRILEX 4 MG: 2 GUM, CHEWING ORAL at 19:30

## 2017-04-02 RX ADMIN — RISPERIDONE 6 MG: 3 TABLET, ORALLY DISINTEGRATING ORAL at 19:10

## 2017-04-02 RX ADMIN — NICOTINE POLACRILEX 4 MG: 2 GUM, CHEWING ORAL at 21:41

## 2017-04-02 RX ADMIN — NICOTINE POLACRILEX 4 MG: 2 GUM, CHEWING ORAL at 17:20

## 2017-04-02 RX ADMIN — Medication 1 G: at 12:36

## 2017-04-02 RX ADMIN — LITHIUM CARBONATE 600 MG: 300 TABLET, EXTENDED RELEASE ORAL at 08:41

## 2017-04-02 RX ADMIN — PANTOPRAZOLE SODIUM 40 MG: 40 TABLET, DELAYED RELEASE ORAL at 08:41

## 2017-04-02 RX ADMIN — RISPERIDONE 2 MG: 2 TABLET, ORALLY DISINTEGRATING ORAL at 08:41

## 2017-04-02 RX ADMIN — LITHIUM CARBONATE 600 MG: 300 TABLET, EXTENDED RELEASE ORAL at 19:10

## 2017-04-02 RX ADMIN — BENZTROPINE MESYLATE 0.5 MG: 0.5 TABLET ORAL at 08:41

## 2017-04-02 RX ADMIN — Medication 1 G: at 16:30

## 2017-04-02 RX ADMIN — Medication 1 G: at 19:43

## 2017-04-02 RX ADMIN — NICOTINE POLACRILEX 4 MG: 2 GUM, CHEWING ORAL at 08:41

## 2017-04-02 RX ADMIN — TRIAMCINOLONE ACETONIDE: 5 OINTMENT TOPICAL at 08:42

## 2017-04-02 ASSESSMENT — ACTIVITIES OF DAILY LIVING (ADL)
DRESS: SCRUBS (BEHAVIORAL HEALTH)
LAUNDRY: UNABLE TO COMPLETE
DRESS: SCRUBS (BEHAVIORAL HEALTH)
LAUNDRY: UNABLE TO COMPLETE
GROOMING: INDEPENDENT;PROMPTS
ORAL_HYGIENE: INDEPENDENT;PROMPTS
GROOMING: INDEPENDENT;PROMPTS
ORAL_HYGIENE: INDEPENDENT;PROMPTS

## 2017-04-02 NOTE — PLAN OF CARE
Problem: Behavioral Disturbance  Goal: Behavioral Disturbance  Signs and symptoms of listed problems will be absent or manageable.    Patient, while hospitalized, will:  -attend unit programming to develop health coping skills  -be medication compliant to promote mental health  -verbalize 2 coping skills that promote mental health   -verbalize decrease in psychotic signs/symptoms  -verbalize decrease in depressive signs/symptoms  -will verbalize a decrease in anxiety   -will participate in discharge planning    Patient, prior to dishcharge, will:  -verbalize an understanding of medication regimen to promote mental health  -verbalize absence of SI/SIB to promote safety  -develop a safety plan to promote safety  -will identify a support system to promote safety       Outcome: Therapy, progress towards functional goals is fair  48 hour nursing assessment completed. Patient spends time between her room and the lounge throughout the shift. While she is in the lounge, she is pleasant and social with staff and peers. She continues to state random, statements that are out of context of what is being talked about around her. She tells this writer that her father is in [facility] for murdering a  in [place]. She then states she thinks she has multiple siblings that she does not know. Patient is med compliant, and requires very little redirection during the shift. Patient prompted to shower and change her linens. Denies SI/SIB. Continue to monitor and assess.

## 2017-04-02 NOTE — PROGRESS NOTES
Pt was isolative to room. When in the milieu she presented as anxious at times, but otherwise pleasant to staff and peers. She could be heard speaking loudly on the phone, however she required little redirection this evening.        04/01/17 2100   Behavioral Health   Hallucinations appears responding   Thinking distractable;delusional   Insight poor   Judgement impaired   Eye Contact at examiner   Affect full range affect   Mood mood is calm   Physical Appearance/Attire attire appropriate to age and situation   Hygiene neglected grooming - unclean body, hair, teeth   Suicidality (None reported or observed)   Activity isolative   Speech flight of ideas   Psychomotor / Gait balanced;steady   Daily Care   Patient Performed Hygiene other (see comments)  (Pt did not shower during the evening)   Activities of Daily Living   Hygiene/Grooming independent   Oral Hygiene independent   Dress scrubs (behavioral health)   Laundry unable to complete   Room Organization independent   Hygiene Care Assistance   Oral Care (per pt. )   Perineal Care (per pt.)   Hygiene Assistance per patient   Behavioral Health Interventions   Behavioral Disturbance maintain safety precautions;monitor need to revise level of observation;maintain safe secure environment;reality orientation;simple, clear language;decrease environmental stimulation;assist in development of alternative methods of expressive communication;encourage clear communication of needs;redirection of intrusive behaviors;redirection of aggressive behaviors;assist patient in developing safety plan;encourage nutrition and hydration;encourage participation / independence with adls;provide emotional support;establish therapeutic relationship;assist with developing & utilizing healthy coping strategies;provide positive feedback for use of effective coping skills;build upon strengths;assess patient response to medication;assess medication adherance;monitor need for prn medication;monitor  confusion, memory loss, decision making ability and reorient / intervent as needed   Social and Therapeutic Interventions (Behavioral Disturbance) encourage socialization with peers;encourage effective boundaries with peers;encourage participation in therapeutic groups and milieu activities

## 2017-04-03 PROCEDURE — H2032 ACTIVITY THERAPY, PER 15 MIN: HCPCS

## 2017-04-03 PROCEDURE — 12400003 ZZH R&B MH CRITICAL UMMC

## 2017-04-03 PROCEDURE — 25000132 ZZH RX MED GY IP 250 OP 250 PS 637: Performed by: PSYCHIATRY & NEUROLOGY

## 2017-04-03 PROCEDURE — 90853 GROUP PSYCHOTHERAPY: CPT

## 2017-04-03 PROCEDURE — 99232 SBSQ HOSP IP/OBS MODERATE 35: CPT | Performed by: PSYCHIATRY & NEUROLOGY

## 2017-04-03 RX ADMIN — Medication 1 G: at 12:54

## 2017-04-03 RX ADMIN — NICOTINE POLACRILEX 4 MG: 2 GUM, CHEWING ORAL at 09:37

## 2017-04-03 RX ADMIN — Medication 1 G: at 09:15

## 2017-04-03 RX ADMIN — LITHIUM CARBONATE 600 MG: 300 TABLET, EXTENDED RELEASE ORAL at 09:15

## 2017-04-03 RX ADMIN — NICOTINE POLACRILEX 4 MG: 2 GUM, CHEWING ORAL at 12:53

## 2017-04-03 RX ADMIN — Medication 1 G: at 20:52

## 2017-04-03 RX ADMIN — Medication 1 G: at 16:26

## 2017-04-03 RX ADMIN — TRIAMCINOLONE ACETONIDE: 5 OINTMENT TOPICAL at 09:37

## 2017-04-03 RX ADMIN — PANTOPRAZOLE SODIUM 40 MG: 40 TABLET, DELAYED RELEASE ORAL at 09:15

## 2017-04-03 RX ADMIN — LITHIUM CARBONATE 600 MG: 300 TABLET, EXTENDED RELEASE ORAL at 20:20

## 2017-04-03 RX ADMIN — NICOTINE POLACRILEX 4 MG: 2 GUM, CHEWING ORAL at 20:27

## 2017-04-03 RX ADMIN — RISPERIDONE 2 MG: 2 TABLET, ORALLY DISINTEGRATING ORAL at 09:15

## 2017-04-03 RX ADMIN — TRIAMCINOLONE ACETONIDE: 5 OINTMENT TOPICAL at 20:20

## 2017-04-03 RX ADMIN — RISPERIDONE 6 MG: 3 TABLET, ORALLY DISINTEGRATING ORAL at 20:21

## 2017-04-03 RX ADMIN — NICOTINE POLACRILEX 4 MG: 2 GUM, CHEWING ORAL at 18:37

## 2017-04-03 RX ADMIN — BENZTROPINE MESYLATE 0.5 MG: 0.5 TABLET ORAL at 09:15

## 2017-04-03 RX ADMIN — NICOTINE POLACRILEX 4 MG: 2 GUM, CHEWING ORAL at 11:01

## 2017-04-03 RX ADMIN — NICOTINE POLACRILEX 4 MG: 2 GUM, CHEWING ORAL at 14:18

## 2017-04-03 RX ADMIN — NICOTINE POLACRILEX 4 MG: 2 GUM, CHEWING ORAL at 16:27

## 2017-04-03 RX ADMIN — NICOTINE POLACRILEX 4 MG: 2 GUM, CHEWING ORAL at 10:23

## 2017-04-03 RX ADMIN — BENZTROPINE MESYLATE 0.5 MG: 0.5 TABLET ORAL at 20:20

## 2017-04-03 ASSESSMENT — ACTIVITIES OF DAILY LIVING (ADL)
DRESS: SCRUBS (BEHAVIORAL HEALTH)
DRESS: SCRUBS (BEHAVIORAL HEALTH)
GROOMING: INDEPENDENT
LAUNDRY: UNABLE TO COMPLETE
ORAL_HYGIENE: INDEPENDENT
GROOMING: INDEPENDENT
ORAL_HYGIENE: INDEPENDENT

## 2017-04-03 NOTE — PROGRESS NOTES
[facility], [place]   Psychiatric Progress Note        Interim History:   The patient's care was discussed with the treatment team during the daily team meeting and/or staff's chart notes were reviewed.  Staff report patient has been compliant with medications. Significantly less pressured, more on task and organized. Slept well. no outbursts and compliant with care and medications. Improved boundaries and receptive to redirection. Poor ADLs and needs prompting.     The patient was calm, pleasant and engaged. Denied hallucinations and paranoia. Denied dep, anxiety, racing thoughts and irritability. Tolerating medications well. She exhibited shifting gait, but alleged that its at baseline and was not interested in pharmacological interventions. Believes that she is ready to discharge and agreed to adhere to post discharge recommendations. No SI or SHARRON. Thoughts were organized and on task. Believes that she is been sleeping well. Appetite intact.     Discussed medications and care plan.        Medications:       risperiDONE  6 mg Sublingual At Bedtime     risperiDONE  2 mg Sublingual QAM     risperiDONE microspheres  50 mg Intramuscular Q14 Days     benztropine  0.5 mg Oral BID     lithium  600 mg Oral BID     pantoprazole  40 mg Oral QAM     sucralfate  1 g Oral 4x Daily AC & HS     triamcinolone   Topical BID          Allergies:     Allergies   Allergen Reactions     Codeine Sulfate           Labs:     No results found for this or any previous visit (from the past 24 hour(s)).       Psychiatric Examination:     Vitals:    03/30/17 0755 03/31/17 0859 04/02/17 0900 04/03/17 0900   BP: 114/71      BP Location:   Left arm    Pulse: 85      Resp:  16 16 16   Temp: 98.1  F (36.7  C) 98.5  F (36.9  C) 97.7  F (36.5  C) 98.5  F (36.9  C)   TempSrc: Oral Oral Oral Oral   SpO2:         Weight is 0 lbs 0 oz  There is no height or weight on file to calculate BMI.    Appearance: appeared as age  stated, poorly groomed, awake, alert and no apparent distress  Attitude:  cooperative  Eye Contact:  good  Mood:  good  Affect:  less guarded and more engaged. .   Speech:  pressured speech resolved.   Psychomotor Behavior:  no evidence of tardive dyskinesia, dystonia, or tics, intact station, gait and muscle tone and shifting gait.   Throught Process:  goal oriented and but concrete   Associations:  no loose associations  Thought Content:  no evidence of suicidal ideation or homicidal ideation, no auditory hallucinations present, no visual hallucinations present and paranoia and internal stimuli resolving.   Insight:  fair  Judgement:  fair  Oriented to:  time, person, and place  Attention Span and Concentration:  intact  Recent and Remote Memory:  intact            Precautions:     Behavioral Orders   Procedures     Assault precautions     Code 1 - Restrict to Unit     Routine Programming     As clinically indicated     Status 15     Every 15 minutes.          Diagnoses:   1.  Schizoaffective disorder, bipolar type, current episode is manic with psychotic features.     2.  Benzodiazepine use disorder.   3.  Historical diagnosis of cannabis and cocaine use disorder.   4.  Antisocial personality disorder.        Plan:     Medications:  1.  Lithium was restarted at 600 mg twice a day.  Lithium level on 3/27 was 1.1. Lithium level was 1.0 on 3/30.      2.  Benztropine restarted at 0.5 mg twice a day.   3.  Risperdal M-Tab restarted and titrated to 2 mg q.a.m. and 6 mg each night at bedtime, may consider switching to Invega and subsequently Invega Sustenna if compliance is questionable.   4.  Risperdal Consta restarted at 50 mg q.2 weeks and was given on 3/22. .   5.  The patient was placed on MSSA for benzodiazepine withdrawal but not cooperative with vitals. Protocol was discontinued. Phenobarbital taper completed. The patient is a very poor candidate for controlled medications due to extensive history of past  chemical use.   6.  PRN hydroxyzine for anxiety, Zyprexa for agitation will be offered.     Legal Status and Disposition:   1. The patient was placed on a 72-hour hold due to active julio and psychosis.  She is currently on court hold. Recommend revoking PD and extending commitment, due to long history of noncompliance with medications. Court heating to extend civil commitment is scheduled on 3/28.   2. The patient will likely be granted discharge home once active julio and psychosis resolve, complete detoxification and safety established in the community. Potentially discharge tomorrow if symptoms continue to improve at current rate.

## 2017-04-03 NOTE — PROGRESS NOTES
Per treatment team pt will be d/c on 4/4. Writer completed provisional d/c paperwork and faxed to [facility] at [phone]. Completed change of status report and faxed to [facility] at [phone].

## 2017-04-03 NOTE — PROGRESS NOTES
Pt.attended a Goal Setting session and actively participated in a discussion of guidelines for setting goals.  Pt. Identified a priority goal and action steps to accomplish that goal. Initially Pt appeared attentive in task but gradually appeared more sleepy and had her thumb in her mouth often.  Pt.was cooperative and pleasant throughout session.

## 2017-04-03 NOTE — PROGRESS NOTES
04/03/17 1508   Significant Event   Significant Event Other (see comments)  (shift summary)   Pt had a good shift. Affect is still elated but may be congruent with mood, because she is excited to leave. She was resting in her bed, watching tv for a couple of hours. This is the first time writer has seen pt sit quietly when not sleeping. She was pleasant and polite. No complaints or requests.

## 2017-04-03 NOTE — PROGRESS NOTES
Pt was sociable on the unit with staff and peers. Pt was able to hold a coherent conversation with staff. No issues or behaviors to report.        04/02/17 2200   Behavioral Health   Thinking distractable   Orientation place: oriented;date: oriented;time: oriented   Insight poor   Eye Contact at examiner   Mood mood is calm   Physical Appearance/Attire untidy   Hygiene neglected grooming - unclean body, hair, teeth   Suicidality (WDL) (none reported or observed)   Self Injury (WDL) (none reported or observed)   Speech coherent   Activities of Daily Living   Hygiene/Grooming independent;prompts   Oral Hygiene independent;prompts   Dress scrubs (behavioral health)   Laundry unable to complete   Room Organization prompts   Activity   Activity Level of Assistance independent   Behavioral Health Interventions   Behavioral Disturbance maintain safety precautions;monitor need to revise level of observation;maintain safe secure environment;reality orientation;simple, clear language;decrease environmental stimulation;assist in development of alternative methods of expressive communication;encourage clear communication of needs;redirection of intrusive behaviors;redirection of aggressive behaviors;assist patient in developing safety plan;encourage nutrition and hydration;encourage participation / independence with adls;provide emotional support;establish therapeutic relationship;assist with developing & utilizing healthy coping strategies;provide positive feedback for use of effective coping skills;build upon strengths;assess patient response to medication;assess medication adherance;monitor need for prn medication;monitor confusion, memory loss, decision making ability and reorient / intervent as needed   Social and Therapeutic Interventions (Behavioral Disturbance) encourage socialization with peers;encourage effective boundaries with peers;encourage participation in therapeutic groups and milieu activities

## 2017-04-04 VITALS
RESPIRATION RATE: 16 BRPM | HEART RATE: 85 BPM | SYSTOLIC BLOOD PRESSURE: 114 MMHG | TEMPERATURE: 97.6 F | DIASTOLIC BLOOD PRESSURE: 71 MMHG | OXYGEN SATURATION: 99 %

## 2017-04-04 LAB — PROLACTIN SERPL-MCNC: 188 UG/L (ref 3–27)

## 2017-04-04 PROCEDURE — 84146 ASSAY OF PROLACTIN: CPT | Performed by: PSYCHIATRY & NEUROLOGY

## 2017-04-04 PROCEDURE — 99238 HOSP IP/OBS DSCHRG MGMT 30/<: CPT | Performed by: PSYCHIATRY & NEUROLOGY

## 2017-04-04 PROCEDURE — 25000132 ZZH RX MED GY IP 250 OP 250 PS 637: Performed by: PSYCHIATRY & NEUROLOGY

## 2017-04-04 PROCEDURE — 36416 COLLJ CAPILLARY BLOOD SPEC: CPT | Performed by: PSYCHIATRY & NEUROLOGY

## 2017-04-04 PROCEDURE — 90853 GROUP PSYCHOTHERAPY: CPT

## 2017-04-04 PROCEDURE — 25000128 H RX IP 250 OP 636: Performed by: PSYCHIATRY & NEUROLOGY

## 2017-04-04 RX ORDER — LITHIUM CARBONATE 300 MG/1
600 TABLET, FILM COATED, EXTENDED RELEASE ORAL 2 TIMES DAILY
Qty: 120 TABLET | Refills: 0 | Status: ON HOLD | OUTPATIENT
Start: 2017-04-04 | End: 2017-05-12

## 2017-04-04 RX ORDER — AMOXICILLIN 250 MG
1 CAPSULE ORAL 2 TIMES DAILY PRN
Qty: 100 TABLET | Refills: 0 | Status: ON HOLD | OUTPATIENT
Start: 2017-04-04 | End: 2017-05-12

## 2017-04-04 RX ORDER — TRIAMCINOLONE ACETONIDE 5 MG/G
1 OINTMENT TOPICAL 2 TIMES DAILY
Qty: 60 G | Refills: 0 | Status: ON HOLD | OUTPATIENT
Start: 2017-04-04 | End: 2017-05-12

## 2017-04-04 RX ORDER — RISPERIDONE 2 MG/1
2 TABLET, ORALLY DISINTEGRATING ORAL EVERY MORNING
Qty: 30 TABLET | Refills: 0 | Status: ON HOLD | OUTPATIENT
Start: 2017-04-04 | End: 2017-05-12

## 2017-04-04 RX ORDER — BENZTROPINE MESYLATE 0.5 MG/1
0.5 TABLET ORAL 2 TIMES DAILY
Qty: 60 TABLET | Refills: 0 | Status: ON HOLD | OUTPATIENT
Start: 2017-04-04 | End: 2017-05-12

## 2017-04-04 RX ORDER — RISPERIDONE 3 MG/1
6 TABLET, ORALLY DISINTEGRATING ORAL AT BEDTIME
Qty: 60 TABLET | Refills: 0 | Status: ON HOLD | OUTPATIENT
Start: 2017-04-04 | End: 2017-05-12

## 2017-04-04 RX ORDER — SUCRALFATE ORAL 1 G/10ML
1 SUSPENSION ORAL
Qty: 1200 ML | Refills: 0 | Status: ON HOLD | OUTPATIENT
Start: 2017-04-04 | End: 2017-05-12

## 2017-04-04 RX ORDER — ARIPIPRAZOLE 2 MG/1
2 TABLET ORAL DAILY
Qty: 30 TABLET | Refills: 0 | Status: ON HOLD | OUTPATIENT
Start: 2017-04-04 | End: 2017-05-12

## 2017-04-04 RX ORDER — ARIPIPRAZOLE 2 MG/1
2 TABLET ORAL DAILY
Status: DISCONTINUED | OUTPATIENT
Start: 2017-04-04 | End: 2017-04-04 | Stop reason: HOSPADM

## 2017-04-04 RX ORDER — PANTOPRAZOLE SODIUM 40 MG/1
40 TABLET, DELAYED RELEASE ORAL EVERY MORNING
Qty: 30 TABLET | Refills: 0 | Status: ON HOLD | OUTPATIENT
Start: 2017-04-04 | End: 2017-05-12

## 2017-04-04 RX ADMIN — PANTOPRAZOLE SODIUM 40 MG: 40 TABLET, DELAYED RELEASE ORAL at 08:22

## 2017-04-04 RX ADMIN — ARIPIPRAZOLE 2 MG: 2 TABLET ORAL at 15:09

## 2017-04-04 RX ADMIN — NICOTINE POLACRILEX 4 MG: 2 GUM, CHEWING ORAL at 15:09

## 2017-04-04 RX ADMIN — BENZTROPINE MESYLATE 0.5 MG: 0.5 TABLET ORAL at 08:22

## 2017-04-04 RX ADMIN — NICOTINE POLACRILEX 4 MG: 2 GUM, CHEWING ORAL at 13:18

## 2017-04-04 RX ADMIN — NICOTINE POLACRILEX 4 MG: 2 GUM, CHEWING ORAL at 10:28

## 2017-04-04 RX ADMIN — RISPERIDONE 50 MG: KIT at 15:10

## 2017-04-04 RX ADMIN — NICOTINE POLACRILEX 4 MG: 2 GUM, CHEWING ORAL at 08:59

## 2017-04-04 RX ADMIN — NICOTINE POLACRILEX 4 MG: 2 GUM, CHEWING ORAL at 16:23

## 2017-04-04 RX ADMIN — TRIAMCINOLONE ACETONIDE: 5 OINTMENT TOPICAL at 11:57

## 2017-04-04 RX ADMIN — Medication 1 G: at 11:59

## 2017-04-04 RX ADMIN — ALUMINUM HYDROXIDE, MAGNESIUM HYDROXIDE, AND DIMETHICONE 30 ML: 400; 400; 40 SUSPENSION ORAL at 16:36

## 2017-04-04 RX ADMIN — NICOTINE POLACRILEX 4 MG: 2 GUM, CHEWING ORAL at 11:47

## 2017-04-04 RX ADMIN — LITHIUM CARBONATE 600 MG: 300 TABLET, EXTENDED RELEASE ORAL at 08:22

## 2017-04-04 RX ADMIN — RISPERIDONE 2 MG: 2 TABLET, ORALLY DISINTEGRATING ORAL at 08:22

## 2017-04-04 RX ADMIN — Medication 1 G: at 08:22

## 2017-04-04 ASSESSMENT — ACTIVITIES OF DAILY LIVING (ADL)
DRESS: SCRUBS (BEHAVIORAL HEALTH)
GROOMING: INDEPENDENT
ORAL_HYGIENE: INDEPENDENT
GROOMING: INDEPENDENT
DRESS: SCRUBS (BEHAVIORAL HEALTH)
ORAL_HYGIENE: INDEPENDENT

## 2017-04-04 NOTE — DISCHARGE INSTRUCTIONS
Behavioral Discharge Planning and Instructions      Summary:  You were admitted to the [facility].  During your hospitalization, you met daily with the staff and were encouraged to attend all therapeutic programming. You met with the Clinical Treatment Coordinator and participated in your discharge planning. Medications were adjusted.    You were dually committed to the [facility] and the [facility] on March 28, 2017.  You are being discharged on a Provisional Discharge Agreement which shall remain in effect for the duration of the Commitment.  Your Commitment expires on April 7, 2018.    You were also court ordered to take the medications the doctor ordered for you.       Primary Diagnoses:   1. Schizoaffective disorder, bipolar type, current episode is manic with psychotic features.   2. Benzodiazepine use disorder.   3. Historical diagnosis of cannabis and cocaine use disorder.   4. Antisocial personality disorder.       Mental Health Follow-Up:  - ACT Team through [facility] [employer]:  : Jyotsna Catherine [phone]  ACT RN: Manpreet Sheehan [phone]  ACT : Ai Mendiola [phone]  Psychiatrist: Dr. Gisela Day MD  Primary Care: Khadijah Quintero [phone]    Attend all scheduled appointments with your outpatient providers. Call at least 24 hours in advance if you need to reschedule an appointment to ensure continued access to your outpatient providers.   Major Treatments, Procedures and Findings:  You were provided with: a psychiatric assessment, medication evaluation and/or management, group therapy and milieu management    Symptoms to Report: feeling more aggressive, increased confusion, losing more sleep, mood getting worse or thoughts of suicide    Early warning signs can include: increased depression or anxiety sleep disturbances increased thoughts or behaviors of  "suicide or self-harm  increased unusual thinking, such as paranoia or hearing voices    Safety and Wellness:  Take all medicines as directed.  Make no changes unless your doctor suggests them.      Follow treatment recommendations.  Refrain from alcohol and non-prescribed drugs.  Ask your support system to help you reduce your access to items that could harm yourself or others. Items could include:  Firearms  Medicines (both prescribed and over-the-counter)  Knives and other sharp objects  Ropes and like materials  Car keys  If there is a concern for safety, call 911. If there is a concern for safety, call 911.    Resources:   Crisis Intervention: 444.449.7994 or 275-694-0138 (TTY: 792.286.3076).  Call anytime for help.  National [place] on Mental Illness (www.mn.pari.org): 344.442.1172 or 558-063-8693.  [place] Association for Children's Mental Health (www.mac.org): 420.986.6935.  Alcoholics Anonymous (www.alcoholics-anonymous.org): Check your phone book for your local chapter.  Suicide Awareness Voices of Education (SAVE) (www.save.org): 101-542-PWLN (3110)  National Suicide Prevention Line (www.mentalhealthmn.org): 168-544-LNYS (1971)  Mental Health Consumer/Survivor Network of [place] (www.mhcsn.net): 752.267.6201 or 329-877-8451  Mental Health Association of [place] (www.mentalhealth.org): 992.244.7021 or 661-944-5594  Self- Management and Recovery Training., SMART-- Toll free: 212.974.1277  www.Pixowl.org  [facility] Crisis (COPE) Response - Adult 069 551-8288  [facility] Crisis Response - Adult 722 871-3464  Text 4 Life: txt \"LIFE\" to 02532 for immediate support and crisis intervention  Crisis text line: Text \"START\" to 103-828. Free, confidential, 24/7.  Crisis Intervention: 813.649.9380 or 153-490-9469. Call anytime for help.   [facility] Crisis Team - Child: [phone]  [facility]'s Mental Health Crisis Response Team - Child: [phone]    The treatment " team has appreciated the opportunity to work with you.  If you have any questions or concerns our unit number is [phone].

## 2017-04-04 NOTE — PLAN OF CARE
Problem: Behavioral Disturbance  Goal: Behavioral Disturbance  Signs and symptoms of listed problems will be absent or manageable.    Patient, while hospitalized, will:  -attend unit programming to develop health coping skills  -be medication compliant to promote mental health  -verbalize 2 coping skills that promote mental health   -verbalize decrease in psychotic signs/symptoms  -verbalize decrease in depressive signs/symptoms  -will verbalize a decrease in anxiety   -will participate in discharge planning    Patient, prior to dishcharge, will:  -verbalize an understanding of medication regimen to promote mental health  -verbalize absence of SI/SIB to promote safety  -develop a safety plan to promote safety  -will identify a support system to promote safety       Outcome: Adequate for Discharge Date Met:  04/04/17  Patient appears to be near baseline so will be discharged today.  She has a full range of affect.  She appears mildly anxious but she states she is just wanting to leave.  She will receive her Risperdal Consta before she leaves today.  She continues to spend most of her free time in her room napping.  Pt denies suicidal ideation.  She denies hallucinations.

## 2017-04-04 NOTE — PROGRESS NOTES
Pt discharged to her mother's house via cab. Pt agrees with all discharge instructions and medication instructions. Reports understanding of instructions.   All belongings sent with pt at time of discharge.

## 2017-04-04 NOTE — PROGRESS NOTES
Writer faxed d/c paperwork and updated medication list to [facility]. Pt will d/c from hospital shortly and return home with ACT team services intact.
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
## 2017-04-04 NOTE — PROGRESS NOTES
Pt was pleasant and active on the milieu, she engaged with staff and peers. She expressed excitement about going home tomorrow. Pt stated that she has no plans on returning to the hospital and plans to taking care of herself to avoid coming back.        04/03/17 2240   Cognitive/Neuro/Behavioral WDL   Orientation person;place;time;situation   Mood/Behavior calm   Behavioral Health   Hallucinations denies / not responding to hallucinations   Thinking intact   Orientation person: oriented;place: oriented;date: oriented;time: oriented   Memory baseline memory   Insight insight appropriate to situation   Judgement intact   Eye Contact at examiner   Affect full range affect   Mood mood is calm   Physical Appearance/Attire attire appropriate to age and situation   Hygiene neglected grooming - unclean body, hair, teeth   Suicidality (WDL) (none observed or reported )   Self Injury (none observed or reported)   Activity other (see comment)  (Present on the unit, engaged with peers and staff)   Speech clear;coherent   Psychomotor / Gait steady   Activities of Daily Living   Hygiene/Grooming independent   Oral Hygiene independent   Dress scrubs (behavioral health)   Laundry unable to complete   Room Organization independent   Activity   Activity Level of Assistance independent   Hygiene Care Assistance   Oral Care (per pt)   Perineal Care (per pt)   Hygiene Assistance per patient   Behavioral Health Interventions   Behavioral Disturbance maintain safety precautions;monitor need to revise level of observation;maintain safe secure environment;reality orientation;simple, clear language;decrease environmental stimulation;assist in development of alternative methods of expressive communication;encourage clear communication of needs;redirection of intrusive behaviors;redirection of aggressive behaviors;assist patient in developing safety plan;encourage nutrition and hydration;encourage participation / independence with adls;provide  emotional support;establish therapeutic relationship;assist with developing & utilizing healthy coping strategies;provide positive feedback for use of effective coping skills;build upon strengths;assess patient response to medication;assess medication adherance;monitor need for prn medication;monitor confusion, memory loss, decision making ability and reorient / intervent as needed   Social and Therapeutic Interventions (Behavioral Disturbance) encourage socialization with peers;encourage effective boundaries with peers;encourage participation in therapeutic groups and milieu activities

## 2017-04-12 ENCOUNTER — HOSPITAL ENCOUNTER (INPATIENT)
Facility: CLINIC | Age: 32
LOS: 33 days | Discharge: HOME | DRG: 885 | End: 2017-05-15
Attending: PSYCHIATRY & NEUROLOGY | Admitting: PSYCHIATRY & NEUROLOGY
Payer: COMMERCIAL

## 2017-04-12 ENCOUNTER — TRANSFERRED RECORDS (OUTPATIENT)
Dept: HEALTH INFORMATION MANAGEMENT | Facility: CLINIC | Age: 32
End: 2017-04-12

## 2017-04-12 DIAGNOSIS — F25.0 SCHIZOAFFECTIVE DISORDER, BIPOLAR TYPE (H): ICD-10-CM

## 2017-04-12 DIAGNOSIS — K21.9 GASTROESOPHAGEAL REFLUX DISEASE WITHOUT ESOPHAGITIS: ICD-10-CM

## 2017-04-12 DIAGNOSIS — K43.9 VENTRAL HERNIA WITHOUT OBSTRUCTION OR GANGRENE: Primary | ICD-10-CM

## 2017-04-12 DIAGNOSIS — K59.01 SLOW TRANSIT CONSTIPATION: ICD-10-CM

## 2017-04-12 PROBLEM — F31.9 BIPOLAR 1 DISORDER (H): Status: ACTIVE | Noted: 2017-04-12

## 2017-04-12 PROCEDURE — 25000132 ZZH RX MED GY IP 250 OP 250 PS 637: Performed by: STUDENT IN AN ORGANIZED HEALTH CARE EDUCATION/TRAINING PROGRAM

## 2017-04-12 PROCEDURE — 12400001 ZZH R&B MH UMMC

## 2017-04-12 RX ORDER — LANOLIN ALCOHOL/MO/W.PET/CERES
100 CREAM (GRAM) TOPICAL DAILY
Status: DISPENSED | OUTPATIENT
Start: 2017-04-13 | End: 2017-04-16

## 2017-04-12 RX ORDER — DIAZEPAM 5 MG
5-20 TABLET ORAL EVERY 30 MIN PRN
Status: DISCONTINUED | OUTPATIENT
Start: 2017-04-12 | End: 2017-04-14

## 2017-04-12 RX ORDER — OLANZAPINE 10 MG/1
10 TABLET ORAL EVERY 4 HOURS PRN
Status: DISCONTINUED | OUTPATIENT
Start: 2017-04-12 | End: 2017-04-14

## 2017-04-12 RX ORDER — BENZTROPINE MESYLATE 0.5 MG/1
0.5 TABLET ORAL 2 TIMES DAILY
Status: DISCONTINUED | OUTPATIENT
Start: 2017-04-12 | End: 2017-04-18

## 2017-04-12 RX ORDER — PANTOPRAZOLE SODIUM 40 MG/1
40 TABLET, DELAYED RELEASE ORAL EVERY MORNING
Status: DISCONTINUED | OUTPATIENT
Start: 2017-04-13 | End: 2017-05-15 | Stop reason: HOSPADM

## 2017-04-12 RX ORDER — LITHIUM CARBONATE 300 MG/1
600 TABLET, FILM COATED, EXTENDED RELEASE ORAL 2 TIMES DAILY
Status: DISCONTINUED | OUTPATIENT
Start: 2017-04-12 | End: 2017-04-29

## 2017-04-12 RX ORDER — OLANZAPINE 10 MG/2ML
10 INJECTION, POWDER, FOR SOLUTION INTRAMUSCULAR EVERY 6 HOURS PRN
Status: DISCONTINUED | OUTPATIENT
Start: 2017-04-12 | End: 2017-04-12 | Stop reason: CLARIF

## 2017-04-12 RX ORDER — RISPERIDONE 3 MG/1
6 TABLET, ORALLY DISINTEGRATING ORAL AT BEDTIME
Status: DISCONTINUED | OUTPATIENT
Start: 2017-04-12 | End: 2017-04-17

## 2017-04-12 RX ORDER — HYDROXYZINE HYDROCHLORIDE 25 MG/1
25-50 TABLET, FILM COATED ORAL EVERY 4 HOURS PRN
Status: DISCONTINUED | OUTPATIENT
Start: 2017-04-12 | End: 2017-04-18

## 2017-04-12 RX ORDER — MULTIPLE VITAMINS W/ MINERALS TAB 9MG-400MCG
1 TAB ORAL DAILY
Status: DISCONTINUED | OUTPATIENT
Start: 2017-04-13 | End: 2017-05-15 | Stop reason: HOSPADM

## 2017-04-12 RX ORDER — FOLIC ACID 1 MG/1
1 TABLET ORAL DAILY
Status: DISCONTINUED | OUTPATIENT
Start: 2017-04-13 | End: 2017-05-15 | Stop reason: HOSPADM

## 2017-04-12 RX ORDER — AMOXICILLIN 250 MG
1 CAPSULE ORAL 2 TIMES DAILY PRN
Status: DISCONTINUED | OUTPATIENT
Start: 2017-04-12 | End: 2017-05-15 | Stop reason: HOSPADM

## 2017-04-12 RX ORDER — ARIPIPRAZOLE 2 MG/1
2 TABLET ORAL DAILY
Status: DISCONTINUED | OUTPATIENT
Start: 2017-04-13 | End: 2017-04-14

## 2017-04-12 RX ORDER — OLANZAPINE 10 MG/2ML
10 INJECTION, POWDER, FOR SOLUTION INTRAMUSCULAR EVERY 4 HOURS PRN
Status: DISCONTINUED | OUTPATIENT
Start: 2017-04-12 | End: 2017-04-14

## 2017-04-12 RX ADMIN — HYDROXYZINE HYDROCHLORIDE 50 MG: 25 TABLET ORAL at 19:41

## 2017-04-12 RX ADMIN — LITHIUM CARBONATE 600 MG: 300 TABLET, EXTENDED RELEASE ORAL at 20:52

## 2017-04-12 RX ADMIN — RISPERIDONE 6 MG: 3 TABLET, ORALLY DISINTEGRATING ORAL at 20:48

## 2017-04-12 RX ADMIN — BENZTROPINE MESYLATE 0.5 MG: 0.5 TABLET ORAL at 20:48

## 2017-04-12 RX ADMIN — OLANZAPINE 10 MG: 10 TABLET, FILM COATED ORAL at 23:29

## 2017-04-12 ASSESSMENT — ACTIVITIES OF DAILY LIVING (ADL)
ORAL_HYGIENE: PROMPTS
GROOMING: PROMPTS
DRESS: PROMPTS
LAUNDRY: UNABLE TO COMPLETE

## 2017-04-12 NOTE — IP AVS SNAPSHOT
MRN:[id number]                      After Visit Summary   4/12/2017    Daisy Curtis    MRN: [id number]           Thank you!     Thank you for choosing [facility] for your care. Our goal is always to provide you with excellent care.        Patient Information     Date Of Birth          1985        Designated Caregiver       Most Recent Value    Caregiver    Will someone help with your care after discharge? yes    Name of designated caregiver YOHAN Lee    Phone number of caregiver  ([phone])     Caregiver address unknown      About your hospital stay     You were admitted on:  April 12, 2017 You last received care in the:   12NB    You were discharged on:  May 15, 2017       Who to Call     For medical emergencies, please call 911.  For non-urgent questions about your medical care, please call your primary care provider or clinic, [phone]          Attending Provider     Provider Specialty    Maurice Sandhu MD Psychiatry    Bill, Sabrina BRITTON MD Psychiatry    Mujica, Celso Booker MD Psychiatry    Yañez, Yunior Becerra MD Psychiatry       Primary Care Provider Office Phone # Fax #    Kary Quintero [phone]       [facility] [address] [facility] [address]        Additional Services     General Surg Adult Referral                 Further instructions from your care team       Behavioral Discharge Planning and Instructions      Summary:  You were admitted to [facility] due to psychosis and disorganized behavior.  You were very anxious and paranoid (wondering whether your mom had poisoned you.)  You asked the neighbor to call 911.  You had not been taking your medication.   You were found in your bathrobe outside of your apartment building.  While on [facility], you became verbally aggressive with a peer which necessitated your transfer to [facility], the intensive unit.    Placement efforts began on Station 22 and  were continued on Station 12.  You were accepted at [facility] IRT's and are being Provisionally Discharged there today.   The [place] [address]  [facility].   [place] Phone:  [phone],  FAX:  [phone].    Your Provisional Discharge was revoked on 4-14-17.   You are under re-commitment order until 4-7-18.  The commitment is to the Commissioner of Human Services as [place].  There is also a Griffith order for Risperdal, Haldoll, Invega, and Zyprexa.        Diagnoses:   Schizoaffective Disorder, bipolar type  Cocaine Use Disorder  Benzodiazepine Use Disorder      Mental Health Follow-Up:   [facility] ACT team.  FAX:  [phone]  ACT Psychiatrist: Dr. Gisela Day - Your ACT worker will arrange your next appointment with Dr Dya.  Nurse: Manpreet Sheehan: [phone]  ACT CM: Jyotsna Lee [phone]  ACT : Ai Mendiola [phone]  Attend all scheduled appointments with your outpatient providers. Call at least 24 hours in advance if you need to reschedule an appointment to ensure continued access to your outpatient providers.   Major Treatments, Procedures and Findings:  You were provided with: a psychiatric assessment, assessed for medical stability, medication evaluation and/or management and group therapy    Symptoms to Report: feeling more aggressive, increased confusion, losing more sleep, mood getting worse or thoughts of suicide    Early warning signs can include: increased depression or anxiety sleep disturbances increased thoughts or behaviors of suicide or self-harm  increased unusual thinking, such as paranoia or hearing voices    Safety and Wellness:  Take all medicines as directed.  Make no changes unless your doctor suggests them.      Follow treatment recommendations.  Refrain from alcohol and non-prescribed drugs.  If there is a concern for safety, call 216.    Resources:   COPE (Community Outreach for Psychiatric Emergencies):  412.429.4002.  Available 24-hours a day, 7 days a week.  COPE professionals are available to go where you are. Telephone consultations are also available.    Crisis Intervention: 858.357.5891 or 423-441-7256 (TTY: 135.499.5298).  Call anytime for help.  National [place] on Mental Illness (www.mn.pari.org): 119.844.2867 or 274-796-4231.  Alcoholics Anonymous (www.alcoholics-anonymous.org): Check your phone book for your local chapter.  National Suicide Prevention Line (www.mentalhealthmn.org): 850-972-ZTVX (5289)  Mental Health Consumer/Survivor Network of [place] (www.mhcsn.net): 481.875.5689 or 731-057-9556  Mental Health Association of [place] (www.mentalhealth.org): 203.997.7962 or 043-396-5247    The treatment team has appreciated the opportunity to work with you. If you have any questions or concerns our unit number is [phone].        Pending Results     No orders found from 4/10/2017 to 4/13/2017.            Admission Information     Date & Time Department Dept. Phone    4/12/2017 UR 12[facility] [phone]      Your Vitals Were     Blood Pressure Pulse Temperature Respirations Weight Pulse Oximetry    113/89 136 95.1  F (35.1  C) (Tympanic) 14 78.9 kg (174 lb) 98%    BMI (Body Mass Index)                   31.83 kg/m2           Care EveryWhere ID     This is your Care EveryWhere ID. This could be used by other organizations to access your [facility] medical records  [id number]           Review of your medicines      START taking        Dose / Directions    folic acid 1 MG tablet   Commonly known as:  FOLVITE   Used for:  Schizoaffective disorder, bipolar type (H)        Dose:  1 mg   Take 1 tablet (1 mg) by mouth daily   Quantity:  30 tablet   Refills:  0       hydrOXYzine 25 MG tablet   Commonly known as:  ATARAX   Used for:  Schizoaffective disorder, bipolar type (H)        Dose:  25-50 mg   Take 1-2 tablets (25-50 mg) by mouth 3 times daily as needed for anxiety   Quantity:  120 tablet   Refills:  0        multivitamin, therapeutic with minerals Tabs tablet   Used for:  Schizoaffective disorder, bipolar type (H)        Dose:  1 tablet   Take 1 tablet by mouth daily   Quantity:  30 each   Refills:  0       * OLANZapine 10 MG tablet   Commonly known as:  zyPREXA   Used for:  Schizoaffective disorder, bipolar type (H)        Dose:  10 mg   Take 1 tablet (10 mg) by mouth every 2 hours as needed (agitation associated with psychosis or julio.)   Quantity:  30 tablet   Refills:  0       * OLANZapine 15 MG tablet   Commonly known as:  zyPREXA   Used for:  Schizoaffective disorder, bipolar type (H)        Dose:  15 mg   Take 1 tablet (15 mg) by mouth At Bedtime   Quantity:  30 tablet   Refills:  0       * OLANZapine 5 MG tablet   Commonly known as:  zyPREXA   Used for:  Schizoaffective disorder, bipolar type (H)        Dose:  5 mg   Take 1 tablet (5 mg) by mouth daily   Quantity:  30 tablet   Refills:  0       * Notice:  This list has 3 medication(s) that are the same as other medications prescribed for you. Read the directions carefully, and ask your doctor or other care provider to review them with you.      CONTINUE these medicines which have NOT CHANGED        Dose / Directions    lithium 300 MG CR tablet   Commonly known as:  ESKALITH/LITHOBID   Used for:  Schizoaffective disorder, bipolar type (H)        Dose:  600 mg   Take 2 tablets (600 mg) by mouth 2 times daily   Quantity:  120 tablet   Refills:  0       pantoprazole 40 MG EC tablet   Commonly known as:  PROTONIX   Used for:  Gastroesophageal reflux disease without esophagitis        Dose:  40 mg   Take 1 tablet (40 mg) by mouth every morning   Quantity:  30 tablet   Refills:  0       senna-docusate 8.6-50 MG per tablet   Commonly known as:  SENOKOT-S;PERICOLACE   Used for:  Slow transit constipation        Dose:  1 tablet   Take 1 tablet by mouth 2 times daily as needed for constipation   Quantity:  100 tablet   Refills:  0         STOP taking     ARIPiprazole 2 MG  tablet   Commonly known as:  ABILIFY           benztropine 0.5 MG tablet   Commonly known as:  COGENTIN           risperiDONE 2 MG ODT tab   Commonly known as:  risperDAL M-TABS           risperiDONE 3 MG Tbdp ODT tab           risperiDONE microspheres 50 MG injection   Commonly known as:  risperDAL CONSTA           sucralfate 1 GM/10ML suspension   Commonly known as:  CARAFATE           triamcinolone 0.5 % Oint ointment   Commonly known as:  KENALOG                Where to get your medicines      These medications were sent to [facility] [place], [place] - [address]  [address] [facility], [facility] [address]     Phone:  [phone]     folic acid 1 MG tablet    hydrOXYzine 25 MG tablet    lithium 300 MG CR tablet    multivitamin, therapeutic with minerals Tabs tablet    OLANZapine 10 MG tablet    OLANZapine 15 MG tablet    OLANZapine 5 MG tablet    pantoprazole 40 MG EC tablet    senna-docusate 8.6-50 MG per tablet                Protect others around you: Learn how to safely use, store and throw away your medicines at www.disposemymeds.org.             Medication List: This is a list of all your medications and when to take them. Check marks below indicate your daily home schedule. Keep this list as a reference.      Medications           Morning Afternoon Evening Bedtime As Needed    folic acid 1 MG tablet   Commonly known as:  FOLVITE   Take 1 tablet (1 mg) by mouth daily   Last time this was given:  1 mg on 5/15/2017  8:01 AM                                hydrOXYzine 25 MG tablet   Commonly known as:  ATARAX   Take 1-2 tablets (25-50 mg) by mouth 3 times daily as needed for anxiety                                lithium 300 MG CR tablet   Commonly known as:  ESKALITH/LITHOBID   Take 2 tablets (600 mg) by mouth 2 times daily   Last time this was given:  600 mg on 5/15/2017  8:00 AM                                multivitamin, therapeutic with minerals Tabs tablet   Take 1 tablet by  mouth daily   Last time this was given:  1 tablet on 5/15/2017  8:01 AM                                * OLANZapine 10 MG tablet   Commonly known as:  zyPREXA   Take 1 tablet (10 mg) by mouth every 2 hours as needed (agitation associated with psychosis or julio.)   Last time this was given:  5 mg on 5/15/2017  8:01 AM                                * OLANZapine 15 MG tablet   Commonly known as:  zyPREXA   Take 1 tablet (15 mg) by mouth At Bedtime   Last time this was given:  5 mg on 5/15/2017  8:01 AM                                * OLANZapine 5 MG tablet   Commonly known as:  zyPREXA   Take 1 tablet (5 mg) by mouth daily   Last time this was given:  5 mg on 5/15/2017  8:01 AM                                pantoprazole 40 MG EC tablet   Commonly known as:  PROTONIX   Take 1 tablet (40 mg) by mouth every morning   Last time this was given:  40 mg on 5/15/2017  8:01 AM                                senna-docusate 8.6-50 MG per tablet   Commonly known as:  SENOKOT-S;PERICOLACE   Take 1 tablet by mouth 2 times daily as needed for constipation                                * Notice:  This list has 3 medication(s) that are the same as other medications prescribed for you. Read the directions carefully, and ask your doctor or other care provider to review them with you.

## 2017-04-12 NOTE — PROGRESS NOTES
04/12/17 1800   Patient Belongings   Did you bring any home meds/supplements to the hospital?  No   Patient Belongings clothing   Disposition of Belongings Pt locker   Belongings Search Yes   Clothing Search Yes   Second Staff Jorge BELLE        In locker: Juan, tank top, [place] state ID     Pt transferred to station 12 on 5/4/17   In locker: 1 [place] I.D, 1 beaded blue necklace, 1 orange bracelet., partial teeth insert in orange conatainer, 1 pair of blue jeans, 1 pair of overalls, 1 pair of black pants, 1 pair of black leggings, 4 shirts, 2 dressed, 1 romper, 4 bras, 1 pair of underwear, 1 pair of stockings, 1 robe, 1 brown jacket, 2 pair of shoes.   Added to locker 5/7/2017: 2 pairs of shoes, 6 bras, 9 pairs of underwear, 1 sweater, 2 tank tops, 4 shirts, 1 pair of jeans, 1 pair of earrings, lotion, bars of soap, 4 little lawrence snack foods.     ADMISSION:  I am responsible for any personal items that are not sent to the safe or pharmacy. [facility] is not responsible for loss, theft or damage of any property in my possession.    Patient Signature _____________________ Date/Time _____________________    Staff Signature _______________________ Date/Time _____________________    2nd Staff person, if patient is unable/unwilling to sign  ___________________________________ Date/Time _____________________    DISCHARGE:  My personal items have been returned to me.   Patient Signature _____________________ Date/Time _____________________

## 2017-04-12 NOTE — IP AVS SNAPSHOT
[address] [address]    Phone:  [phone]                                       After Visit Summary   4/12/2017    Daisy Cutris    MRN: [id number]           After Visit Summary Signature Page     I have received my discharge instructions, and my questions have been answered. I have discussed any challenges I see with this plan with the nurse or doctor.    ..........................................................................................................................................  Patient/Patient Representative Signature      ..........................................................................................................................................  Patient Representative Print Name and Relationship to Patient    ..................................................               ................................................  Date                                            Time    ..........................................................................................................................................  Reviewed by Signature/Title    ...................................................              ..............................................  Date                                                            Time

## 2017-04-12 NOTE — IP AVS SNAPSHOT
"` `     [facility]: [phone]                 INTERAGENCY TRANSFER FORM - NOTES (H&P, Discharge Summary, Consults, Procedures, Therapies)   2017                    Hospital Admission Date: 2017  DAISY UNDERWOOD   : 1985  Sex: Female        Patient PCP Information     Provider PCP Type    MD General Gisela Machado Mental Health/Behavioral Medicine         History & Physicals      H&P by Sabrina Khan MD at 2017  6:30 PM     Author:  Sabrina Khan MD Service:  Psychiatry Author Type:  Physician    Filed:  2017  2:15 PM Date of Service:  2017  6:30 PM Note Created:  2017  7:24 PM    Status:  Signed :  Sabrina Khan MD (Physician)             -----------------------------------------------------------------------------------------------------------  Psychiatry History & Physical      Daisy Underwood MRN# [id number]   Age: 31 year old YOB: 1985     Date of Admission: 2017     Interviewed at 1830 at [facility]          Contacts:     Patient has an ACT team.   Primary Outpatient Psychiatrist: Dr.Barbara Day  Nurse: Manpreet Sheehan: [phone]  [facility] CM: Jyotsna Lee [phone]  Family: Mother : Charu Underwood: [phone],  [phone]         Chief Concern:   \" EMS told me [facility] is better than [facility] for me\".    History is obtained from the patient and EMR /chart review, considering her current mental status.          History of Present Illness:   Daisy Underwood is a 31 year old female with a significant past psychiatric history of  schizoaffective disorder, polysubstance use disorder (Stimulant, Benzo, Tobacco), bipolar disorder, antisocial personality disorder and PTSD, who was transferred from [facility] as a direct admit to [facility] due to disorganized behavior in the context of non compliance to medication. Patient had called to 911 and was found in her bathrobe at " "the parking of her apartment building by EMS and was taken to [facility] ED.     Patient reports that she felt \"super anxious\" at home and when left the apartment at about midnight, she asked her neighbor to call EMS. She is not able to elaborate on the reason for her anxiety, however she kept talking about her two children. She reported that she does not have their custody and this makes her nervous about their wellbeing. Per notes, she was discharged from [facility] on  4/4/17 on prescription for risperidone, lithium, benztropine and trazodone. She reports she has been taking the medications as prescribed. Reportedly, after discharge, she had become progressively paranoid, anxious, pacing, and not feeling herself. She reported that her mother might have been poisoning her and she cannot eat anything because her mother touched it. That's why she left the house and asked for help. Per mother's report, she has only been taking lithium since being discharged. Mother had noticed the change in her behavior for the past few days. Patient was seen by her CM who had reported that she did not seem to be oriented and she had noticed abdominal distension.     At [facility], she was found to be unoriented, very paranoid, refusing food and medication, pacing/anxious. She was noted to have pressured speech, responding to internal stimuli with delayed thought processes and illogical responses. Utox was positive for barbiturate and small amount of Etoh. She was given olanzapine for symptomatic management and was placed on 72 hrs hold before transferring to our ED.     Patient is pleasant, guarded at times and redirectable. She reports feeling \"good and safe\" and denies any mood elation, hallucination or substance use. Upon entering the unit, she had a verbal altercation with another patient. Upon questioning, she reports she felt the other patient was \"guarding\" towards her and she felt unsafe. Also she is not aware why she was " "incontinent earlier in the unit, when she voided at hallway. She reports that it was not intentional, and she doesn't remember to be incontinent but she is not sure. She remembers being hospitalized in this unit and feels safe now.     Daisy Curtis's goals of this hospitalization is for her care team to \"be there for me when I need you!\". The interview was discontinued due to her mental status, level of disorganization and her continuous pacing in the interview room.     Of note, patient is dually committed to [facility] and to the commissioner for the period of 3/28/17- 4/7/2018.          Psychiatric History:   Past Diagnoses:  schizoaffective disorder, polysubstance use disorder (Stimulant, Benzo, Tobacco), bipolar disorder, antisocial personality disorder and PTSD  Past Hospitalizations: Numerous times every year. Last hospitalization was on 3/20/17 under 's care.          Substance Use History:   Primary Drug: cocaine  - Has hx of Benzo, cannabinoids, and Etoh use.  - Is a tobacco smoker  -currently denies any use. Utox was postive for barbiturates and Etoh.          Psychiatric Review of Systems:   Deferred due to patient's physical agitation and disorganized thoughts.        Medical Review of Systems:   Deferred due to patient's physical agitation and disorganized thoughts.  The current method of family planning was not evaluated due to patient's mental status.           Past Medical History     I have reviewed this patient's past medical history[OE1.1]  Past Medical History:   Diagnosis Date     Anxiety      Substance abuse      Uncomplicated asthma      Ventral hernia[OE1.2]             Medications:[OE1.1]     Prescriptions Prior to Admission   Medication Sig Dispense Refill Last Dose     benztropine (COGENTIN) 0.5 MG tablet Take 1 tablet (0.5 mg) by mouth 2 times daily 60 tablet 0 Unknown at Unknown time     lithium (ESKALITH/LITHOBID) 300 MG CR tablet Take 2 tablets (600 mg) by mouth 2 " "times daily 120 tablet 0 Unknown at Unknown time     risperiDONE (RISPERDAL M-TABS) 2 MG ODT tab Place 1 tablet (2 mg) under the tongue every morning 30 tablet 0 Unknown at Unknown time     risperiDONE 3 MG TBDP ODT tab Place 2 tablets (6 mg) under the tongue At Bedtime 60 tablet 0 Unknown at Unknown time     triamcinolone (KENALOG) 0.5 % OINT ointment Apply 1 g topically 2 times daily 60 g 0 Unknown at Unknown time     senna-docusate (SENOKOT-S;PERICOLACE) 8.6-50 MG per tablet Take 1 tablet by mouth 2 times daily as needed for constipation 100 tablet 0 Unknown at Unknown time     pantoprazole (PROTONIX) 40 MG EC tablet Take 1 tablet (40 mg) by mouth every morning 30 tablet 0 Unknown at Unknown time     sucralfate (CARAFATE) 1 GM/10ML suspension Take 10 mLs (1 g) by mouth 4 times daily (before meals and nightly) 1200 mL 0 Unknown at Unknown time     [START ON 4/18/2017] risperiDONE microspheres (RISPERDAL CONSTA) 50 MG injection Inject 2 mLs (50 mg) into the muscle every 14 days 4 mL 0 Unknown at Unknown time     ARIPiprazole (ABILIFY) 2 MG tablet Take 1 tablet (2 mg) by mouth daily 30 tablet 0 Unknown at Unknown time[OE1.2]            Allergies:[OE1.1]     Allergies   Allergen Reactions     Codeine Sulfate[OE1.3]            Family History:    Per 's note,\" Aunt has some sort of mental health diagnosis. Her brothers and uncle both struggled with chemical dependency.\"        Social History   Per 's note,\" Pt grew up in [place]. She was an only child. Her father has five other children in [place]. He apparently murdered a . She grew up with her mother. Pt completed a little bit of college after getting her GED. She has worked in fast food and retail primarily. She has had two children and one of her daughters currently lives with her.\"         Labs:   For lab result please refer to the [facility] labs. Currently mostly pending.          Psychiatric " "Examination:[OE1.1]   BP (!) 135/100  Pulse 113  Temp 96  F (35.6  C) (Oral)  Resp 18  Wt 75.1 kg (165 lb 8 oz)  BMI 30.27 kg/m2[OE1.3]    Appearance:  poorly groomed  female, dressed in hospital scrub  Attitude:  guarded and somewhat cooperative  Eye Contact:  intense  Mood:  \"good\"  Affect:  mood incongruent, intensity is heightened and labile  Speech:  clear, coherent  Psychomotor Behavior:  no evidence of tardive dyskinesia, dystonia, or tics and physical agitation  Thought Process:  linear, disorganized and circumstantial  Associations:  no loose associations  Thought Content:  no evidence of suicidal ideation or homicidal ideation, no auditory hallucinations present and no visual hallucinations present, patient does not seem to respond to the internal stimuli  Insight:  limited  Judgment:  limited  Oriented to:  oriented to place only  Attention Span and Concentration:  limited  Recent and Remote Memory:  limited  Language: communicates coherently in conversational context  Fund of Knowledge: appropriate  Muscle Strength and Tone: normal  Gait and Station: Normal         Physical Examination:   Please refer to [facility]Robert at [facility], dated 4/12/17 for details of physical exam.         Assessment:   Daisy Curtis is 32 yo female with a significant past psychiatric history of  schizoaffective disorder, polysubstance use disorder (Stimulant, Benzo, Tobacco), bipolar disorder, antisocial personality disorder and PTSD, who was transferred from [facility] ED as a direct admit to [facility] due to disorganized behavior in the context of non compliance to medication[OE1.1] and cannabis use[OE1.4]. The patient's last psychiatric hospitalization was in 3/20-4/4 for similar symptoms.  The patient is currently followed by Dr. Gisela Day.  The patient denies any drug abuse however her utox was positive. The MSE is notable for[OE1.1] disorganized thought and " behavior as well as psychomotor agitation[OE1.5].[OE1.1] She denies any hallucination but at ED she was found to response to internal stimuli. She was also significantly paranoid lately.[OE1.5]The patient symptoms are consistent with historic diagnosis of[OE1.1] Schizoaffective bipolar type. This hospitalization is the outcome of non adherence to treatment plan as well as using substances.[OE1.5]  Additionally, the patient has traits of[OE1.1] Antisocial PD[OE1.5] which[OE1.1] possibly[OE1.5] interfere[OE1.1]s[OE1.5] with her ability to adhere with the treatment plan.  PTA medications were continued at time of admission[OE1.1]. Dr. Schultz's note, point to increased prolactin level (188) at the day of discharge and galactorrhea.[OE1.5] Prolactin level ordered.[OE1.4] Primary team might need to investigate this and[OE1.5] complete the cross taper to Abilify that  had started on 4/4.[OE1.4]  Injectable[OE1.5] (invega? )[OE1.4]might also be a next step to consider[OE1.5] as patient doesn't seem to be compliant to med and has been hospitalized several times in a few months[OE1.4].[OE1.5] Given that she is[OE1.1] so disorganized, paranoid and not oriented[OE1.5], patient warrants inpatient psychiatric hospitalization to maintain her safety. Disposition pending clinical stabilization, medication optimization and development of an appropriate discharge plan.[OE1.1] Assault, withdrawal precautions, self injury precaution and elopement precautions started. She is too disorganized, pacing in the rosales, and just got in to a verbal altercation with another patient. She also had a bout of incontinence. She was placed on SIO for 12 hours to assure her safety at unit.[OE1.5]        Plan   Admit to station[OE1.1] 22[OE1.5] under the care of [OE1.1] Bill[OE1.6]. To be staffed by [OE1.1] Pavey[OE1.5] in the AM.    Principal Diagnosis:[OE1.1]  #[OE1.5] schizoaffective disorder,[OE1.1] bipolar type   #[OE1.5]  polysubstance use disorder     Medications:   New:[OE1.1] Psych emergency medication  - Hydroxyzine  - Olanzapine[OE1.5]     Continue:[OE1.1]  - Risperidone  - Lithium  - Benztropine  - Folic acid, multivitamin, thiamine  - MSSA started with Valium on board[OE1.5]    Hold:[OE1.1]  None[OE1.5]    Laboratory/Imaging:[OE1.1] The labs done in [facility], are mostly pending.[OE1.4] Admission labs order. Prolactin level, lithium level and UA were also added.[OE1.5]     Consults:[OE1.1]None[OE1.5]    Patient will be treated in therapeutic milieu with appropriate individual and group therapies as described.    Secondary psychiatric diagnoses of concern this admission:[OE1.1]   #[OE1.5] antisocial personality disorder[OE1.1]   #[OE1.5] PTSD,  Plan:[OE1.1] Monitoring[OE1.5]    Medical diagnoses to be addressed this admission:[OE1.1]    # Asthma  # Cholecystectomy  # ventral abdominal hernia  #Constipation  Plan[OE1.5]:[OE1.1]   - Senno  - ECG ordered for tomorrow due to documented ECG abnormality in patient's transfer documents (pagtient is too disorganized to have ECG now)[OE1.5]    Consults:[OE1.1] Might consult medicine for abdominal distension reported by CM[OE1.5]     Relevant psychosocial stressors:[OE1.1] Recent hospitalization, being  from her children, living with her mother[OE1.5]    Legal Status: 72-h Hold signed on 4/12/17[OE1.1], 2:43 placed at [facility].   Patient is also committed to [facility].[OE1.5]     Safety Assessment:   Checks: Status 15  Precautions: Assault  Elopement  Substance Withdrawal  Pt has not required locked seclusion or restraints in the past 24 hours to maintain safety, please refer to RN documentation for further details.    The risks, benefits, alternatives and side effects have been discussed and are understood by the patient and other caregivers.     Anticipated Disposition/Discharge Date: Unknown at this time. Pending further clinical evaluation and  "stabilization.    Attestation:  Patient has been seen and evaluated by me,[OE1.1]  Deacon Garcia MD[OE1.3] Psychiatry Resident, PGY-1.[OE1.1]    For attending attestation statement, see progress note dated April 13, 2017. Sabrina Khan MD[KN1.1]             Revision History        User Key Date/Time User Provider Type Action    > KN1.1 4/18/2017  2:15 PM Sabrina Khan MD Physician Sign     OE1.6 4/17/2017  8:43 AM Deacon Mishra MD Resident Sign     [N/A] 4/13/2017  6:35 AM Deacon Mishra MD Resident Sign     OE1.4 4/13/2017  6:28 AM Deacon Mishra MD Resident Sign     OE1.5 4/12/2017 10:23 PM Deacon Mishra MD Resident Sign     OE1.2 4/12/2017  9:14 PM Deacon Mishra MD Resident Share     OE1.3 4/12/2017  7:25 PM Deacon Mishra MD Resident      OE1.1 4/12/2017  7:24 PM Deacon Mishra MD Resident                   Discharge Summaries     No notes of this type exist for this encounter.         Consult Notes      Consults by Lissett Chavez PA-C at 5/8/2017 10:15 AM     Author:  Lissett Chavez PA-C Service:  Internal Medicine Author Type:  Physician Assistant    Filed:  5/8/2017 11:07 AM Date of Service:  5/8/2017 10:15 AM Note Created:  5/8/2017 10:14 AM    Status:  Signed :  Lissett Chavez PA-C (Physician Assistant)     Consult Orders:    1. Internal Medicine Hospitalist Psych Adult IP Consult - GENERAL: Patient to be seen: Routine within 24 hrs; Call back #: [phone]; increasing abdominal hernia (per pt \"doubled in size 1-2 weeks PTA\"); Consultant may enter orders: Yes [[id number]] ordered by Lissett Chavez PA-C at 05/08/17 0856                  [facility]  Internal Medicine Consult     Daisy Lori Curtis MRN# [id number]   Age: 31 year old YOB: 1985     Date of Admission: 4/12/2017  Date of Consult:  5/8/2017    Primary Care " Provider: Kary Quintero    Requesting Service: Psychiatry  Reason for Consult: General Medical Evaluation      SUBJECTIVE   CC:   hernia   HPI:[CM1.1]   Daisy Curtis[CM1.2] is a 32yo female with a hx of anxiety, depression, substance abuse, uncomplicated asthma, and ventral hernia who was admitted to [facility]  for psych evaluation. Medicine was consulted  for worsening hernia. Patient is very tangential with thought process. She reports that she has a hx of hernia following having her gallbladder was removed. She reports that her hernia has been increasing in size and she is getting more worried about it. She denies any pain surrounding the area, no nausea, no abdominal pain[CM1.1]. She is able to push the hernia back in without any issue. She denies noticing and redness in this area. She reports she was supposed to see a surgeon to get this fixed, but this has been pushed off. Otherwise reports she feels well without chest pain, sob, dyspnea, fevers or chills.[CM1.3]      Past Medical History:[CM1.1]     Past Medical History:   Diagnosis Date     Anxiety      Substance abuse      Uncomplicated asthma      Ventral hernia[CM1.2]          Past Surgical History:[CM1.1]      Past Surgical History:   Procedure Laterality Date     CHOLECYSTECTOMY       GYN SURGERY           ORTHOPEDIC SURGERY      scoliosis repair[CM1.2]         Social History:[CM1.1]     Social History   Substance Use Topics     Smoking status: Former Smoker     Packs/day: 1.00     Smokeless tobacco: Not on file     Alcohol use No[CM1.2]         Family History:[CM1.1]   No family history on file.[CM1.2]      Allergies:[CM1.1]     Allergies   Allergen Reactions     Codeine Sulfate      Compazine Rash     Morphine Nausea and Vomiting     Prochlorperazine Rash[CM1.2]         Medications:   Reviewed. Please see MAR     Review of Systems:   10 point ROS of systems including Constitutional, Eyes, Respiratory, Cardiovascular,  Gastroenterology, Genitourinary, Integumentary, Muscularskeletal, Psychiatric were all negative except for pertinent positives noted in my HPI.      OBJECTIVE   Physical Exam:   Vitals were reviewed[CM1.1]  Blood pressure 105/70, pulse 109, temperature 98.7  F (37.1  C), temperature source Tympanic, resp. rate 16, weight 78.9 kg (174 lb), SpO2 98 %, not currently breastfeeding.[CM1.2]  General:alert, NAD,[CM1.1] very talkative, tangential thought process[CM1.3]  HEENT: NCAT, anicteric sclera, EOMI,[CM1.1] MMM[CM1.3]  Cardiovascular: RRR, S1S2  Lungs:CTAB  Abdomen: + BS, soft with[CM1.1] no[CM1.3] distention and[CM1.1] no[CM1.3] tenderness[CM1.1]. Large ventral hernia right above umbilicus, reducible, no surrounding redness/warmth/tenderness[CM1.3]  Vascular:[CM1.1] no peripheral[CM1.3] edema, distal pulses palpable  Neurologic: AAO X 3, no focal deficits  Skin: no jaundice, rashes, or lesions[CM1.1] on exposed areas of skin[CM1.3]        Data:        Lab Results   Component Value Date     04/13/2017    Lab Results   Component Value Date    CHLORIDE 107 04/13/2017    Lab Results   Component Value Date    BUN 5 04/13/2017      Lab Results   Component Value Date    POTASSIUM 3.9 04/13/2017    Lab Results   Component Value Date    CO2 27 04/13/2017    Lab Results   Component Value Date    CR 0.72 04/13/2017[CM1.1]        Lab Results   Component Value Date    WBC 7.9 04/13/2017    HGB 12.4 04/13/2017    HCT 36.7 04/13/2017    MCV 94 04/13/2017     04/13/2017     Lab Results   Component Value Date    WBC 7.9 04/13/2017[CM1.2]         Assessment and Plan/Recommendations:[CM1.1]   Daisy Curtis[CM1.2] is a 30yo female with a hx of anxiety, depression, substance abuse, uncomplicated asthma, and ventral hernia who was admitted to [facility] 4/12 for psych evaluation.[CM1.1]    Hernia. Appears to be a ventral hernia right above the umbilicus. It is quite large. It is reducible, non tender, no erythema, no  warmth. Recommend that patient be seen in surgery clinic as outpatient to consider surgical repair. Educated patient to seek emergency care if worrisome signs of redness, warmth or tenderness at the site, or inability to reduce it, occur. Patient agreeable to plan. Surgery referral placed in discharge navigator.[CM1.3]         Currently, medically stable and I will be happy to follow up and see again for any intercurrent medical issues. Thank you for the opportunity to be a part of this patient's care.      Lissett Chavez  Internal Medicine [facility] Hospitalist  [phone]  May 8, 2017[CM1.1]         Revision History        User Key Date/Time User Provider Type Action    > CM1.3 5/8/2017 11:07 AM Lissett Chavez PA-C Physician Assistant Sign     CM1.2 5/8/2017 10:15 AM Lissett Chavez PA-C Physician Assistant      CM1.1 5/8/2017 10:14 AM Lissett Chavez PA-C Physician Assistant                      Progress Notes - Physician (Notes from 05/12/17 through 05/15/17)      Progress Notes by Hannah Kaplan LMFT at 5/15/2017 10:20 AM     Author:  Hannah Kaplan LMFT Service:  (none) Author Type:      Filed:  5/15/2017 10:26 AM Date of Service:  5/15/2017 10:20 AM Note Created:  5/15/2017 10:20 AM    Status:  Addendum :  Hannah Kaplan LMFT ()         This writer faxed over Provisional discharge agreement to [facility] [address] this morning.      Pt's ACT team worker Jyotsna Lee to pick her up at 2pm to take her to  Residence.[MS1.1]  Confirmed this with Jyotsna and then also called and confirmed with [facility].[MS1.2]             Revision History        User Key Date/Time User Provider Type Action    > MS1.2 5/15/2017 10:26 AM Hannah Kaplan LMFT  Addend     MS1.1 5/15/2017 10:21 AM Hannah Kaplan LMFT  Sign            Progress Notes by Alexa Holden at 5/14/2017  3:08 PM     Author:  Alexa Holden Service:  (none)  Author Type:  Psych Associate    Filed:  5/14/2017  3:09 PM Date of Service:  5/14/2017  3:08 PM Note Created:  5/14/2017  3:08 PM    Status:  Signed :  Alexa Holden (Psych Associate)            05/14/17 1505   Significant Event   Significant Event Other (see comments)  (shift summary)   Pt had a calm shift. She was out in the milieu and pleasant and cooperative with peers and staff. In the afternoon she rested/napped for several hours in her room. No MI sx observed, of note.[AK1.1]      Revision History        User Key Date/Time User Provider Type Action    > AK1.1 5/14/2017  3:09 PM Alexa Holden Psych Associate Sign            Progress Notes by Maurice Jordan at 5/13/2017  1:37 PM     Author:  Maurice Jordan Service:  Mental Health Author Type:  Psych Associate    Filed:  5/13/2017  1:38 PM Date of Service:  5/13/2017  1:37 PM Note Created:  5/13/2017  1:37 PM    Status:  Signed :  Maurice Jordan (Psych Associate)         Patient has shown improvement, joking with peers and staff. Respectful in her requests Oriented in all spheres, less loose associations noted in her thinking. Plans to discharge on Monday.[TL1.1]     05/13/17 1330   Behavioral Health   Hallucinations appears responding;denies / not responding to hallucinations   Thinking distractable   Orientation person: oriented;place: oriented;date: oriented   Memory baseline memory   Insight other (see comment)  (Improving)   Judgement (Improving)   Eye Contact at examiner   Affect full range affect;other (see comments)   Mood mood is calm;elated   Physical Appearance/Attire neat   Hygiene other (see comment)  (Adequate)   Suicidality other (see comments)  (No SI)   Self Injury other (see comment)  (No SIB observed)   Activity restless;other (see comment)  (In milieu a fair amount)   Speech clear;coherent   Medication Sensitivity no stated side effects;no observed side effects   Psychomotor / Gait balanced;steady   Substance  Withdrawal Interventions   Social and Therapeutic Interventions (Substance Withdrawal) encourage effective boundaries with peers   Overt Aggression Scale   Verbal Aggression 0   Aggression against Property 0   Auto-Aggression 0   Physical Aggression 0   Overt Aggression Total Score 0   Sleep/Rest/Relaxation   Day/Evening Time Hours resting in bed;napping   Number of hours napping 2 hours   Number of hours resting in bed 1 hours   Coping/Psychosocial   Verbalized Emotional State acceptance;happiness   Psycho Education   Type of Intervention 1:1 intervention   Response participates with encouragement   Hours 0.5   Treatment Detail Ways to [place]  (Improved thinking)   Daily Care   Activity up ad pravin   Activities of Daily Living   Hygiene/Grooming independent   Oral Hygiene independent   Dress scrubs (behavioral health)   Laundry unable to complete   Room Organization independent   Activity   Activity Level of Assistance independent   Behavioral Health Interventions   Psychotic Symptoms decrease environmental stimulation   Social and Therapeutic Interventions (Psychotic Symptoms) encourage effective boundaries with peers[TL1.2]        Revision History        User Key Date/Time User Provider Type Action    > TL1.2 5/13/2017  1:38 PM Maurice Jordan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

## 2017-04-13 LAB
ALBUMIN SERPL-MCNC: 3.7 G/DL (ref 3.4–5)
ALP SERPL-CCNC: 76 U/L (ref 40–150)
ALT SERPL W P-5'-P-CCNC: 19 U/L (ref 0–50)
ANION GAP SERPL CALCULATED.3IONS-SCNC: 6 MMOL/L (ref 3–14)
AST SERPL W P-5'-P-CCNC: 13 U/L (ref 0–45)
BILIRUB SERPL-MCNC: 0.7 MG/DL (ref 0.2–1.3)
BUN SERPL-MCNC: 5 MG/DL (ref 7–30)
CALCIUM SERPL-MCNC: 9.8 MG/DL (ref 8.5–10.1)
CHLORIDE SERPL-SCNC: 107 MMOL/L (ref 94–109)
CHOLEST SERPL-MCNC: 193 MG/DL
CO2 SERPL-SCNC: 27 MMOL/L (ref 20–32)
CREAT SERPL-MCNC: 0.72 MG/DL (ref 0.52–1.04)
ERYTHROCYTE [DISTWIDTH] IN BLOOD BY AUTOMATED COUNT: 12.4 % (ref 10–15)
GFR SERPL CREATININE-BSD FRML MDRD: ABNORMAL ML/MIN/1.7M2
GLUCOSE SERPL-MCNC: 102 MG/DL (ref 70–99)
HCT VFR BLD AUTO: 36.7 % (ref 35–47)
HDLC SERPL-MCNC: 59 MG/DL
HGB BLD-MCNC: 12.4 G/DL (ref 11.7–15.7)
LDLC SERPL CALC-MCNC: 117 MG/DL
LITHIUM SERPL-SCNC: 1.2 MMOL/L (ref 0.6–1.2)
MCH RBC QN AUTO: 31.6 PG (ref 26.5–33)
MCHC RBC AUTO-ENTMCNC: 33.8 G/DL (ref 31.5–36.5)
MCV RBC AUTO: 94 FL (ref 78–100)
NONHDLC SERPL-MCNC: 134 MG/DL
PLATELET # BLD AUTO: 357 10E9/L (ref 150–450)
POTASSIUM SERPL-SCNC: 3.9 MMOL/L (ref 3.4–5.3)
PROT SERPL-MCNC: 7.6 G/DL (ref 6.8–8.8)
RBC # BLD AUTO: 3.92 10E12/L (ref 3.8–5.2)
SODIUM SERPL-SCNC: 140 MMOL/L (ref 133–144)
TRIGL SERPL-MCNC: 86 MG/DL
TSH SERPL DL<=0.005 MIU/L-ACNC: 2.73 MU/L (ref 0.4–4)
WBC # BLD AUTO: 7.9 10E9/L (ref 4–11)

## 2017-04-13 PROCEDURE — 85027 COMPLETE CBC AUTOMATED: CPT | Performed by: STUDENT IN AN ORGANIZED HEALTH CARE EDUCATION/TRAINING PROGRAM

## 2017-04-13 PROCEDURE — 93005 ELECTROCARDIOGRAM TRACING: CPT

## 2017-04-13 PROCEDURE — 25000132 ZZH RX MED GY IP 250 OP 250 PS 637: Performed by: PSYCHIATRY & NEUROLOGY

## 2017-04-13 PROCEDURE — 97150 GROUP THERAPEUTIC PROCEDURES: CPT | Mod: GO

## 2017-04-13 PROCEDURE — H2032 ACTIVITY THERAPY, PER 15 MIN: HCPCS

## 2017-04-13 PROCEDURE — 99223 1ST HOSP IP/OBS HIGH 75: CPT | Mod: GC | Performed by: PSYCHIATRY & NEUROLOGY

## 2017-04-13 PROCEDURE — 36415 COLL VENOUS BLD VENIPUNCTURE: CPT | Performed by: STUDENT IN AN ORGANIZED HEALTH CARE EDUCATION/TRAINING PROGRAM

## 2017-04-13 PROCEDURE — 90853 GROUP PSYCHOTHERAPY: CPT

## 2017-04-13 PROCEDURE — 84443 ASSAY THYROID STIM HORMONE: CPT | Performed by: STUDENT IN AN ORGANIZED HEALTH CARE EDUCATION/TRAINING PROGRAM

## 2017-04-13 PROCEDURE — 25000132 ZZH RX MED GY IP 250 OP 250 PS 637: Performed by: STUDENT IN AN ORGANIZED HEALTH CARE EDUCATION/TRAINING PROGRAM

## 2017-04-13 PROCEDURE — 84146 ASSAY OF PROLACTIN: CPT | Performed by: STUDENT IN AN ORGANIZED HEALTH CARE EDUCATION/TRAINING PROGRAM

## 2017-04-13 PROCEDURE — 12400001 ZZH R&B MH UMMC

## 2017-04-13 PROCEDURE — 80061 LIPID PANEL: CPT | Performed by: STUDENT IN AN ORGANIZED HEALTH CARE EDUCATION/TRAINING PROGRAM

## 2017-04-13 PROCEDURE — 80053 COMPREHEN METABOLIC PANEL: CPT | Performed by: STUDENT IN AN ORGANIZED HEALTH CARE EDUCATION/TRAINING PROGRAM

## 2017-04-13 PROCEDURE — 80178 ASSAY OF LITHIUM: CPT | Performed by: STUDENT IN AN ORGANIZED HEALTH CARE EDUCATION/TRAINING PROGRAM

## 2017-04-13 RX ORDER — DIPHENHYDRAMINE HCL 50 MG
50 CAPSULE ORAL EVERY 4 HOURS PRN
Status: DISCONTINUED | OUTPATIENT
Start: 2017-04-13 | End: 2017-05-15 | Stop reason: HOSPADM

## 2017-04-13 RX ORDER — LORAZEPAM 2 MG/1
2 TABLET ORAL EVERY 6 HOURS PRN
Status: DISCONTINUED | OUTPATIENT
Start: 2017-04-13 | End: 2017-05-04

## 2017-04-13 RX ORDER — RISPERIDONE 2 MG/1
2 TABLET, ORALLY DISINTEGRATING ORAL DAILY
Status: DISCONTINUED | OUTPATIENT
Start: 2017-04-14 | End: 2017-04-17

## 2017-04-13 RX ORDER — HALOPERIDOL 5 MG/ML
5 INJECTION INTRAMUSCULAR EVERY 6 HOURS PRN
Status: DISCONTINUED | OUTPATIENT
Start: 2017-04-13 | End: 2017-05-15 | Stop reason: HOSPADM

## 2017-04-13 RX ORDER — DIPHENHYDRAMINE HYDROCHLORIDE 50 MG/ML
50 INJECTION INTRAMUSCULAR; INTRAVENOUS EVERY 6 HOURS PRN
Status: DISCONTINUED | OUTPATIENT
Start: 2017-04-13 | End: 2017-05-15 | Stop reason: HOSPADM

## 2017-04-13 RX ORDER — LORAZEPAM 2 MG/ML
2 INJECTION INTRAMUSCULAR EVERY 4 HOURS PRN
Status: DISCONTINUED | OUTPATIENT
Start: 2017-04-13 | End: 2017-05-04

## 2017-04-13 RX ORDER — HALOPERIDOL 5 MG/1
5 TABLET ORAL EVERY 6 HOURS PRN
Status: DISCONTINUED | OUTPATIENT
Start: 2017-04-13 | End: 2017-05-15 | Stop reason: HOSPADM

## 2017-04-13 RX ADMIN — BENZTROPINE MESYLATE 0.5 MG: 0.5 TABLET ORAL at 08:02

## 2017-04-13 RX ADMIN — Medication 100 MG: at 08:03

## 2017-04-13 RX ADMIN — DIAZEPAM 10 MG: 5 TABLET ORAL at 21:34

## 2017-04-13 RX ADMIN — OLANZAPINE 10 MG: 10 TABLET, FILM COATED ORAL at 12:52

## 2017-04-13 RX ADMIN — DIPHENHYDRAMINE HYDROCHLORIDE 50 MG: 50 CAPSULE ORAL at 14:55

## 2017-04-13 RX ADMIN — DIAZEPAM 5 MG: 5 TABLET ORAL at 17:36

## 2017-04-13 RX ADMIN — HYDROXYZINE HYDROCHLORIDE 50 MG: 25 TABLET ORAL at 17:16

## 2017-04-13 RX ADMIN — RISPERIDONE 6 MG: 3 TABLET, ORALLY DISINTEGRATING ORAL at 21:10

## 2017-04-13 RX ADMIN — FOLIC ACID 1 MG: 1 TABLET ORAL at 08:03

## 2017-04-13 RX ADMIN — OLANZAPINE 10 MG: 10 TABLET, FILM COATED ORAL at 17:15

## 2017-04-13 RX ADMIN — PANTOPRAZOLE SODIUM 40 MG: 40 TABLET, DELAYED RELEASE ORAL at 08:02

## 2017-04-13 RX ADMIN — HYDROXYZINE HYDROCHLORIDE 50 MG: 25 TABLET ORAL at 21:34

## 2017-04-13 RX ADMIN — LORAZEPAM 2 MG: 2 TABLET ORAL at 14:55

## 2017-04-13 RX ADMIN — HALOPERIDOL 5 MG: 5 TABLET ORAL at 14:55

## 2017-04-13 RX ADMIN — LITHIUM CARBONATE 600 MG: 300 TABLET, EXTENDED RELEASE ORAL at 19:04

## 2017-04-13 RX ADMIN — DIAZEPAM 5 MG: 5 TABLET ORAL at 01:22

## 2017-04-13 RX ADMIN — OLANZAPINE 10 MG: 10 TABLET, FILM COATED ORAL at 21:34

## 2017-04-13 RX ADMIN — HYDROXYZINE HYDROCHLORIDE 50 MG: 25 TABLET ORAL at 01:02

## 2017-04-13 RX ADMIN — BENZTROPINE MESYLATE 0.5 MG: 0.5 TABLET ORAL at 19:04

## 2017-04-13 RX ADMIN — MULTIPLE VITAMINS W/ MINERALS TAB 1 TABLET: TAB at 08:02

## 2017-04-13 RX ADMIN — ARIPIPRAZOLE 2 MG: 2 TABLET ORAL at 08:03

## 2017-04-13 RX ADMIN — LITHIUM CARBONATE 600 MG: 300 TABLET, EXTENDED RELEASE ORAL at 08:02

## 2017-04-13 ASSESSMENT — ACTIVITIES OF DAILY LIVING (ADL)
ORAL_HYGIENE: INDEPENDENT
ORAL_HYGIENE: INDEPENDENT
DRESS: INDEPENDENT
GROOMING: INDEPENDENT
GROOMING: INDEPENDENT;PROMPTS
DRESS: INDEPENDENT

## 2017-04-13 NOTE — PROGRESS NOTES
Message left with Jyotsna Lee ([phone]) TCM with [facility] to contact [facility] to initiate discharge planning.     Update: writer received call back from Jyotsna, she will revoke PD. Reports team is wondering if patient would benefit from Invega.

## 2017-04-13 NOTE — PROGRESS NOTES
During the shift the pt was standing in the lounge area and became incontinent and urinated on the floor in front of peers. During evening shift pt repeatedly stated she did not feel safe in room as it reminded her of being in seclusion.
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
## 2017-04-13 NOTE — PROGRESS NOTES
[facility], [place]   Psychiatric Progress Note, Hospital Day #1        Interim History:     Sleep: None recorded  PRNs: Hydroxyzine (1941), Zyprexa ([id number])    Staff Notes: Patient had episode of incontinence and noted that she did not feel safe in room as it reminded her of seclusion.    The treatment team met with the patient in her room.  Patient relates starting to feel anxious and paranoid regarding previous night's events, which is why she called 911.  She says that she wasn't feeling safe in her house w/ her mom, but unable to identify specifically why that was.  Patient is tearful and calm during interview, asking a few times whether she will need to go to [place].  Team spoke with her regarding plan to coordinate with her ACT team regarding next steps in treatment plan.  Team also asked about patient's galactorrhea; she says it started about eight years ago after birth of one of her children.  She does not seem particularly bothered regarding symptoms.             Medications:       [START ON 4/14/2017] risperiDONE  2 mg Sublingual Daily     benztropine  0.5 mg Oral BID     pantoprazole  40 mg Oral QAM     ARIPiprazole  2 mg Oral Daily     risperiDONE  6 mg Sublingual At Bedtime     lithium  600 mg Oral BID     thiamine  100 mg Oral Daily     folic acid  1 mg Oral Daily     multivitamin, therapeutic with minerals  1 tablet Oral Daily          Allergies:     Allergies   Allergen Reactions     Codeine Sulfate      Compazine Rash     Morphine Nausea and Vomiting     Prochlorperazine Rash          Labs:     Recent Results (from the past 24 hour(s))   CBC with platelets    Collection Time: 04/13/17  7:50 AM   Result Value Ref Range    WBC 7.9 4.0 - 11.0 10e9/L    RBC Count 3.92 3.8 - 5.2 10e12/L    Hemoglobin 12.4 11.7 - 15.7 g/dL    Hematocrit 36.7 35.0 - 47.0 %    MCV 94 78 - 100 fl    MCH 31.6 26.5 - 33.0 pg    MCHC 33.8 31.5 - 36.5 g/dL    RDW 12.4 10.0 - 15.0 %    Platelet Count  357 150 - 450 10e9/L   Comprehensive metabolic panel    Collection Time: 04/13/17  7:50 AM   Result Value Ref Range    Sodium 140 133 - 144 mmol/L    Potassium 3.9 3.4 - 5.3 mmol/L    Chloride 107 94 - 109 mmol/L    Carbon Dioxide 27 20 - 32 mmol/L    Anion Gap 6 3 - 14 mmol/L    Glucose 102 (H) 70 - 99 mg/dL    Urea Nitrogen 5 (L) 7 - 30 mg/dL    Creatinine 0.72 0.52 - 1.04 mg/dL    GFR Estimate >90  Non  GFR Calc   >60 mL/min/1.7m2    GFR Estimate If Black >90   GFR Calc   >60 mL/min/1.7m2    Calcium 9.8 8.5 - 10.1 mg/dL    Bilirubin Total 0.7 0.2 - 1.3 mg/dL    Albumin 3.7 3.4 - 5.0 g/dL    Protein Total 7.6 6.8 - 8.8 g/dL    Alkaline Phosphatase 76 40 - 150 U/L    ALT 19 0 - 50 U/L    AST 13 0 - 45 U/L   Lipid panel    Collection Time: 04/13/17  7:50 AM   Result Value Ref Range    Cholesterol 193 <200 mg/dL    Triglycerides 86 <150 mg/dL    HDL Cholesterol 59 >49 mg/dL    LDL Cholesterol Calculated 117 (H) <100 mg/dL    Non HDL Cholesterol 134 (H) <130 mg/dL   Lithium level    Collection Time: 04/13/17  7:50 AM   Result Value Ref Range    Lithium Level 1.2 0.6 - 1.2 mmol/L   TSH with free T4 reflex    Collection Time: 04/13/17  7:50 AM   Result Value Ref Range    TSH 2.73 0.40 - 4.00 mU/L   EKG 12-lead, complete    Collection Time: 04/13/17  8:10 AM   Result Value Ref Range    Interpretation ECG Click View Image link to view waveform and result           Psychiatric Examination:   BP (!) 149/103  Pulse 118  Temp 98  F (36.7  C)  Resp 12  Wt 75.1 kg (165 lb 8 oz)  BMI 30.27 kg/m2  Weight is 165 lbs 8 oz  Body mass index is 30.27 kg/(m^2).    Appearance: awake, alert, adequately groomed and dressed in hospital scrubs  Attitude:  cooperative  Eye Contact:  fair  Mood:  anxious and depressed, tearful  Affect:  mood congruent, intensity is normal and reactive   Speech:  normal prosody, slight mumbling  Language: English language w/ appropriate syntax and vocabulary  Psychomotor  Behavior:  no evidence of tardive dyskinesia, dystonia, or tics  Gait/Station: normal gait  Thought Process:  logical, slightly disorganized  Associations:  loosening of associations present  Thought Content:  no evidence of suicidal ideation or homicidal ideation and no evidence of psychotic thought  Insight:  limited  Judgement:  limited  Oriented to:  time, person, and place  Attention Span and Concentration:  fair  Recent and Remote Memory:  fair  Fund of Knowledge: average         Precautions:     Behavioral Orders   Procedures     Assault precautions     Code 1 - Restrict to Unit     Elopement precautions     Routine Programming     As clinically indicated     Self Injury Precaution     Single Room     Status 15     Every 15 minutes.     Status Individual Observation     Order Specific Question:   AT NIGHT     Answer:   Distance and Exceptions     Order Specific Question:   Distance     Answer:   at the door of the patient's room     Order Specific Question:   Exceptions     Answer:   bathroom and shower     Withdrawal precautions          Diagnoses:     Schizoaffective Disorder, bipolar type  Cocaine Use Disorder  Benzodiazepine Use Disorder         Plan:     31 year-old female w/ past hx significant for schizoaffective disorder, polysubstance abuse (cocaine, benzodiazepines) and antisocial personality disorder admitted to station 22 for paranoia and disorganized behavior in context of suspected medication non-compliance.    Principal Psychiatric Diagnosis: Schizoaffective Disorder, bipolar type    - Continue risperidone 2 mg AM, 6 mg PM  - Continue aripiprazole 2 mg  - Continue lithium 600 mg BID  - Continue Risperdal Consta 50 mg [id number] (next dose due 4/18/17)      - Hydroxyzine 25-50 mg PO q4hrs for anxiety  - Benadryl 50 mg/Haldol 5 mg/Ativan 2 mg IM PRN available for agitation associated w/ psychosis or julio    - Will coordinate w/ patient's ACT team regarding provisional discharge.    Secondary  Psychiatric Diagnosis: Cocaine Use Disorder, Benzodiazepine Use Disorder, Tobacco Use Disorder    - MSSA (w/ valium as indicated)  - Thiamine 100 mg   - Multivitamin  - Folic acid 1 mg  - Nicotine replacement    Medical Diagnoses: Asthma, Ventral abdominal hernia, constipation    - Pantoprazole 40 mg   - Senna PRN    - No consult    Legal: 72-hour hold started 4/12 - w/ PD pending    Disposition: Pending clinical improvement, medication optimization, and determination of commitment status.    Patient seen and discussed w/ Dr. Khan, who agrees with this plan.    Zoltan Rutledge MD  PGY-1 Psychiatry Resident       Attestation:  I, Sabrina Khan, have personally performed an examination of this patient and I have reviewed the resident's documentation.  I have edited the note to reflect all relevant changes.  I have discussed this patient with the house staff on 4/13/2017.  I agree with resident findings and plan in today's note and yesterdays resident H&P.  I have reviewed all vitals and laboratory findings.  Sabrina Khan MD

## 2017-04-13 NOTE — PLAN OF CARE
Problem: General Plan of Care (Inpatient Behavioral)  Goal: Individualization/Patient Specific Goal (IP Behavioral)  The patient and/or their representative will achieve their patient-specific goals related to the plan of care.    The patient-specific goals include:   Illness Management Recovery model: Objectives     Patient will identify reason(s) for hospitalization from their perspective.  Patient will identify a minimum of three goals for discharge.  Patient will identify a minimum of three triggers that may increase their symptoms.  Patient will identify a minimum of three coping skills they can do to stay well.  Patient will identify their support system to demonstrate readiness for discharge.

## 2017-04-13 NOTE — PLAN OF CARE
Problem: General Plan of Care (Inpatient Behavioral)  Goal: Team Discussion  Team Plan:   BEHAVIORAL TEAM DISCUSSION     Continued Stay Criteria/Rationale: Disorganization  Plan: Monitor, assess and stabilize  Participants: Dr. Khan, Deacon Garcia MD Psychiatry Resident, JANELLE Kwon, Fernanda Vital RN  Summary/Recommendation: The plan is to assess and patient for mental health and medication needs.  The patient will be prescribed medications to treat the identified symptoms.  Upon discharge the patient will be referred to services as appropriate based on the assessment.  Medical/Physical: Deferred to medicine  Progress: Initial     Illness Management Recovery model: Personal Plan of Care     Patient did not complet Personal Plan of Care, identifying reasons for hospitalization and goals for discharge due to active psychosis. When stable, form to be reviewed in team meeting and signed by patient, physician, writer/CTC and, RN. Form will be scanned into EPIC.

## 2017-04-13 NOTE — H&P
"    -----------------------------------------------------------------------------------------------------------  Psychiatry History & Physical      Daisy Curtis MRN# [id number]   Age: 31 year old YOB: 1985     Date of Admission: 4/12/2017     Interviewed at 1830 at [facility]         Contacts:     Patient has an ACT team.   Primary Outpatient Psychiatrist: Dr.Barbara Day  Nurse: Manpreet Sheehan: [phone]  [facility] CM: Jyotsna Lee [phone]  Family: Mother : Charu Curtis: [phone],  [phone]         Chief Concern:   \" EMS told me [facility] is better than [facility] for me\".    History is obtained from the patient and EMR /chart review, considering her current mental status.          History of Present Illness:   Daisy Curtis is a 31 year old female with a significant past psychiatric history of  schizoaffective disorder, polysubstance use disorder (Stimulant, Benzo, Tobacco), bipolar disorder, antisocial personality disorder and PTSD, who was transferred from [facility] ED as a direct admit to [facility] due to disorganized behavior in the context of non compliance to medication. Patient had called to 911 and was found in her bathrobe at the parking of her apartment building by EMS and was taken to [facility] ED.     Patient reports that she felt \"super anxious\" at home and when left the apartment at about midnight, she asked her neighbor to call EMS. She is not able to elaborate on the reason for her anxiety, however she kept talking about her two children. She reported that she does not have their custody and this makes her nervous about their wellbeing. Per notes, she was discharged from [facility] on  4/4/17 on prescription for risperidone, lithium, benztropine and trazodone. She reports she has been taking the medications as prescribed. Reportedly, after discharge, she had become progressively paranoid, anxious, pacing, and not feeling herself. She reported that " "her mother might have been poisoning her and she cannot eat anything because her mother touched it. That's why she left the house and asked for help. Per mother's report, she has only been taking lithium since being discharged. Mother had noticed the change in her behavior for the past few days. Patient was seen by her CM who had reported that she did not seem to be oriented and she had noticed abdominal distension.     At [facility], she was found to be unoriented, very paranoid, refusing food and medication, pacing/anxious. She was noted to have pressured speech, responding to internal stimuli with delayed thought processes and illogical responses. Utox was positive for barbiturate and small amount of Etoh. She was given olanzapine for symptomatic management and was placed on 72 hrs hold before transferring to our ED.     Patient is pleasant, guarded at times and redirectable. She reports feeling \"good and safe\" and denies any mood elation, hallucination or substance use. Upon entering the unit, she had a verbal altercation with another patient. Upon questioning, she reports she felt the other patient was \"guarding\" towards her and she felt unsafe. Also she is not aware why she was incontinent earlier in the unit, when she voided at hallway. She reports that it was not intentional, and she doesn't remember to be incontinent but she is not sure. She remembers being hospitalized in this unit and feels safe now.     Daisy Curtis's goals of this hospitalization is for her care team to \"be there for me when I need you!\". The interview was discontinued due to her mental status, level of disorganization and her continuous pacing in the interview room.     Of note, patient is dually committed to [facility] and to the commissioner for the period of 3/28/17- 4/7/2018.          Psychiatric History:   Past Diagnoses:  schizoaffective disorder, polysubstance use disorder (Stimulant, Benzo, Tobacco), bipolar " disorder, antisocial personality disorder and PTSD  Past Hospitalizations: Numerous times every year. Last hospitalization was on 3/20/17 under 's care.          Substance Use History:   Primary Drug: cocaine  - Has hx of Benzo, cannabinoids, and Etoh use.  - Is a tobacco smoker  -currently denies any use. Utox was postive for barbiturates and Etoh.          Psychiatric Review of Systems:   Deferred due to patient's physical agitation and disorganized thoughts.        Medical Review of Systems:   Deferred due to patient's physical agitation and disorganized thoughts.  The current method of family planning was not evaluated due to patient's mental status.           Past Medical History     I have reviewed this patient's past medical history  Past Medical History:   Diagnosis Date     Anxiety      Substance abuse      Uncomplicated asthma      Ventral hernia             Medications:     Prescriptions Prior to Admission   Medication Sig Dispense Refill Last Dose     benztropine (COGENTIN) 0.5 MG tablet Take 1 tablet (0.5 mg) by mouth 2 times daily 60 tablet 0 Unknown at Unknown time     lithium (ESKALITH/LITHOBID) 300 MG CR tablet Take 2 tablets (600 mg) by mouth 2 times daily 120 tablet 0 Unknown at Unknown time     risperiDONE (RISPERDAL M-TABS) 2 MG ODT tab Place 1 tablet (2 mg) under the tongue every morning 30 tablet 0 Unknown at Unknown time     risperiDONE 3 MG TBDP ODT tab Place 2 tablets (6 mg) under the tongue At Bedtime 60 tablet 0 Unknown at Unknown time     triamcinolone (KENALOG) 0.5 % OINT ointment Apply 1 g topically 2 times daily 60 g 0 Unknown at Unknown time     senna-docusate (SENOKOT-S;PERICOLACE) 8.6-50 MG per tablet Take 1 tablet by mouth 2 times daily as needed for constipation 100 tablet 0 Unknown at Unknown time     pantoprazole (PROTONIX) 40 MG EC tablet Take 1 tablet (40 mg) by mouth every morning 30 tablet 0 Unknown at Unknown time     sucralfate (CARAFATE) 1 GM/10ML suspension  "Take 10 mLs (1 g) by mouth 4 times daily (before meals and nightly) 1200 mL 0 Unknown at Unknown time     [START ON 4/18/2017] risperiDONE microspheres (RISPERDAL CONSTA) 50 MG injection Inject 2 mLs (50 mg) into the muscle every 14 days 4 mL 0 Unknown at Unknown time     ARIPiprazole (ABILIFY) 2 MG tablet Take 1 tablet (2 mg) by mouth daily 30 tablet 0 Unknown at Unknown time            Allergies:     Allergies   Allergen Reactions     Codeine Sulfate            Family History:    Per 's note,\" Aunt has some sort of mental health diagnosis. Her brothers and uncle both struggled with chemical dependency.\"        Social History   Per 's note,\" Pt grew up in [place] and [place]. She was an only child. Her father has five other children in [place]. He apparently murdered a . She grew up with her mother. Pt completed a little bit of college after getting her GED. She has worked in fast food and retail primarily. She has had two children and one of her daughters currently lives with her.\"         Labs:   For lab result please refer to the [facility] labs. Currently mostly pending.          Psychiatric Examination:   BP (!) 135/100  Pulse 113  Temp 96  F (35.6  C) (Oral)  Resp 18  Wt 75.1 kg (165 lb 8 oz)  BMI 30.27 kg/m2    Appearance:  poorly groomed  female, dressed in hospital scrub  Attitude:  guarded and somewhat cooperative  Eye Contact:  intense  Mood:  \"good\"  Affect:  mood incongruent, intensity is heightened and labile  Speech:  clear, coherent  Psychomotor Behavior:  no evidence of tardive dyskinesia, dystonia, or tics and physical agitation  Thought Process:  linear, disorganized and circumstantial  Associations:  no loose associations  Thought Content:  no evidence of suicidal ideation or homicidal ideation, no auditory hallucinations present and no visual hallucinations present, patient does not seem to respond to the internal " stimuli  Insight:  limited  Judgment:  limited  Oriented to:  oriented to place only  Attention Span and Concentration:  limited  Recent and Remote Memory:  limited  Language: communicates coherently in conversational context  Fund of Knowledge: appropriate  Muscle Strength and Tone: normal  Gait and Station: Normal         Physical Examination:   Please refer to [facility] byRobert at [facility], dated 4/12/17 for details of physical exam.         Assessment:   Daisy Curtis is 30 yo female with a significant past psychiatric history of  schizoaffective disorder, polysubstance use disorder (Stimulant, Benzo, Tobacco), bipolar disorder, antisocial personality disorder and PTSD, who was transferred from [facility] ED as a direct admit to [facility] due to disorganized behavior in the context of non compliance to medication and cannabis use. The patient's last psychiatric hospitalization was in 3/20-4/4 for similar symptoms.  The patient is currently followed by Dr. Gisela Day.  The patient denies any drug abuse however her utox was positive. The MSE is notable for disorganized thought and behavior as well as psychomotor agitation. She denies any hallucination but at ED she was found to response to internal stimuli. She was also significantly paranoid lately.The patient symptoms are consistent with historic diagnosis of Schizoaffective bipolar type. This hospitalization is the outcome of non adherence to treatment plan as well as using substances.  Additionally, the patient has traits of Antisocial PD which possibly interferes with her ability to adhere with the treatment plan.  PTA medications were continued at time of admission. Dr. Schultz's note, point to increased prolactin level (188) at the day of discharge and galactorrhea. Prolactin level ordered. Primary team might need to investigate this and complete the cross taper to Abilify that  had started on 4/4.  Injectable  (invega? )might also be a next step to consider as patient doesn't seem to be compliant to med and has been hospitalized several times in a few months. Given that she is so disorganized, paranoid and not oriented, patient warrants inpatient psychiatric hospitalization to maintain her safety. Disposition pending clinical stabilization, medication optimization and development of an appropriate discharge plan. Assault, withdrawal precautions, self injury precaution and elopement precautions started. She is too disorganized, pacing in the rosales, and just got in to a verbal altercation with another patient. She also had a bout of incontinence. She was placed on SIO for 12 hours to assure her safety at unit.        Plan   Admit to station 22 under the care of Dr. Khan. To be staffed by Dr. Sandhu in the AM.    Principal Diagnosis:  # schizoaffective disorder, bipolar type   # polysubstance use disorder     Medications:   New: Psych emergency medication  - Hydroxyzine  - Olanzapine     Continue:  - Risperidone  - Lithium  - Benztropine  - Folic acid, multivitamin, thiamine  - MSSA started with Valium on board    Hold:  None    Laboratory/Imaging: The labs done in [facility], are mostly pending. Admission labs order. Prolactin level, lithium level and UA were also added.     Consults:None    Patient will be treated in therapeutic milieu with appropriate individual and group therapies as described.    Secondary psychiatric diagnoses of concern this admission:   # antisocial personality disorder   # PTSD,  Plan: Monitoring    Medical diagnoses to be addressed this admission:    # Asthma  # Cholecystectomy  # ventral abdominal hernia  #Constipation  Plan:   - Senno  - ECG ordered for tomorrow due to documented ECG abnormality in patient's transfer documents (pagtient is too disorganized to have ECG now)    Consults: Might consult medicine for abdominal distension reported by CM     Relevant psychosocial stressors: Recent  hospitalization, being  from her children, living with her mother    Legal Status: 72-h Hold signed on 4/12/17, 2:43 placed at [facility].   Patient is also committed to [facility].     Safety Assessment:   Checks: Status 15  Precautions: Assault  Elopement  Substance Withdrawal  Pt has not required locked seclusion or restraints in the past 24 hours to maintain safety, please refer to RN documentation for further details.    The risks, benefits, alternatives and side effects have been discussed and are understood by the patient and other caregivers.     Anticipated Disposition/Discharge Date: Unknown at this time. Pending further clinical evaluation and stabilization.    Attestation:  Patient has been seen and evaluated by me,  Deacon Garcia MD Psychiatry Resident, PGY-1.    For attending attestation statement, see progress note dated April 13, 2017. Sabrina Khan MD

## 2017-04-13 NOTE — PROGRESS NOTES
"   04/13/17 1400   Behavioral Health   Hallucinations appears responding   Thinking confused;paranoid;delusional   Orientation person: oriented   Insight poor   Judgement impaired   Eye Contact out of corner of eyes   Affect tense;irritable   Mood irritable   Physical Appearance/Attire untidy   Hygiene (incontinent, showered and changed afterward)   Suicidality (none reported)   Self Injury (none reported)   Activity hyperactive (agitated, impulsive)   Speech pressured   Medication Sensitivity no stated side effects   Psychomotor / Gait balanced   Psycho Education   Type of Intervention 1:1 intervention   Response unavailable   Hours 0.5   Treatment Detail check in   Activities of Daily Living   Hygiene/Grooming independent   Oral Hygiene independent   Dress independent   Room Organization independent   Activity   Activity Level of Assistance independent   Pt was visible in  Milieu and attended groups during the shift.  Pt reports no SI/SIB, no medication side effects.  Pt was intrusive towards others during the shift and stated \"what you looking at\",  \"Quit walking next to me\" and swore at a patient when they were using the sink next to her.  Had to be redirected by staff multiple times to leave other patients alone or respectfully ask to get more space.  Pt was incontinent in lounge, showered immediately after and changed clothes.    "

## 2017-04-13 NOTE — PLAN OF CARE
Problem: Psychotic Symptoms  Goal: Psychotic Symptoms  Signs and symptoms of listed problems will be absent or manageable.   31 year old woman received per transport from [facility] er ,pt well known to [facility] staff,pt on 72 hr hold,she presents as verbally aggressive toward peer,she states that sheknows she needs to be here and has taken her meds willingly,she has been too volatile and disorganized to cooperate with admission assessment this pm,please refer to resident assessment for further detail,sib,assault,and elopement precautions begun,defer to team

## 2017-04-13 NOTE — PROGRESS NOTES
"Blue team resident Teresa VASQUES, is to place prn order for meds for pt's continued agitation. She had a verbal altercation with another female pt, who did not provoke her. She had agreed to take Zyprexa earlier this afternoon, with coaxing.  Pt is denying s/sx etoh w/d. She declined vistaril for anxiety. She is very guarded, and appears tense in lounge. She did say \"I really needed that,\" when given the Zyprexa. Will offer meds when they become available; staff are monitoring pt closely.  "

## 2017-04-13 NOTE — PROGRESS NOTES
"Daisy attended 2 of 2 OT groups today. This a.m. participated in OT clinic and was able to initiate task, follow through with plan and ask for help as needed. Focused on task. During interactions with peers and this writer she was irritable and harsh: \"Shut up!\" she muttered at several points. This afternoon she participated in a structured group task which focused on problem solving, memory and initiating interaction with peers. Required repeated reminders of the parameters of the game. Delayed responses, however she expressed impatience and irritation when any other player responded slowly.          "

## 2017-04-13 NOTE — PROGRESS NOTES
Pt paced for a few hours at the beginning of the night. Pt could not verbalize thoughts or needs and had to be prompted to use the bathroom. Pt had high blood pressure and could not remain still.  After medication Pt slept the remainder of the night.

## 2017-04-14 LAB
INTERPRETATION ECG - MUSE: NORMAL
PROLACTIN SERPL-MCNC: 295 UG/L (ref 3–27)

## 2017-04-14 PROCEDURE — 25000132 ZZH RX MED GY IP 250 OP 250 PS 637: Performed by: PSYCHIATRY & NEUROLOGY

## 2017-04-14 PROCEDURE — 97150 GROUP THERAPEUTIC PROCEDURES: CPT | Mod: GO

## 2017-04-14 PROCEDURE — 25000132 ZZH RX MED GY IP 250 OP 250 PS 637: Performed by: STUDENT IN AN ORGANIZED HEALTH CARE EDUCATION/TRAINING PROGRAM

## 2017-04-14 PROCEDURE — 90853 GROUP PSYCHOTHERAPY: CPT

## 2017-04-14 PROCEDURE — 99207 ZZC CDG-MDM COMPONENT: MEETS MODERATE - UP CODED: CPT | Performed by: PSYCHIATRY & NEUROLOGY

## 2017-04-14 PROCEDURE — 99232 SBSQ HOSP IP/OBS MODERATE 35: CPT | Mod: GC | Performed by: PSYCHIATRY & NEUROLOGY

## 2017-04-14 PROCEDURE — 12400001 ZZH R&B MH UMMC

## 2017-04-14 RX ADMIN — NICOTINE POLACRILEX 4 MG: 2 GUM, CHEWING ORAL at 18:53

## 2017-04-14 RX ADMIN — LITHIUM CARBONATE 600 MG: 300 TABLET, EXTENDED RELEASE ORAL at 22:26

## 2017-04-14 RX ADMIN — DIPHENHYDRAMINE HYDROCHLORIDE 50 MG: 50 CAPSULE ORAL at 22:28

## 2017-04-14 RX ADMIN — PANTOPRAZOLE SODIUM 40 MG: 40 TABLET, DELAYED RELEASE ORAL at 08:52

## 2017-04-14 RX ADMIN — OLANZAPINE 10 MG: 10 TABLET, FILM COATED ORAL at 08:55

## 2017-04-14 RX ADMIN — DIPHENHYDRAMINE HYDROCHLORIDE 50 MG: 50 CAPSULE ORAL at 10:50

## 2017-04-14 RX ADMIN — DIAZEPAM 5 MG: 5 TABLET ORAL at 03:02

## 2017-04-14 RX ADMIN — BENZTROPINE MESYLATE 0.5 MG: 0.5 TABLET ORAL at 22:27

## 2017-04-14 RX ADMIN — HALOPERIDOL 5 MG: 5 TABLET ORAL at 10:50

## 2017-04-14 RX ADMIN — LORAZEPAM 2 MG: 2 TABLET ORAL at 22:26

## 2017-04-14 RX ADMIN — Medication 100 MG: at 08:52

## 2017-04-14 RX ADMIN — LITHIUM CARBONATE 600 MG: 300 TABLET, EXTENDED RELEASE ORAL at 08:52

## 2017-04-14 RX ADMIN — LORAZEPAM 2 MG: 2 TABLET ORAL at 10:50

## 2017-04-14 RX ADMIN — BENZTROPINE MESYLATE 0.5 MG: 0.5 TABLET ORAL at 08:52

## 2017-04-14 RX ADMIN — HALOPERIDOL 5 MG: 5 TABLET ORAL at 22:27

## 2017-04-14 RX ADMIN — NICOTINE POLACRILEX 4 MG: 2 GUM, CHEWING ORAL at 14:11

## 2017-04-14 RX ADMIN — HYDROXYZINE HYDROCHLORIDE 50 MG: 25 TABLET ORAL at 03:02

## 2017-04-14 ASSESSMENT — ACTIVITIES OF DAILY LIVING (ADL)
LAUNDRY: UNABLE TO COMPLETE
DRESS: PROMPTS
ORAL_HYGIENE: PROMPTS
GROOMING: INDEPENDENT;PROMPTS
GROOMING: PROMPTS
ORAL_HYGIENE: INDEPENDENT;PROMPTS
DRESS: INDEPENDENT

## 2017-04-14 NOTE — PROGRESS NOTES
04/14/17 1300   Behavioral Health   Hallucinations appears responding   Thinking confused;paranoid;distractable   Orientation person: oriented;place: oriented;date: oriented   Insight poor   Judgement impaired   Eye Contact at examiner   Affect angry;tense;irritable   Mood irritable;anxious   Physical Appearance/Attire untidy;disheveled   Hygiene neglected grooming - unclean body, hair, teeth   Suicidality (denies)   Self Injury (denies)   Activity restless;hyperactive (agitated, impulsive)   Speech rambling;pressured   Medication Sensitivity no stated side effects   Psychomotor / Gait balanced;steady   Psycho Education   Type of Intervention 1:1 intervention   Response participates, initiates socially appropriate   Hours 0.5   Treatment Detail check- in   Activities of Daily Living   Hygiene/Grooming independent;prompts   Oral Hygiene independent;prompts   Dress independent   Room Organization independent   Activity   Activity Level of Assistance independent   Pt was visible min milieu, restless and intrusive towards others.  Pt swore at multiple patients and staff during the shift.  Pt needed to be redirected and reminded to respect others on the unit.  Pt denies SI/SIB, no medication side effects reported.  Pt was incontinent during shift and staff had to wipe clean room afterward.  Pt was swore at housekeeping and would not leave room so patients room was not cleaned immediately.  Pt reacts when people walk by her in the hallway or get near her in the lounge.

## 2017-04-14 NOTE — PROGRESS NOTES
[facility], [place]   Psychiatric Progress Note, Hospital Day #2        Interim History:     Sleep: 5 hours recorded  PRNs: 20 mg Valium, Hydroxyzine (1716, 2134,0302), Benadryl/Haldol/Ativan (1455), Zyprexa (1252, 1715, 2134)    Staff Notes: Patient was intrusive and verbally aggressive w/ other patients multiple times yesterday.  Patient noted at one point to be yelling on the phone w/ her mother.    The treatment team met with the patient in the conference room.  Patient is not bothered by galactorrhea and wants to stop Abilify tablet because it is causing GI irritation.  Patient most concerned about possibility of going to [facility].  [facility] as treatment possibility had not been previously discussed, but patient has been treated at [facility] in the past for extended period of time and does not have good memories.  Team discussed with her possibility of looking at guardianship or group home because of patient's repeated difficulties w/ med non-compliance and re-hospitalization.  Patient informed that team will coordinate w/ ACT team.  Patient w/ no other concerns.         Medications:       risperiDONE  2 mg Sublingual Daily     benztropine  0.5 mg Oral BID     pantoprazole  40 mg Oral QAM     risperiDONE  6 mg Sublingual At Bedtime     lithium  600 mg Oral BID     thiamine  100 mg Oral Daily     folic acid  1 mg Oral Daily     multivitamin, therapeutic with minerals  1 tablet Oral Daily          Allergies:     Allergies   Allergen Reactions     Codeine Sulfate      Compazine Rash     Morphine Nausea and Vomiting     Prochlorperazine Rash          Labs:     No results found for this or any previous visit (from the past 24 hour(s)).       Psychiatric Examination:   BP (!) 141/92  Pulse 110  Temp 97.9  F (36.6  C) (Tympanic)  Resp 16  Wt 75.1 kg (165 lb 8 oz)  SpO2 97%  BMI 30.27 kg/m2  Weight is 165 lbs 8 oz  Body mass index is 30.27 kg/(m^2).    Appearance: awake, alert, adequately  groomed and dressed in hospital scrubs  Attitude:  cooperative  Eye Contact:  fair  Mood:  anxious and depressed,   Affect:  mood congruent, intensity is normal and reactive   Speech:  normal prosody, slight mumbling  Language: English language w/ appropriate syntax and vocabulary  Psychomotor Behavior:  no evidence of tardive dyskinesia, dystonia, or tics  Gait/Station: normal gait  Thought Process:  logical, slightly disorganized  Associations:  loosening of associations present  Thought Content:  no evidence of suicidal ideation or homicidal ideation and no evidence of psychotic thought  Insight:  limited  Judgement:  limited  Oriented to:  time, person, and place  Attention Span and Concentration:  fair  Recent and Remote Memory:  fair  Fund of Knowledge: average         Precautions:     Behavioral Orders   Procedures     Assault precautions     Code 1 - Restrict to Unit     Elopement precautions     Routine Programming     As clinically indicated     Self Injury Precaution     Single Room     Status 15     Every 15 minutes.     Status Individual Observation     Order Specific Question:   AT NIGHT     Answer:   Distance and Exceptions     Order Specific Question:   Distance     Answer:   at the door of the patient's room     Order Specific Question:   Exceptions     Answer:   bathroom and shower     Withdrawal precautions          Diagnoses:     Schizoaffective Disorder, bipolar type  Cocaine Use Disorder  Benzodiazepine Use Disorder         Plan:     31 year-old female w/ past hx significant for schizoaffective disorder, polysubstance abuse (cocaine, benzodiazepines) and antisocial personality disorder admitted to station 22 for paranoia and disorganized behavior in context of suspected medication non-compliance.    Principal Psychiatric Diagnosis: Schizoaffective Disorder, bipolar type    - Continue risperidone 2 mg AM, 6 mg PM  - Continue lithium 600 mg BID  - Continue Risperdal Consta 50 mg [id number] (next  dose due 4/18/17)    - D/C aripiprazole 2 mg daily    - Hydroxyzine 25-50 mg PO q4hrs for anxiety  - Benadryl 50 mg/Haldol 5 mg/Ativan 2 mg IM PRN available for agitation associated w/ psychosis or julio    - Will coordinate w/ patient's ACT team regarding potential petition for guardianship and other placement options.  ACT team will be meeting Monday, 4/17    Secondary Psychiatric Diagnosis: Cocaine Use Disorder, Benzodiazepine Use Disorder, Tobacco Use Disorder    - D/C MSSA (patient not demonstrating w/d side effects)  - Thiamine 100 mg   - Multivitamin  - Folic acid 1 mg  - Nicotine replacement    Medical Diagnoses: Asthma, Ventral abdominal hernia, constipation    - Pantoprazole 40 mg   - Senna PRN    - No consult    Legal: Committed - provisional discharge revoked.    Disposition: Pending clinical improvement, medication optimization, and determination of commitment status.    Patient seen and discussed w/ Dr. Sandhu, who agrees with this plan.    Zoltan Rutledge MD  PGY-1 Psychiatry Resident

## 2017-04-14 NOTE — PROGRESS NOTES
"INITIAL O.T. ASSESSMENT: Daisy has attended 2 OT sessions since admission. Has presented in group and in the milieu as irritable, volatile, and somewhat disorganized.     Was given a written self assessment, threw it in the trash. Told this writer to \"shut up\" several times during group yesterday. This morning shouted angrily at this writer when I delivered two bars of soap to her room, as per her request. She kicked the soap across the rosales, yelling \"I don't want that shit!\"     Plan: Encourage ongoing attendance as able to meet self-stated goals, implement positive coping skills, engage in graded opportunities for success, and provide assessment ongoing.       04/14/17 1100   Clinical Impression   Affect Irritable   Orientation Needs further assessment   Appearance and ADLs Disheveled   Attention to Internal Stimuli Does not refocus   Interaction Skills Intrusive   Ability to Communicate Needs Intrusive;Demanding   Verbal Content Loose;Pressured   Ability to Maintain Boundaries Needs occasional cues;Unable to redirect   Participation Participates with minimal encouragement;Oppositional    Concentration Concentrates 5-10 minutes;Needs further assessment   Ability to Concentrate Easily distracted   Follows and Comprehends Directions Comprehends, but disregards direction   Memory Needs further assessment   Organization Independently organizes simple tasks;Disorganized   Decision Making Impulsive;Changes mind frequently   Planning and Problem Solving Indentifies problems but not alternatives;Impulsive   Ability to Apply and Learn Concepts Needs further assessment   Frustrations / Stress Tolerance Easily frustrated;Inappropriate responses to frustration   Level of Insight No insight   Self Esteem Needs further assessment   Social Supports Needs further assessment     "
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
## 2017-04-14 NOTE — PROGRESS NOTES
Patient when awake is fearful and needs to be constantly reassured that you will stay by her side. When talking to patient her speech is slow and there is a lot of confusion and appears to be unsure of her surroundings. When asked to go back to her room and lay down patient readily did so but requested that I stay with her to keep her safe. After talking for a bit and waking up a few times and being reassured that I was still there and not leaving she did take UT meds and went back to sleep.

## 2017-04-14 NOTE — PROGRESS NOTES
P/C, L/M for Jyotsna Lee re status of revocation.      Jyotsna returned P/C, reports the revocation is in progress, they needed additional paperwork from her and her office had a FAX machine down.  P/W should be arriving this afternoon.    P/C to Jyotsna.  Informed her we have no revokation paperwork yet.  She states she talked to court earlier and revocation would be approved, they had wanted confirmation of where she is, they thought she was at [facility]..  Asked Jyotsna to send the Intent to Revoke paperwork to [facility].  She agreed to do this.
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
## 2017-04-15 PROCEDURE — 25000132 ZZH RX MED GY IP 250 OP 250 PS 637: Performed by: PSYCHIATRY & NEUROLOGY

## 2017-04-15 PROCEDURE — 25000132 ZZH RX MED GY IP 250 OP 250 PS 637: Performed by: STUDENT IN AN ORGANIZED HEALTH CARE EDUCATION/TRAINING PROGRAM

## 2017-04-15 PROCEDURE — 90853 GROUP PSYCHOTHERAPY: CPT

## 2017-04-15 PROCEDURE — 12400001 ZZH R&B MH UMMC

## 2017-04-15 RX ADMIN — RISPERIDONE 2 MG: 2 TABLET, ORALLY DISINTEGRATING ORAL at 10:00

## 2017-04-15 RX ADMIN — NICOTINE POLACRILEX 4 MG: 2 GUM, CHEWING ORAL at 10:23

## 2017-04-15 RX ADMIN — RISPERIDONE 6 MG: 3 TABLET, ORALLY DISINTEGRATING ORAL at 20:14

## 2017-04-15 RX ADMIN — HALOPERIDOL 5 MG: 5 TABLET ORAL at 12:37

## 2017-04-15 RX ADMIN — LORAZEPAM 2 MG: 2 TABLET ORAL at 12:37

## 2017-04-15 RX ADMIN — DIPHENHYDRAMINE HYDROCHLORIDE 50 MG: 50 CAPSULE ORAL at 20:13

## 2017-04-15 RX ADMIN — BENZTROPINE MESYLATE 0.5 MG: 0.5 TABLET ORAL at 20:13

## 2017-04-15 RX ADMIN — PANTOPRAZOLE SODIUM 40 MG: 40 TABLET, DELAYED RELEASE ORAL at 10:00

## 2017-04-15 RX ADMIN — LORAZEPAM 2 MG: 2 TABLET ORAL at 20:13

## 2017-04-15 RX ADMIN — LITHIUM CARBONATE 600 MG: 300 TABLET, EXTENDED RELEASE ORAL at 20:13

## 2017-04-15 RX ADMIN — LITHIUM CARBONATE 600 MG: 300 TABLET, EXTENDED RELEASE ORAL at 09:59

## 2017-04-15 RX ADMIN — HALOPERIDOL 5 MG: 5 TABLET ORAL at 20:13

## 2017-04-15 RX ADMIN — DIPHENHYDRAMINE HYDROCHLORIDE 50 MG: 50 CAPSULE ORAL at 12:37

## 2017-04-15 ASSESSMENT — ACTIVITIES OF DAILY LIVING (ADL)
DRESS: INDEPENDENT
ORAL_HYGIENE: INDEPENDENT
GROOMING: INDEPENDENT

## 2017-04-15 NOTE — PLAN OF CARE
Problem: Psychotic Symptoms  Goal: Psychotic Symptoms  Signs and symptoms of listed problems will be absent or manageable.   Outcome: No Change  Pt was in and out of milieu this pm,expressed again desire not to go to [facility],at hs became verbally abusive with this writer,pressured speech,personal insults,given ativan,haldol and benadryl,refused hs scheduled dose of resperdol,

## 2017-04-15 NOTE — PROGRESS NOTES
Mostly cooperative. Visible late morning. Agitated. Attended late morning group and was appropriate. PRN meds given after patient thought someone had urinated in her bathroom sink. Patient became quite loud and needed redirection. Patient appears paranoid at times. Easily agitated.       04/15/17 1100   Behavioral Health   Hallucinations appears responding   Thinking distractable;paranoid;poor concentration;confused   Orientation situation, disoriented;person: oriented   Insight poor   Judgement impaired   Eye Contact (Hypervigilant at times)   Affect tense;angry;irritable   Physical Appearance/Attire attire appropriate to age and situation   Hygiene (Fair)   Activity restless;withdrawn   Safety   Suicidality status 15

## 2017-04-15 NOTE — PLAN OF CARE
Problem: General Plan of Care (Inpatient Behavioral)  Goal: Individualization/Patient Specific Goal (IP Behavioral)  The patient and/or their representative will achieve their patient-specific goals related to the plan of care.    The patient-specific goals include:   Illness Management Recovery model:  Taking Action.     Patient identified the following actions they can take when early warning signs are present in order to prevent relapse:     1.2. No imr discussion this pm  3

## 2017-04-15 NOTE — PROGRESS NOTES
"Patient slept in this morning until 0930. Ate breakfast. Refused the vitamins and Cogentin, but took everything else for 0800 medications. Was fairly pleasant rest of morning and then at 1220 patient said to this writer, \"someone pissed in my sink, it smells like piss. One of the patients went in my room and pissed in my sink\". This writer told patient the other patients are not allowed in other patients rooms. \"Then it was a staff\". This writer told patient staff does not do that\". This writer went into patient's bathroom with patient and did not smell any urine and told patient that and told patient would wipe sink out with bleach wipes. Patient walked down rosales with this writer and patient kept saying she was going to kick someone's fucking butt for going into her bathroom. Patient kept saying this. Writer told patient not to talk like that, patient kept talking in a threatening way. This writer gave patient Ativan, Benadryl and Haldol, patient only wanted the Ativan but was told they were ordered to be given together. Patient sat down and ate lunch and was calm afterwards.   "

## 2017-04-15 NOTE — PLAN OF CARE
"Problem: Psychotic Symptoms  Intervention: Social and Therapeutic Interv (Psychotic Symptoms)     Pt attended the morning OT group.  Pt had a few options of activities, and chose to play cards with writer and two peers - stated she knew how to play, though it was apparent she did not.  Appeared internally preoccupied throughout the session, staring straight ahead, never realizing when it was her turn, long hesitations with taking a new card/discarding cards despite frequent prompts.     At one point went to the sink, sat down and began swearing about a female peer in group who had reached around her to get a small bottle of paint off the shelf.  When writer told her she was just reaching for paint and she didn't touch her her, and she couldn't talk like that/make threats to peers in group, pt responded \"that bitch better not get cotton in my hair, I'd rather do a year than a day in [place]!\" and continued to need much redirection regarding her verbalizations.     As I was leaving the unit, pt approached me asking me to smell her sink as she was sure someone had urinated in it, and was redirected to nursing staff.        "
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
## 2017-04-16 PROCEDURE — 25000132 ZZH RX MED GY IP 250 OP 250 PS 637: Performed by: STUDENT IN AN ORGANIZED HEALTH CARE EDUCATION/TRAINING PROGRAM

## 2017-04-16 PROCEDURE — 25000132 ZZH RX MED GY IP 250 OP 250 PS 637: Performed by: PSYCHIATRY & NEUROLOGY

## 2017-04-16 PROCEDURE — 12400001 ZZH R&B MH UMMC

## 2017-04-16 RX ADMIN — LORAZEPAM 2 MG: 2 TABLET ORAL at 08:35

## 2017-04-16 RX ADMIN — NICOTINE POLACRILEX 4 MG: 2 GUM, CHEWING ORAL at 14:45

## 2017-04-16 RX ADMIN — DIPHENHYDRAMINE HYDROCHLORIDE 50 MG: 50 CAPSULE ORAL at 08:34

## 2017-04-16 RX ADMIN — NICOTINE POLACRILEX 4 MG: 2 GUM, CHEWING ORAL at 16:02

## 2017-04-16 RX ADMIN — DIPHENHYDRAMINE HYDROCHLORIDE 50 MG: 50 CAPSULE ORAL at 15:42

## 2017-04-16 RX ADMIN — RISPERIDONE 6 MG: 3 TABLET, ORALLY DISINTEGRATING ORAL at 20:04

## 2017-04-16 RX ADMIN — HALOPERIDOL 5 MG: 5 TABLET ORAL at 08:34

## 2017-04-16 RX ADMIN — NICOTINE POLACRILEX 4 MG: 2 GUM, CHEWING ORAL at 10:27

## 2017-04-16 RX ADMIN — LITHIUM CARBONATE 600 MG: 300 TABLET, EXTENDED RELEASE ORAL at 20:04

## 2017-04-16 RX ADMIN — BENZTROPINE MESYLATE 0.5 MG: 0.5 TABLET ORAL at 20:04

## 2017-04-16 RX ADMIN — NICOTINE POLACRILEX 4 MG: 2 GUM, CHEWING ORAL at 09:14

## 2017-04-16 RX ADMIN — HALOPERIDOL 5 MG: 5 TABLET ORAL at 15:42

## 2017-04-16 RX ADMIN — LORAZEPAM 2 MG: 2 TABLET ORAL at 15:42

## 2017-04-16 ASSESSMENT — ACTIVITIES OF DAILY LIVING (ADL)
DRESS: SCRUBS (BEHAVIORAL HEALTH);INDEPENDENT
ORAL_HYGIENE: INDEPENDENT
LAUNDRY: UNABLE TO COMPLETE
ORAL_HYGIENE: INDEPENDENT
GROOMING: INDEPENDENT
DRESS: INDEPENDENT
GROOMING: INDEPENDENT

## 2017-04-16 NOTE — PROGRESS NOTES
Patient was isolative to room for most of shift, sleeping and napping. Pt came out to eat dinner. Pt became agitated while making phone calls, speaking loudly and swearing and was minimally receptive to staff's requests to lower her voice and keep the call appropriate. Affect was tense, irritable. Mood was labile, irritable. No other behavioral concerns this shift.     04/15/17 2038   Behavioral Health   Hallucinations appears responding   Thinking distractable;paranoid;poor concentration   Orientation person: oriented;place: oriented   Insight poor   Judgement impaired   Affect tense;irritable   Mood labile;irritable   Physical Appearance/Attire disheveled   Hygiene (adequate)   Suicidality (denies)   Self Injury (denies)   Activity isolative   Speech pressured   Medication Sensitivity sedation   Psychomotor / Gait balanced;steady   Activities of Daily Living   Hygiene/Grooming independent   Oral Hygiene independent   Dress independent   Room Organization independent

## 2017-04-16 NOTE — PLAN OF CARE
Problem: Psychotic Symptoms  Goal: Psychotic Symptoms  Signs and symptoms of listed problems will be absent or manageable.   Outcome: No Change  48 hour nursing assessment:  Pt evaluation continues. Assessed mood, anxiety, thoughts, and behavior. Is progressing towards goals. Encourage participation in groups and developing healthy coping skills. Refer to daily team meeting notes for individualized plan of care. Will continue to assess.     Patient denies SI/SIB. Patient appears to be responding to auditory and visual hallucinations yet denies experiencing these (talking and laughing to self, staring). Patient was verbally aggressive toward peers this morning (See note) but was able to calm after receiving PRN medication. Patient was visible in the milieu, observed using the telephone and pacing the hallway. Patient refused all scheduled medications this morning x2. Patient became agitated toward writer when asked to take medications. Patient is eating well at meal times. Minimally accepting of redirection for swearing in the milieu. No other concerns, will continue to monitor.

## 2017-04-16 NOTE — PROGRESS NOTES
"1. What PRN did patient receive? Anti-Psychotic (Haldol), Benadryl and Benzodiazepine (Ativan)    2. What was the patient doing that led to the PRN medication? Agitation, Anxiety and Other (threatening peers)    3. Did they require R/S? NO    4. Side effects to PRN medication? none    5. After 1 Hour, patient appeared: Calmer, yet still having occasional minor verbal outbursts. Pacing, talking/singing to self.    Patient was in the dining room eating breakfast and suddenly began screaming at and threatening a peer. Stated \"he'd be better off dead!\" Writer approached patient and she was rude, disrespectful and demanding, stated \"I need a ativan!\" Patient agreed to ativan/benadryl/haldol combination as ordered. Writer offered scheduled AM meds as well and patient vehemently refused, stated \"I don't need those I'm not crazy!\". After patient was given the PRN medications she was immediately calmer yet still verbally aggressive toward writer, and making nonsensical statements (\"I'm used to dealing with teenagers, there's teenagers on another unit, i'm not a child molester..\"). Patient reminded of behavior expectations and patient just stared at writer. Will continue to monitor.       "

## 2017-04-17 PROCEDURE — 99231 SBSQ HOSP IP/OBS SF/LOW 25: CPT | Mod: GC | Performed by: PSYCHIATRY & NEUROLOGY

## 2017-04-17 PROCEDURE — 25000132 ZZH RX MED GY IP 250 OP 250 PS 637: Performed by: STUDENT IN AN ORGANIZED HEALTH CARE EDUCATION/TRAINING PROGRAM

## 2017-04-17 PROCEDURE — 12400001 ZZH R&B MH UMMC

## 2017-04-17 PROCEDURE — 25000132 ZZH RX MED GY IP 250 OP 250 PS 637: Performed by: PSYCHIATRY & NEUROLOGY

## 2017-04-17 RX ORDER — OLANZAPINE 5 MG/1
5 TABLET ORAL 2 TIMES DAILY
Status: DISCONTINUED | OUTPATIENT
Start: 2017-04-17 | End: 2017-05-03

## 2017-04-17 RX ADMIN — FOLIC ACID 1 MG: 1 TABLET ORAL at 07:55

## 2017-04-17 RX ADMIN — NICOTINE POLACRILEX 4 MG: 2 GUM, CHEWING ORAL at 17:22

## 2017-04-17 RX ADMIN — BENZTROPINE MESYLATE 0.5 MG: 0.5 TABLET ORAL at 07:55

## 2017-04-17 RX ADMIN — LITHIUM CARBONATE 600 MG: 300 TABLET, EXTENDED RELEASE ORAL at 07:54

## 2017-04-17 RX ADMIN — OLANZAPINE 5 MG: 5 TABLET, FILM COATED ORAL at 19:49

## 2017-04-17 RX ADMIN — NICOTINE POLACRILEX 4 MG: 2 GUM, CHEWING ORAL at 09:03

## 2017-04-17 RX ADMIN — LITHIUM CARBONATE 600 MG: 300 TABLET, EXTENDED RELEASE ORAL at 19:49

## 2017-04-17 RX ADMIN — NICOTINE POLACRILEX 4 MG: 2 GUM, CHEWING ORAL at 12:39

## 2017-04-17 RX ADMIN — NICOTINE POLACRILEX 4 MG: 2 GUM, CHEWING ORAL at 07:55

## 2017-04-17 RX ADMIN — BENZTROPINE MESYLATE 0.5 MG: 0.5 TABLET ORAL at 19:50

## 2017-04-17 RX ADMIN — NICOTINE POLACRILEX 4 MG: 2 GUM, CHEWING ORAL at 19:59

## 2017-04-17 RX ADMIN — RISPERIDONE 2 MG: 2 TABLET, ORALLY DISINTEGRATING ORAL at 07:55

## 2017-04-17 RX ADMIN — PANTOPRAZOLE SODIUM 40 MG: 40 TABLET, DELAYED RELEASE ORAL at 07:55

## 2017-04-17 RX ADMIN — NICOTINE POLACRILEX 4 MG: 2 GUM, CHEWING ORAL at 18:43

## 2017-04-17 RX ADMIN — MULTIPLE VITAMINS W/ MINERALS TAB 1 TABLET: TAB at 07:55

## 2017-04-17 RX ADMIN — NICOTINE POLACRILEX 4 MG: 2 GUM, CHEWING ORAL at 13:31

## 2017-04-17 ASSESSMENT — ACTIVITIES OF DAILY LIVING (ADL)
LAUNDRY: WITH SUPERVISION
ORAL_HYGIENE: INDEPENDENT
GROOMING: INDEPENDENT
DRESS: STREET CLOTHES

## 2017-04-17 NOTE — PROGRESS NOTES
Unable to particip in and groups, hyperkinetic, pacing much of am near desk. 1200 pt talking and responding to internal stim.        04/17/17 1100   Behavioral Health   Hallucinations appears responding;other (see comment)  (Talking to self after noon)   Thinking distractable;delusional;poor concentration   Orientation person: oriented;place: oriented;date, disoriented   Insight denial of illness;poor   Judgement impaired   Eye Contact out of corner of eyes   Affect tense   Mood anxious;labile   Physical Appearance/Attire untidy   Hygiene other (see comment)  (adequate)   Activity restless   Speech clear;pressured   Psychomotor / Gait balanced;steady   Psycho Education   Type of Intervention structured groups   Response refuses   Activities of Daily Living   Hygiene/Grooming independent   Oral Hygiene independent   Dress street clothes   Laundry with supervision   Room Organization independent

## 2017-04-17 NOTE — PROGRESS NOTES
Pt was approachable and friendly this evening. Talkative with staff and peers. Played Wii for a bit with others. Pt showed no signs of agitation or aggression. Pt willingly took her medication this shift. Pt fell asleep around 1800 and slept the remainder of the evening.           04/16/17 2222   Behavioral Health   Hallucinations appears responding   Thinking distractable;paranoid;poor concentration   Orientation person: oriented;place: oriented;date: oriented;time: oriented   Insight denial of illness;poor   Judgement impaired   Eye Contact at examiner   Affect tense   Mood mood is calm   Physical Appearance/Attire untidy   Hygiene other (see comment)   Suicidality (WDL) (adequate)   Activity (sociable)   Speech pressured   Psychomotor / Gait balanced;steady   Psycho Education   Type of Intervention 1:1 intervention   Response participates with encouragement   Hours 0.5   Treatment Detail (check in)   Activities of Daily Living   Hygiene/Grooming independent   Oral Hygiene independent   Dress scrubs (behavioral health);independent   Room Organization independent
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
## 2017-04-17 NOTE — PROGRESS NOTES
P/C Jyotsna Lee ([phone]) ACT CM.  The ACT is focused on medicine.  Not willing to go guardianship can think of no placements willing or able to take her.  She will not tolerate being around other people, even a 2 person group home would be too much for her in their opinion.  They are waiting for stabilization and hopeful Team will find a solution.

## 2017-04-18 PROCEDURE — 99231 SBSQ HOSP IP/OBS SF/LOW 25: CPT | Mod: GC | Performed by: PSYCHIATRY & NEUROLOGY

## 2017-04-18 PROCEDURE — 25000132 ZZH RX MED GY IP 250 OP 250 PS 637: Performed by: STUDENT IN AN ORGANIZED HEALTH CARE EDUCATION/TRAINING PROGRAM

## 2017-04-18 PROCEDURE — 25000132 ZZH RX MED GY IP 250 OP 250 PS 637: Performed by: PSYCHIATRY & NEUROLOGY

## 2017-04-18 PROCEDURE — 97150 GROUP THERAPEUTIC PROCEDURES: CPT | Mod: GO

## 2017-04-18 PROCEDURE — 90853 GROUP PSYCHOTHERAPY: CPT

## 2017-04-18 PROCEDURE — 12400001 ZZH R&B MH UMMC

## 2017-04-18 RX ORDER — BENZTROPINE MESYLATE 0.5 MG/1
0.5 TABLET ORAL 2 TIMES DAILY PRN
Status: DISCONTINUED | OUTPATIENT
Start: 2017-04-18 | End: 2017-05-15 | Stop reason: HOSPADM

## 2017-04-18 RX ADMIN — OLANZAPINE 5 MG: 5 TABLET, FILM COATED ORAL at 19:59

## 2017-04-18 RX ADMIN — OLANZAPINE 5 MG: 5 TABLET, FILM COATED ORAL at 09:55

## 2017-04-18 RX ADMIN — BENZTROPINE MESYLATE 0.5 MG: 0.5 TABLET ORAL at 09:55

## 2017-04-18 RX ADMIN — MULTIPLE VITAMINS W/ MINERALS TAB 1 TABLET: TAB at 09:55

## 2017-04-18 RX ADMIN — NICOTINE POLACRILEX 4 MG: 2 GUM, CHEWING ORAL at 19:59

## 2017-04-18 RX ADMIN — LITHIUM CARBONATE 600 MG: 300 TABLET, EXTENDED RELEASE ORAL at 19:59

## 2017-04-18 RX ADMIN — NICOTINE POLACRILEX 4 MG: 2 GUM, CHEWING ORAL at 21:18

## 2017-04-18 RX ADMIN — LITHIUM CARBONATE 600 MG: 300 TABLET, EXTENDED RELEASE ORAL at 09:56

## 2017-04-18 RX ADMIN — FOLIC ACID 1 MG: 1 TABLET ORAL at 09:56

## 2017-04-18 RX ADMIN — PANTOPRAZOLE SODIUM 40 MG: 40 TABLET, DELAYED RELEASE ORAL at 09:56

## 2017-04-18 ASSESSMENT — ACTIVITIES OF DAILY LIVING (ADL)
ORAL_HYGIENE: PROMPTS
ORAL_HYGIENE: PROMPTS
GROOMING: PROMPTS
DRESS: SCRUBS (BEHAVIORAL HEALTH)
GROOMING: PROMPTS
DRESS: INDEPENDENT
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
## 2017-04-18 NOTE — PROGRESS NOTES
"Pt ate breakfast, attended groups, singing songs to herself as she walks the hallway, social with peers. Pt says she is restless and that her mood is at an \"8\" on a 10 point scale, pt smiling and laughing while being questioned about SI/SIB. Pt has not showered or brushed teeth in 2 days and needs to change her bedding. This writer put towels, toiletries and bedding in her room.      04/18/17 1200   Behavioral Health   Hallucinations denies / not responding to hallucinations   Thinking distractable;poor concentration   Orientation place: oriented;date: oriented;person: oriented   Memory confabulation   Insight poor   Judgement impaired   Eye Contact at examiner   Affect full range affect   Mood elated   Physical Appearance/Attire untidy   Hygiene neglected grooming - unclean body, hair, teeth   Suicidality other (see comments)  (denies)   Self Injury other (see comment)  (denies)   Activity restless   Speech clear;coherent;rambling   Medication Sensitivity no stated side effects   Psychomotor / Gait steady;balanced;paces   Psycho Education   Type of Intervention 1:1 intervention   Response participates, initiates socially appropriate   Hours 0.5   Treatment Detail check-in   Activities of Daily Living   Hygiene/Grooming prompts   Oral Hygiene prompts   Dress scrubs (behavioral health)   Room Organization prompts   Activity   Activity Level of Assistance independent     "

## 2017-04-18 NOTE — PROGRESS NOTES
Spoke with Pt.'s ACT CCM Aundrea Lee ([phone]) who confirmed that the pt. Is Jarvised for Risperdal, Zyprexa, Invega and Haldol.  Suggested that CCM begin CADI waiver and reports that she will at least  begin the process (often takes up to 10 days).

## 2017-04-19 PROCEDURE — 99231 SBSQ HOSP IP/OBS SF/LOW 25: CPT | Mod: GC | Performed by: PSYCHIATRY & NEUROLOGY

## 2017-04-19 PROCEDURE — 97150 GROUP THERAPEUTIC PROCEDURES: CPT | Mod: GO

## 2017-04-19 PROCEDURE — 25000132 ZZH RX MED GY IP 250 OP 250 PS 637: Performed by: PSYCHIATRY & NEUROLOGY

## 2017-04-19 PROCEDURE — 12400001 ZZH R&B MH UMMC

## 2017-04-19 PROCEDURE — 25000132 ZZH RX MED GY IP 250 OP 250 PS 637: Performed by: STUDENT IN AN ORGANIZED HEALTH CARE EDUCATION/TRAINING PROGRAM

## 2017-04-19 RX ADMIN — NICOTINE POLACRILEX 4 MG: 2 GUM, CHEWING ORAL at 17:57

## 2017-04-19 RX ADMIN — OLANZAPINE 5 MG: 5 TABLET, FILM COATED ORAL at 22:12

## 2017-04-19 RX ADMIN — NICOTINE POLACRILEX 4 MG: 2 GUM, CHEWING ORAL at 10:50

## 2017-04-19 RX ADMIN — LITHIUM CARBONATE 600 MG: 300 TABLET, EXTENDED RELEASE ORAL at 22:14

## 2017-04-19 RX ADMIN — NICOTINE POLACRILEX 4 MG: 2 GUM, CHEWING ORAL at 09:16

## 2017-04-19 RX ADMIN — PANTOPRAZOLE SODIUM 40 MG: 40 TABLET, DELAYED RELEASE ORAL at 09:09

## 2017-04-19 RX ADMIN — LITHIUM CARBONATE 600 MG: 300 TABLET, EXTENDED RELEASE ORAL at 09:09

## 2017-04-19 RX ADMIN — OLANZAPINE 5 MG: 5 TABLET, FILM COATED ORAL at 09:09

## 2017-04-19 RX ADMIN — FOLIC ACID 1 MG: 1 TABLET ORAL at 09:09

## 2017-04-19 RX ADMIN — MULTIPLE VITAMINS W/ MINERALS TAB 1 TABLET: TAB at 09:09

## 2017-04-19 ASSESSMENT — ACTIVITIES OF DAILY LIVING (ADL)
DRESS: INDEPENDENT
LAUNDRY: WITH SUPERVISION
ORAL_HYGIENE: INDEPENDENT
DRESS: INDEPENDENT
GROOMING: INDEPENDENT
GROOMING: INDEPENDENT
ORAL_HYGIENE: INDEPENDENT
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
## 2017-04-19 NOTE — PROGRESS NOTES
04/19/17 1300   Behavioral Health   Hallucinations denies / not responding to hallucinations   Thinking distractable   Orientation person: oriented;place: oriented   Insight poor   Judgement impaired   Eye Contact at examiner   Affect full range affect   Mood mood is calm;labile   Physical Appearance/Attire attire appropriate to age and situation   Hygiene neglected grooming - unclean body, hair, teeth   Suicidality (denies)   Self Injury (denies)   Activity restless   Speech coherent;pressured   Medication Sensitivity no stated side effects   Psychomotor / Gait balanced;steady   Psycho Education   Type of Intervention structured groups   Response participates, initiates socially appropriate   Hours 0.5   Treatment Detail (Warning Signs)   Activities of Daily Living   Hygiene/Grooming independent   Oral Hygiene independent   Dress independent   Room Organization independent   Activity   Activity Level of Assistance independent   Pt was visible in milieu and attended groups today.  Pt is calm and friendly with staff during interactions, did not need to be redirected for intrusive behavior or language.  Pt reported no SI/SIB, no medication side effects reported.
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
## 2017-04-19 NOTE — PROGRESS NOTES
Patient was visible in milieu, social with peers. Affect was full range. Mood was labile, irritable at times. Pt made several phone calls and was loud and inappropriate at times. No medication side effects, SI, hallucinations noted.      04/18/17 2051   Behavioral Health   Hallucinations denies / not responding to hallucinations   Thinking distractable;poor concentration   Orientation person: oriented;place: oriented   Insight poor   Judgement impaired   Eye Contact at examiner   Affect full range affect   Mood labile;irritable   Physical Appearance/Attire disheveled   Hygiene neglected grooming - unclean body, hair, teeth   Suicidality (denies)   Self Injury (denies)   Activity restless   Speech pressured;coherent   Medication Sensitivity no stated side effects   Psychomotor / Gait balanced;steady   Activities of Daily Living   Hygiene/Grooming prompts   Oral Hygiene prompts   Dress independent   Room Organization independent   Activity   Activity Level of Assistance independent

## 2017-04-20 LAB
ALBUMIN UR-MCNC: 10 MG/DL
AMPHETAMINES UR QL SCN: ABNORMAL
APPEARANCE UR: ABNORMAL
BACTERIA #/AREA URNS HPF: ABNORMAL /HPF
BARBITURATES UR QL: ABNORMAL
BENZODIAZ UR QL: ABNORMAL
BILIRUB UR QL STRIP: NEGATIVE
CANNABINOIDS UR QL SCN: ABNORMAL
COCAINE UR QL: ABNORMAL
COLOR UR AUTO: YELLOW
ETHANOL UR QL SCN: ABNORMAL
GLUCOSE UR STRIP-MCNC: NEGATIVE MG/DL
HCG UR QL: NEGATIVE
HGB UR QL STRIP: NEGATIVE
KETONES UR STRIP-MCNC: NEGATIVE MG/DL
LEUKOCYTE ESTERASE UR QL STRIP: ABNORMAL
MUCOUS THREADS #/AREA URNS LPF: PRESENT /LPF
NITRATE UR QL: NEGATIVE
OPIATES UR QL SCN: ABNORMAL
PCP UR QL SCN: ABNORMAL
PH UR STRIP: 7.5 PH (ref 5–7)
RBC #/AREA URNS AUTO: 2 /HPF (ref 0–2)
SP GR UR STRIP: 1.02 (ref 1–1.03)
SQUAMOUS #/AREA URNS AUTO: 7 /HPF (ref 0–1)
URN SPEC COLLECT METH UR: ABNORMAL
UROBILINOGEN UR STRIP-MCNC: NORMAL MG/DL (ref 0–2)
WBC #/AREA URNS AUTO: 10 /HPF (ref 0–2)

## 2017-04-20 PROCEDURE — 25000132 ZZH RX MED GY IP 250 OP 250 PS 637: Performed by: STUDENT IN AN ORGANIZED HEALTH CARE EDUCATION/TRAINING PROGRAM

## 2017-04-20 PROCEDURE — 12400001 ZZH R&B MH UMMC

## 2017-04-20 PROCEDURE — 25000132 ZZH RX MED GY IP 250 OP 250 PS 637: Performed by: PSYCHIATRY & NEUROLOGY

## 2017-04-20 PROCEDURE — 97150 GROUP THERAPEUTIC PROCEDURES: CPT | Mod: GO

## 2017-04-20 PROCEDURE — 80307 DRUG TEST PRSMV CHEM ANLYZR: CPT | Performed by: STUDENT IN AN ORGANIZED HEALTH CARE EDUCATION/TRAINING PROGRAM

## 2017-04-20 PROCEDURE — 80320 DRUG SCREEN QUANTALCOHOLS: CPT | Performed by: STUDENT IN AN ORGANIZED HEALTH CARE EDUCATION/TRAINING PROGRAM

## 2017-04-20 PROCEDURE — 81001 URINALYSIS AUTO W/SCOPE: CPT | Performed by: STUDENT IN AN ORGANIZED HEALTH CARE EDUCATION/TRAINING PROGRAM

## 2017-04-20 PROCEDURE — 81025 URINE PREGNANCY TEST: CPT | Performed by: STUDENT IN AN ORGANIZED HEALTH CARE EDUCATION/TRAINING PROGRAM

## 2017-04-20 RX ADMIN — OLANZAPINE 5 MG: 5 TABLET, FILM COATED ORAL at 10:43

## 2017-04-20 RX ADMIN — LORAZEPAM 2 MG: 2 TABLET ORAL at 18:12

## 2017-04-20 RX ADMIN — NICOTINE POLACRILEX 4 MG: 2 GUM, CHEWING ORAL at 17:30

## 2017-04-20 RX ADMIN — DIPHENHYDRAMINE HYDROCHLORIDE 50 MG: 50 CAPSULE ORAL at 18:12

## 2017-04-20 RX ADMIN — FOLIC ACID 1 MG: 1 TABLET ORAL at 10:43

## 2017-04-20 RX ADMIN — HALOPERIDOL 5 MG: 5 TABLET ORAL at 18:12

## 2017-04-20 RX ADMIN — LITHIUM CARBONATE 600 MG: 300 TABLET, EXTENDED RELEASE ORAL at 20:29

## 2017-04-20 RX ADMIN — NICOTINE POLACRILEX 4 MG: 2 GUM, CHEWING ORAL at 13:15

## 2017-04-20 RX ADMIN — NICOTINE POLACRILEX 4 MG: 2 GUM, CHEWING ORAL at 14:42

## 2017-04-20 RX ADMIN — LITHIUM CARBONATE 600 MG: 300 TABLET, EXTENDED RELEASE ORAL at 10:42

## 2017-04-20 RX ADMIN — OLANZAPINE 5 MG: 5 TABLET, FILM COATED ORAL at 20:29

## 2017-04-20 RX ADMIN — PANTOPRAZOLE SODIUM 40 MG: 40 TABLET, DELAYED RELEASE ORAL at 10:42

## 2017-04-20 RX ADMIN — MULTIPLE VITAMINS W/ MINERALS TAB 1 TABLET: TAB at 10:42

## 2017-04-20 RX ADMIN — NICOTINE POLACRILEX 4 MG: 2 GUM, CHEWING ORAL at 18:46

## 2017-04-20 ASSESSMENT — ACTIVITIES OF DAILY LIVING (ADL)
GROOMING: INDEPENDENT
LAUNDRY: WITH SUPERVISION
DRESS: INDEPENDENT
GROOMING: PROMPTS
DRESS: STREET CLOTHES
ORAL_HYGIENE: INDEPENDENT
FALL_HISTORY_WITHIN_LAST_SIX_MONTHS: NO
ORAL_HYGIENE: INDEPENDENT

## 2017-04-20 NOTE — PROGRESS NOTES
Patient very sedated this morning. Did not get up for breakfast, despite waking her up 3-4 times to offer breakfast and morning medications. Stated she was just feeling tired. Denies pain or other sx. Did eventually get up for lunchtime and showered, appearing more alert. Attended group and left urine sample for collection when instructed to do so.

## 2017-04-20 NOTE — PLAN OF CARE
Problem: Psychotic Symptoms  Goal: Psychotic Symptoms  Signs and symptoms of listed problems will be absent or manageable.   Outcome: Improving  Pt was more visible this pm,paid attention to grooming,interactive,states she would like to go home with her mother and return to the care of act team

## 2017-04-20 NOTE — PROGRESS NOTES
"Pt sleeping most of shift, did not eat breakfast, pt says she is \"sleepy\" due to her medications and said that is was a good thing because she was up for a few days prior to coming to the hospital.      04/20/17 1200   Behavioral Health   Hallucinations denies / not responding to hallucinations   Thinking poor concentration   Orientation person: oriented;place: oriented   Memory confabulation   Insight poor   Judgement impaired   Eye Contact at examiner   Affect full range affect   Mood mood is calm   Physical Appearance/Attire attire appropriate to age and situation   Hygiene neglected grooming - unclean body, hair, teeth   Suicidality other (see comments)  (denies)   Self Injury other (see comment)  (denies)   Activity isolative;withdrawn   Speech coherent;rambling   Medication Sensitivity no stated side effects   Psychomotor / Gait balanced;steady   Psycho Education   Type of Intervention 1:1 intervention   Response participates with encouragement   Hours 0.5   Treatment Detail check-in   Activities of Daily Living   Hygiene/Grooming prompts   Oral Hygiene independent   Dress street clothes   Room Organization independent   Activity   Activity Level of Assistance independent     "

## 2017-04-21 PROCEDURE — 12400001 ZZH R&B MH UMMC

## 2017-04-21 PROCEDURE — 25000132 ZZH RX MED GY IP 250 OP 250 PS 637: Performed by: STUDENT IN AN ORGANIZED HEALTH CARE EDUCATION/TRAINING PROGRAM

## 2017-04-21 PROCEDURE — 25000132 ZZH RX MED GY IP 250 OP 250 PS 637: Performed by: PSYCHIATRY & NEUROLOGY

## 2017-04-21 PROCEDURE — 90853 GROUP PSYCHOTHERAPY: CPT

## 2017-04-21 PROCEDURE — 99232 SBSQ HOSP IP/OBS MODERATE 35: CPT | Mod: GC | Performed by: PSYCHIATRY & NEUROLOGY

## 2017-04-21 RX ADMIN — NICOTINE POLACRILEX 4 MG: 2 GUM, CHEWING ORAL at 09:16

## 2017-04-21 RX ADMIN — FOLIC ACID 1 MG: 1 TABLET ORAL at 09:14

## 2017-04-21 RX ADMIN — LITHIUM CARBONATE 600 MG: 300 TABLET, EXTENDED RELEASE ORAL at 09:13

## 2017-04-21 RX ADMIN — PANTOPRAZOLE SODIUM 40 MG: 40 TABLET, DELAYED RELEASE ORAL at 09:13

## 2017-04-21 RX ADMIN — LITHIUM CARBONATE 600 MG: 300 TABLET, EXTENDED RELEASE ORAL at 20:01

## 2017-04-21 RX ADMIN — MULTIPLE VITAMINS W/ MINERALS TAB 1 TABLET: TAB at 09:13

## 2017-04-21 RX ADMIN — NICOTINE POLACRILEX 4 MG: 2 GUM, CHEWING ORAL at 11:24

## 2017-04-21 RX ADMIN — NICOTINE POLACRILEX 4 MG: 2 GUM, CHEWING ORAL at 19:02

## 2017-04-21 RX ADMIN — NICOTINE POLACRILEX 4 MG: 2 GUM, CHEWING ORAL at 20:01

## 2017-04-21 RX ADMIN — OLANZAPINE 5 MG: 5 TABLET, FILM COATED ORAL at 09:14

## 2017-04-21 RX ADMIN — NICOTINE POLACRILEX 4 MG: 2 GUM, CHEWING ORAL at 14:23

## 2017-04-21 RX ADMIN — NICOTINE POLACRILEX 4 MG: 2 GUM, CHEWING ORAL at 16:04

## 2017-04-21 RX ADMIN — OLANZAPINE 5 MG: 5 TABLET, FILM COATED ORAL at 20:01

## 2017-04-21 ASSESSMENT — ACTIVITIES OF DAILY LIVING (ADL)
ORAL_HYGIENE: INDEPENDENT
LAUNDRY: WITH SUPERVISION
LAUNDRY: WITH SUPERVISION
DRESS: INDEPENDENT
GROOMING: PROMPTS
ORAL_HYGIENE: INDEPENDENT
DRESS: STREET CLOTHES
GROOMING: INDEPENDENT

## 2017-04-21 NOTE — PROGRESS NOTES
"[facility], [place]   Psychiatric Progress Note, Hospital Day #9        Interim History:     Sleep: 7 hours recorded  PRNs: Benadryl, Haldol, Lorazepam (4/20, 1812)    Staff Notes: Reviewed, has been very sedated recently (sleeping through until lunch).    The treatment team met with the patient in the conference room.  She stated that she has been feeling \"alright.\"  Daisy required a PRN at this time because she got upset when her mother did not answer the phone.  Once Daisy got in touch with her mother, she stated that her mother does not feel that she can take care of Daisy at home.  Daisy is anxious and curious about where her care may take place in the future.  She endorses sedation on her current medication regimen but states, \"I love my Zyprexa.  I call it my happy pill.\"  Daisy had no further questions or concerns at the time of interview.  She denies any other medication side effects.           Medications:       OLANZapine  5 mg Oral BID     pneumococcal vaccine  0.5 mL Intramuscular Prior to discharge     pantoprazole  40 mg Oral QAM     lithium  600 mg Oral BID     folic acid  1 mg Oral Daily     multivitamin, therapeutic with minerals  1 tablet Oral Daily          Allergies:     Allergies   Allergen Reactions     Codeine Sulfate      Compazine Rash     Morphine Nausea and Vomiting     Prochlorperazine Rash          Labs:     Recent Results (from the past 24 hour(s))   HCG qualitative urine    Collection Time: 04/20/17 12:51 PM   Result Value Ref Range    HCG Qual Urine Negative NEG   UA with Microscopic    Collection Time: 04/20/17 12:51 PM   Result Value Ref Range    Color Urine Yellow     Appearance Urine Slightly Cloudy     Glucose Urine Negative NEG mg/dL    Bilirubin Urine Negative NEG    Ketones Urine Negative NEG mg/dL    Specific Gravity Urine 1.018 1.003 - 1.035    Blood Urine Negative NEG    pH Urine 7.5 (H) 5.0 - 7.0 pH    Protein Albumin Urine 10 (A) NEG mg/dL    " "Urobilinogen mg/dL Normal 0.0 - 2.0 mg/dL    Nitrite Urine Negative NEG    Leukocyte Esterase Urine Trace (A) NEG    Source Midstream Urine     WBC Urine 10 (H) 0 - 2 /HPF    RBC Urine 2 0 - 2 /HPF    Bacteria Urine Few (A) NEG /HPF    Squamous Epithelial /HPF Urine 7 (H) 0 - 1 /HPF    Mucous Urine Present (A) NEG /LPF   Drug abuse screen 8 urine (UR)    Collection Time: 04/20/17 12:51 PM   Result Value Ref Range    Amphetamine Qual Urine  NEG     Negative   Cutoff for a negative amphetamine is 500 ng/mL or less.      Barbiturates Qual Urine  NEG     Negative   Cutoff for a negative barbiturate is 200 ng/mL or less.      Benzodiazepine Qual Urine (A) NEG     Positive   Cutoff for a positive benzodiazepine is greater than 200 ng/mL. This is an   unconfirmed screening result to be used for medical purposes only.      Cannabinoids Qual Urine  NEG     Negative   Cutoff for a negative cannabinoid is 50 ng/mL or less.      Cocaine Qual Urine  NEG     Negative   Cutoff for a negative cocaine is 300 ng/mL or less.      Ethanol Qual Urine  NEG     Negative   Cutoff for a negative urine ethanol is 0.05 g/dL or less      Opiates Qualitative Urine  NEG     Negative   Cutoff for a negative opiate is 300 ng/mL or less.      PCP Qual Urine  NEG     Negative   Cutoff for a negative PCP is 25 ng/mL or less.            Psychiatric Examination:   /78  Pulse 82  Temp 98.1  F (36.7  C) (Tympanic)  Resp 16  Wt 75.1 kg (165 lb 8 oz)  SpO2 97%  BMI 30.27 kg/m2  Weight is 165 lbs 8 oz  Body mass index is 30.27 kg/(m^2).    Appearance: awake, alert, adequately groomed and dressed in hospital scrubs  Attitude:  Cooperative  Eye Contact: good, patient appeared to have difficulty keeping eyes open during interview.  Stated that she has been very tired recently.  Mood:  \"alright\"  Affect: mood congruent, patient was pleasant and appropriate throughout interview  Speech:  clear, coherent and normal prosody, occasional pauses before " speaking while patient contemplates answers  Language: English language w/ appropriate syntax and vocabulary  Psychomotor Behavior:  no evidence of tardive dyskinesia, dystonia, or tics  Gait/Station: normal gait  Thought Process: linear, goal-oriented.  Able to respond appropriately to simple questions, able to ask relevant questions as well.  Associations:  no loose associations  Thought Content:  no evidence of suicidal ideation or homicidal ideation and no evidence of psychotic thought  Insight:  limited  Judgement:  limited  Oriented to:  time, person, and place  Attention Span and Concentration:  intact during iterview  Recent and Remote Memory:  intact, able to recall conversations from yesterday  Fund of Knowledge: likely below average, thought not formally evaluated         Precautions:     Behavioral Orders   Procedures     Assault precautions     Code 1 - Restrict to Unit     Elopement precautions     Routine Programming     As clinically indicated     Self Injury Precaution     Single Room     Status 15     Every 15 minutes.     Status Individual Observation     Order Specific Question:   AT NIGHT     Answer:   Distance and Exceptions     Order Specific Question:   Distance     Answer:   at the door of the patient's room     Order Specific Question:   Exceptions     Answer:   bathroom and shower          Diagnoses:     Schizoaffective Disorder, bipolar type  Cocaine Use Disorder  Benzodiazepine Use Disorder         Plan:     31 year-old female w/ past hx significant for schizoaffective disorder, polysubstance abuse (cocaine, benzodiazepines) and antisocial personality disorder admitted to station [facility] for paranoia and disorganized behavior in context of suspected medication non-compliance. Ms. Curtis is currently committed, has a long history of civil commitments, and currently has an ACT team.    Principal Psychiatric Diagnosis: Schizoaffective Disorder, bipolar type    - Continue lithium 600 mg BID  -  Cogentin 0.5 mg BID PRN  - Continue olanzapine 5 mg BID (switch from risperidone due to concerns for hyperprolactinemia/galactorrhea)  - Repeat prolactin level on Monday (4/24), will then determine if brain MRI is warranted in the context of prolonged hyperprolactinemia/galactorrhea.    - Benadryl 50 mg/Haldol 5 mg/Ativan 2 mg IM PRN available for agitation associated w/ psychosis or julio    - ACT team has indicated that they will not support petition for guardianship as this may result in patient losing the ACT team.  This has been a valuable resource for her in the past.  Will continue to work with ACT team regarding placement while optimizing medications inpatient.    Secondary Psychiatric Diagnosis: Cocaine Use Disorder, Benzodiazepine Use Disorder, Tobacco Use Disorder    - Thiamine 100 mg   - Multivitamin  - Folic acid 1 mg  - Nicotine replacement    Medical Diagnoses: Asthma, Ventral abdominal hernia, Constipation, Hyperprolactinemia, Galactorrhea    - Repeat prolactin level on Monday (4/24), will then determine if brain MRI is warranted in the context of prolonged hyperprolactinemia/galactorrhea.    - Pantoprazole 40 mg   - Senna PRN    - No consult    Psychosocial stressors: has h/o assault and possible outstanding charges that have limited her ability to secure housing and employment    Legal: Committed - provisional discharge revoked. [facility] for Risperdal, Zyprexa, Invega, and Haldol.    Disposition: Pending clinical improvement, medication optimization, and determination of commitment status.    This note was scribed by Chavo Uriarte, MS3, for Dr. Zoltan Rutledge, Resident.    I have reviewed and edited the documentation recorded by the scribe.  This documentation accurately reflects the services I personally performed and treatment decisions made by me in consultation with the attending physician.    Zoltan Rutledge MD  Psychiatry Resident, PGY-1       Attestation:  I, Celso Mujica, personally performed  an examination of this patient on April 21, 2017 and I have reviewed the resident's documentation.  I have edited the note to reflect all relevant changes. I agree with resident findings and plan in this resident note.  I have reviewed all vitals and laboratory findings.  Celso Mujica

## 2017-04-21 NOTE — PROGRESS NOTES
Pt was in bed on and off all day. Pt says she sedated due to meds. Pt reports not manic sx. Attended half of OT group. Ate meals, did not shower or brush teeth.     04/21/17 1507   Behavioral Health   Hallucinations denies / not responding to hallucinations   Thinking poor concentration   Orientation person: oriented;place: oriented   Memory baseline memory   Insight insight appropriate to situation   Judgement impaired   Eye Contact at examiner   Affect blunted, flat   Mood mood is calm   Physical Appearance/Attire disheveled   Hygiene neglected grooming - unclean body, hair, teeth   Suicidality other (see comments)  (denies)   Self Injury other (see comment)  (denies)   Activity withdrawn   Speech clear;coherent   Medication Sensitivity sedation   Psychomotor / Gait steady;balanced   Psycho Education   Type of Intervention 1:1 intervention   Response participates, initiates socially appropriate   Hours 0.5   Treatment Detail check-in   Activities of Daily Living   Hygiene/Grooming prompts   Oral Hygiene independent   Dress street clothes   Laundry with supervision   Room Organization independent

## 2017-04-21 NOTE — PROGRESS NOTES
04/20/17 2200   Behavioral Health   Thoughts/Cognition (WDL) insight   Hallucinations denies / not responding to hallucinations   Thinking poor concentration   Orientation situation, disoriented;person: oriented   Memory baseline memory   Insight poor   Judgement impaired   Eye Contact at examiner   Affect full range affect;irritable   Mood mood is calm   Physical Appearance/Attire attire appropriate to age and situation   Hygiene neglected grooming - unclean body, hair, teeth   Suicidality other (see comments)   Activity isolative;withdrawn   Speech clear;coherent   Medication Sensitivity no stated side effects   Psychomotor / Gait agitated;balanced   Coping/Psychosocial   Verbalized Emotional State acceptance   Plan Of Care Reviewed With patient   Patient Agreement with Plan of Care agrees   Psycho Education   Type of Intervention 1:1 intervention   Response participates with encouragement   Hours 0.5   Activities of Daily Living   Hygiene/Grooming independent   Oral Hygiene independent   Dress independent   Laundry with supervision   Room Organization independent   Activity   Activity Level of Assistance independent     Patient spent the evening on the phone . She had to be redirected multiple time, to low her voice down. She ate dinner and snack with common area . Mood was calm and quiet . Affect was tense and blunted.

## 2017-04-21 NOTE — PLAN OF CARE
Problem: General Plan of Care (Inpatient Behavioral)  Goal: Team Discussion  Team Plan:   BEHAVIORAL TEAM DISCUSSION     Continued Stay Criteria/Rationale: symptoms of pyschosis continue - medication management ongoing - patient legal status is committed with [facility] order. Provisional discharge planning ongoing pending clinical stabilization.   Plan: [facility] to continue to coordinate with patient and ACT team for discharge planning, refer to IRTS placement  Plan per psychiatry phsycian progress note 4/21/17:  Principal Psychiatric Diagnosis: Schizoaffective Disorder, bipolar type  - Continue lithium 600 mg BID  - Cogentin 0.5 mg BID PRN  - Continue olanzapine 5 mg BID (switch from risperidone due to concerns for hyperprolactinemia/galactorrhea)  - Repeat prolactin level on Monday (4/24), will then determine if brain MRI is warranted in the context of prolonged hyperprolactinemia/galactorrhea.     - Benadryl 50 mg/Haldol 5 mg/Ativan 2 mg IM PRN available for agitation associated w/ psychosis or julio     Secondary Psychiatric Diagnosis: Cocaine Use Disorder, Benzodiazepine Use Disorder, Tobacco Use Disorder  - Thiamine 100 mg   - Multivitamin  - Folic acid 1 mg  - Nicotine replacement     Medical Diagnoses: Asthma, Ventral abdominal hernia, Constipation, Hyperprolactinemia, Galactorrhea  - Repeat prolactin level on Monday (4/24), will then determine if brain MRI is warranted in the context of prolonged hyperprolactinemia/galactorrhea.  - Pantoprazole 40 mg   - Senna PRN  - No consult     Legal: Committed - provisional discharge revoked. [facility] for Risperdal, Zyprexa, Invega, and Haldol.      Behavioral Orders   Procedures     Assault precautions     Code 1 - Restrict to Unit     Elopement precautions     Routine Programming       As clinically indicated     Self Injury Precaution     Single Room     Status 15       Every 15 minutes.     Status Individual Observation       Order Specific Question:  AT NIGHT       Answer:   Distance and Exceptions       Order Specific Question:  Distance       Answer:  at the door of the patient's room       Order Specific Question:  Exceptions       Answer:  bathroom and shower      Participants:   Celso Mujica MD, PhD-Attending Psychiatrist  Zoltan Rutledge MD - PGY-1 Psychiatry Resident  Roxanna Lopez, MSW, [facility] Clinical Treatment Coordinator  Audelia Crawley, DAMION Machuca, MS,OTR/L  Chavo Uriarte, MS3  Summary/Recommendation: see above  Medical/Physical: see medical diagnoses plan as noted above  Progress: progress towards goal functioning is slow - improving since admission.

## 2017-04-22 PROCEDURE — 12400001 ZZH R&B MH UMMC

## 2017-04-22 PROCEDURE — 25000132 ZZH RX MED GY IP 250 OP 250 PS 637: Performed by: STUDENT IN AN ORGANIZED HEALTH CARE EDUCATION/TRAINING PROGRAM

## 2017-04-22 PROCEDURE — 25000132 ZZH RX MED GY IP 250 OP 250 PS 637: Performed by: PSYCHIATRY & NEUROLOGY

## 2017-04-22 RX ADMIN — NICOTINE POLACRILEX 4 MG: 2 GUM, CHEWING ORAL at 18:21

## 2017-04-22 RX ADMIN — LITHIUM CARBONATE 600 MG: 300 TABLET, EXTENDED RELEASE ORAL at 09:42

## 2017-04-22 RX ADMIN — FOLIC ACID 1 MG: 1 TABLET ORAL at 09:42

## 2017-04-22 RX ADMIN — NICOTINE POLACRILEX 4 MG: 2 GUM, CHEWING ORAL at 12:51

## 2017-04-22 RX ADMIN — LORAZEPAM 2 MG: 2 TABLET ORAL at 13:19

## 2017-04-22 RX ADMIN — OLANZAPINE 5 MG: 5 TABLET, FILM COATED ORAL at 21:44

## 2017-04-22 RX ADMIN — NICOTINE POLACRILEX 4 MG: 2 GUM, CHEWING ORAL at 19:38

## 2017-04-22 RX ADMIN — PANTOPRAZOLE SODIUM 40 MG: 40 TABLET, DELAYED RELEASE ORAL at 09:41

## 2017-04-22 RX ADMIN — HALOPERIDOL 5 MG: 5 TABLET ORAL at 13:18

## 2017-04-22 RX ADMIN — LITHIUM CARBONATE 600 MG: 300 TABLET, EXTENDED RELEASE ORAL at 21:44

## 2017-04-22 RX ADMIN — MULTIPLE VITAMINS W/ MINERALS TAB 1 TABLET: TAB at 09:42

## 2017-04-22 RX ADMIN — DIPHENHYDRAMINE HYDROCHLORIDE 50 MG: 50 CAPSULE ORAL at 13:18

## 2017-04-22 RX ADMIN — OLANZAPINE 5 MG: 5 TABLET, FILM COATED ORAL at 09:42

## 2017-04-22 ASSESSMENT — ACTIVITIES OF DAILY LIVING (ADL)
DRESS: STREET CLOTHES
ORAL_HYGIENE: PROMPTS
GROOMING: PROMPTS

## 2017-04-22 NOTE — PLAN OF CARE
Problem: Psychotic Symptoms  Goal: Psychotic Symptoms  Signs and symptoms of listed problems will be absent or manageable.   Outcome: Improving  Pt was more conversational during approach this pm,she talked about missing her 2 children and the father of the youngest child,she states that she is ok with going to an irts but would like one in [place] if possible,she looks forward to a visit from her mother tomorrow,she plans on returning to the work force in the future,she presents as lethargic and remains med compliant,no mention of mental health sx this pm

## 2017-04-22 NOTE — PLAN OF CARE
Problem: General Plan of Care (Inpatient Behavioral)  Goal: Individualization/Patient Specific Goal (IP Behavioral)  The patient and/or their representative will achieve their patient-specific goals related to the plan of care.    The patient-specific goals include:   Illness Management Recovery model:  Taking Action.     Patient identified the following actions they can take when early warning signs are present in order to prevent relapse:     1.  2.  3.        Comments:   No discussion of imr topic other than taking action toward complying with treatment plan and goals for the future at this time

## 2017-04-22 NOTE — PLAN OF CARE
"Problem: Psychotic Symptoms  Goal: Psychotic Symptoms  Signs and symptoms of listed problems will be absent or manageable.   Daisy came to med window and asked for Benadryl, Haldol and Ativan \"Because of the anxiety, agitation and thoughts.  Yes, it works really well and I like it (these meds).  If I don't take them - yes I will act out and - yes this is so that I don't (act out).\".  She nodded twice for affirmation.  Prior to this she had eyes very wide and she had been standing with a back sway chest out pose while speaking with male peer.  During lunch she seemed unaware that she had food on her mouth and chin as she ate.      "

## 2017-04-22 NOTE — PROGRESS NOTES
"Pt sleeping most of shift, ate breakfast and lunch, did not shower or attend community mtg. Pt appears sedated while walking in hallway- unable to keep eyes open at times. When this writer checked in with her at 2 pm, pt brightened and said her mood was at a \"9\" on a 10 point scale. Pt says her goal for the day is to shower and change her bedding.      04/22/17 1430   Behavioral Health   Hallucinations denies / not responding to hallucinations   Thinking poor concentration   Orientation place: oriented;date: oriented   Memory baseline memory   Insight insight appropriate to situation   Judgement impaired   Eye Contact at examiner   Affect full range affect   Mood mood is calm   Physical Appearance/Attire disheveled   Hygiene neglected grooming - unclean body, hair, teeth   Suicidality other (see comments)  (denies)   Self Injury other (see comment)  (denies)   Activity isolative;withdrawn   Speech clear;coherent   Medication Sensitivity sedation   Psychomotor / Gait balanced;steady;slow   Activities of Daily Living   Hygiene/Grooming prompts   Oral Hygiene prompts   Dress street clothes   Room Organization prompts     "

## 2017-04-23 PROCEDURE — 25000132 ZZH RX MED GY IP 250 OP 250 PS 637: Performed by: STUDENT IN AN ORGANIZED HEALTH CARE EDUCATION/TRAINING PROGRAM

## 2017-04-23 PROCEDURE — 97150 GROUP THERAPEUTIC PROCEDURES: CPT | Mod: GO

## 2017-04-23 PROCEDURE — 25000132 ZZH RX MED GY IP 250 OP 250 PS 637: Performed by: PSYCHIATRY & NEUROLOGY

## 2017-04-23 PROCEDURE — 12400001 ZZH R&B MH UMMC

## 2017-04-23 RX ADMIN — NICOTINE POLACRILEX 4 MG: 2 GUM, CHEWING ORAL at 13:15

## 2017-04-23 RX ADMIN — PANTOPRAZOLE SODIUM 40 MG: 40 TABLET, DELAYED RELEASE ORAL at 12:20

## 2017-04-23 RX ADMIN — NICOTINE POLACRILEX 4 MG: 2 GUM, CHEWING ORAL at 14:25

## 2017-04-23 RX ADMIN — NICOTINE POLACRILEX 4 MG: 2 GUM, CHEWING ORAL at 12:21

## 2017-04-23 RX ADMIN — LORAZEPAM 2 MG: 2 TABLET ORAL at 17:20

## 2017-04-23 RX ADMIN — DIPHENHYDRAMINE HYDROCHLORIDE 50 MG: 50 CAPSULE ORAL at 17:20

## 2017-04-23 RX ADMIN — HALOPERIDOL 5 MG: 5 TABLET ORAL at 17:20

## 2017-04-23 RX ADMIN — NICOTINE POLACRILEX 4 MG: 2 GUM, CHEWING ORAL at 17:22

## 2017-04-23 RX ADMIN — LITHIUM CARBONATE 600 MG: 300 TABLET, EXTENDED RELEASE ORAL at 12:20

## 2017-04-23 RX ADMIN — FOLIC ACID 1 MG: 1 TABLET ORAL at 12:19

## 2017-04-23 RX ADMIN — OLANZAPINE 5 MG: 5 TABLET, FILM COATED ORAL at 12:20

## 2017-04-23 RX ADMIN — LITHIUM CARBONATE 600 MG: 300 TABLET, EXTENDED RELEASE ORAL at 21:37

## 2017-04-23 RX ADMIN — NICOTINE POLACRILEX 4 MG: 2 GUM, CHEWING ORAL at 18:18

## 2017-04-23 RX ADMIN — MULTIPLE VITAMINS W/ MINERALS TAB 1 TABLET: TAB at 12:20

## 2017-04-23 RX ADMIN — OLANZAPINE 5 MG: 5 TABLET, FILM COATED ORAL at 21:37

## 2017-04-23 ASSESSMENT — ACTIVITIES OF DAILY LIVING (ADL)
DRESS: PROMPTS
GROOMING: PROMPTS
ORAL_HYGIENE: PROMPTS
LAUNDRY: WITH SUPERVISION

## 2017-04-23 NOTE — PROGRESS NOTES
Daisy  participated in OT clinic this afternoon and was able to initiate task, follow through with plan and ask for help as needed.  Focused on her work, finished quickly. Danced to a song of her choice. Socrates.

## 2017-04-23 NOTE — PROGRESS NOTES
04/22/17 2100   Behavioral Health   Hallucinations appears responding   Thinking poor concentration;confused   Orientation person: oriented;place: oriented   Memory baseline memory   Insight insight appropriate to situation   Judgement impaired   Eye Contact at examiner   Affect blunted, flat   Mood mood is calm   Physical Appearance/Attire neat;attire appropriate to age and situation   Hygiene well groomed   Suicidality other (see comments)  (Pt denies)   Self Injury other (see comment)  (Pt denies)   Activity withdrawn;isolative   Speech clear;coherent   Medication Sensitivity no stated side effects   Psychomotor / Gait balanced;steady     Pt reports taking shower and dressing as goals for the day. Pt states that she's feeling good little anxious about mom visiting tomorrow to bring some clothes. She denies SI and SIB. Pt further reports having so much appetite and feels she is overeating. Her sleep is good as well. Pt also reports not attending groups. Pt was observed being isolative and taking naps in-between meals. She was pleasant and cooperative.

## 2017-04-24 LAB
LITHIUM SERPL-SCNC: 0.9 MMOL/L (ref 0.6–1.2)
PROLACTIN SERPL-MCNC: 196 UG/L (ref 3–27)

## 2017-04-24 PROCEDURE — 25000132 ZZH RX MED GY IP 250 OP 250 PS 637: Performed by: STUDENT IN AN ORGANIZED HEALTH CARE EDUCATION/TRAINING PROGRAM

## 2017-04-24 PROCEDURE — 80178 ASSAY OF LITHIUM: CPT | Performed by: STUDENT IN AN ORGANIZED HEALTH CARE EDUCATION/TRAINING PROGRAM

## 2017-04-24 PROCEDURE — 25000132 ZZH RX MED GY IP 250 OP 250 PS 637: Performed by: PSYCHIATRY & NEUROLOGY

## 2017-04-24 PROCEDURE — 99232 SBSQ HOSP IP/OBS MODERATE 35: CPT | Mod: GC | Performed by: PSYCHIATRY & NEUROLOGY

## 2017-04-24 PROCEDURE — 84146 ASSAY OF PROLACTIN: CPT | Performed by: STUDENT IN AN ORGANIZED HEALTH CARE EDUCATION/TRAINING PROGRAM

## 2017-04-24 PROCEDURE — 12400001 ZZH R&B MH UMMC

## 2017-04-24 PROCEDURE — 97150 GROUP THERAPEUTIC PROCEDURES: CPT | Mod: GO

## 2017-04-24 PROCEDURE — 90853 GROUP PSYCHOTHERAPY: CPT

## 2017-04-24 PROCEDURE — 36415 COLL VENOUS BLD VENIPUNCTURE: CPT | Performed by: STUDENT IN AN ORGANIZED HEALTH CARE EDUCATION/TRAINING PROGRAM

## 2017-04-24 RX ADMIN — NICOTINE POLACRILEX 4 MG: 2 GUM, CHEWING ORAL at 12:51

## 2017-04-24 RX ADMIN — LITHIUM CARBONATE 600 MG: 300 TABLET, EXTENDED RELEASE ORAL at 20:34

## 2017-04-24 RX ADMIN — PANTOPRAZOLE SODIUM 40 MG: 40 TABLET, DELAYED RELEASE ORAL at 11:12

## 2017-04-24 RX ADMIN — NICOTINE POLACRILEX 4 MG: 2 GUM, CHEWING ORAL at 19:33

## 2017-04-24 RX ADMIN — NICOTINE POLACRILEX 4 MG: 2 GUM, CHEWING ORAL at 20:33

## 2017-04-24 RX ADMIN — NICOTINE POLACRILEX 4 MG: 2 GUM, CHEWING ORAL at 11:12

## 2017-04-24 RX ADMIN — OLANZAPINE 5 MG: 5 TABLET, FILM COATED ORAL at 11:11

## 2017-04-24 RX ADMIN — OLANZAPINE 5 MG: 5 TABLET, FILM COATED ORAL at 20:34

## 2017-04-24 RX ADMIN — LITHIUM CARBONATE 600 MG: 300 TABLET, EXTENDED RELEASE ORAL at 11:11

## 2017-04-24 RX ADMIN — NICOTINE POLACRILEX 4 MG: 2 GUM, CHEWING ORAL at 18:20

## 2017-04-24 RX ADMIN — NICOTINE POLACRILEX 4 MG: 2 GUM, CHEWING ORAL at 14:27

## 2017-04-24 RX ADMIN — FOLIC ACID 1 MG: 1 TABLET ORAL at 11:11

## 2017-04-24 RX ADMIN — MULTIPLE VITAMINS W/ MINERALS TAB 1 TABLET: TAB at 11:11

## 2017-04-24 ASSESSMENT — ACTIVITIES OF DAILY LIVING (ADL)
ORAL_HYGIENE: INDEPENDENT
GROOMING: INDEPENDENT
DRESS: STREET CLOTHES
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
## 2017-04-24 NOTE — PLAN OF CARE
Problem: Psychotic Symptoms  Goal: Psychotic Symptoms  Signs and symptoms of listed problems will be absent or manageable.   Daisy had a late lithium draw with results wnl.  Lithium oral was held until draw.  Her mood accelerated again today when around an energetic male peer.  She started to speak faster, louder, and make rhymes about nicotine. She did receive her meds and was willing to come out of private room to share room with another peer.

## 2017-04-24 NOTE — PROGRESS NOTES
04/24/17 1126   Behavioral Health   Hallucinations denies / not responding to hallucinations   Thinking distractable;poor concentration   Orientation person: oriented;place: oriented;date: oriented   Insight insight appropriate to situation   Judgement impaired   Eye Contact at examiner   Affect blunted, flat;tense  (at times )   Mood anxious;mood is calm  (at times)   Physical Appearance/Attire attire appropriate to age and situation   Hygiene (fair )   Suicidality (denies )   Self Injury (none)   Activity withdrawn   Speech clear;coherent   Psychomotor / Gait balanced;steady   Activities of Daily Living   Hygiene/Grooming independent   Oral Hygiene independent   Dress street clothes   Room Organization independent   Pt. Slept in this am. Pt did get up and attend ot group. Pt appears calmer overall. Pt describes mood as stable, denies problems with depression and anxiety. Pt denies aud halls, denies active SI or safety concerns. Pt pleasant and cooperative. Pt states she is waiting on group home placement.

## 2017-04-24 NOTE — PLAN OF CARE
Problem: Psychotic Symptoms  Goal: Psychotic Symptoms  Signs and symptoms of listed problems will be absent or manageable.   Held med lithium for blood draw and will see if order changes.
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
## 2017-04-24 NOTE — PLAN OF CARE
"Problem: General Plan of Care (Inpatient Behavioral)  Goal: Individualization/Patient Specific Goal (IP Behavioral)  The patient and/or their representative will achieve their patient-specific goals related to the plan of care.    The patient-specific goals include:   Illness Management Recovery model:  Self-Reflection & Planning.     Assessed patient's progress completing forms related to Illness Management Recovery (including Personal Plan of Care, Adult Coping Plan, and My Support and Coping Plan) and assisted as needed.     Encouraged patient to continue to consider triggers, wellness strategies, early warning signs, feedback from others, actions to take to prevent relapse, and coping strategies as part of a plan to remain well after leaving the hospital.    Comments:   Pt has been reflective this pm and firmly states that she is \"done partying\" and wants to settle into an apt with her kids and obtain a job  "

## 2017-04-24 NOTE — PROGRESS NOTES
"SPIRITUAL HEALTH SERVICES Progress Note  [facility] ([facility] [place]) Station 22       DATA:    I visited Daisy this morning but pt was asleep. In my second visit attempt pt still sleep but I tried to wake her up to speak with me but pt said, \"I don't want to talk now. I want to sleep now but I want to talk to you some other day. Please come back to see me again.\" I let the pt to sleep and promised her that I will visit her again.       INTERVENTION:    I info about the [facility] and my role as the unit .       OUTCOME:    Pt is tired and want to sleep rather than to talk.       PLAN:    I will visit the pt again during this week.    Vandana Han M.Div. (Alem), M.Th., D.Min., [facility]  Staff   Pager [phone]      "

## 2017-04-25 ENCOUNTER — ANESTHESIA EVENT (OUTPATIENT)
Dept: BEHAVIORAL HEALTH | Facility: CLINIC | Age: 32
End: 2017-04-25

## 2017-04-25 ENCOUNTER — ANESTHESIA (OUTPATIENT)
Dept: BEHAVIORAL HEALTH | Facility: CLINIC | Age: 32
End: 2017-04-25

## 2017-04-25 ENCOUNTER — APPOINTMENT (OUTPATIENT)
Dept: MRI IMAGING | Facility: CLINIC | Age: 32
DRG: 885 | End: 2017-04-25
Attending: PSYCHIATRY & NEUROLOGY
Payer: COMMERCIAL

## 2017-04-25 LAB
ACTH PLAS-MCNC: 47 PG/ML
CORTIS SERPL-MCNC: 12.2 UG/DL (ref 4–22)
FSH SERPL-ACNC: 3.6 IU/L
IGF-I BLD-MCNC: 176 NG/ML (ref 85–283)
LH SERPL-ACNC: 1 IU/L
PROLACTIN SERPL-MCNC: 182 UG/L (ref 3–27)
T4 FREE SERPL-MCNC: 0.89 NG/DL (ref 0.76–1.46)
TSH SERPL DL<=0.05 MIU/L-ACNC: 3.07 MU/L (ref 0.4–4)

## 2017-04-25 PROCEDURE — 84439 ASSAY OF FREE THYROXINE: CPT | Performed by: STUDENT IN AN ORGANIZED HEALTH CARE EDUCATION/TRAINING PROGRAM

## 2017-04-25 PROCEDURE — 83001 ASSAY OF GONADOTROPIN (FSH): CPT | Performed by: STUDENT IN AN ORGANIZED HEALTH CARE EDUCATION/TRAINING PROGRAM

## 2017-04-25 PROCEDURE — 25000132 ZZH RX MED GY IP 250 OP 250 PS 637: Performed by: PSYCHIATRY & NEUROLOGY

## 2017-04-25 PROCEDURE — 12400001 ZZH R&B MH UMMC

## 2017-04-25 PROCEDURE — 97150 GROUP THERAPEUTIC PROCEDURES: CPT | Mod: GO

## 2017-04-25 PROCEDURE — 25000132 ZZH RX MED GY IP 250 OP 250 PS 637: Performed by: STUDENT IN AN ORGANIZED HEALTH CARE EDUCATION/TRAINING PROGRAM

## 2017-04-25 PROCEDURE — 84146 ASSAY OF PROLACTIN: CPT | Performed by: STUDENT IN AN ORGANIZED HEALTH CARE EDUCATION/TRAINING PROGRAM

## 2017-04-25 PROCEDURE — 82533 TOTAL CORTISOL: CPT | Performed by: STUDENT IN AN ORGANIZED HEALTH CARE EDUCATION/TRAINING PROGRAM

## 2017-04-25 PROCEDURE — 84305 ASSAY OF SOMATOMEDIN: CPT | Performed by: STUDENT IN AN ORGANIZED HEALTH CARE EDUCATION/TRAINING PROGRAM

## 2017-04-25 PROCEDURE — 25500064 ZZH RX 255 OP 636: Performed by: PSYCHIATRY & NEUROLOGY

## 2017-04-25 PROCEDURE — 25000128 H RX IP 250 OP 636: Performed by: PSYCHIATRY & NEUROLOGY

## 2017-04-25 PROCEDURE — 36415 COLL VENOUS BLD VENIPUNCTURE: CPT | Performed by: STUDENT IN AN ORGANIZED HEALTH CARE EDUCATION/TRAINING PROGRAM

## 2017-04-25 PROCEDURE — 40000671 ZZH STATISTIC ANESTHESIA CASE

## 2017-04-25 PROCEDURE — 70553 MRI BRAIN STEM W/O & W/DYE: CPT

## 2017-04-25 PROCEDURE — 84443 ASSAY THYROID STIM HORMONE: CPT | Performed by: STUDENT IN AN ORGANIZED HEALTH CARE EDUCATION/TRAINING PROGRAM

## 2017-04-25 PROCEDURE — 83002 ASSAY OF GONADOTROPIN (LH): CPT | Performed by: STUDENT IN AN ORGANIZED HEALTH CARE EDUCATION/TRAINING PROGRAM

## 2017-04-25 PROCEDURE — A9585 GADOBUTROL INJECTION: HCPCS | Performed by: PSYCHIATRY & NEUROLOGY

## 2017-04-25 PROCEDURE — 82024 ASSAY OF ACTH: CPT | Performed by: STUDENT IN AN ORGANIZED HEALTH CARE EDUCATION/TRAINING PROGRAM

## 2017-04-25 RX ORDER — GADOBUTROL 604.72 MG/ML
7.5 INJECTION INTRAVENOUS ONCE
Status: COMPLETED | OUTPATIENT
Start: 2017-04-25 | End: 2017-04-25

## 2017-04-25 RX ADMIN — DIPHENHYDRAMINE HYDROCHLORIDE 50 MG: 50 CAPSULE ORAL at 22:02

## 2017-04-25 RX ADMIN — NICOTINE POLACRILEX 4 MG: 2 GUM, CHEWING ORAL at 18:33

## 2017-04-25 RX ADMIN — NICOTINE POLACRILEX 4 MG: 2 GUM, CHEWING ORAL at 21:39

## 2017-04-25 RX ADMIN — PANTOPRAZOLE SODIUM 40 MG: 40 TABLET, DELAYED RELEASE ORAL at 10:32

## 2017-04-25 RX ADMIN — MULTIPLE VITAMINS W/ MINERALS TAB 1 TABLET: TAB at 10:32

## 2017-04-25 RX ADMIN — FOLIC ACID 1 MG: 1 TABLET ORAL at 10:31

## 2017-04-25 RX ADMIN — SODIUM CHLORIDE 50 ML: 9 INJECTION, SOLUTION INTRAVENOUS at 17:04

## 2017-04-25 RX ADMIN — OLANZAPINE 5 MG: 5 TABLET, FILM COATED ORAL at 19:46

## 2017-04-25 RX ADMIN — NICOTINE POLACRILEX 4 MG: 2 GUM, CHEWING ORAL at 13:50

## 2017-04-25 RX ADMIN — LORAZEPAM 2 MG: 2 TABLET ORAL at 22:02

## 2017-04-25 RX ADMIN — HALOPERIDOL 5 MG: 5 TABLET ORAL at 22:02

## 2017-04-25 RX ADMIN — OLANZAPINE 5 MG: 5 TABLET, FILM COATED ORAL at 10:32

## 2017-04-25 RX ADMIN — LITHIUM CARBONATE 600 MG: 300 TABLET, EXTENDED RELEASE ORAL at 10:31

## 2017-04-25 RX ADMIN — NICOTINE POLACRILEX 4 MG: 2 GUM, CHEWING ORAL at 19:46

## 2017-04-25 RX ADMIN — NICOTINE POLACRILEX 4 MG: 2 GUM, CHEWING ORAL at 12:45

## 2017-04-25 RX ADMIN — LITHIUM CARBONATE 600 MG: 300 TABLET, EXTENDED RELEASE ORAL at 19:46

## 2017-04-25 RX ADMIN — GADOBUTROL 7.5 ML: 604.72 INJECTION INTRAVENOUS at 17:04

## 2017-04-25 NOTE — PROGRESS NOTES
04/25/17 1300   Behavioral Health   Hallucinations denies / not responding to hallucinations   Thinking poor concentration   Orientation person: oriented;place: oriented   Memory baseline memory   Insight poor   Judgement impaired   Eye Contact at examiner   Affect blunted, flat   Mood mood is calm   Physical Appearance/Attire untidy   Hygiene neglected grooming - unclean body, hair, teeth   Suicidality (denies)   Self Injury (denies)   Activity isolative;withdrawn   Speech clear;coherent   Medication Sensitivity no observed side effects   Psychomotor / Gait balanced;steady   Psycho Education   Type of Intervention 1:1 intervention   Hours 0.5   Treatment Detail check in   Pt was isolative and withdraw, slept and rested in room for the majority of the shift.  Pt  reports no SI/SIB, calm and cooperative, no medication side effects reported.

## 2017-04-25 NOTE — PROGRESS NOTES
Patient was visible in milieu, made several phone calls later in shift. Patient was supposed to get an MRI with contrast but all attempts to start an IV were unsuccessful. Patient was frustrated about having so many IV attempts, but was ultimately accepting. Affect was full range, tense at times. Mood was calm. No SI, hallucinations, med side effects noted.     04/24/17 2038   Behavioral Health   Hallucinations denies / not responding to hallucinations   Thinking distractable;poor concentration   Orientation person: oriented;place: oriented   Memory baseline memory   Insight poor   Judgement impaired   Eye Contact at examiner   Affect tense;full range affect   Mood mood is calm   Physical Appearance/Attire untidy;disheveled   Hygiene neglected grooming - unclean body, hair, teeth   Suicidality (denies)   Self Injury (denies)   Activity restless   Speech flight of ideas;clear   Medication Sensitivity no observed side effects   Psychomotor / Gait balanced;steady

## 2017-04-25 NOTE — PROGRESS NOTES
"SPIRITUAL HEALTH SERVICES Progress Note  [facility] ([facility] [place]) Station 22       DATA:    I visited Daisy this after noon per follow up visit. Pt is Alevism but want to pray like Mandaen. Pt said, \"I pray to Allah but He is not listening me. My aunt had open heart surgery and I am worried a lot for her. Can you pray for my aunt?\" Pt looks a kind of confused regarding religions. I tried to help her to stay focus on what she know and not confused on what she didn't know.        INTERVENTION:    I shared the positive and hope giving message from the word of God and at then end I offered her a prayer, as she requested me to do.       OUTCOME:    Pt appreciated the prayer that I did for her aunt and her current needs.       PLAN:    I will remian open to provide spiritual care as needed.    Vandana Han M.Div. (Alem), M.Th., D.Min., [facility]  Staff   Pager [phone]      "
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
## 2017-04-25 NOTE — PROGRESS NOTES
Patient has an MRI schedule at 18:00, since previous start IV attempt was unsuccessful, anesthesia will do IV access insertion around 1300, pt is notified the plan and agreed.

## 2017-04-26 PROCEDURE — 12400001 ZZH R&B MH UMMC

## 2017-04-26 PROCEDURE — 25000132 ZZH RX MED GY IP 250 OP 250 PS 637: Performed by: STUDENT IN AN ORGANIZED HEALTH CARE EDUCATION/TRAINING PROGRAM

## 2017-04-26 PROCEDURE — 25000132 ZZH RX MED GY IP 250 OP 250 PS 637: Performed by: PSYCHIATRY & NEUROLOGY

## 2017-04-26 PROCEDURE — 99231 SBSQ HOSP IP/OBS SF/LOW 25: CPT | Mod: GC | Performed by: PSYCHIATRY & NEUROLOGY

## 2017-04-26 RX ADMIN — LITHIUM CARBONATE 600 MG: 300 TABLET, EXTENDED RELEASE ORAL at 22:14

## 2017-04-26 RX ADMIN — DIPHENHYDRAMINE HYDROCHLORIDE 50 MG: 50 CAPSULE ORAL at 23:37

## 2017-04-26 RX ADMIN — LORAZEPAM 2 MG: 2 TABLET ORAL at 23:38

## 2017-04-26 RX ADMIN — NICOTINE POLACRILEX 4 MG: 2 GUM, CHEWING ORAL at 20:05

## 2017-04-26 RX ADMIN — LITHIUM CARBONATE 600 MG: 300 TABLET, EXTENDED RELEASE ORAL at 12:02

## 2017-04-26 RX ADMIN — NICOTINE POLACRILEX 4 MG: 2 GUM, CHEWING ORAL at 22:14

## 2017-04-26 RX ADMIN — NICOTINE POLACRILEX 4 MG: 2 GUM, CHEWING ORAL at 11:51

## 2017-04-26 RX ADMIN — NICOTINE POLACRILEX 4 MG: 2 GUM, CHEWING ORAL at 18:38

## 2017-04-26 RX ADMIN — OLANZAPINE 5 MG: 5 TABLET, FILM COATED ORAL at 22:14

## 2017-04-26 RX ADMIN — PANTOPRAZOLE SODIUM 40 MG: 40 TABLET, DELAYED RELEASE ORAL at 12:01

## 2017-04-26 RX ADMIN — MULTIPLE VITAMINS W/ MINERALS TAB 1 TABLET: TAB at 12:02

## 2017-04-26 RX ADMIN — OLANZAPINE 5 MG: 5 TABLET, FILM COATED ORAL at 12:02

## 2017-04-26 RX ADMIN — HALOPERIDOL 5 MG: 5 TABLET ORAL at 23:37

## 2017-04-26 RX ADMIN — FOLIC ACID 1 MG: 1 TABLET ORAL at 12:02

## 2017-04-26 ASSESSMENT — ACTIVITIES OF DAILY LIVING (ADL)
ORAL_HYGIENE: INDEPENDENT
LAUNDRY: WITH SUPERVISION
GROOMING: INDEPENDENT
DRESS: STREET CLOTHES
DRESS: INDEPENDENT
ORAL_HYGIENE: PROMPTS
GROOMING: PROMPTS
LAUNDRY: UNABLE TO COMPLETE

## 2017-04-26 NOTE — PLAN OF CARE
Problem: Psychotic Symptoms  Goal: Psychotic Symptoms  Signs and symptoms of listed problems will be absent or manageable.   Outcome: Improving  Pt cooperative with MRI this afternoon . She was overheard arguing with her mother on the phone , delusional statements , states she had God in her causing her backpain and needing back surgery. Pt shuts her eyes and blinks a lot while talking .

## 2017-04-26 NOTE — PROGRESS NOTES
Pt woke up around noon and was visible in the milieu, restless. Pt was polite upon approach and social with peers. Pt stated she is feeling hopeful today, and hopes to ultimately go to a group home close to her mother and children. She also hopes to eventually get her own apartment. Pt denies hallucinations, SI/SIB, and medication side affects. Pt's affect was bright and she did her makeup, she attended a focus group and planes to attend the other groups later today.      04/26/17 1200 Behavioral Health   Hallucinations denies / not responding to hallucinations   Thinking distractable;poor concentration   Orientation person: oriented;place: oriented   Memory baseline memory   Insight poor   Judgement impaired   Eye Contact at examiner   Affect full range affect   Mood mood is calm   Physical Appearance/Attire untidy   Hygiene neglected grooming - unclean body, hair, teeth   Suicidality other (see comments)  (denies)   Self Injury other (see comment)  (denies)   Activity restless   Speech clear;coherent   Medication Sensitivity no stated side effects;no observed side effects   Psychomotor / Gait steady;balanced   Psycho Education   Type of Intervention 1:1 intervention   Response participates, initiates socially appropriate   Hours 0.5   Treatment Detail Check in    Activities of Daily Living   Hygiene/Grooming independent   Oral Hygiene independent   Dress independent   Laundry with supervision   Room Organization independent   Activity   Activity Level of Assistance independent
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
## 2017-04-27 PROCEDURE — 12400001 ZZH R&B MH UMMC

## 2017-04-27 PROCEDURE — 97150 GROUP THERAPEUTIC PROCEDURES: CPT | Mod: GO

## 2017-04-27 PROCEDURE — 25000132 ZZH RX MED GY IP 250 OP 250 PS 637: Performed by: PSYCHIATRY & NEUROLOGY

## 2017-04-27 PROCEDURE — 25000132 ZZH RX MED GY IP 250 OP 250 PS 637: Performed by: STUDENT IN AN ORGANIZED HEALTH CARE EDUCATION/TRAINING PROGRAM

## 2017-04-27 PROCEDURE — 90853 GROUP PSYCHOTHERAPY: CPT

## 2017-04-27 PROCEDURE — 99232 SBSQ HOSP IP/OBS MODERATE 35: CPT | Mod: GC | Performed by: PSYCHIATRY & NEUROLOGY

## 2017-04-27 RX ADMIN — LITHIUM CARBONATE 600 MG: 300 TABLET, EXTENDED RELEASE ORAL at 22:32

## 2017-04-27 RX ADMIN — MULTIPLE VITAMINS W/ MINERALS TAB 1 TABLET: TAB at 10:44

## 2017-04-27 RX ADMIN — FOLIC ACID 1 MG: 1 TABLET ORAL at 10:44

## 2017-04-27 RX ADMIN — DIPHENHYDRAMINE HYDROCHLORIDE 50 MG: 50 CAPSULE ORAL at 22:30

## 2017-04-27 RX ADMIN — NICOTINE POLACRILEX 4 MG: 2 GUM, CHEWING ORAL at 10:45

## 2017-04-27 RX ADMIN — OLANZAPINE 5 MG: 5 TABLET, FILM COATED ORAL at 22:29

## 2017-04-27 RX ADMIN — PANTOPRAZOLE SODIUM 40 MG: 40 TABLET, DELAYED RELEASE ORAL at 10:44

## 2017-04-27 RX ADMIN — LORAZEPAM 2 MG: 2 TABLET ORAL at 22:29

## 2017-04-27 RX ADMIN — NICOTINE POLACRILEX 4 MG: 2 GUM, CHEWING ORAL at 16:10

## 2017-04-27 RX ADMIN — HALOPERIDOL 5 MG: 5 TABLET ORAL at 22:30

## 2017-04-27 RX ADMIN — NICOTINE POLACRILEX 4 MG: 2 GUM, CHEWING ORAL at 17:27

## 2017-04-27 RX ADMIN — NICOTINE POLACRILEX 4 MG: 2 GUM, CHEWING ORAL at 22:29

## 2017-04-27 RX ADMIN — NICOTINE POLACRILEX 4 MG: 2 GUM, CHEWING ORAL at 14:09

## 2017-04-27 RX ADMIN — NICOTINE POLACRILEX 4 MG: 2 GUM, CHEWING ORAL at 12:21

## 2017-04-27 RX ADMIN — LITHIUM CARBONATE 600 MG: 300 TABLET, EXTENDED RELEASE ORAL at 10:44

## 2017-04-27 RX ADMIN — OLANZAPINE 5 MG: 5 TABLET, FILM COATED ORAL at 10:44

## 2017-04-27 ASSESSMENT — ACTIVITIES OF DAILY LIVING (ADL)
ORAL_HYGIENE: INDEPENDENT
DRESS: INDEPENDENT
GROOMING: INDEPENDENT
LAUNDRY: UNABLE TO COMPLETE
DRESS: STREET CLOTHES
ORAL_HYGIENE: INDEPENDENT
GROOMING: INDEPENDENT
LAUNDRY: WITH SUPERVISION

## 2017-04-27 NOTE — PROGRESS NOTES
Pt stayed in her room for the most part of the shift, but was restless and would go from room, to phone, to lounge, back to bed throughout evening shift.  Pt did not take a shower this shift and appeared to have neglected hygiene.  Pt was withdrawn to room for most part and did not attend evening groups.  Pt was calm but blunted and flat during shift.     04/26/17 2137   Behavioral Health   Hallucinations appears responding   Thinking distractable;poor concentration   Orientation place: oriented;date: oriented   Memory baseline memory   Insight poor   Judgement impaired   Eye Contact at examiner   Affect blunted, flat;full range affect   Mood mood is calm   Physical Appearance/Attire untidy   Hygiene neglected grooming - unclean body, hair, teeth   Suicidality other (see comments)  (denies)   Self Injury other (see comment)  (denies)   Activity withdrawn;restless   Speech coherent   Medication Sensitivity no stated side effects;no observed side effects   Psychomotor / Gait balanced;steady   Psycho Education   Type of Intervention structured groups   Response unavailable   Activities of Daily Living   Hygiene/Grooming prompts   Oral Hygiene prompts   Dress street clothes   Laundry unable to complete   Room Organization independent   Behavioral Health Interventions   Psychotic Symptoms maintain safety precautions;maintain safe secure environment;reality orientation;simple, clear language;decrease environmental stimulation;redirection of intrusive behaviors;redirection of aggressive behaviors;provide emotional support;establish therapeutic relationship   Social and Therapeutic Interventions (Psychotic Symptoms) encourage socialization with peers;encourage effective boundaries with peers;encourage participation in therapeutic groups and milieu activities

## 2017-04-27 NOTE — PROGRESS NOTES
Daisy  attended 1 of 2 OT groups today. Worked with good focus on a creative project of her choice. Pleasant and appreciative. Socializes casually with peers.
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
## 2017-04-27 NOTE — PLAN OF CARE
Problem: General Plan of Care (Inpatient Behavioral)  Goal: Team Discussion  Team Plan:   BEHAVIORAL TEAM DISCUSSION     Continued Stay Criteria/Rationale:  Patient is committed with [facility] - medication management and provisional discharge disposition planning on going.   Plan: continue with therapeutic milieu - monitoring of medication efficacy and potential side effects ongoing - CTC to continue working with ACT team on provisional discharge placement disposition.   Plan per psychiatry physician progress note:  Principal Psychiatric Diagnosis: Schizoaffective Disorder, bipolar type  - Continue lithium 600 mg BID  - Cogentin 0.5 mg BID PRN  - Continue olanzapine 5 mg BID (switch from risperidone due to concerns for hyperprolactinemia/galactorrhea)     - Benadryl 50 mg/Haldol 5 mg/Ativan 2 mg IM PRN available for agitation associated w/ psychosis or julio     Secondary Psychiatric Diagnosis: Cocaine Use Disorder, Benzodiazepine Use Disorder, Tobacco Use Disorder  - Thiamine 100 mg   - Multivitamin  - Folic acid 1 mg  - Nicotine replacement     Medical Diagnoses: Asthma, Ventral abdominal hernia, Constipation, Hyperprolactinemia, Galactorrhea     #Hyperprolactinemia  - Talked with endocrine fellow regarding ongoing hyperprolactinemia in context of neuroleptic use and history of documented microadenoma. MRI did not show evidence of adenoma at this time. After speaking with endocrine fellow, team did not feel patient needed to be seen during this hospitalization. Time will be scheduled for outpatient clinic follow-up.     - MRI w and w/o contrast did not show evidence of adenoma  - TSH, T4, Adrenal corticotropin, serum cortisol, lutropin, insulin growth factor-1 WNL  - FSH 3.6 (low)  - Repeat prolactin 182 (4/25)     #Hernia, constipation  - Pantoprazole 40 mg   - Senna PRN     - Patient had been [place] at [facility] [place] for elective ventral hernia repair- was canceled due to patient's previous psychiatric  hospitalization.     Participants:   Celso Mujica MD, PhD  Zoltan Rutledge MD - PGY-1 Psychiatry Resident  RICH Funes, [facility] Clinical Treatment Coordinator  Bell Darby RN    Summary/Recommendation: see above   Medical/Physical: see above   Progress: improving

## 2017-04-27 NOTE — PLAN OF CARE
Problem: Psychotic Symptoms  Goal: Psychotic Symptoms  Signs and symptoms of listed problems will be absent or manageable.   Outcome: Improving  Pt sleeps later in the morning. When she is up she is pleasant and appropriate. Appears at times to be responding to internal stimulation. She is med compliant. Denies SI and SIB. Bell Darby RN'

## 2017-04-27 NOTE — PROGRESS NOTES
Re ongoing provisional discharge placement planning     patient has been referred to multiple IRTS locations.     [facility] - asks that program is called back on May 8th to inquire about status of wait list and referral.     [facility] - call received today from director - acknowledging referral, stated sorry had yet to call to acknowledge will be contact anticipates next bed opening in May.     patient is on list at   [facility]  [facility] programs (      -  [facility]      - Count includes the [facility] [place]      - [facility].  Estevan Salcedo  [facility]      [facility] ([facility] - patient's ACT  Jyotsna has made referral for supportive housing via C[facility] program that does not require waiver for placement. c

## 2017-04-28 PROCEDURE — 25000132 ZZH RX MED GY IP 250 OP 250 PS 637: Performed by: STUDENT IN AN ORGANIZED HEALTH CARE EDUCATION/TRAINING PROGRAM

## 2017-04-28 PROCEDURE — 90853 GROUP PSYCHOTHERAPY: CPT

## 2017-04-28 PROCEDURE — 99232 SBSQ HOSP IP/OBS MODERATE 35: CPT | Mod: GC | Performed by: PSYCHIATRY & NEUROLOGY

## 2017-04-28 PROCEDURE — 25000132 ZZH RX MED GY IP 250 OP 250 PS 637: Performed by: PSYCHIATRY & NEUROLOGY

## 2017-04-28 PROCEDURE — 12400001 ZZH R&B MH UMMC

## 2017-04-28 RX ADMIN — LITHIUM CARBONATE 600 MG: 300 TABLET, EXTENDED RELEASE ORAL at 20:02

## 2017-04-28 RX ADMIN — PANTOPRAZOLE SODIUM 40 MG: 40 TABLET, DELAYED RELEASE ORAL at 09:18

## 2017-04-28 RX ADMIN — NICOTINE POLACRILEX 4 MG: 2 GUM, CHEWING ORAL at 20:16

## 2017-04-28 RX ADMIN — NICOTINE POLACRILEX 4 MG: 2 GUM, CHEWING ORAL at 18:32

## 2017-04-28 RX ADMIN — NICOTINE POLACRILEX 4 MG: 2 GUM, CHEWING ORAL at 17:41

## 2017-04-28 RX ADMIN — FOLIC ACID 1 MG: 1 TABLET ORAL at 09:18

## 2017-04-28 RX ADMIN — NICOTINE POLACRILEX 4 MG: 2 GUM, CHEWING ORAL at 09:18

## 2017-04-28 RX ADMIN — NICOTINE POLACRILEX 4 MG: 2 GUM, CHEWING ORAL at 16:18

## 2017-04-28 RX ADMIN — LITHIUM CARBONATE 600 MG: 300 TABLET, EXTENDED RELEASE ORAL at 09:18

## 2017-04-28 RX ADMIN — NICOTINE POLACRILEX 4 MG: 2 GUM, CHEWING ORAL at 14:53

## 2017-04-28 RX ADMIN — OLANZAPINE 5 MG: 5 TABLET, FILM COATED ORAL at 20:02

## 2017-04-28 RX ADMIN — MULTIPLE VITAMINS W/ MINERALS TAB 1 TABLET: TAB at 09:18

## 2017-04-28 RX ADMIN — OLANZAPINE 5 MG: 5 TABLET, FILM COATED ORAL at 09:18

## 2017-04-28 RX ADMIN — NICOTINE POLACRILEX 4 MG: 2 GUM, CHEWING ORAL at 13:43

## 2017-04-28 ASSESSMENT — ACTIVITIES OF DAILY LIVING (ADL)
ORAL_HYGIENE: INDEPENDENT
DRESS: STREET CLOTHES;INDEPENDENT
LAUNDRY: WITH SUPERVISION
GROOMING: INDEPENDENT
LAUNDRY: WITH SUPERVISION
ORAL_HYGIENE: INDEPENDENT
GROOMING: INDEPENDENT
DRESS: INDEPENDENT

## 2017-04-28 NOTE — PROGRESS NOTES
Pt was withdrawn and spent the majority of the morning sleeping in her room. Pt did not attend groups and stated that her medication was making her drowsy. Pt was social with her roommate, seen laughing and conversational with her. Pt denies hallucinations, and SI/SIB. She stated she is doing good today and that she had a good meeting with her ACT team nurse.      04/28/17 1000   Behavioral Health   Hallucinations denies / not responding to hallucinations   Thinking poor concentration;distractable   Orientation person: oriented;place: oriented   Memory baseline memory   Insight poor   Judgement impaired   Eye Contact at examiner   Affect full range affect   Mood mood is calm   Physical Appearance/Attire attire appropriate to age and situation   Hygiene well groomed   Suicidality other (see comments)  (denies )   Self Injury other (see comment)  (denies)   Activity other (see comment)  (visible in milieu/ sleeping in room )   Speech clear;coherent   Medication Sensitivity other (see comment)  (drowsy )   Psychomotor / Gait balanced;steady   Psycho Education   Type of Intervention 1:1 intervention   Response participates, initiates socially appropriate   Hours 0.5   Treatment Detail Check in    Activities of Daily Living   Hygiene/Grooming independent   Oral Hygiene independent   Dress street clothes;independent   Laundry with supervision   Room Organization independent   Activity   Activity Level of Assistance independent
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
## 2017-04-29 LAB — LITHIUM SERPL-SCNC: 0.9 MMOL/L (ref 0.6–1.2)

## 2017-04-29 PROCEDURE — 12400001 ZZH R&B MH UMMC

## 2017-04-29 PROCEDURE — 25000132 ZZH RX MED GY IP 250 OP 250 PS 637: Performed by: PSYCHIATRY & NEUROLOGY

## 2017-04-29 PROCEDURE — 25000132 ZZH RX MED GY IP 250 OP 250 PS 637: Performed by: STUDENT IN AN ORGANIZED HEALTH CARE EDUCATION/TRAINING PROGRAM

## 2017-04-29 PROCEDURE — 80178 ASSAY OF LITHIUM: CPT | Performed by: STUDENT IN AN ORGANIZED HEALTH CARE EDUCATION/TRAINING PROGRAM

## 2017-04-29 PROCEDURE — 36415 COLL VENOUS BLD VENIPUNCTURE: CPT | Performed by: STUDENT IN AN ORGANIZED HEALTH CARE EDUCATION/TRAINING PROGRAM

## 2017-04-29 RX ORDER — HYDROXYZINE HYDROCHLORIDE 25 MG/1
25 TABLET, FILM COATED ORAL EVERY 8 HOURS PRN
Status: DISCONTINUED | OUTPATIENT
Start: 2017-04-29 | End: 2017-05-04

## 2017-04-29 RX ORDER — LITHIUM CARBONATE 450 MG
900 TABLET, EXTENDED RELEASE ORAL EVERY EVENING
Status: DISCONTINUED | OUTPATIENT
Start: 2017-04-29 | End: 2017-05-08

## 2017-04-29 RX ORDER — LITHIUM CARBONATE 300 MG/1
600 TABLET, FILM COATED, EXTENDED RELEASE ORAL EVERY MORNING
Status: DISCONTINUED | OUTPATIENT
Start: 2017-04-30 | End: 2017-05-08

## 2017-04-29 RX ADMIN — LITHIUM CARBONATE 900 MG: 450 TABLET, EXTENDED RELEASE ORAL at 19:35

## 2017-04-29 RX ADMIN — DIPHENHYDRAMINE HYDROCHLORIDE 50 MG: 50 CAPSULE ORAL at 11:41

## 2017-04-29 RX ADMIN — OLANZAPINE 5 MG: 5 TABLET, FILM COATED ORAL at 09:15

## 2017-04-29 RX ADMIN — NICOTINE POLACRILEX 4 MG: 2 GUM, CHEWING ORAL at 18:23

## 2017-04-29 RX ADMIN — OLANZAPINE 5 MG: 5 TABLET, FILM COATED ORAL at 19:34

## 2017-04-29 RX ADMIN — FOLIC ACID 1 MG: 1 TABLET ORAL at 09:14

## 2017-04-29 RX ADMIN — MULTIPLE VITAMINS W/ MINERALS TAB 1 TABLET: TAB at 09:15

## 2017-04-29 RX ADMIN — PANTOPRAZOLE SODIUM 40 MG: 40 TABLET, DELAYED RELEASE ORAL at 09:15

## 2017-04-29 RX ADMIN — LITHIUM CARBONATE 600 MG: 300 TABLET, EXTENDED RELEASE ORAL at 09:15

## 2017-04-29 RX ADMIN — HALOPERIDOL 5 MG: 5 TABLET ORAL at 11:41

## 2017-04-29 RX ADMIN — NICOTINE POLACRILEX 4 MG: 2 GUM, CHEWING ORAL at 19:34

## 2017-04-29 RX ADMIN — LORAZEPAM 2 MG: 2 TABLET ORAL at 11:41

## 2017-04-29 RX ADMIN — NICOTINE POLACRILEX 4 MG: 2 GUM, CHEWING ORAL at 12:36

## 2017-04-29 RX ADMIN — BENZTROPINE MESYLATE 0.5 MG: 0.5 TABLET ORAL at 02:26

## 2017-04-29 RX ADMIN — NICOTINE POLACRILEX 4 MG: 2 GUM, CHEWING ORAL at 20:42

## 2017-04-29 ASSESSMENT — ACTIVITIES OF DAILY LIVING (ADL)
GROOMING: INDEPENDENT
ORAL_HYGIENE: INDEPENDENT
DRESS: STREET CLOTHES;INDEPENDENT
DRESS: INDEPENDENT
ORAL_HYGIENE: INDEPENDENT
GROOMING: INDEPENDENT
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
## 2017-04-29 NOTE — PROGRESS NOTES
Patient napped on and off throughout shift. Affect was full range. Mood was calm. Made phone calls to her children, social with peers on unit. Patient ate dinner, snacks, and attended community meeting. No SI or hallucinations noted. No safety concerns this shift.     04/28/17 1952   Behavioral Health   Hallucinations denies / not responding to hallucinations   Thinking distractable;poor concentration   Orientation person: oriented;place: oriented   Memory baseline memory   Insight poor   Judgement impaired   Affect full range affect   Mood mood is calm   Physical Appearance/Attire attire appropriate to age and situation   Hygiene other (see comment)  (adequate)   Suicidality (denies)   Self Injury (denies)   Activity (visible in milieu)   Speech clear;coherent   Medication Sensitivity no stated side effects   Psychomotor / Gait balanced;steady   Activities of Daily Living   Hygiene/Grooming independent   Oral Hygiene independent   Dress independent   Laundry with supervision   Room Organization independent   Activity   Activity Level of Assistance independent

## 2017-04-29 NOTE — PROGRESS NOTES
"When debriefing patient, patient said \"I will act like a pussy cat and tolerate her\". Patient agreed she would not hurt or yell at other patient.   "

## 2017-04-29 NOTE — PROGRESS NOTES
Brief Cross Cover Note: Pts Li level reviewed by writer, resulted as 0.9 (600mg PO BID dose-last dose given at 8pm, level drawn at 7am) discussed with attending physician Dr. Mujica who recommended the following dose adjustment:  -Increase Lithium CR to 600mg PO QAM and 900mg PO QPM  -Li level recheck ordered for Saturday 5/6/17      Mery Andres DO  PGY-2 Psychiatry Resident

## 2017-04-29 NOTE — PROGRESS NOTES
"   04/29/17 1145   Justification   Clinical Justification Others   Patient had a disagreement with roommate and started yelling at her saying  she was going to \"kick her fucking butt\" over and over. Staff got between patient and roommate and were redirecting both patients but patient kept yelling at roommate saying she was going to \"kick her fucking butt\". Patient kept trying to go up to roommate while threatening her. Patient would not take redirection from staff and kept yelling at roommate. Code 21 was called and patient was placed in seclusion and medications were administered.   "

## 2017-04-29 NOTE — PLAN OF CARE
"Problem: Psychotic Symptoms  Goal: Psychotic Symptoms  Signs and symptoms of listed problems will be absent or manageable.   Outcome: Improving  This afternoon writer had a 1:1 with patient, \"I'm way better. When I first came in I thought everyone was out to get me\". Patient states she does not believe everyone is out to get her now. Depression is at a 4-5 with 10 the worst, states depression has improved during hospital stay. Anxiety has improved and states does not have anxiety. Thoughts are more clear and is able to focus and concentrate better. Attends 1/2 the groups and is social with peers. Has been taking a nap this afternoon.       "

## 2017-04-29 NOTE — PROGRESS NOTES
"Brief Cross Cover Note: Writer paged by RN as pt became increasing agitation and threatening towards roommate. Was able to be walked to seclusion and took PRN of Haldol+Benadryl+Ativan. Of note, per staff pt has been requesting PRNs in evening, often the above combination, because she \"likes the high\".   -Order for seclusion placed  -Pt to be placed in private room upon release from seclusion  -PRN hydroxyzine ordered 25mg PO Q8H to be offered in place of Haldol+Benadryl+Ativan when pt is not having severe agitation but requesting PRN medications, this to be re-evaluated by primary team Monday.    Mery Andres, DO  PGY-2 Psychiatry Resident  "

## 2017-04-30 PROCEDURE — 25000132 ZZH RX MED GY IP 250 OP 250 PS 637: Performed by: PSYCHIATRY & NEUROLOGY

## 2017-04-30 PROCEDURE — 25000132 ZZH RX MED GY IP 250 OP 250 PS 637: Performed by: STUDENT IN AN ORGANIZED HEALTH CARE EDUCATION/TRAINING PROGRAM

## 2017-04-30 PROCEDURE — 12400001 ZZH R&B MH UMMC

## 2017-04-30 RX ADMIN — NICOTINE POLACRILEX 4 MG: 2 GUM, CHEWING ORAL at 10:38

## 2017-04-30 RX ADMIN — HALOPERIDOL 5 MG: 5 TABLET ORAL at 01:41

## 2017-04-30 RX ADMIN — NICOTINE POLACRILEX 4 MG: 2 GUM, CHEWING ORAL at 11:31

## 2017-04-30 RX ADMIN — NICOTINE POLACRILEX 4 MG: 2 GUM, CHEWING ORAL at 19:55

## 2017-04-30 RX ADMIN — MULTIPLE VITAMINS W/ MINERALS TAB 1 TABLET: TAB at 09:04

## 2017-04-30 RX ADMIN — OLANZAPINE 5 MG: 5 TABLET, FILM COATED ORAL at 09:05

## 2017-04-30 RX ADMIN — PANTOPRAZOLE SODIUM 40 MG: 40 TABLET, DELAYED RELEASE ORAL at 09:05

## 2017-04-30 RX ADMIN — LORAZEPAM 2 MG: 2 TABLET ORAL at 01:41

## 2017-04-30 RX ADMIN — LITHIUM CARBONATE 900 MG: 450 TABLET, EXTENDED RELEASE ORAL at 19:55

## 2017-04-30 RX ADMIN — OLANZAPINE 5 MG: 5 TABLET, FILM COATED ORAL at 19:55

## 2017-04-30 RX ADMIN — NICOTINE POLACRILEX 4 MG: 2 GUM, CHEWING ORAL at 14:16

## 2017-04-30 RX ADMIN — LITHIUM CARBONATE 600 MG: 300 TABLET, EXTENDED RELEASE ORAL at 09:04

## 2017-04-30 RX ADMIN — FOLIC ACID 1 MG: 1 TABLET ORAL at 09:04

## 2017-04-30 RX ADMIN — NICOTINE POLACRILEX 4 MG: 2 GUM, CHEWING ORAL at 15:49

## 2017-04-30 RX ADMIN — DIPHENHYDRAMINE HYDROCHLORIDE 50 MG: 50 CAPSULE ORAL at 01:41

## 2017-04-30 RX ADMIN — NICOTINE POLACRILEX 4 MG: 2 GUM, CHEWING ORAL at 18:14

## 2017-04-30 RX ADMIN — NICOTINE POLACRILEX 4 MG: 2 GUM, CHEWING ORAL at 17:10

## 2017-04-30 ASSESSMENT — ACTIVITIES OF DAILY LIVING (ADL)
ORAL_HYGIENE: INDEPENDENT
GROOMING: PROMPTS
GROOMING: PROMPTS
DRESS: STREET CLOTHES;INDEPENDENT
ORAL_HYGIENE: INDEPENDENT
LAUNDRY: WITH SUPERVISION
LAUNDRY: WITH SUPERVISION
DRESS: INDEPENDENT

## 2017-04-30 NOTE — PROGRESS NOTES
"Pt was out of her room and anxious. Pt was walking in place, and yelling that staff shouldt \"let bitch come after me\".   "

## 2017-04-30 NOTE — PROGRESS NOTES
"Patient slept during first few hours of shift. Affect was full range. Mood was anxious. Speech was loud and pressured at times and pt had somewhat inappropriate boundaries with peers and staff at times. On phone, pt stated \"this place is like [facility], I'm in here flirting with white boys.\" Pt responded to redirection from staff. Pt said that she felt high from several of her medications including zyprexa and ativan. No SI, hallucinations noted. No aggressive behavioral concerns this shift.     04/29/17 1935   Behavioral Health   Hallucinations denies / not responding to hallucinations   Thinking distractable;poor concentration   Orientation person: oriented;place: oriented   Memory baseline memory   Insight poor   Judgement impaired   Eye Contact at examiner   Affect full range affect   Mood anxious   Physical Appearance/Attire untidy   Hygiene other (see comment)  (adequate)   Suicidality (denies)   Self Injury (denies)   Activity restless   Speech clear;coherent   Medication Sensitivity no observed side effects   Psychomotor / Gait balanced;steady   Activities of Daily Living   Hygiene/Grooming independent   Oral Hygiene independent   Dress independent   Room Organization independent   Activity   Activity Level of Assistance independent     "

## 2017-04-30 NOTE — PROGRESS NOTES
Pt slept until approx 11, did not eat breakfast or attend community mt. Pt washed laundry, talked on the phone, and ate lunch. Social with peers, making jokes. Appears animated yet emotionally regulated. Did not shower.      04/30/17 1400   Behavioral Health   Hallucinations denies / not responding to hallucinations   Thinking distractable;poor concentration   Orientation person: oriented;place: oriented   Memory baseline memory   Insight poor   Judgement impaired   Eye Contact at examiner   Affect full range affect   Mood mood is calm   Physical Appearance/Attire disheveled;untidy   Hygiene neglected grooming - unclean body, hair, teeth   Suicidality other (see comments)  (denies)   Self Injury other (see comment)  (denies)   Activity withdrawn;other (see comment)  (in milieu at times)   Speech rambling;clear   Medication Sensitivity no stated side effects;no observed side effects   Psychomotor / Gait balanced;steady   Psycho Education   Type of Intervention 1:1 intervention   Response participates, initiates socially appropriate   Hours 0.5   Treatment Detail check-in   Activities of Daily Living   Hygiene/Grooming prompts   Oral Hygiene independent   Dress street clothes;independent   Laundry with supervision   Room Organization prompts

## 2017-05-01 PROCEDURE — 25000132 ZZH RX MED GY IP 250 OP 250 PS 637: Performed by: PSYCHIATRY & NEUROLOGY

## 2017-05-01 PROCEDURE — 12400001 ZZH R&B MH UMMC

## 2017-05-01 PROCEDURE — 25000132 ZZH RX MED GY IP 250 OP 250 PS 637: Performed by: STUDENT IN AN ORGANIZED HEALTH CARE EDUCATION/TRAINING PROGRAM

## 2017-05-01 PROCEDURE — 99232 SBSQ HOSP IP/OBS MODERATE 35: CPT | Mod: GC | Performed by: PSYCHIATRY & NEUROLOGY

## 2017-05-01 PROCEDURE — 90853 GROUP PSYCHOTHERAPY: CPT

## 2017-05-01 RX ADMIN — NICOTINE POLACRILEX 4 MG: 2 GUM, CHEWING ORAL at 19:40

## 2017-05-01 RX ADMIN — NICOTINE POLACRILEX 4 MG: 2 GUM, CHEWING ORAL at 12:27

## 2017-05-01 RX ADMIN — FOLIC ACID 1 MG: 1 TABLET ORAL at 08:40

## 2017-05-01 RX ADMIN — PANTOPRAZOLE SODIUM 40 MG: 40 TABLET, DELAYED RELEASE ORAL at 08:41

## 2017-05-01 RX ADMIN — NICOTINE POLACRILEX 4 MG: 2 GUM, CHEWING ORAL at 10:58

## 2017-05-01 RX ADMIN — OLANZAPINE 5 MG: 5 TABLET, FILM COATED ORAL at 19:40

## 2017-05-01 RX ADMIN — HALOPERIDOL 5 MG: 5 TABLET ORAL at 00:04

## 2017-05-01 RX ADMIN — NICOTINE POLACRILEX 4 MG: 2 GUM, CHEWING ORAL at 17:23

## 2017-05-01 RX ADMIN — MULTIPLE VITAMINS W/ MINERALS TAB 1 TABLET: TAB at 08:41

## 2017-05-01 RX ADMIN — OLANZAPINE 5 MG: 5 TABLET, FILM COATED ORAL at 08:41

## 2017-05-01 RX ADMIN — NICOTINE POLACRILEX 4 MG: 2 GUM, CHEWING ORAL at 15:16

## 2017-05-01 RX ADMIN — LITHIUM CARBONATE 600 MG: 300 TABLET, EXTENDED RELEASE ORAL at 08:40

## 2017-05-01 RX ADMIN — LITHIUM CARBONATE 900 MG: 450 TABLET, EXTENDED RELEASE ORAL at 19:40

## 2017-05-01 RX ADMIN — DIPHENHYDRAMINE HYDROCHLORIDE 50 MG: 50 CAPSULE ORAL at 21:41

## 2017-05-01 RX ADMIN — HALOPERIDOL 5 MG: 5 TABLET ORAL at 21:41

## 2017-05-01 RX ADMIN — LORAZEPAM 2 MG: 2 TABLET ORAL at 21:41

## 2017-05-01 RX ADMIN — LORAZEPAM 2 MG: 2 TABLET ORAL at 00:04

## 2017-05-01 RX ADMIN — DIPHENHYDRAMINE HYDROCHLORIDE 50 MG: 50 CAPSULE ORAL at 00:04

## 2017-05-01 ASSESSMENT — ACTIVITIES OF DAILY LIVING (ADL)
GROOMING: INDEPENDENT
LAUNDRY: WITH SUPERVISION
DRESS: SCRUBS (BEHAVIORAL HEALTH)
ORAL_HYGIENE: INDEPENDENT

## 2017-05-01 NOTE — PROGRESS NOTES
Received a call from [facility] staff stating that pt has an intake appointment on Wednesday, May 3, 2017 at 12:00pm.

## 2017-05-01 NOTE — PLAN OF CARE
Problem: Psychotic Symptoms  Goal: Social and Therapeutic (Psychotic Symptoms)  Signs and symptoms of listed problems will be absent or manageable.         Pt.attended partial session of OT goal setting  group today.   Disorganized in her efforts. Pt.was cooperative and pleasant throughout session.

## 2017-05-01 NOTE — PROGRESS NOTES
Patient was visible in milieu, social and joking with peers and staff. Had to be redirected at times for inappropriate language but was pleasant. Full range affect. Mood is calm.  No hallucinations, SI, med side effects noted.      04/30/17 2002   Behavioral Health   Hallucinations denies / not responding to hallucinations   Thinking distractable;poor concentration   Orientation person: oriented;place: oriented   Memory baseline memory   Insight poor   Judgement impaired   Eye Contact at examiner   Affect full range affect   Mood mood is calm   Physical Appearance/Attire disheveled   Hygiene neglected grooming - unclean body, hair, teeth   Suicidality (denies)   Self Injury (denies)   Activity restless   Speech flight of ideas;clear;coherent   Medication Sensitivity no stated side effects   Psychomotor / Gait balanced;steady   Activities of Daily Living   Hygiene/Grooming prompts   Oral Hygiene independent   Dress independent   Laundry with supervision   Room Organization prompts
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
## 2017-05-01 NOTE — PROGRESS NOTES
"   05/01/17 1124   Activities of Daily Living   Oral Hygiene independent   Pt was seen in the milieu socializing, joking with her peers..At one time, she began using inappropriate language and had to be redirected.She was pleasant.She denies all Mental Health Symptoms.She told this writer, \" I am here because, I ran out of my mother's   House. '' I should not be here. '  "

## 2017-05-02 PROCEDURE — 25000132 ZZH RX MED GY IP 250 OP 250 PS 637: Performed by: STUDENT IN AN ORGANIZED HEALTH CARE EDUCATION/TRAINING PROGRAM

## 2017-05-02 PROCEDURE — 12400001 ZZH R&B MH UMMC

## 2017-05-02 PROCEDURE — 90853 GROUP PSYCHOTHERAPY: CPT

## 2017-05-02 PROCEDURE — 25000132 ZZH RX MED GY IP 250 OP 250 PS 637: Performed by: PSYCHIATRY & NEUROLOGY

## 2017-05-02 RX ADMIN — NICOTINE POLACRILEX 4 MG: 2 GUM, CHEWING ORAL at 11:49

## 2017-05-02 RX ADMIN — NICOTINE POLACRILEX 4 MG: 2 GUM, CHEWING ORAL at 10:06

## 2017-05-02 RX ADMIN — NICOTINE POLACRILEX 4 MG: 2 GUM, CHEWING ORAL at 16:42

## 2017-05-02 RX ADMIN — NICOTINE POLACRILEX 4 MG: 2 GUM, CHEWING ORAL at 21:55

## 2017-05-02 RX ADMIN — DIPHENHYDRAMINE HYDROCHLORIDE 50 MG: 50 CAPSULE ORAL at 21:52

## 2017-05-02 RX ADMIN — NICOTINE POLACRILEX 4 MG: 2 GUM, CHEWING ORAL at 14:06

## 2017-05-02 RX ADMIN — HALOPERIDOL 5 MG: 5 TABLET ORAL at 21:54

## 2017-05-02 RX ADMIN — FOLIC ACID 1 MG: 1 TABLET ORAL at 10:06

## 2017-05-02 RX ADMIN — PANTOPRAZOLE SODIUM 40 MG: 40 TABLET, DELAYED RELEASE ORAL at 10:06

## 2017-05-02 RX ADMIN — OLANZAPINE 5 MG: 5 TABLET, FILM COATED ORAL at 21:52

## 2017-05-02 RX ADMIN — LITHIUM CARBONATE 900 MG: 450 TABLET, EXTENDED RELEASE ORAL at 21:51

## 2017-05-02 RX ADMIN — LORAZEPAM 2 MG: 2 TABLET ORAL at 21:50

## 2017-05-02 RX ADMIN — MULTIPLE VITAMINS W/ MINERALS TAB 1 TABLET: TAB at 10:06

## 2017-05-02 RX ADMIN — OLANZAPINE 5 MG: 5 TABLET, FILM COATED ORAL at 10:06

## 2017-05-02 RX ADMIN — LITHIUM CARBONATE 600 MG: 300 TABLET, EXTENDED RELEASE ORAL at 10:06

## 2017-05-02 ASSESSMENT — ACTIVITIES OF DAILY LIVING (ADL)
GROOMING: INDEPENDENT
DRESS: PROMPTS
ORAL_HYGIENE: INDEPENDENT
DRESS: STREET CLOTHES
LAUNDRY: WITH SUPERVISION
GROOMING: PROMPTS
ORAL_HYGIENE: PROMPTS
LAUNDRY: WITH SUPERVISION

## 2017-05-02 NOTE — PROGRESS NOTES
Pt has had a good shift , pleasant social . At times says odd things often in humor . Med compliant, requested prn at bedtime
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
## 2017-05-02 NOTE — PROGRESS NOTES
Pt present in milieu, attended groups, participated appropriately, did not eat breakfast, did not shower. Pt's face is dry; pt says she has lotion to apply. Pt says she has not gotten her menstrual period since October 2016, is not sure why, and is worried. Pt pleasant and social, making jokes. Denies psychotic sx.      05/02/17 1200   Behavioral Health   Hallucinations denies / not responding to hallucinations   Thinking distractable   Orientation person: oriented;place: oriented   Memory baseline memory   Insight insight appropriate to situation   Judgement impaired   Eye Contact at examiner   Affect full range affect   Mood mood is calm   Physical Appearance/Attire attire appropriate to age and situation   Hygiene other (see comment)  (fair)   Suicidality other (see comments)  (denies)   Self Injury other (see comment)  (denies)   Activity restless;other (see comment)  (present in milieu)   Speech clear;coherent   Medication Sensitivity no stated side effects;no observed side effects   Psychomotor / Gait balanced;steady   Psycho Education   Type of Intervention 1:1 intervention   Response participates, initiates socially appropriate   Hours 0.5   Treatment Detail check in   Group Therapy Session   Group Attendance attended group session   Activities of Daily Living   Hygiene/Grooming independent   Oral Hygiene independent   Dress street clothes   Laundry with supervision   Room Organization prompts

## 2017-05-03 PROCEDURE — 90853 GROUP PSYCHOTHERAPY: CPT

## 2017-05-03 PROCEDURE — 12400001 ZZH R&B MH UMMC

## 2017-05-03 PROCEDURE — 25000132 ZZH RX MED GY IP 250 OP 250 PS 637: Performed by: PSYCHIATRY & NEUROLOGY

## 2017-05-03 PROCEDURE — 99232 SBSQ HOSP IP/OBS MODERATE 35: CPT | Mod: GC | Performed by: PSYCHIATRY & NEUROLOGY

## 2017-05-03 PROCEDURE — 25000132 ZZH RX MED GY IP 250 OP 250 PS 637: Performed by: STUDENT IN AN ORGANIZED HEALTH CARE EDUCATION/TRAINING PROGRAM

## 2017-05-03 RX ORDER — OLANZAPINE 15 MG/1
15 TABLET ORAL AT BEDTIME
Status: DISCONTINUED | OUTPATIENT
Start: 2017-05-04 | End: 2017-05-15 | Stop reason: HOSPADM

## 2017-05-03 RX ORDER — OLANZAPINE 10 MG/1
10 TABLET ORAL
Status: DISCONTINUED | OUTPATIENT
Start: 2017-05-03 | End: 2017-05-15 | Stop reason: HOSPADM

## 2017-05-03 RX ORDER — OLANZAPINE 10 MG/1
10 TABLET ORAL AT BEDTIME
Status: COMPLETED | OUTPATIENT
Start: 2017-05-03 | End: 2017-05-03

## 2017-05-03 RX ORDER — EMOLLIENT BASE
CREAM (GRAM) TOPICAL EVERY 6 HOURS PRN
Status: DISCONTINUED | OUTPATIENT
Start: 2017-05-03 | End: 2017-05-15 | Stop reason: HOSPADM

## 2017-05-03 RX ADMIN — LITHIUM CARBONATE 900 MG: 450 TABLET, EXTENDED RELEASE ORAL at 21:44

## 2017-05-03 RX ADMIN — LITHIUM CARBONATE 600 MG: 300 TABLET, EXTENDED RELEASE ORAL at 08:47

## 2017-05-03 RX ADMIN — NICOTINE POLACRILEX 4 MG: 2 GUM, CHEWING ORAL at 08:47

## 2017-05-03 RX ADMIN — FOLIC ACID 1 MG: 1 TABLET ORAL at 08:47

## 2017-05-03 RX ADMIN — OLANZAPINE 10 MG: 10 TABLET, FILM COATED ORAL at 21:43

## 2017-05-03 RX ADMIN — NICOTINE POLACRILEX 4 MG: 2 GUM, CHEWING ORAL at 13:40

## 2017-05-03 RX ADMIN — PANTOPRAZOLE SODIUM 40 MG: 40 TABLET, DELAYED RELEASE ORAL at 08:47

## 2017-05-03 RX ADMIN — MULTIPLE VITAMINS W/ MINERALS TAB 1 TABLET: TAB at 08:47

## 2017-05-03 RX ADMIN — LORAZEPAM 2 MG: 2 TABLET ORAL at 21:42

## 2017-05-03 RX ADMIN — OLANZAPINE 5 MG: 5 TABLET, FILM COATED ORAL at 08:47

## 2017-05-03 RX ADMIN — NICOTINE POLACRILEX 4 MG: 2 GUM, CHEWING ORAL at 18:44

## 2017-05-03 RX ADMIN — HALOPERIDOL 5 MG: 5 TABLET ORAL at 21:43

## 2017-05-03 RX ADMIN — DIPHENHYDRAMINE HYDROCHLORIDE 50 MG: 50 CAPSULE ORAL at 21:42

## 2017-05-03 ASSESSMENT — ACTIVITIES OF DAILY LIVING (ADL)
LAUNDRY: WITH SUPERVISION
ORAL_HYGIENE: INDEPENDENT
GROOMING: INDEPENDENT
DRESS: INDEPENDENT
ORAL_HYGIENE: INDEPENDENT
LAUNDRY: WITH SUPERVISION
DRESS: INDEPENDENT
HYGIENE/GROOMING: INDEPENDENT

## 2017-05-03 NOTE — PROGRESS NOTES
"Easily distracted in groups. Impulsive tangential comments frequently, at times inappropriate. Pt was chanting \"smirnoff gets me off!\" Pt states \"I don't use alcohol while on meds, so I skip my meds if I go party. My Dr said it's ok to do that for a couple days.\" Ate meals, well groomed.       05/03/17 1300   Behavioral Health   Hallucinations denies / not responding to hallucinations   Thinking distractable;poor concentration   Orientation person: oriented;place: oriented;date: oriented;time: oriented   Memory baseline memory   Insight poor   Judgement impaired   Eye Contact at examiner   Affect full range affect   Mood anxious;elated   Physical Appearance/Attire attire appropriate to age and situation   Hygiene well groomed   Activity restless;hyperactive (agitated, impulsive)   Speech clear;tangential   Psychomotor / Gait balanced;steady   Psycho Education   Type of Intervention structured groups   Response participates with cues/redirection   Hours 0.5   Activities of Daily Living   Hygiene/Grooming independent   Oral Hygiene independent   Dress independent   Laundry with supervision   Room Organization independent     "

## 2017-05-03 NOTE — PLAN OF CARE
Problem: General Plan of Care (Inpatient Behavioral)  Goal: Individualization/Patient Specific Goal (IP Behavioral)  The patient and/or their representative will achieve their patient-specific goals related to the plan of care.    The patient-specific goals include:   Illness Management Recovery model:  Wellness Strategies.     Patient identified the following actions/activities they can do to stay well.     1. 3.pt realizes that she will need irts after d/c

## 2017-05-03 NOTE — PLAN OF CARE
Problem: Psychotic Symptoms  Goal: Psychotic Symptoms  Signs and symptoms of listed problems will be absent or manageable.   Outcome: Improving  Pt has been worried and anxious about her mother dealing with depression,as she has never had mh problems that [place] knows of,she appeared agitated and requested her prn meds at ,no discussion of amenorrhea issues this pm,aftercare placement process continues
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
## 2017-05-04 PROCEDURE — 90853 GROUP PSYCHOTHERAPY: CPT

## 2017-05-04 PROCEDURE — 25000132 ZZH RX MED GY IP 250 OP 250 PS 637: Performed by: STUDENT IN AN ORGANIZED HEALTH CARE EDUCATION/TRAINING PROGRAM

## 2017-05-04 PROCEDURE — 12400003 ZZH R&B MH CRITICAL UMMC

## 2017-05-04 PROCEDURE — 25000132 ZZH RX MED GY IP 250 OP 250 PS 637: Performed by: PSYCHIATRY & NEUROLOGY

## 2017-05-04 PROCEDURE — 99232 SBSQ HOSP IP/OBS MODERATE 35: CPT | Mod: GC | Performed by: PSYCHIATRY & NEUROLOGY

## 2017-05-04 PROCEDURE — H2032 ACTIVITY THERAPY, PER 15 MIN: HCPCS

## 2017-05-04 RX ADMIN — NICOTINE POLACRILEX 4 MG: 2 GUM, CHEWING ORAL at 19:10

## 2017-05-04 RX ADMIN — LITHIUM CARBONATE 900 MG: 450 TABLET, EXTENDED RELEASE ORAL at 20:21

## 2017-05-04 RX ADMIN — MULTIPLE VITAMINS W/ MINERALS TAB 1 TABLET: TAB at 10:36

## 2017-05-04 RX ADMIN — NICOTINE POLACRILEX 4 MG: 2 GUM, CHEWING ORAL at 13:24

## 2017-05-04 RX ADMIN — NICOTINE POLACRILEX 4 MG: 2 GUM, CHEWING ORAL at 20:20

## 2017-05-04 RX ADMIN — NICOTINE POLACRILEX 4 MG: 2 GUM, CHEWING ORAL at 17:10

## 2017-05-04 RX ADMIN — BENZTROPINE MESYLATE 0.5 MG: 0.5 TABLET ORAL at 13:24

## 2017-05-04 RX ADMIN — HALOPERIDOL 5 MG: 5 TABLET ORAL at 13:30

## 2017-05-04 RX ADMIN — FOLIC ACID 1 MG: 1 TABLET ORAL at 10:36

## 2017-05-04 RX ADMIN — OLANZAPINE 15 MG: 15 TABLET, FILM COATED ORAL at 20:21

## 2017-05-04 RX ADMIN — NICOTINE POLACRILEX 4 MG: 2 GUM, CHEWING ORAL at 10:36

## 2017-05-04 RX ADMIN — LITHIUM CARBONATE 600 MG: 300 TABLET, EXTENDED RELEASE ORAL at 10:36

## 2017-05-04 RX ADMIN — DIPHENHYDRAMINE HYDROCHLORIDE 50 MG: 50 CAPSULE ORAL at 13:24

## 2017-05-04 RX ADMIN — PANTOPRAZOLE SODIUM 40 MG: 40 TABLET, DELAYED RELEASE ORAL at 10:36

## 2017-05-04 RX ADMIN — NICOTINE POLACRILEX 4 MG: 2 GUM, CHEWING ORAL at 11:26

## 2017-05-04 ASSESSMENT — ACTIVITIES OF DAILY LIVING (ADL)
LAUNDRY: WITH SUPERVISION
ORAL_HYGIENE: INDEPENDENT
DRESS: INDEPENDENT
GROOMING: INDEPENDENT
GROOMING: INDEPENDENT
ORAL_HYGIENE: INDEPENDENT
ORAL_HYGIENE: INDEPENDENT
LAUNDRY: WITH SUPERVISION
LAUNDRY: UNABLE TO COMPLETE
HYGIENE/GROOMING: INDEPENDENT
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
## 2017-05-04 NOTE — PROGRESS NOTES
[facility] a couple [facility]. Pt was appropriate with staff and went back to bed after eating snacks.
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
## 2017-05-05 PROCEDURE — 25000132 ZZH RX MED GY IP 250 OP 250 PS 637: Performed by: STUDENT IN AN ORGANIZED HEALTH CARE EDUCATION/TRAINING PROGRAM

## 2017-05-05 PROCEDURE — 12400003 ZZH R&B MH CRITICAL UMMC

## 2017-05-05 PROCEDURE — 25000132 ZZH RX MED GY IP 250 OP 250 PS 637: Performed by: PSYCHIATRY & NEUROLOGY

## 2017-05-05 RX ADMIN — PANTOPRAZOLE SODIUM 40 MG: 40 TABLET, DELAYED RELEASE ORAL at 09:32

## 2017-05-05 RX ADMIN — NICOTINE POLACRILEX 4 MG: 2 GUM, CHEWING ORAL at 13:26

## 2017-05-05 RX ADMIN — HALOPERIDOL 5 MG: 5 TABLET ORAL at 20:34

## 2017-05-05 RX ADMIN — NICOTINE POLACRILEX 4 MG: 2 GUM, CHEWING ORAL at 11:15

## 2017-05-05 RX ADMIN — OLANZAPINE 10 MG: 10 TABLET, FILM COATED ORAL at 17:33

## 2017-05-05 RX ADMIN — NICOTINE POLACRILEX 4 MG: 2 GUM, CHEWING ORAL at 19:10

## 2017-05-05 RX ADMIN — MULTIPLE VITAMINS W/ MINERALS TAB 1 TABLET: TAB at 09:31

## 2017-05-05 RX ADMIN — LITHIUM CARBONATE 900 MG: 450 TABLET, EXTENDED RELEASE ORAL at 19:09

## 2017-05-05 RX ADMIN — OLANZAPINE 10 MG: 10 TABLET, FILM COATED ORAL at 12:17

## 2017-05-05 RX ADMIN — DIPHENHYDRAMINE HYDROCHLORIDE 50 MG: 50 CAPSULE ORAL at 20:34

## 2017-05-05 RX ADMIN — NICOTINE POLACRILEX 4 MG: 2 GUM, CHEWING ORAL at 16:47

## 2017-05-05 RX ADMIN — FOLIC ACID 1 MG: 1 TABLET ORAL at 09:31

## 2017-05-05 RX ADMIN — NICOTINE POLACRILEX 4 MG: 2 GUM, CHEWING ORAL at 09:32

## 2017-05-05 RX ADMIN — LITHIUM CARBONATE 600 MG: 300 TABLET, EXTENDED RELEASE ORAL at 09:31

## 2017-05-05 RX ADMIN — NICOTINE POLACRILEX 4 MG: 2 GUM, CHEWING ORAL at 12:17

## 2017-05-05 RX ADMIN — NICOTINE POLACRILEX 4 MG: 2 GUM, CHEWING ORAL at 18:10

## 2017-05-05 RX ADMIN — OLANZAPINE 15 MG: 15 TABLET, FILM COATED ORAL at 19:12

## 2017-05-05 ASSESSMENT — ACTIVITIES OF DAILY LIVING (ADL)
ORAL_HYGIENE: INDEPENDENT
HYGIENE/GROOMING: PROMPTS
DRESS: SCRUBS (BEHAVIORAL HEALTH)
DRESS: SCRUBS (BEHAVIORAL HEALTH)
LAUNDRY: UNABLE TO COMPLETE
GROOMING: INDEPENDENT
ORAL_HYGIENE: INDEPENDENT

## 2017-05-05 NOTE — PROGRESS NOTES
Pt appears responding to hallucinations and paranoid, muttering under her breath and looking around. No major behavioral concerns to be noted when pt was transferred to the unit and for the remainder of the evening.      05/04/17 2152   Behavioral Health   Hallucinations appears responding   Thinking paranoid;poor concentration;distractable   Orientation person: oriented;place: oriented   Memory baseline memory   Insight poor   Judgement impaired   Eye Contact at examiner   Affect full range affect   Mood labile;mood is calm   Physical Appearance/Attire untidy;appears older than stated age   Hygiene other (see comment)  (fair grooming)   Suicidality (WDL) WDL   Self Injury (WDL) WDL   Activity hyperactive (agitated, impulsive)   Speech (WDL) WDL   Psychomotor Gait (WDL) WDL   Activities of Daily Living   Hygiene/Grooming independent   Oral Hygiene independent   Dress independent   Laundry with supervision   Room Organization independent

## 2017-05-05 NOTE — PROGRESS NOTES
"   05/05/17 1511   Significant Event   Significant Event Other (see comments)  (shift summary)   Pt was isolative for the majority of the shifting, sleeping and resting in bed. When she awoke from sleeping it was very abrupt, immediately becoming loud and restless, singing loudly to herself in the hallway, dancing. When writer asked if she needed anything pt impulsively yelled, \"Fuck no!\" but then turned around and said, \"Oh, I didn't realize it was you.\" No other issues.  "
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
## 2017-05-05 NOTE — PROGRESS NOTES
St 12  received verbal report from [address] [facility] , DANIEL Lopez.    I've also reviewed all Case Mgt notes.    Today, I called and left a vm message for pt's MH Resources worker, Jyotsna Lee.   I also made follow up calls to five IRT's:      -[facility].   Birdie said she anticipates a bed several weeks from now and she will call me as the vacancy approaches, so she can schedule an interview day/time.  When the time comes, she is willing to come to the hospital to do the interview.    -[facility] (Jason):   I left a vm message requesting a return call    -[facility] (Diego):  I left a vm message requesting a return call    -[facility] (Francine):  I left a vm message requesting a return call.    -[facility] (Keenan):  I left a vm message requesting a return call.    *After I made the above calls, I got a message back from Jyotsna, the ACT worker, who said she does not think [facility] is a good option for patient.   So it sounds like we'll be scratching that one from the list.
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
## 2017-05-06 PROCEDURE — 25000132 ZZH RX MED GY IP 250 OP 250 PS 637: Performed by: PSYCHIATRY & NEUROLOGY

## 2017-05-06 PROCEDURE — 25000132 ZZH RX MED GY IP 250 OP 250 PS 637: Performed by: STUDENT IN AN ORGANIZED HEALTH CARE EDUCATION/TRAINING PROGRAM

## 2017-05-06 PROCEDURE — 12400003 ZZH R&B MH CRITICAL UMMC

## 2017-05-06 RX ADMIN — LITHIUM CARBONATE 900 MG: 450 TABLET, EXTENDED RELEASE ORAL at 19:17

## 2017-05-06 RX ADMIN — OLANZAPINE 10 MG: 10 TABLET, FILM COATED ORAL at 16:28

## 2017-05-06 RX ADMIN — NICOTINE POLACRILEX 4 MG: 2 GUM, CHEWING ORAL at 19:17

## 2017-05-06 RX ADMIN — NICOTINE POLACRILEX 4 MG: 2 GUM, CHEWING ORAL at 14:03

## 2017-05-06 RX ADMIN — FOLIC ACID 1 MG: 1 TABLET ORAL at 08:18

## 2017-05-06 RX ADMIN — NICOTINE POLACRILEX 4 MG: 2 GUM, CHEWING ORAL at 21:26

## 2017-05-06 RX ADMIN — LITHIUM CARBONATE 600 MG: 300 TABLET, EXTENDED RELEASE ORAL at 08:18

## 2017-05-06 RX ADMIN — OLANZAPINE 10 MG: 10 TABLET, FILM COATED ORAL at 08:18

## 2017-05-06 RX ADMIN — NICOTINE POLACRILEX 4 MG: 2 GUM, CHEWING ORAL at 20:24

## 2017-05-06 RX ADMIN — OLANZAPINE 15 MG: 15 TABLET, FILM COATED ORAL at 19:17

## 2017-05-06 RX ADMIN — NICOTINE POLACRILEX 4 MG: 2 GUM, CHEWING ORAL at 10:15

## 2017-05-06 RX ADMIN — MULTIPLE VITAMINS W/ MINERALS TAB 1 TABLET: TAB at 08:18

## 2017-05-06 RX ADMIN — NICOTINE POLACRILEX 4 MG: 2 GUM, CHEWING ORAL at 16:30

## 2017-05-06 RX ADMIN — PANTOPRAZOLE SODIUM 40 MG: 40 TABLET, DELAYED RELEASE ORAL at 08:18

## 2017-05-06 ASSESSMENT — ACTIVITIES OF DAILY LIVING (ADL)
DRESS: SCRUBS (BEHAVIORAL HEALTH)
ORAL_HYGIENE: PROMPTS
ORAL_HYGIENE: PROMPTS
HYGIENE/GROOMING: PROMPTS;INDEPENDENT
GROOMING: PROMPTS
DRESS: SCRUBS (BEHAVIORAL HEALTH);INDEPENDENT

## 2017-05-06 NOTE — PROGRESS NOTES
"Pt out in the [facility] hallway on the phone, swearing at the person she was talking to and talking very loudly.  Pt was redirected several times, but refused to lower her voice and stop swearing.  Staff attempted to shut off the phone, but she hung up before it was shut off.  Pt then approached the male staff nurse that had been redirecting her off the phone and began rapping, replacing swear words for words close to them.  Pt was asked to go to her room by male staff nurse but she refused.  This nurse attempted to redirect her and she stated, \"Okay bitch, get your breasts out of my face.  Do you want to see my breasts?  I have milk.  I will show you\".  Pt then removed her L breast from her bra and squeezed, squirting milk across the room.  Pt then laughed and went to her room.  Pt came out of her room within a few minutes and requested to talk to this nurse.  Pt was redirected to her room and she continued to ask this nurse to come talk to her, disregarding request.  Another staff nurse went to talk with her and she then cooperated with staying in her room.  Nicotine gum and Zyprexa 10 mg administered.  Requested pt stay in her room until calmer and discussed the inappropriateness of exposing herself and squirting breast milk. Pt agreeable. Will continue to monitor.  "
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
## 2017-05-07 PROCEDURE — 25000132 ZZH RX MED GY IP 250 OP 250 PS 637: Performed by: PSYCHIATRY & NEUROLOGY

## 2017-05-07 PROCEDURE — 25000132 ZZH RX MED GY IP 250 OP 250 PS 637: Performed by: STUDENT IN AN ORGANIZED HEALTH CARE EDUCATION/TRAINING PROGRAM

## 2017-05-07 PROCEDURE — 12400003 ZZH R&B MH CRITICAL UMMC

## 2017-05-07 RX ADMIN — LITHIUM CARBONATE 600 MG: 300 TABLET, EXTENDED RELEASE ORAL at 10:56

## 2017-05-07 RX ADMIN — NICOTINE POLACRILEX 4 MG: 2 GUM, CHEWING ORAL at 17:17

## 2017-05-07 RX ADMIN — NICOTINE POLACRILEX 4 MG: 2 GUM, CHEWING ORAL at 10:56

## 2017-05-07 RX ADMIN — OLANZAPINE 15 MG: 15 TABLET, FILM COATED ORAL at 19:57

## 2017-05-07 RX ADMIN — NICOTINE POLACRILEX 4 MG: 2 GUM, CHEWING ORAL at 13:02

## 2017-05-07 RX ADMIN — PANTOPRAZOLE SODIUM 40 MG: 40 TABLET, DELAYED RELEASE ORAL at 10:56

## 2017-05-07 RX ADMIN — MULTIPLE VITAMINS W/ MINERALS TAB 1 TABLET: TAB at 10:55

## 2017-05-07 RX ADMIN — NICOTINE POLACRILEX 4 MG: 2 GUM, CHEWING ORAL at 18:17

## 2017-05-07 RX ADMIN — NICOTINE POLACRILEX 4 MG: 2 GUM, CHEWING ORAL at 20:00

## 2017-05-07 RX ADMIN — NICOTINE POLACRILEX 4 MG: 2 GUM, CHEWING ORAL at 21:20

## 2017-05-07 RX ADMIN — FOLIC ACID 1 MG: 1 TABLET ORAL at 10:55

## 2017-05-07 RX ADMIN — NICOTINE POLACRILEX 4 MG: 2 GUM, CHEWING ORAL at 11:49

## 2017-05-07 RX ADMIN — NICOTINE POLACRILEX 4 MG: 2 GUM, CHEWING ORAL at 16:06

## 2017-05-07 RX ADMIN — OLANZAPINE 10 MG: 10 TABLET, FILM COATED ORAL at 10:55

## 2017-05-07 RX ADMIN — LITHIUM CARBONATE 900 MG: 450 TABLET, EXTENDED RELEASE ORAL at 19:57

## 2017-05-07 ASSESSMENT — ACTIVITIES OF DAILY LIVING (ADL)
LAUNDRY: UNABLE TO COMPLETE
GROOMING: INDEPENDENT
DRESS: SCRUBS (BEHAVIORAL HEALTH)
ORAL_HYGIENE: INDEPENDENT

## 2017-05-07 NOTE — PROGRESS NOTES
05/06/17 2000   Behavioral Health   Hallucinations denies / not responding to hallucinations;appears responding   Thinking distractable;poor concentration;paranoid   Orientation person: oriented;place: oriented;date: oriented;time: oriented   Insight poor   Judgement impaired   Eye Contact at examiner   Affect other (see comments)  (Increased vigilance)   Mood grandiose;elated   Physical Appearance/Attire untidy   Hygiene neglected grooming - unclean body, hair, teeth   Activity hyperactive (agitated, impulsive)   Psychomotor / Gait balanced;steady   Activities of Daily Living   Hygiene/Grooming prompts;independent   Oral Hygiene prompts   Dress scrubs (behavioral health)   Room Organization prompts

## 2017-05-07 NOTE — PROGRESS NOTES
Patient once again elected to refuse her Lithium level this morning.  Reordered for tomorrow AM.  Will continue to encourage her to cooperate with her care and highlight the importance of obtaining a current drug level for Lithium to be adequately dosed.

## 2017-05-07 NOTE — PROGRESS NOTES
"Pt allowed writer to assess abdomen, which appears to have a large protrusion above the umbilicus. Pt reports she was \"supposed to see a surgeon\" prior to admission but was unable. Reports this is a hernia, and appears as such. Pt is able to push the tissue \"in\" and reports a \"popping\" sensation, and the bulge slowly returns when pressure is removed. Upon palpation, area is firm, non tender, no evidence of heat or color changes to the tissue. Pts VS are WNL, Pt states the alleged hernia has been present for a long time but has recently begun to increase in size. MD notified.   "

## 2017-05-08 LAB — LITHIUM SERPL-SCNC: 1.4 MMOL/L (ref 0.6–1.2)

## 2017-05-08 PROCEDURE — 25000132 ZZH RX MED GY IP 250 OP 250 PS 637: Performed by: STUDENT IN AN ORGANIZED HEALTH CARE EDUCATION/TRAINING PROGRAM

## 2017-05-08 PROCEDURE — 25000132 ZZH RX MED GY IP 250 OP 250 PS 637: Performed by: PSYCHIATRY & NEUROLOGY

## 2017-05-08 PROCEDURE — 36415 COLL VENOUS BLD VENIPUNCTURE: CPT | Performed by: PSYCHIATRY & NEUROLOGY

## 2017-05-08 PROCEDURE — 99232 SBSQ HOSP IP/OBS MODERATE 35: CPT | Mod: GC | Performed by: PSYCHIATRY & NEUROLOGY

## 2017-05-08 PROCEDURE — 99207 ZZC CONSULT E&M CHANGED TO INITIAL LEVEL: CPT | Performed by: PHYSICIAN ASSISTANT

## 2017-05-08 PROCEDURE — 12400003 ZZH R&B MH CRITICAL UMMC

## 2017-05-08 PROCEDURE — 99221 1ST HOSP IP/OBS SF/LOW 40: CPT | Performed by: PHYSICIAN ASSISTANT

## 2017-05-08 PROCEDURE — 80178 ASSAY OF LITHIUM: CPT | Performed by: PSYCHIATRY & NEUROLOGY

## 2017-05-08 RX ORDER — LITHIUM CARBONATE 300 MG/1
600 TABLET, FILM COATED, EXTENDED RELEASE ORAL 2 TIMES DAILY
Status: DISCONTINUED | OUTPATIENT
Start: 2017-05-08 | End: 2017-05-15 | Stop reason: HOSPADM

## 2017-05-08 RX ORDER — OLANZAPINE 5 MG/1
5 TABLET ORAL DAILY
Status: DISCONTINUED | OUTPATIENT
Start: 2017-05-08 | End: 2017-05-15 | Stop reason: HOSPADM

## 2017-05-08 RX ADMIN — OLANZAPINE 5 MG: 5 TABLET, FILM COATED ORAL at 12:50

## 2017-05-08 RX ADMIN — NICOTINE POLACRILEX 4 MG: 2 GUM, CHEWING ORAL at 12:50

## 2017-05-08 RX ADMIN — NICOTINE POLACRILEX 4 MG: 2 GUM, CHEWING ORAL at 09:32

## 2017-05-08 RX ADMIN — NICOTINE POLACRILEX 4 MG: 2 GUM, CHEWING ORAL at 11:25

## 2017-05-08 RX ADMIN — NICOTINE POLACRILEX 4 MG: 2 GUM, CHEWING ORAL at 21:11

## 2017-05-08 RX ADMIN — OLANZAPINE 15 MG: 15 TABLET, FILM COATED ORAL at 19:16

## 2017-05-08 RX ADMIN — NICOTINE POLACRILEX 4 MG: 2 GUM, CHEWING ORAL at 08:35

## 2017-05-08 RX ADMIN — FOLIC ACID 1 MG: 1 TABLET ORAL at 08:35

## 2017-05-08 RX ADMIN — MULTIPLE VITAMINS W/ MINERALS TAB 1 TABLET: TAB at 08:35

## 2017-05-08 RX ADMIN — PANTOPRAZOLE SODIUM 40 MG: 40 TABLET, DELAYED RELEASE ORAL at 08:35

## 2017-05-08 RX ADMIN — HALOPERIDOL 5 MG: 5 TABLET ORAL at 22:00

## 2017-05-08 RX ADMIN — LITHIUM CARBONATE 600 MG: 300 TABLET, EXTENDED RELEASE ORAL at 08:35

## 2017-05-08 RX ADMIN — NICOTINE POLACRILEX 4 MG: 2 GUM, CHEWING ORAL at 18:37

## 2017-05-08 RX ADMIN — LITHIUM CARBONATE 600 MG: 300 TABLET, EXTENDED RELEASE ORAL at 19:15

## 2017-05-08 RX ADMIN — DIPHENHYDRAMINE HYDROCHLORIDE 50 MG: 50 CAPSULE ORAL at 22:00

## 2017-05-08 RX ADMIN — NICOTINE POLACRILEX 4 MG: 2 GUM, CHEWING ORAL at 16:51

## 2017-05-08 ASSESSMENT — ACTIVITIES OF DAILY LIVING (ADL)
LAUNDRY: UNABLE TO COMPLETE
HYGIENE/GROOMING: INDEPENDENT
ORAL_HYGIENE: INDEPENDENT
DRESS: SCRUBS (BEHAVIORAL HEALTH)
GROOMING: PROMPTS
LAUNDRY: UNABLE TO COMPLETE
DRESS: SCRUBS (BEHAVIORAL HEALTH);INDEPENDENT
ORAL_HYGIENE: PROMPTS
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
## 2017-05-08 NOTE — PROGRESS NOTES
"Pt was calm and pleasant. Pt mentioned that \"my medications are making shaky\". She had good evening overall.       05/07/17 2324   Behavioral Health   Hallucinations denies / not responding to hallucinations   Thinking poor concentration   Orientation place: oriented;date: oriented;person: oriented   Memory baseline memory   Insight poor   Judgement impaired   Eye Contact at examiner   Affect other (see comments)  ([place])   Mood mood is calm   Physical Appearance/Attire disheveled   Hygiene neglected grooming - unclean body, hair, teeth   Suicidality other (see comments)  (Stated none)   Self Injury other (see comment)  (Stated none)   Activity restless   Speech rambling   Activities of Daily Living   Hygiene/Grooming independent   Oral Hygiene independent   Dress scrubs (behavioral health)   Laundry unable to complete   Room Organization independent   Activity   Activity Level of Assistance independent   Behavioral Health Interventions   Psychotic Symptoms maintain safety precautions;monitor need to revise level of observation;reality orientation;decrease environmental stimulation;provide emotional support;encourage participation / independence with adls;redirection of intrusive behaviors;monitor need for prn medication;assess patient response to medication   Social and Therapeutic Interventions (Psychotic Symptoms) encourage socialization with peers;encourage effective boundaries with peers;encourage participation in therapeutic groups and milieu activities     "

## 2017-05-08 NOTE — CONSULTS
[facility]  Internal Medicine Consult     Daisy Curtis MRN# [id number]   Age: 31 year old YOB: 1985     Date of Admission: 2017  Date of Consult:  2017    Primary Care Provider: Kary Quintero    Requesting Service: Psychiatry  Reason for Consult: General Medical Evaluation      SUBJECTIVE   CC:   hernia   HPI:   Daisy Curtis is a 32yo female with a hx of anxiety, depression, substance abuse, uncomplicated asthma, and ventral hernia who was admitted to [facility]  for psych evaluation. Medicine was consulted  for worsening hernia. Patient is very tangential with thought process. She reports that she has a hx of hernia following having her gallbladder was removed. She reports that her hernia has been increasing in size and she is getting more worried about it. She denies any pain surrounding the area, no nausea, no abdominal pain. She is able to push the hernia back in without any issue. She denies noticing and redness in this area. She reports she was supposed to see a surgeon to get this fixed, but this has been pushed off. Otherwise reports she feels well without chest pain, sob, dyspnea, fevers or chills.      Past Medical History:     Past Medical History:   Diagnosis Date     Anxiety      Substance abuse      Uncomplicated asthma      Ventral hernia          Past Surgical History:      Past Surgical History:   Procedure Laterality Date     CHOLECYSTECTOMY       GYN SURGERY           ORTHOPEDIC SURGERY      scoliosis repair         Social History:     Social History   Substance Use Topics     Smoking status: Former Smoker     Packs/day: 1.00     Smokeless tobacco: Not on file     Alcohol use No         Family History:   No family history on file.      Allergies:     Allergies   Allergen Reactions     Codeine Sulfate      Compazine Rash     Morphine Nausea and Vomiting     Prochlorperazine Rash         Medications:   Reviewed.  Please see MAR     Review of Systems:   10 point ROS of systems including Constitutional, Eyes, Respiratory, Cardiovascular, Gastroenterology, Genitourinary, Integumentary, Muscularskeletal, Psychiatric were all negative except for pertinent positives noted in my HPI.      OBJECTIVE   Physical Exam:   Vitals were reviewed  Blood pressure 105/70, pulse 109, temperature 98.7  F (37.1  C), temperature source Tympanic, resp. rate 16, weight 78.9 kg (174 lb), SpO2 98 %, not currently breastfeeding.  General:alert, NAD, very talkative, tangential thought process  HEENT: NCAT, anicteric sclera, EOMI, MMM  Cardiovascular: RRR, S1S2  Lungs:CTAB  Abdomen: + BS, soft with no distention and no tenderness. Large ventral hernia right above umbilicus, reducible, no surrounding redness/warmth/tenderness  Vascular: no peripheral edema, distal pulses palpable  Neurologic: AAO X 3, no focal deficits  Skin: no jaundice, rashes, or lesions on exposed areas of skin        Data:        Lab Results   Component Value Date     04/13/2017    Lab Results   Component Value Date    CHLORIDE 107 04/13/2017    Lab Results   Component Value Date    BUN 5 04/13/2017      Lab Results   Component Value Date    POTASSIUM 3.9 04/13/2017    Lab Results   Component Value Date    CO2 27 04/13/2017    Lab Results   Component Value Date    CR 0.72 04/13/2017        Lab Results   Component Value Date    WBC 7.9 04/13/2017    HGB 12.4 04/13/2017    HCT 36.7 04/13/2017    MCV 94 04/13/2017     04/13/2017     Lab Results   Component Value Date    WBC 7.9 04/13/2017         Assessment and Plan/Recommendations:   Daisy Curtis is a 32yo female with a hx of anxiety, depression, substance abuse, uncomplicated asthma, and ventral hernia who was admitted to [facility] 4/12 for psych evaluation.    Hernia. Appears to be a ventral hernia right above the umbilicus. It is quite large. It is reducible, non tender, no erythema, no warmth. Recommend  that patient be seen in surgery clinic as outpatient to consider surgical repair. Educated patient to seek emergency care if worrisome signs of redness, warmth or tenderness at the site, or inability to reduce it, occur. Patient agreeable to plan. Surgery referral placed in discharge navigator.         Currently, medically stable and I will be happy to follow up and see again for any intercurrent medical issues. Thank you for the opportunity to be a part of this patient's care.      Lissett Bennett [facility]  Internal Medicine [facility] Hospitalist  [phone]  May 8, 2017

## 2017-05-08 NOTE — PROGRESS NOTES
Lithium level this morning 1.4. Dr. Yañez aware. No reported or observed sx of lithium toxicity at this time. Encourage fluids and continue to monitor.

## 2017-05-08 NOTE — PLAN OF CARE
"Problem: Psychotic Symptoms  Goal: Psychotic Symptoms  PSYCHOSIS CAREPLAN GOALS  Patient will have absence of physical or verbal aggression.   Patient will be oriented x4  Patient will exhibit organized thought and speech  Patient will be absent of auditory and visual hallucinations  Patient will have decreased paranoia.   Patient will verbalize their current medication; including dosage and time it is due.  Demonstrate participation in unit programming  Patient will demonstrate daily independence with ADL s.   Patient will verbalize persons outside the hospital they can call if needing assistance  Outcome: Therapy, progress towards functional goals is fair  Patient was out and visible in the milieu all morning. Appears disheveled and with neglected grooming. Mood reported as \"good!\". Affect full range, labile at times, with only mild irritability, otherwise mostly pleasant with interactions. No SI or HI. Denies AH or VH. Speech rambling and tangential. Possible some sedation after scheduled olanzapine given this afternoon, as patient spent time sleeping in bed. Seen by internal medicine to assess hernia, however no reports of pain or new sx related to hernia or abdominal area. Did endorse galactorrhea (not directly observed this shift) as medication SE, in addition to observed sedation from olanzapine. Appetite and sleep intact.       "

## 2017-05-09 PROCEDURE — 25000132 ZZH RX MED GY IP 250 OP 250 PS 637: Performed by: STUDENT IN AN ORGANIZED HEALTH CARE EDUCATION/TRAINING PROGRAM

## 2017-05-09 PROCEDURE — 12400003 ZZH R&B MH CRITICAL UMMC

## 2017-05-09 PROCEDURE — 99232 SBSQ HOSP IP/OBS MODERATE 35: CPT | Mod: GC | Performed by: PSYCHIATRY & NEUROLOGY

## 2017-05-09 PROCEDURE — 97150 GROUP THERAPEUTIC PROCEDURES: CPT | Mod: GO

## 2017-05-09 PROCEDURE — 25000132 ZZH RX MED GY IP 250 OP 250 PS 637: Performed by: PSYCHIATRY & NEUROLOGY

## 2017-05-09 RX ADMIN — LITHIUM CARBONATE 600 MG: 300 TABLET, EXTENDED RELEASE ORAL at 08:53

## 2017-05-09 RX ADMIN — MULTIPLE VITAMINS W/ MINERALS TAB 1 TABLET: TAB at 08:53

## 2017-05-09 RX ADMIN — NICOTINE POLACRILEX 4 MG: 2 GUM, CHEWING ORAL at 20:04

## 2017-05-09 RX ADMIN — NICOTINE POLACRILEX 4 MG: 2 GUM, CHEWING ORAL at 16:23

## 2017-05-09 RX ADMIN — LITHIUM CARBONATE 600 MG: 300 TABLET, EXTENDED RELEASE ORAL at 20:04

## 2017-05-09 RX ADMIN — OLANZAPINE 15 MG: 15 TABLET, FILM COATED ORAL at 20:04

## 2017-05-09 RX ADMIN — NICOTINE POLACRILEX 4 MG: 2 GUM, CHEWING ORAL at 21:55

## 2017-05-09 RX ADMIN — FOLIC ACID 1 MG: 1 TABLET ORAL at 08:54

## 2017-05-09 RX ADMIN — PANTOPRAZOLE SODIUM 40 MG: 40 TABLET, DELAYED RELEASE ORAL at 08:53

## 2017-05-09 RX ADMIN — NICOTINE POLACRILEX 4 MG: 2 GUM, CHEWING ORAL at 09:20

## 2017-05-09 RX ADMIN — OLANZAPINE 5 MG: 5 TABLET, FILM COATED ORAL at 08:53

## 2017-05-09 RX ADMIN — NICOTINE POLACRILEX 4 MG: 2 GUM, CHEWING ORAL at 21:04

## 2017-05-09 ASSESSMENT — ACTIVITIES OF DAILY LIVING (ADL)
DRESS: SCRUBS (BEHAVIORAL HEALTH)
LAUNDRY: UNABLE TO COMPLETE
GROOMING: HANDWASHING;INDEPENDENT
ORAL_HYGIENE: INDEPENDENT

## 2017-05-09 NOTE — PROGRESS NOTES
Pt had appropriate behaviors with staff and others. Pt's mood was calm. Pt ate dinner in her room. Pt spent a majority of the shift withdrawn in her room.      05/08/17 9063   Behavioral Health   Hallucinations appears responding   Thinking delusional;distractable;paranoid   Orientation place: oriented;person: oriented;date: oriented   Memory confabulation   Insight poor   Judgement impaired   Eye Contact at examiner   Affect full range affect   Mood mood is calm   Physical Appearance/Attire attire appropriate to age and situation   Hygiene well groomed   Suicidality other (see comments)  (none reported)   Self Injury other (see comment)  (none reported/none observed)   Activity restless   Speech clear;coherent   Medication Sensitivity no observed side effects;no stated side effects   Psychomotor / Gait balanced   Activities of Daily Living   Hygiene/Grooming independent   Oral Hygiene independent   Dress scrubs (behavioral health)   Laundry unable to complete   Room Organization independent   Activity   Activity Level of Assistance independent   Behavioral Health Interventions   Psychotic Symptoms maintain safety precautions;monitor need to revise level of observation;maintain safe secure environment;simple, clear language;decrease environmental stimulation;redirection of intrusive behaviors;redirection of aggressive behaviors;assist patient in developing safety plan;assist patient in following safety plan;encourage nutrition and hydration;encourage participation / independence with adls;provide emotional support;establish therapeutic relationship;assist with developing & utilizing healthy coping strategies;build upon strengths;monitor need for prn medication;monitor confusion, memory loss, decision making ability and reorient / intervent as needed   Social and Therapeutic Interventions (Psychotic Symptoms) encourage participation in therapeutic groups and milieu activities;encourage effective boundaries with  peers;encourage socialization with peers

## 2017-05-09 NOTE — PROGRESS NOTES
Pt mostly in her room this morning shift. Still appears responding, odd behaviors. Calm and cooperative with staff and other peers.

## 2017-05-09 NOTE — PROGRESS NOTES
Pt's Co CM, Jyotsna Lee, came to visit with pt today.   I spoke with her about placement efforts.   Jyotsna will follow up with [facility] to see if an interview can occur soon, on the unit.

## 2017-05-09 NOTE — PROGRESS NOTES
[facility], pending  approval.    Her Co CM will transport.   Dr Yañez approved the pass.
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
## 2017-05-09 NOTE — PLAN OF CARE
"Problem: Psychotic Symptoms  Intervention: Social and Therapeutic Interv (Psychotic Symptoms)     Pt attended the morning OT clinic group.  Odd affect - tense and pressured speech while asking more light-hearted questions, which she asks daily - \"how's your dog, you have the black one now, where is he now, what is he doing\" then smiles and asks what we should do today - suggests we play a game, talk, and do crafts all at the same time.  Pt chose to work on a scratch art project, took her time and worked carefully on it the whole time.        "

## 2017-05-10 PROCEDURE — 25000132 ZZH RX MED GY IP 250 OP 250 PS 637: Performed by: PSYCHIATRY & NEUROLOGY

## 2017-05-10 PROCEDURE — 25000132 ZZH RX MED GY IP 250 OP 250 PS 637: Performed by: STUDENT IN AN ORGANIZED HEALTH CARE EDUCATION/TRAINING PROGRAM

## 2017-05-10 PROCEDURE — 90853 GROUP PSYCHOTHERAPY: CPT

## 2017-05-10 PROCEDURE — 97150 GROUP THERAPEUTIC PROCEDURES: CPT | Mod: GO

## 2017-05-10 PROCEDURE — 12400003 ZZH R&B MH CRITICAL UMMC

## 2017-05-10 RX ADMIN — NICOTINE POLACRILEX 4 MG: 2 GUM, CHEWING ORAL at 09:18

## 2017-05-10 RX ADMIN — NICOTINE POLACRILEX 4 MG: 2 GUM, CHEWING ORAL at 11:19

## 2017-05-10 RX ADMIN — PANTOPRAZOLE SODIUM 40 MG: 40 TABLET, DELAYED RELEASE ORAL at 09:08

## 2017-05-10 RX ADMIN — OLANZAPINE 5 MG: 5 TABLET, FILM COATED ORAL at 09:08

## 2017-05-10 RX ADMIN — NICOTINE POLACRILEX 4 MG: 2 GUM, CHEWING ORAL at 12:35

## 2017-05-10 RX ADMIN — MULTIPLE VITAMINS W/ MINERALS TAB 1 TABLET: TAB at 09:07

## 2017-05-10 RX ADMIN — LITHIUM CARBONATE 600 MG: 300 TABLET, EXTENDED RELEASE ORAL at 09:07

## 2017-05-10 RX ADMIN — NICOTINE POLACRILEX 4 MG: 2 GUM, CHEWING ORAL at 16:25

## 2017-05-10 RX ADMIN — OLANZAPINE 10 MG: 10 TABLET, FILM COATED ORAL at 01:12

## 2017-05-10 RX ADMIN — FOLIC ACID 1 MG: 1 TABLET ORAL at 09:07

## 2017-05-10 RX ADMIN — LITHIUM CARBONATE 600 MG: 300 TABLET, EXTENDED RELEASE ORAL at 19:29

## 2017-05-10 RX ADMIN — OLANZAPINE 15 MG: 15 TABLET, FILM COATED ORAL at 19:29

## 2017-05-10 RX ADMIN — NICOTINE POLACRILEX 4 MG: 2 GUM, CHEWING ORAL at 21:58

## 2017-05-10 RX ADMIN — NICOTINE POLACRILEX 4 MG: 2 GUM, CHEWING ORAL at 19:29

## 2017-05-10 ASSESSMENT — ACTIVITIES OF DAILY LIVING (ADL)
GROOMING: INDEPENDENT
GROOMING: INDEPENDENT
DRESS: SCRUBS (BEHAVIORAL HEALTH)
DRESS: SCRUBS (BEHAVIORAL HEALTH)
ORAL_HYGIENE: INDEPENDENT
ORAL_HYGIENE: INDEPENDENT

## 2017-05-10 NOTE — PLAN OF CARE
Problem: Psychotic Symptoms  Goal: Psychotic Symptoms  PSYCHOSIS CAREPLAN GOALS  Patient will have absence of physical or verbal aggression.   Patient will be oriented x4  Patient will exhibit organized thought and speech  Patient will be absent of auditory and visual hallucinations  Patient will have decreased paranoia.   Patient will verbalize their current medication; including dosage and time it is due.  Demonstrate participation in unit programming  Patient will demonstrate daily independence with ADL s.   Patient will verbalize persons outside the hospital they can call if needing assistance     Outcome: Improving  Pt continues to have symptoms of psychosis. Pt's overall status is improving. Pt was socially appropriate throughout the shift. Pt attended unit activities and interacted appropriately with others. Pt was more isolative today than previous shifts. Pt was able to communicate about symptoms at only a superficial level. Pt appears to be responding at times and is quite distractible. Pt's affect and mood are improved over previous days. Pt maintained good behavioral control. Pt continues to lack some insight and judgement. Pt has no physical health concerns or side effects from medications.
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
## 2017-05-10 NOTE — PLAN OF CARE
Problem: Psychotic Symptoms  Intervention: Social and Therapeutic Interv (Psychotic Symptoms)     Pt attended 2 hours of OT groups.  Bright affect, again, asks writer the same daily questions about my dog, then transitions to a new topic.  Friendly, though appears preoccupied often stopping conversation and staring for a moment.  Appropriate interaction with the one other peer in group, though can easily interpret a neutral comment as something rude, and readilys respond with a rude comment, though today would listen to writer as I clarified what was said, and she was able to move on and be appropriate with the peer.

## 2017-05-10 NOTE — PROGRESS NOTES
Visible and social in the milieu with staff and peers. She appeared bright at times laughing and playing games with other patients but was noticed sometimes appearing tense during these interactions where her mood seemed to quickly change and she became more blunted and tense. She stated to staff she is excited and hopeful that her meeting for placement goes well so she can get back out into the community.     05/09/17 1959   Behavioral Health   Hallucinations appears responding   Thinking distractable;poor concentration   Orientation person: oriented;place: oriented;date: oriented;time: oriented   Memory baseline memory   Insight poor   Judgement impaired   Eye Contact at examiner;staring   Affect tense;full range affect   Mood mood is calm   Physical Appearance/Attire attire appropriate to age and situation   Hygiene well groomed   Suicidality other (see comments)  (none stated or observed)   Self Injury other (see comment)  (none stated or observed)   Activity other (see comment)  (social and visible in miliue )   Speech clear;coherent;pressured   Medication Sensitivity no stated side effects;no observed side effects   Psychomotor / Gait balanced;steady   Overt Aggression Scale   Verbal Aggression 0   Aggression against Property 0   Auto-Aggression 0   Physical Aggression 0   Overt Aggression Total Score 0   Sleep/Rest/Relaxation   Day/Evening Time Hours napping;resting in bed   Number of hours napping 1 hours   Number of hours resting in bed 1 hours   Coping/Psychosocial   Verbalized Emotional State hopefulness   Plan Of Care Reviewed With patient   Patient Agreement with Plan of Care agrees   Supportive Measures active listening utilized;positive reinforcement provided;verbalization of feelings encouraged;self-care encouraged;relaxation techniques promoted   Daily Care   Activity up ad pravin   Patient Performed Hygiene dressed   Activities of Daily Living   Hygiene/Grooming handwashing;independent   Oral Hygiene  independent   Dress scrubs (behavioral health)   Laundry unable to complete   Room Organization independent   Activity   Activity Level of Assistance independent   Behavioral Health Interventions   Psychotic Symptoms maintain safety precautions;maintain safe secure environment;simple, clear language;encourage participation / independence with adls;provide emotional support;monitor confusion, memory loss, decision making ability and reorient / intervent as needed   Social and Therapeutic Interventions (Psychotic Symptoms) encourage socialization with peers;encourage effective boundaries with peers;encourage participation in therapeutic groups and milieu activities

## 2017-05-11 PROCEDURE — 12400003 ZZH R&B MH CRITICAL UMMC

## 2017-05-11 PROCEDURE — 25000132 ZZH RX MED GY IP 250 OP 250 PS 637: Performed by: PSYCHIATRY & NEUROLOGY

## 2017-05-11 PROCEDURE — 25000132 ZZH RX MED GY IP 250 OP 250 PS 637: Performed by: STUDENT IN AN ORGANIZED HEALTH CARE EDUCATION/TRAINING PROGRAM

## 2017-05-11 PROCEDURE — H2032 ACTIVITY THERAPY, PER 15 MIN: HCPCS

## 2017-05-11 RX ADMIN — FOLIC ACID 1 MG: 1 TABLET ORAL at 08:45

## 2017-05-11 RX ADMIN — NICOTINE POLACRILEX 4 MG: 2 GUM, CHEWING ORAL at 09:27

## 2017-05-11 RX ADMIN — PANTOPRAZOLE SODIUM 40 MG: 40 TABLET, DELAYED RELEASE ORAL at 08:45

## 2017-05-11 RX ADMIN — NICOTINE POLACRILEX 4 MG: 2 GUM, CHEWING ORAL at 20:11

## 2017-05-11 RX ADMIN — LITHIUM CARBONATE 600 MG: 300 TABLET, EXTENDED RELEASE ORAL at 20:03

## 2017-05-11 RX ADMIN — NICOTINE POLACRILEX 4 MG: 2 GUM, CHEWING ORAL at 12:55

## 2017-05-11 RX ADMIN — OLANZAPINE 5 MG: 5 TABLET, FILM COATED ORAL at 08:45

## 2017-05-11 RX ADMIN — OLANZAPINE 15 MG: 15 TABLET, FILM COATED ORAL at 20:03

## 2017-05-11 RX ADMIN — NICOTINE POLACRILEX 4 MG: 2 GUM, CHEWING ORAL at 16:18

## 2017-05-11 RX ADMIN — LITHIUM CARBONATE 600 MG: 300 TABLET, EXTENDED RELEASE ORAL at 08:45

## 2017-05-11 RX ADMIN — MULTIPLE VITAMINS W/ MINERALS TAB 1 TABLET: TAB at 08:45

## 2017-05-11 RX ADMIN — NICOTINE POLACRILEX 4 MG: 2 GUM, CHEWING ORAL at 18:16

## 2017-05-11 ASSESSMENT — ACTIVITIES OF DAILY LIVING (ADL)
GROOMING: INDEPENDENT
ORAL_HYGIENE: INDEPENDENT
DRESS: SCRUBS (BEHAVIORAL HEALTH);INDEPENDENT
DRESS: SCRUBS (BEHAVIORAL HEALTH)
ORAL_HYGIENE: INDEPENDENT
GROOMING: INDEPENDENT

## 2017-05-11 NOTE — PLAN OF CARE
Problem: Psychotic Symptoms  Goal: Psychotic Symptoms  PSYCHOSIS CAREPLAN GOALS  Patient will have absence of physical or verbal aggression.   Patient will be oriented x4  Patient will exhibit organized thought and speech  Patient will be absent of auditory and visual hallucinations  Patient will have decreased paranoia.   Patient will verbalize their current medication; including dosage and time it is due.  Demonstrate participation in unit programming  Patient will demonstrate daily independence with ADL s.   Patient will verbalize persons outside the hospital they can call if needing assistance     Outcome: Improving  Patient is alert and fully oriented.  She continues to present with symptoms of psychosis.  Paranoia is improving, but she remains hypervigilant in the milieu.  Her affect is blunted.  Appearance is disheveled.  She feels that her psychotic symptoms have improved when compared to admission.  She continues to appear to be responding to internal stimuli with AH, but she is no longer experiencing agitation associated with these symptoms.  Thought blocking is evident.  She is calm, pleasant, and cooperative on approach.  Medication compliant.  Denies any adverse side effects from her current regimen.  Denies any acute physical concerns.

## 2017-05-11 NOTE — PROGRESS NOTES
[facility].   [facility] was [facility] and will be Provisionally Discharged there on Monday May 15th.   Her ACT team CM will pick her up at 2pm.
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
## 2017-05-11 NOTE — PROGRESS NOTES
Pt was bright and appropriate today. Pt appears to still be responding to internal stimuli at times and is distractible, as she will walk away in the middle of conversations. Pt is still paranoid of some of her peers, and will create distance between herself and others, or return to her room. Pt was able to tell this writer that she feels as though she is improving, and does feel better than when she was admitted. She looks forward to her IRTS visit tomorrow and hopes to discharge soon.          05/10/17 3567   Behavioral Health   Hallucinations appears responding   Thinking distractable;delusional;poor concentration;paranoid   Orientation place: oriented;person: oriented;date: oriented;time: oriented   Memory baseline memory   Insight poor   Judgement impaired   Eye Contact at examiner   Affect full range affect   Mood mood is calm   Physical Appearance/Attire attire appropriate to age and situation   Hygiene well groomed   Suicidality other (see comments)  (None stated, none observed)   Self Injury other (see comment)  (None stated, none observed)   Activity other (see comment)  (Active in milieu)   Speech coherent;clear   Activities of Daily Living   Hygiene/Grooming independent   Oral Hygiene independent   Dress scrubs (behavioral health)   Room Organization independent   Activity   Activity Level of Assistance independent

## 2017-05-12 LAB
ANION GAP SERPL CALCULATED.3IONS-SCNC: 13 MMOL/L (ref 3–14)
BUN SERPL-MCNC: 13 MG/DL (ref 7–30)
CALCIUM SERPL-MCNC: 9.9 MG/DL (ref 8.5–10.1)
CHLORIDE SERPL-SCNC: 106 MMOL/L (ref 94–109)
CO2 SERPL-SCNC: 24 MMOL/L (ref 20–32)
CREAT SERPL-MCNC: 0.7 MG/DL (ref 0.52–1.04)
GFR SERPL CREATININE-BSD FRML MDRD: ABNORMAL ML/MIN/1.7M2
GLUCOSE SERPL-MCNC: 137 MG/DL (ref 70–99)
LITHIUM SERPL-SCNC: 1.1 MMOL/L (ref 0.6–1.2)
POTASSIUM SERPL-SCNC: 4 MMOL/L (ref 3.4–5.3)
SODIUM SERPL-SCNC: 143 MMOL/L (ref 133–144)

## 2017-05-12 PROCEDURE — 25000132 ZZH RX MED GY IP 250 OP 250 PS 637: Performed by: PSYCHIATRY & NEUROLOGY

## 2017-05-12 PROCEDURE — 99232 SBSQ HOSP IP/OBS MODERATE 35: CPT | Mod: GC | Performed by: PSYCHIATRY & NEUROLOGY

## 2017-05-12 PROCEDURE — 80178 ASSAY OF LITHIUM: CPT | Performed by: PSYCHIATRY & NEUROLOGY

## 2017-05-12 PROCEDURE — 25000132 ZZH RX MED GY IP 250 OP 250 PS 637: Performed by: STUDENT IN AN ORGANIZED HEALTH CARE EDUCATION/TRAINING PROGRAM

## 2017-05-12 PROCEDURE — 80048 BASIC METABOLIC PNL TOTAL CA: CPT | Performed by: PSYCHIATRY & NEUROLOGY

## 2017-05-12 PROCEDURE — 12400003 ZZH R&B MH CRITICAL UMMC

## 2017-05-12 PROCEDURE — 36416 COLLJ CAPILLARY BLOOD SPEC: CPT | Performed by: PSYCHIATRY & NEUROLOGY

## 2017-05-12 RX ORDER — FOLIC ACID 1 MG/1
1 TABLET ORAL DAILY
Qty: 30 TABLET | Refills: 0 | Status: ON HOLD | OUTPATIENT
Start: 2017-05-12 | End: 2017-06-14

## 2017-05-12 RX ORDER — MULTIPLE VITAMINS W/ MINERALS TAB 9MG-400MCG
1 TAB ORAL DAILY
Qty: 30 EACH | Refills: 0 | Status: ON HOLD | OUTPATIENT
Start: 2017-05-12 | End: 2017-06-14

## 2017-05-12 RX ORDER — OLANZAPINE 10 MG/1
10 TABLET ORAL
Qty: 30 TABLET | Refills: 0 | Status: ON HOLD | OUTPATIENT
Start: 2017-05-12 | End: 2017-05-29

## 2017-05-12 RX ORDER — HYDROXYZINE HYDROCHLORIDE 25 MG/1
25-50 TABLET, FILM COATED ORAL 3 TIMES DAILY PRN
Status: DISCONTINUED | OUTPATIENT
Start: 2017-05-12 | End: 2017-05-15 | Stop reason: HOSPADM

## 2017-05-12 RX ORDER — LITHIUM CARBONATE 300 MG/1
600 TABLET, FILM COATED, EXTENDED RELEASE ORAL 2 TIMES DAILY
Qty: 60 TABLET | Refills: 0 | Status: SHIPPED | OUTPATIENT
Start: 2017-05-12 | End: 2017-05-15

## 2017-05-12 RX ORDER — HYDROXYZINE HYDROCHLORIDE 25 MG/1
25-50 TABLET, FILM COATED ORAL 3 TIMES DAILY PRN
Qty: 120 TABLET | Refills: 0 | Status: ON HOLD | OUTPATIENT
Start: 2017-05-12 | End: 2017-06-14

## 2017-05-12 RX ORDER — OLANZAPINE 15 MG/1
15 TABLET ORAL AT BEDTIME
Qty: 30 TABLET | Refills: 0 | Status: ON HOLD | OUTPATIENT
Start: 2017-05-12 | End: 2017-06-14

## 2017-05-12 RX ORDER — OLANZAPINE 5 MG/1
5 TABLET ORAL DAILY
Qty: 30 TABLET | Refills: 0 | Status: ON HOLD | OUTPATIENT
Start: 2017-05-12 | End: 2017-06-14

## 2017-05-12 RX ORDER — AMOXICILLIN 250 MG
1 CAPSULE ORAL 2 TIMES DAILY PRN
Qty: 100 TABLET | Refills: 0 | Status: ON HOLD | OUTPATIENT
Start: 2017-05-12 | End: 2017-06-14

## 2017-05-12 RX ORDER — PANTOPRAZOLE SODIUM 40 MG/1
40 TABLET, DELAYED RELEASE ORAL EVERY MORNING
Qty: 30 TABLET | Refills: 0 | Status: ON HOLD | OUTPATIENT
Start: 2017-05-12 | End: 2017-06-14

## 2017-05-12 RX ORDER — BENZTROPINE MESYLATE 0.5 MG/1
0.5 TABLET ORAL 2 TIMES DAILY PRN
Qty: 60 TABLET | Refills: 0 | Status: CANCELLED | OUTPATIENT
Start: 2017-05-12

## 2017-05-12 RX ADMIN — OLANZAPINE 10 MG: 10 TABLET, FILM COATED ORAL at 03:07

## 2017-05-12 RX ADMIN — PANTOPRAZOLE SODIUM 40 MG: 40 TABLET, DELAYED RELEASE ORAL at 09:50

## 2017-05-12 RX ADMIN — NICOTINE POLACRILEX 4 MG: 2 GUM, CHEWING ORAL at 21:35

## 2017-05-12 RX ADMIN — MULTIPLE VITAMINS W/ MINERALS TAB 1 TABLET: TAB at 09:50

## 2017-05-12 RX ADMIN — NICOTINE POLACRILEX 4 MG: 2 GUM, CHEWING ORAL at 19:30

## 2017-05-12 RX ADMIN — LITHIUM CARBONATE 600 MG: 300 TABLET, EXTENDED RELEASE ORAL at 19:31

## 2017-05-12 RX ADMIN — LITHIUM CARBONATE 600 MG: 300 TABLET, EXTENDED RELEASE ORAL at 09:49

## 2017-05-12 RX ADMIN — OLANZAPINE 5 MG: 5 TABLET, FILM COATED ORAL at 09:49

## 2017-05-12 RX ADMIN — FOLIC ACID 1 MG: 1 TABLET ORAL at 09:50

## 2017-05-12 RX ADMIN — NICOTINE POLACRILEX 4 MG: 2 GUM, CHEWING ORAL at 09:50

## 2017-05-12 RX ADMIN — OLANZAPINE 15 MG: 15 TABLET, FILM COATED ORAL at 19:31

## 2017-05-12 ASSESSMENT — ACTIVITIES OF DAILY LIVING (ADL)
ORAL_HYGIENE: INDEPENDENT
LAUNDRY: UNABLE TO COMPLETE
LAUNDRY: UNABLE TO COMPLETE
HYGIENE/GROOMING: INDEPENDENT
DRESS: SCRUBS (BEHAVIORAL HEALTH)
HYGIENE/GROOMING: INDEPENDENT;PROMPTS
DRESS: SCRUBS (BEHAVIORAL HEALTH)
ORAL_HYGIENE: INDEPENDENT
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
## 2017-05-12 NOTE — PROGRESS NOTES
Pt spent some time in the milieu today interacting with peers. Needed some redirection for being to loud but was agreeable. Pt was appropriate with staff and ate well. Pt spend time in her room, possibly because the milieu was loud and busy this evening. Pt is polite and bright on approach.          05/11/17 2736   Behavioral Health   Hallucinations appears responding   Thinking poor concentration;distractable   Orientation person: oriented;date: oriented;time: oriented;place: oriented   Memory baseline memory   Insight poor   Judgement impaired   Eye Contact at examiner   Affect blunted, flat   Mood mood is calm   Physical Appearance/Attire attire appropriate to age and situation   Hygiene well groomed   Suicidality other (see comments)  (None stated, none observed)   Self Injury other (see comment)  (None stated, none observed)   Activity withdrawn;other (see comment)  (Active in milieu at times )   Speech clear;coherent   Activities of Daily Living   Hygiene/Grooming independent   Oral Hygiene independent   Dress scrubs (behavioral health)   Room Organization independent   Activity   Activity Level of Assistance independent

## 2017-05-12 NOTE — PROGRESS NOTES
Daisy was in her room for most of the day. She came out of her room a few times and interacted with other patients and staff. She ate all her meals and she is currently in her room.

## 2017-05-13 PROCEDURE — 25000132 ZZH RX MED GY IP 250 OP 250 PS 637: Performed by: PSYCHIATRY & NEUROLOGY

## 2017-05-13 PROCEDURE — 12400003 ZZH R&B MH CRITICAL UMMC

## 2017-05-13 PROCEDURE — 25000132 ZZH RX MED GY IP 250 OP 250 PS 637: Performed by: STUDENT IN AN ORGANIZED HEALTH CARE EDUCATION/TRAINING PROGRAM

## 2017-05-13 RX ADMIN — OLANZAPINE 15 MG: 15 TABLET, FILM COATED ORAL at 19:29

## 2017-05-13 RX ADMIN — NICOTINE POLACRILEX 4 MG: 2 GUM, CHEWING ORAL at 13:15

## 2017-05-13 RX ADMIN — OLANZAPINE 5 MG: 5 TABLET, FILM COATED ORAL at 08:47

## 2017-05-13 RX ADMIN — NICOTINE POLACRILEX 4 MG: 2 GUM, CHEWING ORAL at 19:30

## 2017-05-13 RX ADMIN — PANTOPRAZOLE SODIUM 40 MG: 40 TABLET, DELAYED RELEASE ORAL at 08:47

## 2017-05-13 RX ADMIN — LITHIUM CARBONATE 600 MG: 300 TABLET, EXTENDED RELEASE ORAL at 08:46

## 2017-05-13 RX ADMIN — MULTIPLE VITAMINS W/ MINERALS TAB 1 TABLET: TAB at 08:47

## 2017-05-13 RX ADMIN — NICOTINE POLACRILEX 4 MG: 2 GUM, CHEWING ORAL at 17:25

## 2017-05-13 RX ADMIN — NICOTINE POLACRILEX 4 MG: 2 GUM, CHEWING ORAL at 09:30

## 2017-05-13 RX ADMIN — LITHIUM CARBONATE 600 MG: 300 TABLET, EXTENDED RELEASE ORAL at 19:29

## 2017-05-13 RX ADMIN — NICOTINE POLACRILEX 4 MG: 2 GUM, CHEWING ORAL at 20:50

## 2017-05-13 RX ADMIN — FOLIC ACID 1 MG: 1 TABLET ORAL at 08:46

## 2017-05-13 ASSESSMENT — ACTIVITIES OF DAILY LIVING (ADL)
GROOMING: INDEPENDENT
ORAL_HYGIENE: INDEPENDENT
LAUNDRY: UNABLE TO COMPLETE
ORAL_HYGIENE: INDEPENDENT
DRESS: SCRUBS (BEHAVIORAL HEALTH)
GROOMING: INDEPENDENT
DRESS: SCRUBS (BEHAVIORAL HEALTH)

## 2017-05-13 NOTE — PROGRESS NOTES
Pt was present in milieu. Most interactions with peers were appropriate, but occasionally required staff redirection due to voice volume and level of animation.         05/12/17 2106   Behavioral Health   Hallucinations appears responding   Thinking poor concentration;distractable   Orientation other (see comment)  (unable to access )   Memory baseline memory   Insight poor   Judgement impaired   Eye Contact at examiner;out of corner of eyes   Affect tense;irritable   Mood mood is calm   Physical Appearance/Attire attire appropriate to age and situation   Hygiene other (see comment)  (adequate)   Suicidality other (see comments)  (none reported or observed)   Self Injury other (see comment)  (none reported or observed)   Activity other (see comment)  (active in milieu)   Speech clear;coherent   Psychomotor / Gait paces;balanced;steady   Hygiene Care   Hygiene Care other (see comments)  (pt declined oppertunity to shower)   Activities of Daily Living   Hygiene/Grooming independent;prompts   Oral Hygiene independent   Dress scrubs (behavioral health)   Laundry unable to complete   Room Organization independent

## 2017-05-13 NOTE — PROGRESS NOTES
Patient has shown improvement, joking with peers and staff. Respectful in her requests Oriented in all spheres, less loose associations noted in her thinking. Plans to discharge on Monday.     05/13/17 1330   Behavioral Health   Hallucinations appears responding;denies / not responding to hallucinations   Thinking distractable   Orientation person: oriented;place: oriented;date: oriented   Memory baseline memory   Insight other (see comment)  (Improving)   Judgement (Improving)   Eye Contact at examiner   Affect full range affect;other (see comments)   Mood mood is calm;elated   Physical Appearance/Attire neat   Hygiene other (see comment)  (Adequate)   Suicidality other (see comments)  (No SI)   Self Injury other (see comment)  (No SIB observed)   Activity restless;other (see comment)  (In milieu a fair amount)   Speech clear;coherent   Medication Sensitivity no stated side effects;no observed side effects   Psychomotor / Gait balanced;steady   Substance Withdrawal Interventions   Social and Therapeutic Interventions (Substance Withdrawal) encourage effective boundaries with peers   Overt Aggression Scale   Verbal Aggression 0   Aggression against Property 0   Auto-Aggression 0   Physical Aggression 0   Overt Aggression Total Score 0   Sleep/Rest/Relaxation   Day/Evening Time Hours resting in bed;napping   Number of hours napping 2 hours   Number of hours resting in bed 1 hours   Coping/Psychosocial   Verbalized Emotional State acceptance;happiness   Psycho Education   Type of Intervention 1:1 intervention   Response participates with encouragement   Hours 0.5   Treatment Detail Ways to [place]  (Improved thinking)   Daily Care   Activity up ad pravin   Activities of Daily Living   Hygiene/Grooming independent   Oral Hygiene independent   Dress scrubs (behavioral health)   Laundry unable to complete   Room Organization independent   Activity   Activity Level of Assistance independent   Behavioral Health Interventions    Psychotic Symptoms decrease environmental stimulation   Social and Therapeutic Interventions (Psychotic Symptoms) encourage effective boundaries with peers

## 2017-05-14 PROCEDURE — 12400003 ZZH R&B MH CRITICAL UMMC

## 2017-05-14 PROCEDURE — 25000132 ZZH RX MED GY IP 250 OP 250 PS 637: Performed by: PSYCHIATRY & NEUROLOGY

## 2017-05-14 PROCEDURE — 25000132 ZZH RX MED GY IP 250 OP 250 PS 637: Performed by: STUDENT IN AN ORGANIZED HEALTH CARE EDUCATION/TRAINING PROGRAM

## 2017-05-14 RX ADMIN — PANTOPRAZOLE SODIUM 40 MG: 40 TABLET, DELAYED RELEASE ORAL at 08:39

## 2017-05-14 RX ADMIN — NICOTINE POLACRILEX 4 MG: 2 GUM, CHEWING ORAL at 11:25

## 2017-05-14 RX ADMIN — LITHIUM CARBONATE 600 MG: 300 TABLET, EXTENDED RELEASE ORAL at 18:23

## 2017-05-14 RX ADMIN — NICOTINE POLACRILEX 4 MG: 2 GUM, CHEWING ORAL at 21:11

## 2017-05-14 RX ADMIN — NICOTINE POLACRILEX 4 MG: 2 GUM, CHEWING ORAL at 13:37

## 2017-05-14 RX ADMIN — DIPHENHYDRAMINE HYDROCHLORIDE 50 MG: 50 CAPSULE ORAL at 18:23

## 2017-05-14 RX ADMIN — NICOTINE POLACRILEX 4 MG: 2 GUM, CHEWING ORAL at 09:36

## 2017-05-14 RX ADMIN — OLANZAPINE 15 MG: 15 TABLET, FILM COATED ORAL at 18:23

## 2017-05-14 RX ADMIN — MULTIPLE VITAMINS W/ MINERALS TAB 1 TABLET: TAB at 08:39

## 2017-05-14 RX ADMIN — OLANZAPINE 5 MG: 5 TABLET, FILM COATED ORAL at 08:39

## 2017-05-14 RX ADMIN — FOLIC ACID 1 MG: 1 TABLET ORAL at 08:39

## 2017-05-14 RX ADMIN — NICOTINE POLACRILEX 4 MG: 2 GUM, CHEWING ORAL at 15:57

## 2017-05-14 RX ADMIN — NICOTINE POLACRILEX 4 MG: 2 GUM, CHEWING ORAL at 12:17

## 2017-05-14 RX ADMIN — NICOTINE POLACRILEX 4 MG: 2 GUM, CHEWING ORAL at 08:35

## 2017-05-14 RX ADMIN — NICOTINE POLACRILEX 4 MG: 2 GUM, CHEWING ORAL at 17:52

## 2017-05-14 RX ADMIN — LITHIUM CARBONATE 600 MG: 300 TABLET, EXTENDED RELEASE ORAL at 08:39

## 2017-05-14 ASSESSMENT — ACTIVITIES OF DAILY LIVING (ADL)
GROOMING: INDEPENDENT
ORAL_HYGIENE: INDEPENDENT
GROOMING: INDEPENDENT
DRESS: SCRUBS (BEHAVIORAL HEALTH)
ORAL_HYGIENE: INDEPENDENT
DRESS: SCRUBS (BEHAVIORAL HEALTH)

## 2017-05-14 NOTE — PROGRESS NOTES
05/14/17 1505   Significant Event   Significant Event Other (see comments)  (shift summary)   Pt had a calm shift. She was out in the milieu and pleasant and cooperative with peers and staff. In the afternoon she rested/napped for several hours in her room. No MI sx observed, of note.

## 2017-05-14 NOTE — PLAN OF CARE
Problem: Psychotic Symptoms  Goal: Psychotic Symptoms  PSYCHOSIS CAREPLAN GOALS  Patient will have absence of physical or verbal aggression.   Patient will be oriented x4  Patient will exhibit organized thought and speech  Patient will be absent of auditory and visual hallucinations  Patient will have decreased paranoia.   Patient will verbalize their current medication; including dosage and time it is due.  Demonstrate participation in unit programming  Patient will demonstrate daily independence with ADL s.   Patient will verbalize persons outside the hospital they can call if needing assistance     Outcome: Improving  Pt is visible in the milieu and socializing with peers. Needs mild redirection at times to maintain appropriate boundaries, but is receptive to redirection. Affect full range. Appears internally preoccupied at times, but denies hallucinations. No agitation or aggression observed. Reports feeling hopeful and excited fro discharge on Monday. Compliant with all scheduled medications, and requests prns appropriately. No observed adverse effects. Will continue to monitor.

## 2017-05-15 VITALS
TEMPERATURE: 95.1 F | BODY MASS INDEX: 31.83 KG/M2 | DIASTOLIC BLOOD PRESSURE: 89 MMHG | RESPIRATION RATE: 14 BRPM | OXYGEN SATURATION: 98 % | WEIGHT: 174 LBS | SYSTOLIC BLOOD PRESSURE: 113 MMHG | HEART RATE: 136 BPM

## 2017-05-15 PROCEDURE — 99238 HOSP IP/OBS DSCHRG MGMT 30/<: CPT | Mod: GC | Performed by: PSYCHIATRY & NEUROLOGY

## 2017-05-15 PROCEDURE — 25000132 ZZH RX MED GY IP 250 OP 250 PS 637: Performed by: PSYCHIATRY & NEUROLOGY

## 2017-05-15 PROCEDURE — 25000132 ZZH RX MED GY IP 250 OP 250 PS 637: Performed by: STUDENT IN AN ORGANIZED HEALTH CARE EDUCATION/TRAINING PROGRAM

## 2017-05-15 RX ORDER — LITHIUM CARBONATE 300 MG/1
600 TABLET, FILM COATED, EXTENDED RELEASE ORAL 2 TIMES DAILY
Qty: 120 TABLET | Refills: 0 | Status: ON HOLD | OUTPATIENT
Start: 2017-05-15 | End: 2017-06-14

## 2017-05-15 RX ADMIN — FOLIC ACID 1 MG: 1 TABLET ORAL at 08:01

## 2017-05-15 RX ADMIN — OLANZAPINE 5 MG: 5 TABLET, FILM COATED ORAL at 08:01

## 2017-05-15 RX ADMIN — NICOTINE POLACRILEX 4 MG: 2 GUM, CHEWING ORAL at 08:42

## 2017-05-15 RX ADMIN — NICOTINE POLACRILEX 4 MG: 2 GUM, CHEWING ORAL at 10:52

## 2017-05-15 RX ADMIN — NICOTINE POLACRILEX 4 MG: 2 GUM, CHEWING ORAL at 13:44

## 2017-05-15 RX ADMIN — NICOTINE POLACRILEX 4 MG: 2 GUM, CHEWING ORAL at 07:18

## 2017-05-15 RX ADMIN — LITHIUM CARBONATE 600 MG: 300 TABLET, EXTENDED RELEASE ORAL at 08:00

## 2017-05-15 RX ADMIN — PANTOPRAZOLE SODIUM 40 MG: 40 TABLET, DELAYED RELEASE ORAL at 08:01

## 2017-05-15 RX ADMIN — MULTIPLE VITAMINS W/ MINERALS TAB 1 TABLET: TAB at 08:01
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
## 2017-05-15 NOTE — PROGRESS NOTES
This writer returned call from Birdie at [facility] to let her know that I heard from the ACT Team CM and everything is in place and the pt will be picked up and transported to them today at 2pm.

## 2017-05-15 NOTE — PROGRESS NOTES
This writer faxed over Provisional discharge agreement to [facility] this morning.      Pt's ACT team worker Jyotsna Lee to pick her up at 2pm to take [facility] to [facility].  Confirmed this with Jyotsna and then also called and confirmed with [facility].

## 2017-05-15 NOTE — DISCHARGE INSTRUCTIONS
Behavioral Discharge Planning and Instructions      Summary:  You were admitted to [facility] due to psychosis and disorganized behavior.  You were very anxious and paranoid (wondering whether your mom had poisoned you.)  You asked the neighbor to call 911.  You had not been taking your medication.   You were found in your bathrobe outside of your apartment building.  While on [facility], you became verbally aggressive with a peer which necessitated your transfer to [facility], the intensive unit.    Placement efforts began on [facility] 22 and were continued on [facility] 12.  You were accepted at [facility]'s and are being Provisionally Discharged there today.   The address is [address] [place] [address].   Iantha Phone:  [phone],  FAX:  [phone].    Your Provisional Discharge was revoked on 4-14-17.   You are under re-commitment order until 4-7-18.  The commitment is to the Commissioner of Human Services as [place].  There is also a Griffith order for Risperdal, Haldoll, Invega, and Zyprexa.        Diagnoses:   Schizoaffective Disorder, bipolar type  Cocaine Use Disorder  Benzodiazepine Use Disorder      Mental Health Follow-Up:   [facility] ACT team.  FAX:  [phone]  ACT Psychiatrist: Dr. Gisela Day - Your ACT worker will arrange your next appointment with Dr Day.  Nurse: Manpreet Sheehan: [phone]  ACT CM: Jyotsna Lee [phone]  ACT : Ai Mendiola [phone]  Attend all scheduled appointments with your outpatient providers. Call at least 24 hours in advance if you need to reschedule an appointment to ensure continued access to your outpatient providers.   Major Treatments, Procedures and Findings:  You were provided with: a psychiatric assessment, assessed for medical stability, medication evaluation and/or management and group therapy    Symptoms to Report: feeling more aggressive, increased confusion, losing more sleep, mood getting  worse or thoughts of suicide    Early warning signs can include: increased depression or anxiety sleep disturbances increased thoughts or behaviors of suicide or self-harm  increased unusual thinking, such as paranoia or hearing voices    Safety and Wellness:  Take all medicines as directed.  Make no changes unless your doctor suggests them.      Follow treatment recommendations.  Refrain from alcohol and non-prescribed drugs.  If there is a concern for safety, call 911.    Resources:   COPE (Community Outreach for Psychiatric Emergencies): 614.413.6105.  Available 24-hours a day, 7 days a week.  COPE professionals are available to go where you are. Telephone consultations are also available.    Crisis Intervention: 861.281.9619 or 011-601-6771 (TTY: 263.395.1374).  Call anytime for help.  National [place] on Mental Illness (www.mn.pari.org): 818.617.6566 or 132-147-9627.  Alcoholics Anonymous (www.alcoholics-anonymous.org): Check your phone book for your local chapter.  National Suicide Prevention Line (www.mentalhealthmn.org): 686-661-GUMP (9068)  Mental Health Consumer/Survivor Network of [place] (www.mhcsn.net): 907.389.6000 or 250-859-3644  Mental [place] Association of [place] (www.mentalhealth.org): 200.256.1660 or 649-912-3094    The treatment team has appreciated the opportunity to work with you. If you have any questions or concerns our unit number is [phone].

## 2017-05-15 NOTE — PLAN OF CARE
Problem: Discharge Planning  Goal: Discharge Planning (Adult, OB, Behavioral, Peds)  Outcome: Adequate for Discharge Date Met:  05/15/17  Patient is ready for discharge today. Patient understands her d/c instructions. She denies depression/SI/SIB. Denies AH/VH. She is able to make her needs know. She denies any acute physical concerns. She has been medication compliant. 30 day supply of medications sent. All belongings and valuables returned. Appropriate for discharge at this time.

## 2017-05-23 ENCOUNTER — HOSPITAL ENCOUNTER (INPATIENT)
Facility: CLINIC | Age: 32
LOS: 3 days | Discharge: ANOTHER HOSPITAL | DRG: 885 | End: 2017-05-27
Attending: EMERGENCY MEDICINE | Admitting: PSYCHIATRY & NEUROLOGY
Payer: COMMERCIAL

## 2017-05-23 DIAGNOSIS — F25.0 SCHIZOAFFECTIVE DISORDER, BIPOLAR TYPE (H): ICD-10-CM

## 2017-05-23 LAB
AMPHETAMINES UR QL SCN: NORMAL
BARBITURATES UR QL: NORMAL
BENZODIAZ UR QL: NORMAL
CANNABINOIDS UR QL SCN: NORMAL
COCAINE UR QL: NORMAL
ETHANOL UR QL SCN: NORMAL
HCG UR QL: NEGATIVE
INTERNAL QC OK POCT: YES
OPIATES UR QL SCN: NORMAL

## 2017-05-23 PROCEDURE — 99285 EMERGENCY DEPT VISIT HI MDM: CPT | Mod: 25 | Performed by: EMERGENCY MEDICINE

## 2017-05-23 PROCEDURE — 81025 URINE PREGNANCY TEST: CPT | Performed by: EMERGENCY MEDICINE

## 2017-05-23 PROCEDURE — 25000132 ZZH RX MED GY IP 250 OP 250 PS 637: Performed by: EMERGENCY MEDICINE

## 2017-05-23 PROCEDURE — 99285 EMERGENCY DEPT VISIT HI MDM: CPT | Mod: Z6 | Performed by: EMERGENCY MEDICINE

## 2017-05-23 PROCEDURE — 80307 DRUG TEST PRSMV CHEM ANLYZR: CPT | Performed by: EMERGENCY MEDICINE

## 2017-05-23 PROCEDURE — 80320 DRUG SCREEN QUANTALCOHOLS: CPT | Performed by: EMERGENCY MEDICINE

## 2017-05-23 PROCEDURE — 90791 PSYCH DIAGNOSTIC EVALUATION: CPT

## 2017-05-23 RX ORDER — OLANZAPINE 5 MG/1
10 TABLET, ORALLY DISINTEGRATING ORAL ONCE
Status: COMPLETED | OUTPATIENT
Start: 2017-05-23 | End: 2017-05-24

## 2017-05-23 RX ORDER — OLANZAPINE 5 MG/1
10 TABLET, ORALLY DISINTEGRATING ORAL ONCE
Status: COMPLETED | OUTPATIENT
Start: 2017-05-23 | End: 2017-05-23

## 2017-05-23 RX ADMIN — OLANZAPINE 10 MG: 5 TABLET, ORALLY DISINTEGRATING ORAL at 22:54

## 2017-05-23 ASSESSMENT — ENCOUNTER SYMPTOMS
VOMITING: 0
HALLUCINATIONS: 1
ABDOMINAL PAIN: 0
AGITATION: 1
DIARRHEA: 0
SHORTNESS OF BREATH: 0
FEVER: 0

## 2017-05-23 NOTE — IP AVS SNAPSHOT
"    UR 12NB: [phone]                                              INTERAGENCY TRANSFER FORM - PHYSICIAN ORDERS   2017                    Hospital Admission Date: 2017  JULY UNDERWOOD   : 1985  Sex: Female        Attending Provider: Norma Schultz MD     Allergies:  Codeine Sulfate, Compazine, Morphine, Prochlorperazine    Infection:  None   Service:  MENTAL HEALT    Ht:  1.575 m (5' 2\")   Wt:  78.9 kg (174 lb)   Admission Wt:  78.9 kg (174 lb)    BMI:  31.83 kg/m 2   BSA:  1.86 m 2            Patient PCP Information     Provider PCP Type    Kary Quintero MD [facility]/Behavioral Medicine      ED Clinical Impression     Diagnosis Description Comment Added By Time Added    Schizoaffective disorder, bipolar type (H) [F25.0] Schizoaffective disorder, bipolar type (H) [F25.0]  Mel Barreto MD 2017  1:46 AM      Hospital Problems as of 2017              Priority Class Noted POA    Mental health disorder Medium  2017 Yes      Non-Hospital Problems as of 2017              Priority Class Noted    Schizoaffective disorder, bipolar type (H) Medium  1/10/2014    Cluster B personality disorder Medium  2014    Cannabis dependence (H) Medium  2014    Psychosis Medium  2014    Intractable vomiting Medium  3/9/2017    Nausea & vomiting Medium  3/10/2017    Behavior concern in adult Medium  3/20/2017    Bipolar 1 disorder (H) Medium  2017      Code Status History     Date Active Date Inactive Code Status Order ID Comments User Context    2017  6:49 PM 5/15/2017  4:20 PM Full Code [id number]  Deacon Mishra MD Inpatient    3/20/2017  7:01 PM 2017  8:29 PM Full Code [id number]  Evelyn Otero RN Inpatient    3/9/2017  9:17 PM 3/11/2017  2:00 PM Full Code [id number]  Venus Jade PA-C Inpatient    3/2/2017  3:44 PM 3/9/2017  9:17 PM Full Code [id number]  Tim Vargas MD Inpatient "    5/20/2016  6:26 PM 6/2/2016  2:37 PM Full Code [id number]  Sonia Richards, RN Inpatient    5/18/2016  8:04 PM 5/20/2016  6:26 PM Full Code [id number]  Ramon Balderrama, RN Inpatient    2/12/2014  8:43 PM 4/25/2014  2:42 PM Full Code [id number]  Cristian Nunez, RN Inpatient    1/10/2014  5:09 PM 2/5/2014 11:29 AM Full Code [id number]  Foster Hopkins, RN Inpatient    9/22/2011  9:06 PM 10/17/2011 12:28 PM Full Code [id number]  Lindy Boone RN Inpatient         Medication Review      UNREVIEWED medications. Ask your doctor about these medications        Dose / Directions Comments    folic acid 1 MG tablet   Commonly known as:  FOLVITE   Used for:  Schizoaffective disorder, bipolar type (H)        Dose:  1 mg   Take 1 tablet (1 mg) by mouth daily   Quantity:  30 tablet   Refills:  0        hydrOXYzine 25 MG tablet   Commonly known as:  ATARAX   Used for:  Schizoaffective disorder, bipolar type (H)        Dose:  25-50 mg   Take 1-2 tablets (25-50 mg) by mouth 3 times daily as needed for anxiety   Quantity:  120 tablet   Refills:  0        lithium 300 MG CR tablet   Commonly known as:  ESKALITH/LITHOBID   Used for:  Schizoaffective disorder, bipolar type (H)        Dose:  600 mg   Take 2 tablets (600 mg) by mouth 2 times daily   Quantity:  120 tablet   Refills:  0        multivitamin, therapeutic with minerals Tabs tablet   Used for:  Schizoaffective disorder, bipolar type (H)        Dose:  1 tablet   Take 1 tablet by mouth daily   Quantity:  30 each   Refills:  0        * OLANZapine 10 MG tablet   Commonly known as:  zyPREXA   Used for:  Schizoaffective disorder, bipolar type (H)        Dose:  10 mg   Take 1 tablet (10 mg) by mouth every 2 hours as needed (agitation associated with psychosis or julio.)   Quantity:  30 tablet   Refills:  0    Max daily dose of 30mg/day.       * OLANZapine 15 MG tablet   Commonly known as:  zyPREXA   Used for:  Schizoaffective disorder, bipolar type (H)        Dose:   15 mg   Take 1 tablet (15 mg) by mouth At Bedtime   Quantity:  30 tablet   Refills:  0        * OLANZapine 5 MG tablet   Commonly known as:  zyPREXA   Used for:  Schizoaffective disorder, bipolar type (H)        Dose:  5 mg   Take 1 tablet (5 mg) by mouth daily   Quantity:  30 tablet   Refills:  0        pantoprazole 40 MG EC tablet   Commonly known as:  PROTONIX   Used for:  Gastroesophageal reflux disease without esophagitis        Dose:  40 mg   Take 1 tablet (40 mg) by mouth every morning   Quantity:  30 tablet   Refills:  0        senna-docusate 8.6-50 MG per tablet   Commonly known as:  SENOKOT-S;PERICOLACE   Used for:  Slow transit constipation        Dose:  1 tablet   Take 1 tablet by mouth 2 times daily as needed for constipation   Quantity:  100 tablet   Refills:  0        * Notice:  This list has 3 medication(s) that are the same as other medications prescribed for you. Read the directions carefully, and ask your doctor or other care provider to review them with you.              Further instructions from your care team       Behavioral Discharge Planning and Instructions      Summary:  You were admitted to the [facility] with paranoia and psychosis in relation to non-compliance with medication.  During your hospitalization, you met daily with the staff and were encouraged to attend all therapeutic programming. You met with the Clinical Treatment Coordinator and participated in your discharge planning. Medications were adjusted.  You are now stabilized and are discharged.  Referrals and recommendations are listed below.     Your provisional discharge was revoked.     Primary Diagnoses:   Schizoaffective Disorder, bipolar type  Cocaine Use Disorder  Benzodiazepine Use Disorder      Mental Health Follow-Up:  ACT team through [facility]:  : Jyotsna Lee [phone]  Psychiatrist: Dr.Barbara Day  Nurse: Manpreet Sheehan: [phone]        Primary Care Physician: Khadijah Quintero [phone]            Attend all scheduled appointments with your outpatient providers. Call at least 24 hours in advance if you need to reschedule an appointment to ensure continued access to your outpatient providers.   Major Treatments, Procedures and Findings:  You were provided with: a psychiatric assessment, medication evaluation and/or management, group therapy and milieu management    Symptoms to Report: feeling more aggressive, increased confusion, losing more sleep, mood getting worse or thoughts of suicide    Early warning signs can include: increased depression or anxiety sleep disturbances increased thoughts or behaviors of suicide or self-harm  increased unusual thinking, such as paranoia or hearing voices    Safety and Wellness:  Take all medicines as directed.  Make no changes unless your doctor suggests them.      Follow treatment recommendations.  Refrain from alcohol and non-prescribed drugs.  Ask your support system to help you reduce your access to items that could harm yourself or others. Items could include:  Firearms  Medicines (both prescribed and over-the-counter)  Knives and other sharp objects  Ropes and like materials  Car keys  If there is a concern for safety, call 911. If there is a concern for safety, call 911.    Resources:   Crisis Intervention: 241.606.6604 or 413-510-4576 (TTY: 334.506.7629).  Call anytime for help.  National [place] on Mental Illness (www.mn.pari.org): 194.382.2146 or 582-763-7012.  [place] Association for Children's Mental Health (www.macmh.org): 728.383.8089.  Alcoholics Anonymous (www.alcoholics-anonymous.org): Check your phone book for your local chapter.  Suicide Awareness Voices of Education (SAVE) (www.save.org): 262-908-ZOSQ (6943)  National Suicide Prevention Line (www.mentalhealthmn.org): 256-950-PKLF (7915)  Mental Health Consumer/Survivor Network of [place] (www.mhcsn.net):  "275-473-3487 or 524-727-7484  Mental Health Association of [place] (www.mentalhealth.org): 600.416.7490 or 899-909-6438  Self- Management and Recovery Training., SMART-- Toll free: 643.684.7482  www.[employer]  [facility] Crisis (COPE) Response - Adult 355 514-9494  [facility] Crisis Response - Adult 910 264-9958  Text 4 Life: txt \"LIFE\" to 15560 for immediate support and crisis intervention  Crisis text line: Text \"START\" to 875-087. Free, confidential, 24/7.  Crisis Intervention: 276.923.3298 or 532-649-5115. Call anytime for help.   [facility] Mental Health Crisis Team - Child: [phone]  [facility] Mental Health Crisis Response Team - Child: [phone]    The treatment team has appreciated the opportunity to work with you. If you have any questions or concerns our unit number is [phone].            "

## 2017-05-23 NOTE — IP AVS SNAPSHOT
"          UR 12NB: [phone]                                              INTERAGENCY TRANSFER FORM - LAB / IMAGING / EKG / EMG RESULTS   2017                    Hospital Admission Date: 2017  JULY UNDERWOOD   : 1985  Sex: Female        Attending Provider: Noram Schultz MD     Allergies:  Codeine Sulfate, Compazine, Morphine, Prochlorperazine    Infection:  None   Service:  MENTAL HEALT    Ht:  1.575 m (5' 2\")   Wt:  78.9 kg (174 lb)   Admission Wt:  78.9 kg (174 lb)    BMI:  31.83 kg/m 2   BSA:  1.86 m 2            Patient PCP Information     Provider PCP Type    Kary Quintero MD [facility]/Behavioral Medicine         Lab Results - 3 Days      Basic metabolic panel [[id number]] (Abnormal)  Resulted: 17 1415, Result status: Final result    Ordering provider: Venus Jade PA-C  17 1311 Resulting lab: [facility]    Specimen Information    Type Source Collected On   Blood  17 1348          Components       Value Reference Range Flag Lab   Sodium 142 133 - 144 mmol/L  13   Potassium 3.9 3.4 - 5.3 mmol/L  13   Chloride 110 94 - 109 mmol/L H 13   Carbon Dioxide 25 20 - 32 mmol/L  13   Anion Gap 7 3 - 14 mmol/L  13   Glucose 133 70 - 99 mg/dL H 13   Urea Nitrogen 8 7 - 30 mg/dL  13   Creatinine 0.68 0.52 - 1.04 mg/dL  13   GFR Estimate -- >60 mL/min/1.7m2  13   Result:         >90  Non  GFR Calc     GFR Estimate If Black -- >60 mL/min/1.7m2  13   Result:         >90   GFR Calc     Calcium 9.4 8.5 - 10.1 mg/dL  13   Result:              Comprehensive metabolic panel [[id number]] (Abnormal)  Resulted: 17 1009, Result status: Final result    Ordering provider: Mel Barreto MD  17 0604 Resulting lab: [facility]    Specimen Information    Type Source Collected On   Blood  17 0930          Components       Value " Reference Range Flag Lab   Sodium 140 133 - 144 mmol/L  13   Potassium 3.6 3.4 - 5.3 mmol/L  13   Chloride 107 94 - 109 mmol/L  13   Carbon Dioxide 26 20 - 32 mmol/L  13   Anion Gap 7 3 - 14 mmol/L  13   Glucose 119 70 - 99 mg/dL H 13   Urea Nitrogen 6 7 - 30 mg/dL L 13   Creatinine 0.58 0.52 - 1.04 mg/dL  13   GFR Estimate -- >60 mL/min/1.7m2  13   Result:         >90  Non  GFR Calc     GFR Estimate If Black -- >60 mL/min/1.7m2  13   Result:         >90   GFR Calc     Calcium 9.8 8.5 - 10.1 mg/dL  13   Result:     Bilirubin Total 0.4 0.2 - 1.3 mg/dL  13   Albumin 4.0 3.4 - 5.0 g/dL  13   Protein Total 8.2 6.8 - 8.8 g/dL  13   Alkaline Phosphatase 81 40 - 150 U/L  13   ALT 45 0 - 50 U/L  13   AST 24 0 - 45 U/L  13            TSH with free T4 reflex [[id number]]  Resulted: 05/24/17 1009, Result status: Final result    Ordering provider: Mel Barreto MD  05/23/17 2320 Resulting lab: [facility]    Specimen Information    Type Source Collected On   Blood  05/24/17 0930          Components       Value Reference Range Flag Lab   TSH 2.52 0.40 - 4.00 mU/L  13            Lithium level [[id number]]  Resulted: 05/24/17 1005, Result status: Final result    Ordering provider: Mel Barreto MD  05/23/17 3726 Resulting lab: [facility]    Specimen Information    Type Source Collected On   Blood  05/24/17 0930          Components       Value Reference Range Flag Lab   Lithium Level 0.6 0.6 - 1.2 mmol/L  13            CBC with platelets differential [[id number]]  Resulted: 05/24/17 0944, Result status: Final result    Ordering provider: Mel Barreto MD  05/23/17 0363 Resulting lab: [facility]    Specimen Information    Type Source Collected On   Blood  05/24/17 0930          Components       Value Reference Range Flag Lab   WBC 9.3 4.0 - 11.0 10e9/L  13   RBC Count 4.08 3.8 - 5.2 10e12/L   13   Hemoglobin 12.6 11.7 - 15.7 g/dL  13   Hematocrit 38.2 35.0 - 47.0 %  13   MCV 94 78 - 100 fl  13   MCH 30.9 26.5 - 33.0 pg  13   MCHC 33.0 31.5 - 36.5 g/dL  13   RDW 12.4 10.0 - 15.0 %  13   Platelet Count 332 150 - 450 10e9/L  13   Diff Method Automated Method   13   % Neutrophils 50.4 %  13   % Lymphocytes 41.4 %  13   % Monocytes 6.1 %  13   % Eosinophils 1.7 %  13   % Basophils 0.2 %  13   % Immature Granulocytes 0.2 %  13   Nucleated RBCs 0 0 /100  13   Absolute Neutrophil 4.7 1.6 - 8.3 10e9/L  13   Absolute Lymphocytes 3.8 0.8 - 5.3 10e9/L  13   Absolute Monocytes 0.6 0.0 - 1.3 10e9/L  13   Absolute Eosinophils 0.2 0.0 - 0.7 10e9/L  13   Absolute Basophils 0.0 0.0 - 0.2 10e9/L  13   Abs Immature Granulocytes 0.0 0 - 0.4 10e9/L  13   Absolute Nucleated RBC 0.0   13            Testing Performed By     Lab - Abbreviation Name Director Address Valid Date Range    13 - Unknown [facility] Unknown [address] [facility] [address] 01/15/15 0916 - Present            Unresulted Labs     None      Encounter-Level Documents:     There are no encounter-level documents.      Order-Level Documents:     There are no order-level documents.

## 2017-05-23 NOTE — IP AVS SNAPSHOT
Daisy Curtis #[id number] (CSN: [id number])  (31 year old F)  (Adm: 17)     [id number]               [address]: [phone]            Patient Demographics     Patient Name Sex          Age SSN Address Phone    Daisy Curtis Female 1985 (31 year old) [id number] [place]   [facility] [address] [phone] (Home)  NONE (Work)  [phone] (Mobile)      Emergency Contact(s)     Name Relation Home Work Mobile    Charu Curtis Mother [phone]      NO SECONDARY CONTACT  [phone]        Admission Information     Attending Provider Admitting Provider Admission Type Admission Date/Time    Norma Schultz MD Alsaadi, Gara, MD Emergency 17  2040    Discharge Date Hospital Service Auth/Cert Status Service Area     Mental Health Incomplete [facility] SERVICES    Unit Room/Bed Admission Status       [facility] N150/N150-01 Admission (Confirmed)       Admission     Complaint    Mental health disorder      Hospital Account     Name Acct ID Class Status Primary Coverage    Daisy Curtis [id number] Mental Health [facility]            Guarantor Account (for Hospital Account #[id number])     Name Relation to Pt Service Area Active? Acct Type    Daisy Curtis Self FCS Yes Behavioral    Address Phone          [address] [facility] [address]  [place] [address] [phone](H)  NONE(O)              Coverage Information (for Hospital Account #[id number])     F/O Payor/Plan Precert #    [facility]     Subscriber Subscriber #    Daisy Curtis [id number]    Address Phone    [address] [address]  [place] [address]                                                 INTERAGENCY TRANSFER FORM - PHYSICIAN ORDERS   2017                       [facility]: [phone]            Attending Provider: Norma Schultz MD     Allergies:  Codeine  "Sulfate, Compazine, Morphine, Prochlorperazine    Infection:  None   Service:  MENTAL HEALT    Ht:  1.575 m (5' 2\")   Wt:  78.9 kg (174 lb)   Admission Wt:  78.9 kg (174 lb)    BMI:  31.83 kg/m 2   BSA:  1.86 m 2            ED Clinical Impression     Diagnosis Description Comment Added By Time Added    Schizoaffective disorder, bipolar type (H) [F25.0] Schizoaffective disorder, bipolar type (H) [F25.0]  Mel Barreto MD 5/24/2017  1:46 AM      Hospital Problems as of 5/27/2017              Priority Class Noted POA    Mental health disorder Medium  5/24/2017 Yes      Non-Hospital Problems as of 5/27/2017              Priority Class Noted    Schizoaffective disorder, bipolar type (H) Medium  1/10/2014    Cluster B personality disorder Medium  2/4/2014    Cannabis dependence (H) Medium  2/4/2014    Psychosis Medium  2/12/2014    Intractable vomiting Medium  3/9/2017    Nausea & vomiting Medium  3/10/2017    Behavior concern in adult Medium  3/20/2017    Bipolar 1 disorder (H) Medium  4/12/2017      Code Status History     Date Active Date Inactive Code Status Order ID Comments User Context    4/12/2017  6:49 PM 5/15/2017  4:20 PM Full Code [id number]  Deacon Mishra MD Inpatient    3/20/2017  7:01 PM 4/4/2017  8:29 PM Full Code [id number]  Evelyn Otero RN Inpatient    3/9/2017  9:17 PM 3/11/2017  2:00 PM Full Code [id number]  Venus Jade PA-C Inpatient    3/2/2017  3:44 PM 3/9/2017  9:17 PM Full Code [id number]  Tim Vargas MD Inpatient    5/20/2016  6:26 PM 6/2/2016  2:37 PM Full Code [id number]  Sonia Richards RN Inpatient    5/18/2016  8:04 PM 5/20/2016  6:26 PM Full Code [id number]  Ramon Balderrama RN Inpatient    2/12/2014  8:43 PM 4/25/2014  2:42 PM Full Code [id number]  Cristian Nunez, RN Inpatient    1/10/2014  5:09 PM 2/5/2014 11:29 AM Full Code [id number]  Foster Hopkins, RN Inpatient    9/22/2011  9:06 PM 10/17/2011 12:28 PM Full Code [id number]  " Lindy Boone RN Inpatient      Current Code Status     Date Active Code Status Order ID Comments User Context       5/24/2017  2:02 PM Full Code [id number]  Agueda Ding RN Inpatient          Medication Review      UNREVIEWED medications. Ask your doctor about these medications        Dose / Directions Comments    folic acid 1 MG tablet   Commonly known as:  FOLVITE   Used for:  Schizoaffective disorder, bipolar type (H)        Dose:  1 mg   Take 1 tablet (1 mg) by mouth daily   Quantity:  30 tablet   Refills:  0        hydrOXYzine 25 MG tablet   Commonly known as:  ATARAX   Used for:  Schizoaffective disorder, bipolar type (H)        Dose:  25-50 mg   Take 1-2 tablets (25-50 mg) by mouth 3 times daily as needed for anxiety   Quantity:  120 tablet   Refills:  0        lithium 300 MG CR tablet   Commonly known as:  ESKALITH/LITHOBID   Used for:  Schizoaffective disorder, bipolar type (H)        Dose:  600 mg   Take 2 tablets (600 mg) by mouth 2 times daily   Quantity:  120 tablet   Refills:  0        multivitamin, therapeutic with minerals Tabs tablet   Used for:  Schizoaffective disorder, bipolar type (H)        Dose:  1 tablet   Take 1 tablet by mouth daily   Quantity:  30 each   Refills:  0        * OLANZapine 10 MG tablet   Commonly known as:  zyPREXA   Used for:  Schizoaffective disorder, bipolar type (H)        Dose:  10 mg   Take 1 tablet (10 mg) by mouth every 2 hours as needed (agitation associated with psychosis or julio.)   Quantity:  30 tablet   Refills:  0    Max daily dose of 30mg/day.       * OLANZapine 15 MG tablet   Commonly known as:  zyPREXA   Used for:  Schizoaffective disorder, bipolar type (H)        Dose:  15 mg   Take 1 tablet (15 mg) by mouth At Bedtime   Quantity:  30 tablet   Refills:  0        * OLANZapine 5 MG tablet   Commonly known as:  zyPREXA   Used for:  Schizoaffective disorder, bipolar type (H)        Dose:  5 mg   Take 1 tablet (5 mg) by mouth daily   Quantity:  30 tablet    Refills:  0        pantoprazole 40 MG EC tablet   Commonly known as:  PROTONIX   Used for:  Gastroesophageal reflux disease without esophagitis        Dose:  40 mg   Take 1 tablet (40 mg) by mouth every morning   Quantity:  30 tablet   Refills:  0        senna-docusate 8.6-50 MG per tablet   Commonly known as:  SENOKOT-S;PERICOLACE   Used for:  Slow transit constipation        Dose:  1 tablet   Take 1 tablet by mouth 2 times daily as needed for constipation   Quantity:  100 tablet   Refills:  0        * Notice:  This list has 3 medication(s) that are the same as other medications prescribed for you. Read the directions carefully, and ask your doctor or other care provider to review them with you.              Further instructions from your care team       Behavioral Discharge Planning and Instructions      Summary:  You were admitted to the [facility] Station 12 with paranoia and psychosis in relation to non-compliance with medication.  During your hospitalization, you met daily with the staff and were encouraged to attend all therapeutic programming. You met with the Clinical Treatment Coordinator and participated in your discharge planning. Medications were adjusted.  You are now stabilized and are discharged.  Referrals and recommendations are listed below.     Your provisional discharge was revoked.     Primary Diagnoses:   Schizoaffective Disorder, bipolar type  Cocaine Use Disorder  Benzodiazepine Use Disorder      Mental Health Follow-Up:  ACT team through [facility]:  : Jyotsna Lee [phone]  Psychiatrist: Dr.Barbara Day  Nurse: Manpreet Sheehan: [phone]        Primary Care Physician: Khadijah Quintero [phone]            Attend all scheduled appointments with your outpatient providers. Call at least 24 hours in advance if you need to reschedule an appointment to ensure continued access to your outpatient providers.    Major Treatments, Procedures and Findings:  You were provided with: a psychiatric assessment, medication evaluation and/or management, group therapy and milieu management    Symptoms to Report: feeling more aggressive, increased confusion, losing more sleep, mood getting worse or thoughts of suicide    Early warning signs can include: increased depression or anxiety sleep disturbances increased thoughts or behaviors of suicide or self-harm  increased unusual thinking, such as paranoia or hearing voices    Safety and Wellness:  Take all medicines as directed.  Make no changes unless your doctor suggests them.      Follow treatment recommendations.  Refrain from alcohol and non-prescribed drugs.  Ask your support system to help you reduce your access to items that could harm yourself or others. Items could include:  Firearms  Medicines (both prescribed and over-the-counter)  Knives and other sharp objects  Ropes and like materials  Car keys  If there is a concern for safety, call 911. If there is a concern for safety, call 911.    Resources:   Crisis Intervention: 471.860.9846 or 689-008-0966 (TTY: 914.651.4596).  Call anytime for help.  National [place] on Mental Illness (www.[place].pari.org): 547.133.7908 or 220-755-7082.  [place] Association for Children's Mental Health (www.macmh.org): 441.168.5537.  Alcoholics Anonymous (www.alcoholics-anonymous.org): Check your phone book for your local chapter.  Suicide Awareness Voices of Education (SAVE) (www.save.org): 926-022-UFTX (7996)  National Suicide Prevention Line (www.mentalhealthmn.org): 414-925-CSFV (6023)  Mental Health Consumer/Survivor Network of [place] (www.mhcsn.net): 746.860.1448 or 069-256-9437  Mental Health Association of [place] (www.mentalhealth.org): 156.590.5624 or 461-614-6866  Self- Management and Recovery Training., SMART-- Toll free: 212.707.3032  www.[employer].cl.[employer]  [facility] Crisis (COPE) Response - Adult [phone]  [facility] Crisis Response -  "Adult 540 851-6625  Text 4 Life: txt \"LIFE\" to 01013 for immediate support and crisis intervention  Crisis text line: Text \"START\" to 945-180. Free, confidential, 24/7.  Crisis Intervention: 359.382.3179 or 293-935-2874. Call anytime for help.   [facility] Mental Health Crisis Team - Child: [phone]  [facility] Mental Health Crisis Response Team - Child: [phone]    The treatment team has appreciated the opportunity to work with you. If you have any questions or concerns our unit number is [phone].                                              INTERAGENCY TRANSFER FORM - NURSING   5/23/2017                       UR 12NB: [phone]            Attending Provider: Norma Schultz MD     Allergies:  Codeine Sulfate, Compazine, Morphine, Prochlorperazine    Infection:  None   Service:  MENTAL HEALT    Ht:  1.575 m (5' 2\")   Wt:  78.9 kg (174 lb)   Admission Wt:  78.9 kg (174 lb)    BMI:  31.83 kg/m 2   BSA:  1.86 m 2            Advance Directives        Does patient have a scanned Advance Directive/ACP document in EPIC?           No        Immunizations     None      ASSESSMENT     Discharge Profile Flowsheet     EXPECTED DISCHARGE     Change in Functional Status Since Onset of Current Illness/Injury  yes 03/27/17 1130    Expected Discharge Date  06/01/17 05/24/17 1449   GASTROINTESTINAL (ADULT,PEDIATRIC,OB)      DISCHARGE NEEDS ASSESSMENT     Last Bowel Movement  03/06/17 03/10/17 0227    # of Referrals Placed by CTS  External Care Coordination (Was going to talk to pt re: PCP--moved to [facility]) 05/17/16 1340   COMMUNICATION ASSESSMENT      FUNCTIONAL LEVEL CURRENT     Patient's communication style  spoken language (English or Bilingual) 05/24/17 1408            Vitals     Vital Signs Flowsheet     VITAL SIGNS     SpO2  98 % 05/24/17 1343    Temp  97.9  F (36.6  C) 05/27/17 1559   O2 Device  None (Room air) 05/24/17 1343    Temp src  Oral 05/27/17 1559   PAIN/COMFORT      Resp  " 16 05/27/17 1559   Patient Currently in Pain  yes 05/27/17 1309    Pulse  103 05/24/17 1343   Preferred Pain Scale  CAPA (Clinically Aligned Pain Assessment) ([facility] Adults Only) 05/27/17 1309    Heart Rate  95 05/27/17 1309   Pain Location  Abdomen 05/27/17 1309    Pulse/Heart Rate Source  Monitor 05/27/17 1309   CLINICALLY ALIGNED PAIN ASSESSMENT (CAPA) ([facility] ONLY)      BP  (!)  177/91 05/27/17 1309   Comfort  intolerable 05/27/17 1309    SITTING ORTHOSTATIC BP     Change in Pain  getting worse 05/27/17 1309    Sitting Orthostatic BP  155/88 05/27/17 1559   Pain Control  inadequate pain control 05/27/17 1309    Sitting Orthostatic Pulse  87 bpm 05/27/17 1559   Functioning  can't do anything because of pain 05/27/17 1309    STANDING ORTHOSTATIC BP     Sleep  normal sleep 05/27/17 1309    Standing Orthostatic BP  131/85 05/27/17 1101   HEIGHT AND WEIGHT      Standing Orthostatic Pulse  87 bpm 05/27/17 1101   Weight  78.9 kg (174 lb) 05/27/17 1101    OXYGEN THERAPY                   Patient Lines/Drains/Airways Status    Active LINES/DRAINS/AIRWAYS     None            Patient Lines/Drains/Airways Status    Active PICC/CVC     None            Intake/Output Detail Report     Date Intake Output Net    Shift IV Piggyback Total Emesis/NG output Total       Janina 05/26/17 0700 - 05/26/17 1459 -- -- -- -- 0    Noc 05/26/17 1500 - 05/26/17 2359 -- -- -- -- 0    Day 05/27/17 0000 - 05/27/17 0659 -- -- -- -- 0    Janina 05/27/17 0700 - 05/27/17 1459 -- -- -- -- 0    Noc 05/27/17 1500 - 05/27/17 2359 -- -- 1800 1800 -1800      Case Management/Discharge Planning     Case Management/Discharge Planning Flowsheet     REFERRAL INFORMATION     ABUSE RISK SCREEN      Arrived From  admitted as an inpatient 04/14/17 2041   QUESTION TO PATIENT:  Has a member of your family or a partner(now or in the past) intimidated, hurt, manipulated, or controlled you in any way?  patient declined to answer or  is unable to answer 05/23/17 2058    # of Referrals Placed by CTS  External Care Coordination (Was going to talk to pt re: PCP--moved to [facility]) 05/17/16 1340   QUESTION TO PATIENT: Do you feel safe going back to the place where you are living?  patient declined to answer or is unable to answer 05/23/17 2058    LIVING ENVIRONMENT     OBSERVATION: Is there reason to believe there has been maltreatment of a vulnerable adult (ie. Physical/Sexual/Emotional abuse, self neglect, lack of adequate food, shelter, medical care, or financial exploitation)?  no 05/23/17 2058    Lives With  other (see comments) ([facility] ) 05/24/17 1415   (R) MENTAL HEALTH SUICIDE RISK      COPING/STRESS     Are you depressed or being treated for depression?  No 05/24/17 1414    Major Change/Loss/Stressor  none 05/24/17 1417   HOMICIDE RISK      EXPECTED DISCHARGE     Homicidal Ideation  no 05/23/17 2058    Expected Discharge Date  06/01/17 05/24/17 1449                       UR 12NB: [phone]            Medication Administration Report for Daisy Curtis as of 05/27/17 2014   Legend:    Given Hold Not Given Due Canceled Entry Other Actions    Time Time (Time) Time  Time-Action       Inactive    Active    Linked        Medications 05/21/17 05/22/17 05/23/17 05/24/17 05/25/17 05/26/17 05/27/17    acetaminophen (TYLENOL) tablet 650 mg  Dose: 650 mg Freq: EVERY 4 HOURS PRN Route: PO  PRN Reasons: mild pain,fever  Start: 05/27/17 1312   Admin Instructions: Maximum acetaminophen dose from all sources = 75 mg/kg/day not to exceed 4 grams/day.               alum & mag hydroxide-simethicone (MYLANTA ES/MAALOX  ES) suspension 30 mL  Dose: 30 mL Freq: EVERY 4 HOURS PRN Route: PO  PRN Reason: indigestion  Start: 05/24/17 1942   Admin Instructions: Shake well.               calcium carbonate (TUMS) chewable tablet 500 mg  Dose: 500 mg Freq: DAILY PRN Route: PO  PRN Reason: heartburn  Start: 05/27/17 1437              diphenhydrAMINE (BENADRYL) capsule  50 mg  Dose: 50 mg Freq: EVERY 4 HOURS PRN Route: PO  PRN Reason: itching  PRN Comment: agitation  Start: 05/24/17 1921       1927 (50 mg)-Given        1227 (50 mg)-Given       1606 (50 mg)-Given        1058 (50 mg)-Given                  Or  diphenhydrAMINE (BENADRYL) injection 50 mg  Dose: 50 mg Freq: EVERY 4 HOURS PRN Route: IM  PRN Reason: anxiety  PRN Comment: agitation  Start: 05/24/17 1921                                      1510 (50 mg)-Given           folic acid (FOLVITE) tablet 1 mg  Dose: 1 mg Freq: DAILY Route: PO  Start: 05/24/17 1415               0941 (1 mg)-Given        0853 (1 mg)-Given        0939 (1 mg)-Given           haloperidol (HALDOL) tablet 5 mg  Dose: 5 mg Freq: EVERY 4 HOURS PRN Route: PO  PRN Reason: agitation  Start: 05/24/17 1919       1927 (5 mg)-Given        1227 (5 mg)-Given       1606 (5 mg)-Given        1058 (5 mg)-Given                  Or  haloperidol lactate (HALDOL) injection 5 mg  Dose: 5 mg Freq: EVERY 4 HOURS PRN Route: IM  PRN Reason: agitation  Start: 05/24/17 1919                                      1510 (5 mg)-Given           hydrOXYzine (ATARAX) tablet 25-50 mg  Dose: 25-50 mg Freq: 3 TIMES DAILY PRN Route: PO  PRN Reason: anxiety  Start: 05/24/17 1402       1627 (50 mg)-Given         0525 (50 mg)-Given            lithium (ESKALITH/LITHOBID) CR tablet 600 mg  Dose: 600 mg Freq: 2 TIMES DAILY Route: PO  Start: 05/24/17 2000   Admin Instructions: DO NOT CRUSH.        1838 (600 mg)-Given               0940 (600 mg)-Given       2037 (600 mg)-Given        0854 (600 mg)-Given       1908 (600 mg)-Given        0938 (600 mg)-Given       [ ] 2000           LORazepam (ATIVAN) tablet 1-2 mg  Dose: 1-2 mg Freq: EVERY 4 HOURS PRN Route: PO  PRN Reasons: agitation,aggression  Start: 05/24/17 1920       1927 (2 mg)-Given        1227 (2 mg)-Given       1606 (2 mg)-Given        1058 (2 mg)-Given                  Or  LORazepam (ATIVAN) injection 1-2 mg  Dose: 1-2 mg Freq: EVERY 4 HOURS  PRN Route: IM  PRN Reasons: anxiety,agitation,aggression  Start: 05/24/17 1920   Admin Instructions: For IV PUSH: Dilute with equal volume of NS.                                       1509 (2 mg)-Given           multivitamin, therapeutic with minerals (THERA-VIT-M) tablet 1 tablet  Dose: 1 tablet Freq: DAILY Route: PO  Start: 05/24/17 1415               0940 (1 tablet)-Given        0853 (1 tablet)-Given        0939 (1 tablet)-Given           nicotine polacrilex (NICORETTE) gum 4 mg  Dose: 4 mg Freq: EVERY 1 HOUR PRN Route: BU  PRN Reason: other  PRN Comment: nicotine withdrawal symptoms  Start: 05/25/17 0949   Admin Instructions: Chew until tingling, then place between cheek and gum.    Repeat.    Do not swallow.    Not to exceed 48 mg in a 24 hour time period.          1025 (4 mg)-Given       1807 (4 mg)-Given       1954 (4 mg)-Given        1117 (4 mg)-Given           OLANZapine (zyPREXA) tablet 10 mg  Dose: 10 mg Freq: EVERY 2 HOURS PRN Route: PO  PRN Comment: agitation associated with psychosis or julio.  Start: 05/24/17 1402   Admin Instructions: Combined IM and PO doses may significantly increase the risk of orthostatic hypotension at 30 mg per day or higher.        1627 (10 mg)-Given              OLANZapine (zyPREXA) tablet 15 mg  Dose: 15 mg Freq: AT BEDTIME Route: PO  Start: 05/24/17 2000   Admin Instructions: Combined IM and PO doses may significantly increase the risk of orthostatic hypotension at 30 mg per day or higher.        1838 (15 mg)-Given               2038 (15 mg)-Given        1908 (15 mg)-Given        (1959)-Not Given [C]           OLANZapine (zyPREXA) tablet 5 mg  Dose: 5 mg Freq: DAILY Route: PO  Start: 05/24/17 1415   Admin Instructions: Combined IM and PO doses may significantly increase the risk of orthostatic hypotension at 30 mg per day or higher.        (1430)-Not Given [C]        0944 (5 mg)-Given        0854 (5 mg)-Given        0939 (5 mg)-Given           ondansetron (ZOFRAN-ODT) ODT  tab 4 mg  Dose: 4 mg Freq: EVERY 6 HOURS PRN Route: PO  PRN Reason: nausea  Start: 05/27/17 1311   Admin Instructions: With dry hands, peel back foil backing and gently remove tablet; do not push oral disintegrating tablet through foil backing; administer immediately on tongue and oral disintegrating tablet dissolves in seconds; then swallow with saliva; liquid not required.           1322 (4 mg)-Given           pantoprazole (PROTONIX) EC tablet 40 mg  Dose: 40 mg Freq: EVERY MORNING Route: PO  Start: 05/25/17 0800   Admin Instructions: DO NOT CRUSH.         0940 (40 mg)-Given        0853 (40 mg)-Given        0939 (40 mg)-Given           senna-docusate (SENOKOT-S;PERICOLACE) 8.6-50 MG per tablet 1 tablet  Dose: 1 tablet Freq: 2 TIMES DAILY PRN Route: PO  PRN Reason: constipation  Start: 05/24/17 1402              senna-docusate (SENOKOT-S;PERICOLACE) 8.6-50 MG per tablet 1-2 tablet  Dose: 1-2 tablet Freq: AT BEDTIME PRN Route: PO  PRN Reason: constipation  Start: 05/24/17 0154             Completed Medications  Medications 05/21/17 05/22/17 05/23/17 05/24/17 05/25/17 05/26/17 05/27/17         Dose: 10 mg Freq: ONCE Route: IM  Start: 05/27/17 2000   End: 05/27/17 2004   Admin Instructions: Dissolve the contents of the 10 mg vial using 2.1 mL of Sterile Water for Injection to provide a solution containing 5 mg/mL of olanzapine. Withdraw the ordered dose from vial. Use immediately (within 1 hour) after reconstitution. Discard any unused portion.           2004 (10 mg)-Given          Discontinued Medications  Medications 05/21/17 05/22/17 05/23/17 05/24/17 05/25/17 05/26/17 05/27/17         Dose: 2-4 mg Freq: EVERY 1 HOUR PRN Route: BU  PRN Reason: other  PRN Comment: nicotine withdrawal symptoms  Start: 05/24/17 1401   End: 05/25/17 0950   Admin Instructions: Chew until tingling, then place between cheek and gum.    Repeat.    Do not swallow.    Not to exceed 48 mg in a 24 hour time period.        1450 (4 mg)-Given      "  1549 (4 mg)-Given        0948 (4 mg)-Given       0950-Med Discontinued                  INTERAGENCY TRANSFER FORM - NOTES (H&P, Discharge Summary, Consults, Procedures, Therapies)   5/23/2017                       [facility] 12NB: [phone]               History & Physicals      H&P signed by Yunior Yañez MD at 5/25/2017  9:25 PM      Author:  Yunior Yañez MD Service:  Psychiatry Author Type:  Physician    Filed:  5/25/2017  9:25 PM Date of Service:  5/25/2017  3:17 PM Creation Time:  5/25/2017  6:33 PM    Status:  Signed :  Yunior Yañez MD (Physician)         PATIENT IDENTIFICATION:  Daisy Curtis is a 31-year-old  female.      CHIEF COMPLAINT:  The patient called for an ambulance herself and requested transport to the ER.  She had missed several doses of her medications and was becoming paranoid.  She was admitted on a voluntary basis.      HISTORY OF PRESENT ILLNESS:  This patient has had many previous psychiatric hospitalizations, most recently here from 04/12 to 05/15.  [facility]. Khan, but was transferred to station 12 under my care because of disruptive behavior on station 22.  She is under a mental health commitment, was discharged to [facility], [facility], with anticipated 3-month stay there.  However, after just a couple of days she left the [facility] and went to her mother's house.  She tells me today that she did this because \"they kept trying to fight with me.\"  She clarifies by saying that peers were \"looking at me strange and talking and laughing about me.\"  It is unclear if this complaint stems from paranoia or if she was actually being targeted by peers.  She says that she got along fine with staff at the [facility].  After going to her mother's home, she says she missed a couple of doses of medications and became more paranoid.  She then wanted to come back to the hospital.  She is " distracted during the interview, occasionally talks to herself, and is hyperalert and vigilant.  During my morning on station 12, several times she yelled loudly and with angry tones, apparently responding to internal stimuli and seemingly arguing with staff.  This was not occurring at all during her last 1-2 weeks on station 12 in early May.  The patient was uniformly calm, pleasant and cooperative during the last couple of weeks on station 12.  The patient is agreeable to having the treatment team call her mother.  The  did speak with the patient's mother, who says that the patient is not currently welcome to stay at her home.  She feels that the patient cannot maintain stability there and recommends placement in an IRTS.  She feels that the patient would do better with a private room in an IRTS, since she has typically had problems with roommates in past residential treatments.  The patient today is perfectly willing to be back on her medications that were working well for her during her last stay, namely Zyprexa and lithium.  She has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

## 2017-05-23 NOTE — IP AVS SNAPSHOT
"` `           UR 12NB: [phone]                                              INTERAGENCY TRANSFER FORM - NURSING   2017                    Hospital Admission Date: 2017  JULY UNDERWOOD   : 1985  Sex: Female        Attending Provider: Norma Schultz MD     Allergies:  Codeine Sulfate, Compazine, Morphine, Prochlorperazine    Infection:  None   Service:  MENTAL HEALT    Ht:  1.575 m (5' 2\")   Wt:  78.9 kg (174 lb)   Admission Wt:  78.9 kg (174 lb)    BMI:  31.83 kg/m 2   BSA:  1.86 m 2            Patient PCP Information     Provider PCP Type    Kary Quintero MD [facility]      Current Code Status     Date Active Code Status Order ID Comments User Context       2017  2:02 PM Full Code [id number]  Augeda Ding RN Inpatient       Code Status History     Date Active Date Inactive Code Status Order ID Comments User Context    2017  6:49 PM 5/15/2017  4:20 PM Full Code [id number]  Deacon Mishra MD Inpatient    3/20/2017  7:01 PM 2017  8:29 PM Full Code [id number]  Evelyn Otero RN Inpatient    3/9/2017  9:17 PM 3/11/2017  2:00 PM Full Code [id number]  Venus Jade PA-C Inpatient    3/2/2017  3:44 PM 3/9/2017  9:17 PM Full Code [id number]  Tim Vargas MD Inpatient    2016  6:26 PM 2016  2:37 PM Full Code [id number]  Sonia Richards RN Inpatient    2016  8:04 PM 2016  6:26 PM Full Code [id number]  Ramon Balderrama, DAMION Inpatient    2014  8:43 PM 2014  2:42 PM Full Code [id number]  Cristian Nunez RN Inpatient    1/10/2014  5:09 PM 2014 11:29 AM Full Code [id number]  Foster Hopkins, RN Inpatient    2011  9:06 PM 10/17/2011 12:28 PM Full Code [id number]  Lindy Boone RN Inpatient      Advance Directives        Does patient have a scanned Advance Directive/ACP document in EPIC?           No        Hospital Problems as of 2017     "          Priority Class Noted POA    Mental health disorder Medium  5/24/2017 Yes      Non-Hospital Problems as of 5/27/2017              Priority Class Noted    Schizoaffective disorder, bipolar type (H) Medium  1/10/2014    Cluster B personality disorder Medium  2/4/2014    Cannabis dependence (H) Medium  2/4/2014    Psychosis Medium  2/12/2014    Intractable vomiting Medium  3/9/2017    Nausea & vomiting Medium  3/10/2017    Behavior concern in adult Medium  3/20/2017    Bipolar 1 disorder (H) Medium  4/12/2017      Immunizations     None         END      ASSESSMENT     Discharge Profile Flowsheet     EXPECTED DISCHARGE     Change in Functional Status Since Onset of Current Illness/Injury  yes 03/27/17 1130    Expected Discharge Date  06/01/17 05/24/17 1449   GASTROINTESTINAL (ADULT,PEDIATRIC,OB)      DISCHARGE NEEDS ASSESSMENT     Last Bowel Movement  03/06/17 03/10/17 0227    # of Referrals Placed by CTS  External Care Coordination (Was going to talk to pt re: PCP--moved to [facility]) 05/17/16 1340   COMMUNICATION ASSESSMENT      FUNCTIONAL LEVEL CURRENT     Patient's communication style  spoken language (English or Bilingual) 05/24/17 1408            Vitals     Vital Signs Flowsheet     VITAL SIGNS     SpO2  98 % 05/24/17 1343    Temp  97.9  F (36.6  C) 05/27/17 1559   O2 Device  None (Room air) 05/24/17 1343    Temp src  Oral 05/27/17 1559   PAIN/COMFORT      Resp  16 05/27/17 1559   Patient Currently in Pain  yes 05/27/17 1309    Pulse  103 05/24/17 1343   Preferred Pain Scale  CAPA (Clinically Aligned Pain Assessment) ([facility] Adults Only) 05/27/17 1309    Heart Rate  95 05/27/17 1309   Pain Location  Abdomen 05/27/17 1309    Pulse/Heart Rate Source  Monitor 05/27/17 1309   CLINICALLY ALIGNED PAIN ASSESSMENT (CAPA) ([facility] ADULTS ONLY)      BP  (!)  177/91 05/27/17 1309   Comfort  intolerable 05/27/17 1309    SITTING ORTHOSTATIC BP     Change in Pain  getting worse 05/27/17  1309    Sitting Orthostatic BP  155/88 05/27/17 1559   Pain Control  inadequate pain control 05/27/17 1309    Sitting Orthostatic Pulse  87 bpm 05/27/17 1559   Functioning  can't do anything because of pain 05/27/17 1309    STANDING ORTHOSTATIC BP     Sleep  normal sleep 05/27/17 1309    Standing Orthostatic BP  131/85 05/27/17 1101   HEIGHT AND WEIGHT      Standing Orthostatic Pulse  87 bpm 05/27/17 1101   Weight  78.9 kg (174 lb) 05/27/17 1101    OXYGEN THERAPY                   Patient Lines/Drains/Airways Status    Active LINES/DRAINS/AIRWAYS     None            Patient Lines/Drains/Airways Status    Active PICC/CVC     None            Intake/Output Detail Report     Date Intake Output Net    Shift IV Piggyback Total Emesis/NG output Total       Janina 05/26/17 0700 - 05/26/17 1459 -- -- -- -- 0    Noc 05/26/17 1500 - 05/26/17 2359 -- -- -- -- 0    Day 05/27/17 0000 - 05/27/17 0659 -- -- -- -- 0    Janina 05/27/17 0700 - 05/27/17 1459 -- -- -- -- 0    Noc 05/27/17 1500 - 05/27/17 2359 -- -- 1800 1800 -1800      Case Management/Discharge Planning     Case Management/Discharge Planning Flowsheet     REFERRAL INFORMATION     ABUSE RISK SCREEN      Arrived From  admitted as an inpatient 04/14/17 2041   QUESTION TO PATIENT:  Has a member of your family or a partner(now or in the past) intimidated, hurt, manipulated, or controlled you in any way?  patient declined to answer or is unable to answer 05/23/17 2058    # of Referrals Placed by [facility]  External Care Coordination (Was going to talk to pt re: PCP--moved to [facility]) 05/17/16 1340   QUESTION TO PATIENT: Do you feel safe going back to the place where you are living?  patient declined to answer or is unable to answer 05/23/17 2058    LIVING ENVIRONMENT     OBSERVATION: Is there reason to believe there has been maltreatment of a vulnerable adult (ie. Physical/Sexual/Emotional abuse, self neglect, lack of adequate food, shelter, medical care, or financial exploitation)?  no  05/23/17 2058    Lives With  other (see comments) ([facility] ) 05/24/17 1415   (R) MENTAL HEALTH SUICIDE RISK      COPING/STRESS     Are you depressed or being treated for depression?  No 05/24/17 1414    Major Change/Loss/Stressor  none 05/24/17 1417   HOMICIDE RISK      EXPECTED DISCHARGE     Homicidal Ideation  no 05/23/17 2058    Expected Discharge Date  06/01/17 05/24/17 144

## 2017-05-23 NOTE — IP AVS SNAPSHOT
` ` Patient Information     Patient Name Sex     Daisy Curtis ([id number]) Female 1985       Room Bed    N150 N150-01      Patient Demographics     Address Phone    [address] [place] [address] [phone] (Home)  NONE (Work)  [phone] (Mobile)      Patient Ethnicity & Race     Ethnic Group Patient Race    American       Emergency Contact(s)     Name Relation Home Work Mobile    Charu Curtis Mother [phone]      NO SECONDARY CONTACT  [phone]        Documents on File        Status Date Received Description       Documents for the Patient    Privacy Notice - [place] Unknown      Face Sheet Received () 02/23/10     Privacy Notice - [place] Received 09     Insurance Card       External Medication Information Consent       Patient ID       Consent for Services - Hospital/Clinic Received () 14     Consent for Services - Hospital/Clinic Received () 11     HIM IRA Authorization   [address]    Privacy Notice - [place]  10/28/11 ACKNOWLEDGMENT OF RECEIPT OF NOTICE OF PRIVACY PRACTICE    HIM IRA Authorization - File Only  10/28/11 AUTHORIZATION FOR RELEASE OF PROTECTED HEALTH INFORMATION    HIM [place] Authorization   [place]    [place]    Consent for EHR Access  13 Copied from existing Consent for services - IP/ED collected on 10/28/2011    [facility] Specified Other       HIM IRA Authorization  14 [address]    Consent for EHR Access Received 14     Privacy Notice - [place] Received 14     Consent for EHR Access Received 14     Consent for Services - Hospital/Clinic Received () 14     HIM IRA Authorization - File Only  14 DEC RELEASE OF INFORMATION    HIM IRA Authorization - File Only  14 MING CHERRY    HIM IRA Authorization - File Only  14 [facility] FACILITIES    HIM IRA  Authorization - File Only  14 [employer]    HIM IRA Authorization - File Only  14 [employer]    Consent for Services/Privacy Notice - Hospital/Clinic Received () 16     HIM IRA Authorization - File Only  16 EDDA MEDEL -     HIM IRA Authorization - File Only  16 DR. HOWARD DREW    HIM IRA Authorization  06/10/16 [facility]    Consent for EHR Access   CONSENT FOR USE OF [facility] EHR WITH NON-[facility] PROVIDERS    HIM IRA Authorization  16 [facility] IRA Authorization - File Only  17 DEC RELEASE OF INFORMATION    HIM IRA Authorization - File Only  17 [facility]    HIM IRA Authorization  17 [facility]    HIM IRA Authorization - File Only  17 [employer]    HIM IRA Authorization - File Only  17 [facility]    HIM IRA Authorization - File Only  17 [facility]    HIM IAR Authorization - File Only  17 [facility] IRTS    HIM IRA Authorization - File Only  17 [facility]    HIM IRA Authorization - File Only  17 [facility]    Consent for Services/Privacy Notice - Hospital/Clinic Received 17     Consent for Services/Privacy Notice - Hospital/Clinic Received (Deleted) 17        Documents for the Encounter    CMS IM for Patient Signature       ED Notes - Emergency Department  17 DEC CRISIS ASSESSMENT    EMS/Ambulance Record  17 [facility]UL[facility] HANDOFF    Business/Insurance/Care Coordination/Health Form - Encounter  17 EMERGENCY DEPARTMENT PATIENT WATCH    Business/Insurance/Care Coordination/Health Form - Encounter  17 PATIENT BELONGINGS    Legal Hold/Commitment Form  17 HEALTH OR  CUSTODY AND TRANSPORT AUTHORITY ORDERS    Legal Hold/Commitment Form  17 [facility]      Admission Information     Attending Provider Admitting Provider Admission Type Admission Date/Time    Norma Schultz MD Alsaadi, Gara, MD Emergency 05/23/17 2040    Discharge Date Hospital Service Auth/Cert Status Service Area     Mental Health Incomplete [facility] SERVICES    Unit Room/Bed Admission Status        12NB N150/N150-01 Admission (Confirmed)       Admission     Complaint    Mental health disorder      Hospital Account     Name Acct ID Class Status Primary Coverage    Daisy Curtis [id number] [facility]            Guarantor Account (for Hospital Account #[id number])     Name Relation to Pt Service Area Active? Acct Type    Daisy Curtis Self FCS Yes Behavioral    Address Phone          [address] [address] [phone](H)  NONE(O)              Coverage Information (for Hospital Account #[id number])     F/O Payor/Plan Precert #    [facility]     Subscriber Subscriber #    Daisy Curtis [id number]    Address Phone    [address] [address]  [place] [address]

## 2017-05-23 NOTE — IP AVS SNAPSHOT
[address] [address]    Phone:  [phone]                                       After Visit Summary   5/23/2017    Daisy Curtis    MRN: [id number]           After Visit Summary Signature Page     I have received my discharge instructions, and my questions have been answered. I have discussed any challenges I see with this plan with the nurse or doctor.    ..........................................................................................................................................  Patient/Patient Representative Signature      ..........................................................................................................................................  Patient Representative Print Name and Relationship to Patient    ..................................................               ................................................  Date                                            Time    ..........................................................................................................................................  Reviewed by Signature/Title    ...................................................              ..............................................  Date                                                            Time

## 2017-05-23 NOTE — IP AVS SNAPSHOT
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 cocaine is 300 ng/mL or less.      Ethanol Qual Urine  NEG     Negative   Cutoff for a negative urine ethanol is 0.05 g/dL or less      Opiates Qualitative Urine  NEG     Negative   Cutoff for a negative opiate is 300 ng/mL or less.     hCG qual urine POCT   Result Value Ref Range    HCG Qual Urine Negative neg    Internal QC OK Yes[NC1.3]               Assessments & Plan (with Medical Decision Making)[SK1.1]   This is a 31-year-old who evidently called EMS herself due to  anxiety, craziness  who presents to the ED today for mental health evaluation.  Records were reviewed.  She has a history of schizoaffective disorder bipolar type and psychosis as well as severe chemical dependency.  She s had multiple inpatient admissions including here at [facility] most recently in April.  She was discharged on a provisional revocation of commitment.  The patient reports that she stayed 3 days at an IR facility and then went and stayed with her mom.    On my evaluation she is extraordinarily disorganized, tangential, appears to clearly be responding to internal stimuli.  She is redirectable for the moment but definitely displays psychomotor agitation and appears to have some significant paranoia.   She, completely contradicts herself, one moment telling me that she doesn t hear any voices and the next moment telling me that the voices are telling her that they have  caught me.     She is denying drug use though I strongly suspect there may be some chemicals onboard.  I strongly doubt that she is taking her meds.  We have given her a dose of Zyprexa here in the ED, we will speak with Intake about bringing her in the hospital.[SK1.3]      Due to revoked stay of commitment, she does not need a 72 hour hold.    She was given a total of 2 doses of zyprexa zydis 10 mg each here in the ED and placed in seclusion due to escalating behavior.    I did order labs as she is supposed to be on lithium, but if she refuses, I will not push the issue as it is not worth escalating her and potentially causing injury to staff.  We can try to ask again later if she is willing.[NC1.2]    I have reviewed the nursing notes.    I have reviewed the findings, diagnosis, plan and need for follow up with the patient.[SK1.1]  This part of the document was transcribed by Mario Ha for Mel Barreto MD.[SK1.3]    New Prescriptions    No medications on file       Final diagnoses:   Schizoaffective disorder, bipolar type (H)[NC1.1]     I, Mario Ha, am serving as a trained medical scribe to document services personally performed by Mel Barreto MD, based on the provider's statements to me.      IMel MD, was physically present and have reviewed and verified the accuracy of this note documented by Mario Ha.    5/23/2017   [facility], EMERGENCY DEPARTMENT[SK1.1]     Mel Barreto MD  05/24/17 0150  [NC1.4]     Revision History        User Key Date/Time User Provider Type Action    > NC1.4 5/24/2017  1:50 AM Mel Barreto MD Physician Sign     NC1.3 5/24/2017  1:48 AM Mel Barreto MD Physician      NC1.1 5/24/2017  1:46 AM Mel Barreto MD Physician      NC1.2 5/24/2017  1:45 AM  Mel Barreto MD Physician      SK1.3 5/24/2017 12:14 AM Mario Ha     SK1.2 5/23/2017 11:18 PM Mario Ha     SK1.1 5/23/2017 10:42 PM Mario Ha            ED Notes by Araceli Alvarez RN at 5/24/2017 12:17 AM     Author:  Araceli Alvarez RN Service:  (none) Author Type:  Registered Nurse    Filed:  5/24/2017 12:57 AM Date of Service:  5/24/2017 12:17 AM Creation Time:  5/24/2017 12:17 AM    Status:  Addendum :  Araceli Alvarez RN (Registered Nurse)         Order to draw blood but patient refused blood draw. MD updated. 2nd dose Zyprexa given. Per MD, nursing to try again once patient calm down. At this time patient pacing in room and getting agitated and  attempt to come out multiple time and redirected[GK1.1] but unsuccessful. Patient placed in seclusions door closed. Nursing staff monitoring patient every 15 minute[GK1.2]         Revision History        User Key Date/Time User Provider Type Action    > GK1.2 5/24/2017 12:57 AM Araceli Alvarez RN Registered Nurse Addend     GK1.1 5/24/2017 12:23 AM Araceli Alvarez RN Registered Nurse Sign                  Procedure Notes     No notes of this type exist for this encounter.         Progress Notes - Therapies (Notes from 05/24/17 through 05/27/17)      Progress Notes by Quita Moreno OT at 5/26/2017  1:17 PM     Author:  Quita Moreno OT Service:  (none) Author Type:  Occupational Therapist    Filed:  5/26/2017  1:19 PM Date of Service:  5/26/2017  1:17 PM Creation Time:  5/26/2017  1:17 PM    Status:  Signed :  Quita Moreno OT (Occupational Therapist)         Pt came into the group area for about 5 minutes, found a rock in the project cabinet, and painted it quickly.  Does not respond to writer attempting to interact with her.  Appeared preoccupied and tense, though kept to herself and abruptly left the area when done.[SW1.1]     Revision History        User Key Date/Time User Provider Type  Action    > SW1.1 5/26/2017  1:19 PM Quita Moreno, OT Occupational Therapist Sign

## 2017-05-23 NOTE — IP AVS SNAPSHOT
` `     [facility]: [phone]            Medication Administration Report for Daisy Curtis as of 05/27/17 2014   Legend:    Given Hold Not Given Due Canceled Entry Other Actions    Time Time (Time) Time  Time-Action       Inactive    Active    Linked        Medications 05/21/17 05/22/17 05/23/17 05/24/17 05/25/17 05/26/17 05/27/17    acetaminophen (TYLENOL) tablet 650 mg  Dose: 650 mg Freq: EVERY 4 HOURS PRN Route: PO  PRN Reasons: mild pain,fever  Start: 05/27/17 1312   Admin Instructions: Maximum acetaminophen dose from all sources = 75 mg/kg/day not to exceed 4 grams/day.               alum & mag hydroxide-simethicone (MYLANTA ES/MAALOX  ES) suspension 30 mL  Dose: 30 mL Freq: EVERY 4 HOURS PRN Route: PO  PRN Reason: indigestion  Start: 05/24/17 1942   Admin Instructions: Shake well.               calcium carbonate (TUMS) chewable tablet 500 mg  Dose: 500 mg Freq: DAILY PRN Route: PO  PRN Reason: heartburn  Start: 05/27/17 1437              diphenhydrAMINE (BENADRYL) capsule 50 mg  Dose: 50 mg Freq: EVERY 4 HOURS PRN Route: PO  PRN Reason: itching  PRN Comment: agitation  Start: 05/24/17 1921       1927 (50 mg)-Given        1227 (50 mg)-Given       1606 (50 mg)-Given        1058 (50 mg)-Given                  Or  diphenhydrAMINE (BENADRYL) injection 50 mg  Dose: 50 mg Freq: EVERY 4 HOURS PRN Route: IM  PRN Reason: anxiety  PRN Comment: agitation  Start: 05/24/17 1921                                      1510 (50 mg)-Given           folic acid (FOLVITE) tablet 1 mg  Dose: 1 mg Freq: DAILY Route: PO  Start: 05/24/17 1415               0941 (1 mg)-Given        0853 (1 mg)-Given        0939 (1 mg)-Given           haloperidol (HALDOL) tablet 5 mg  Dose: 5 mg Freq: EVERY 4 HOURS PRN Route: PO  PRN Reason: agitation  Start: 05/24/17 1919       1927 (5 mg)-Given        1227 (5 mg)-Given       1606 (5 mg)-Given        1058 (5 mg)-Given                  Or  haloperidol lactate (HALDOL) injection 5 mg  Dose: 5  mg Freq: EVERY 4 HOURS PRN Route: IM  PRN Reason: agitation  Start: 05/24/17 1919                                      1510 (5 mg)-Given           hydrOXYzine (ATARAX) tablet 25-50 mg  Dose: 25-50 mg Freq: 3 TIMES DAILY PRN Route: PO  PRN Reason: anxiety  Start: 05/24/17 1402       1627 (50 mg)-Given         0525 (50 mg)-Given            lithium (ESKALITH/LITHOBID) CR tablet 600 mg  Dose: 600 mg Freq: 2 TIMES DAILY Route: PO  Start: 05/24/17 2000   Admin Instructions: DO NOT CRUSH.        1838 (600 mg)-Given               0940 (600 mg)-Given       2037 (600 mg)-Given        0854 (600 mg)-Given       1908 (600 mg)-Given        0938 (600 mg)-Given       [ ] 2000           LORazepam (ATIVAN) tablet 1-2 mg  Dose: 1-2 mg Freq: EVERY 4 HOURS PRN Route: PO  PRN Reasons: agitation,aggression  Start: 05/24/17 1920       1927 (2 mg)-Given        1227 (2 mg)-Given       1606 (2 mg)-Given        1058 (2 mg)-Given                  Or  LORazepam (ATIVAN) injection 1-2 mg  Dose: 1-2 mg Freq: EVERY 4 HOURS PRN Route: IM  PRN Reasons: anxiety,agitation,aggression  Start: 05/24/17 1920   Admin Instructions: For IV PUSH: Dilute with equal volume of NS.                                       1509 (2 mg)-Given           multivitamin, therapeutic with minerals (THERA-VIT-M) tablet 1 tablet  Dose: 1 tablet Freq: DAILY Route: PO  Start: 05/24/17 1415               0940 (1 tablet)-Given        0853 (1 tablet)-Given        0939 (1 tablet)-Given           nicotine polacrilex (NICORETTE) gum 4 mg  Dose: 4 mg Freq: EVERY 1 HOUR PRN Route: BU  PRN Reason: other  PRN Comment: nicotine withdrawal symptoms  Start: 05/25/17 0949   Admin Instructions: Chew until tingling, then place between cheek and gum.    Repeat.    Do not swallow.    Not to exceed 48 mg in a 24 hour time period.          1025 (4 mg)-Given       1807 (4 mg)-Given       1954 (4 mg)-Given        1117 (4 mg)-Given           OLANZapine (zyPREXA) tablet 10 mg  Dose: 10 mg Freq: EVERY 2  HOURS PRN Route: PO  PRN Comment: agitation associated with psychosis or julio.  Start: 05/24/17 1402   Admin Instructions: Combined IM and PO doses may significantly increase the risk of orthostatic hypotension at 30 mg per day or higher.        1627 (10 mg)-Given              OLANZapine (zyPREXA) tablet 15 mg  Dose: 15 mg Freq: AT BEDTIME Route: PO  Start: 05/24/17 2000   Admin Instructions: Combined IM and PO doses may significantly increase the risk of orthostatic hypotension at 30 mg per day or higher.        1838 (15 mg)-Given               2038 (15 mg)-Given        1908 (15 mg)-Given        (1959)-Not Given [C]           OLANZapine (zyPREXA) tablet 5 mg  Dose: 5 mg Freq: DAILY Route: PO  Start: 05/24/17 1415   Admin Instructions: Combined IM and PO doses may significantly increase the risk of orthostatic hypotension at 30 mg per day or higher.        (1430)-Not Given [C]        0944 (5 mg)-Given        0854 (5 mg)-Given        0939 (5 mg)-Given           ondansetron (ZOFRAN-ODT) ODT tab 4 mg  Dose: 4 mg Freq: EVERY 6 HOURS PRN Route: PO  PRN Reason: nausea  Start: 05/27/17 1311   Admin Instructions: With dry hands, peel back foil backing and gently remove tablet; do not push oral disintegrating tablet through foil backing; administer immediately on tongue and oral disintegrating tablet dissolves in seconds; then swallow with saliva; liquid not required.           1322 (4 mg)-Given           pantoprazole (PROTONIX) EC tablet 40 mg  Dose: 40 mg Freq: EVERY MORNING Route: PO  Start: 05/25/17 0800   Admin Instructions: DO NOT CRUSH.         0940 (40 mg)-Given        0853 (40 mg)-Given        0939 (40 mg)-Given           senna-docusate (SENOKOT-S;PERICOLACE) 8.6-50 MG per tablet 1 tablet  Dose: 1 tablet Freq: 2 TIMES DAILY PRN Route: PO  PRN Reason: constipation  Start: 05/24/17 1402              senna-docusate (SENOKOT-S;PERICOLACE) 8.6-50 MG per tablet 1-2 tablet  Dose: 1-2 tablet Freq: AT BEDTIME PRN Route:  PO  PRN Reason: constipation  Start: 05/24/17 0154             Completed Medications  Medications 05/21/17 05/22/17 05/23/17 05/24/17 05/25/17 05/26/17 05/27/17         Dose: 10 mg Freq: ONCE Route: IM  Start: 05/27/17 2000   End: 05/27/17 2004   Admin Instructions: Dissolve the contents of the 10 mg vial using 2.1 mL of Sterile Water for Injection to provide a solution containing 5 mg/mL of olanzapine. Withdraw the ordered dose from vial. Use immediately (within 1 hour) after reconstitution. Discard any unused portion.           2004 (10 mg)-Given          Discontinued Medications  Medications 05/21/17 05/22/17 05/23/17 05/24/17 05/25/17 05/26/17 05/27/17         Dose: 2-4 mg Freq: EVERY 1 HOUR PRN Route: BU  PRN Reason: other  PRN Comment: nicotine withdrawal symptoms  Start: 05/24/17 1401   End: 05/25/17 0950   Admin Instructions: Chew until tingling, then place between cheek and gum.    Repeat.    Do not swallow.    Not to exceed 48 mg in a 24 hour time period.        1450 (4 mg)-Given       1549 (4 mg)-Given        0948 (4 mg)-Given       0950-Med Discontinued

## 2017-05-23 NOTE — IP AVS SNAPSHOT
MRN:[id number]                      After Visit Summary   5/23/2017    Daisy Curtis    MRN: [id number]           Thank you!     Thank you for choosing [facility] for your care. Our goal is always to provide you with excellent care.        Patient Information     Date Of Birth          1985        Designated Caregiver       Most Recent Value    Caregiver    Will someone help with your care after discharge? no      About your hospital stay     You were admitted on:  May 24, 2017 You last received care in the:   12NB    You were discharged on:  May 27, 2017       Who to Call     For medical emergencies, please call 911.  For non-urgent questions about your medical care, please call your primary care provider or clinic, [phone]          Attending Provider     Provider Specialty    Mel Barreto MD Emergency Medicine    [facility]    Norma Schultz MD Psychiatry       Primary Care Provider Office Phone # Fax #    Kary Quintero [phone]       [facility] [address]  [facility] [address]        Further instructions from your care team       Behavioral Discharge Planning and Instructions      Summary:  You were admitted to the [facility] with paranoia and psychosis in relation to non-compliance with medication.  During your hospitalization, you met daily with the staff and were encouraged to attend all therapeutic programming. You met with the Clinical Treatment Coordinator and participated in your discharge planning. Medications were adjusted.  You are now stabilized and are discharged.  Referrals and recommendations are listed below.     Your provisional discharge was revoked.     Primary Diagnoses:   Schizoaffective Disorder, bipolar type  Cocaine Use Disorder  Benzodiazepine Use Disorder      Mental Health Follow-Up:  ACT team through [facility]:  Case  Manager: Jyotsna Lee [phone]  Psychiatrist: Dr.Barbara Day  Nurse: Manpreet Sheehan: [phone]        Primary Care Physician: Khadijah Quintero [phone]            Attend all scheduled appointments with your outpatient providers. Call at least 24 hours in advance if you need to reschedule an appointment to ensure continued access to your outpatient providers.   Major Treatments, Procedures and Findings:  You were provided with: a psychiatric assessment, medication evaluation and/or management, group therapy and milieu management    Symptoms to Report: feeling more aggressive, increased confusion, losing more sleep, mood getting worse or thoughts of suicide    Early warning signs can include: increased depression or anxiety sleep disturbances increased thoughts or behaviors of suicide or self-harm  increased unusual thinking, such as paranoia or hearing voices    Safety and Wellness:  Take all medicines as directed.  Make no changes unless your doctor suggests them.      Follow treatment recommendations.  Refrain from alcohol and non-prescribed drugs.  Ask your support system to help you reduce your access to items that could harm yourself or others. Items could include:  Firearms  Medicines (both prescribed and over-the-counter)  Knives and other sharp objects  Ropes and like materials  Car keys  If there is a concern for safety, call 911. If there is a concern for safety, call 911.    Resources:   Crisis Intervention: 235.312.3602 or 246-820-5474 (TTY: 789.890.3833).  Call anytime for help.  National [place] on Mental Illness (www.mn.pari.org): 539.357.6879 or 686-015-2954.  [place] Association for Children's Mental Health (www.macmh.org): 758.737.2792.  Alcoholics Anonymous (www.alcoholics-anonymous.org): Check your phone book for your local chapter.  Suicide Awareness Voices of Education (SAVE) (www.save.org): 625-303-MUEP (5788)  National Suicide Prevention Line  "(www.mentalhealthmn.org): 712-146-SHTM (7032)  Mental Health Consumer/Survivor Network of [place] (www.mhcsn.net): 481.983.8713 or 643-559-9688  Mental Health Association of [place] (www.mentalhealth.org): 549.900.5546 or 580-068-7291  Self- Management and Recovery Training., SMART-- Toll free: 782.494.4288  www.[employer]  [facility] Crisis (COPE) Response - Adult 227 569-1506  [facility] Crisis Response - Adult 530 937-6833  Text 4 Life: txt \"LIFE\" to 52541 for immediate support and crisis intervention  Crisis text line: Text \"START\" to 211-964. Free, confidential, 24/7.  Crisis Intervention: 894.595.1901 or 282-161-9674. Call anytime for help.   [facility] Crisis Team - Child: [phone]  [facility] Mental Health Crisis Response Team - Child: [phone]    The treatment team has appreciated the opportunity to work with you. If you have any questions or concerns our unit number is [phone].          Pending Results     No orders found from 5/21/2017 to 5/24/2017.            Admission Information     Date & Time Provider Department Dept. Phone    5/23/2017 Norma Schultz MD [facility] [phone]      Your Vitals Were     Blood Pressure Pulse Temperature Respirations Weight Last Period    177/91 103 97.9  F (36.6  C) (Oral) 16 78.9 kg (174 lb) (LMP Unknown)    Pulse Oximetry BMI (Body Mass Index)                98% 31.83 kg/m2          Care EveryWhere ID     This is your Care EveryWhere ID. This could be used by other organizations to access your [facility] medical records  [id number]           Review of your medicines      UNREVIEWED medicines. Ask your doctor about these medicines        Dose / Directions    folic acid 1 MG tablet   Commonly known as:  FOLVITE   Used for:  Schizoaffective disorder, bipolar type (H)        Dose:  1 mg   Take 1 tablet (1 mg) by mouth daily   Quantity:  30 tablet   Refills:  0       hydrOXYzine 25 MG tablet   Commonly known " as:  ATARAX   Used for:  Schizoaffective disorder, bipolar type (H)        Dose:  25-50 mg   Take 1-2 tablets (25-50 mg) by mouth 3 times daily as needed for anxiety   Quantity:  120 tablet   Refills:  0       lithium 300 MG CR tablet   Commonly known as:  ESKALITH/LITHOBID   Used for:  Schizoaffective disorder, bipolar type (H)        Dose:  600 mg   Take 2 tablets (600 mg) by mouth 2 times daily   Quantity:  120 tablet   Refills:  0       multivitamin, therapeutic with minerals Tabs tablet   Used for:  Schizoaffective disorder, bipolar type (H)        Dose:  1 tablet   Take 1 tablet by mouth daily   Quantity:  30 each   Refills:  0       * OLANZapine 10 MG tablet   Commonly known as:  zyPREXA   Used for:  Schizoaffective disorder, bipolar type (H)        Dose:  10 mg   Take 1 tablet (10 mg) by mouth every 2 hours as needed (agitation associated with psychosis or julio.)   Quantity:  30 tablet   Refills:  0       * OLANZapine 15 MG tablet   Commonly known as:  zyPREXA   Used for:  Schizoaffective disorder, bipolar type (H)        Dose:  15 mg   Take 1 tablet (15 mg) by mouth At Bedtime   Quantity:  30 tablet   Refills:  0       * OLANZapine 5 MG tablet   Commonly known as:  zyPREXA   Used for:  Schizoaffective disorder, bipolar type (H)        Dose:  5 mg   Take 1 tablet (5 mg) by mouth daily   Quantity:  30 tablet   Refills:  0       pantoprazole 40 MG EC tablet   Commonly known as:  PROTONIX   Used for:  Gastroesophageal reflux disease without esophagitis        Dose:  40 mg   Take 1 tablet (40 mg) by mouth every morning   Quantity:  30 tablet   Refills:  0       senna-docusate 8.6-50 MG per tablet   Commonly known as:  SENOKOT-S;PERICOLACE   Used for:  Slow transit constipation        Dose:  1 tablet   Take 1 tablet by mouth 2 times daily as needed for constipation   Quantity:  100 tablet   Refills:  0       * Notice:  This list has 3 medication(s) that are the same as other medications prescribed for you. Read  the directions carefully, and ask your doctor or other care provider to review them with you.             Protect others around you: Learn how to safely use, store and throw away your medicines at www.disposemymeds.org.             Medication List: This is a list of all your medications and when to take them. Check marks below indicate your daily home schedule. Keep this list as a reference.      Medications           Morning Afternoon Evening Bedtime As Needed    folic acid 1 MG tablet   Commonly known as:  FOLVITE   Take 1 tablet (1 mg) by mouth daily   Last time this was given:  1 mg on 5/27/2017  9:39 AM                                hydrOXYzine 25 MG tablet   Commonly known as:  ATARAX   Take 1-2 tablets (25-50 mg) by mouth 3 times daily as needed for anxiety   Last time this was given:  50 mg on 5/26/2017  5:25 AM                                lithium 300 MG CR tablet   Commonly known as:  ESKALITH/LITHOBID   Take 2 tablets (600 mg) by mouth 2 times daily   Last time this was given:  600 mg on 5/27/2017  9:38 AM                                multivitamin, therapeutic with minerals Tabs tablet   Take 1 tablet by mouth daily   Last time this was given:  1 tablet on 5/27/2017  9:39 AM                                * OLANZapine 10 MG tablet   Commonly known as:  zyPREXA   Take 1 tablet (10 mg) by mouth every 2 hours as needed (agitation associated with psychosis or julio.)   Last time this was given:  5 mg on 5/27/2017  9:39 AM                                * OLANZapine 15 MG tablet   Commonly known as:  zyPREXA   Take 1 tablet (15 mg) by mouth At Bedtime   Last time this was given:  5 mg on 5/27/2017  9:39 AM                                * OLANZapine 5 MG tablet   Commonly known as:  zyPREXA   Take 1 tablet (5 mg) by mouth daily   Last time this was given:  5 mg on 5/27/2017  9:39 AM                                pantoprazole 40 MG EC tablet   Commonly known as:  PROTONIX   Take 1 tablet (40 mg) by mouth  every morning   Last time this was given:  40 mg on 5/27/2017  9:39 AM                                senna-docusate 8.6-50 MG per tablet   Commonly known as:  SENOKOT-S;PERICOLACE   Take 1 tablet by mouth 2 times daily as needed for constipation                                * Notice:  This list has 3 medication(s) that are the same as other medications prescribed for you. Read the directions carefully, and ask your doctor or other care provider to review them with you.

## 2017-05-24 PROBLEM — F99 MENTAL HEALTH DISORDER: Status: ACTIVE | Noted: 2017-05-24

## 2017-05-24 LAB
ALBUMIN SERPL-MCNC: 4 G/DL (ref 3.4–5)
ALP SERPL-CCNC: 81 U/L (ref 40–150)
ALT SERPL W P-5'-P-CCNC: 45 U/L (ref 0–50)
ANION GAP SERPL CALCULATED.3IONS-SCNC: 7 MMOL/L (ref 3–14)
AST SERPL W P-5'-P-CCNC: 24 U/L (ref 0–45)
BASOPHILS # BLD AUTO: 0 10E9/L (ref 0–0.2)
BASOPHILS NFR BLD AUTO: 0.2 %
BILIRUB SERPL-MCNC: 0.4 MG/DL (ref 0.2–1.3)
BUN SERPL-MCNC: 6 MG/DL (ref 7–30)
CALCIUM SERPL-MCNC: 9.8 MG/DL (ref 8.5–10.1)
CHLORIDE SERPL-SCNC: 107 MMOL/L (ref 94–109)
CO2 SERPL-SCNC: 26 MMOL/L (ref 20–32)
CREAT SERPL-MCNC: 0.58 MG/DL (ref 0.52–1.04)
DIFFERENTIAL METHOD BLD: NORMAL
EOSINOPHIL # BLD AUTO: 0.2 10E9/L (ref 0–0.7)
EOSINOPHIL NFR BLD AUTO: 1.7 %
ERYTHROCYTE [DISTWIDTH] IN BLOOD BY AUTOMATED COUNT: 12.4 % (ref 10–15)
GFR SERPL CREATININE-BSD FRML MDRD: ABNORMAL ML/MIN/1.7M2
GLUCOSE SERPL-MCNC: 119 MG/DL (ref 70–99)
HCT VFR BLD AUTO: 38.2 % (ref 35–47)
HGB BLD-MCNC: 12.6 G/DL (ref 11.7–15.7)
IMM GRANULOCYTES # BLD: 0 10E9/L (ref 0–0.4)
IMM GRANULOCYTES NFR BLD: 0.2 %
LITHIUM SERPL-SCNC: 0.6 MMOL/L (ref 0.6–1.2)
LYMPHOCYTES # BLD AUTO: 3.8 10E9/L (ref 0.8–5.3)
LYMPHOCYTES NFR BLD AUTO: 41.4 %
MCH RBC QN AUTO: 30.9 PG (ref 26.5–33)
MCHC RBC AUTO-ENTMCNC: 33 G/DL (ref 31.5–36.5)
MCV RBC AUTO: 94 FL (ref 78–100)
MONOCYTES # BLD AUTO: 0.6 10E9/L (ref 0–1.3)
MONOCYTES NFR BLD AUTO: 6.1 %
NEUTROPHILS # BLD AUTO: 4.7 10E9/L (ref 1.6–8.3)
NEUTROPHILS NFR BLD AUTO: 50.4 %
NRBC # BLD AUTO: 0 10*3/UL
NRBC BLD AUTO-RTO: 0 /100
PLATELET # BLD AUTO: 332 10E9/L (ref 150–450)
POTASSIUM SERPL-SCNC: 3.6 MMOL/L (ref 3.4–5.3)
PROT SERPL-MCNC: 8.2 G/DL (ref 6.8–8.8)
RBC # BLD AUTO: 4.08 10E12/L (ref 3.8–5.2)
SODIUM SERPL-SCNC: 140 MMOL/L (ref 133–144)
TSH SERPL DL<=0.005 MIU/L-ACNC: 2.52 MU/L (ref 0.4–4)
WBC # BLD AUTO: 9.3 10E9/L (ref 4–11)

## 2017-05-24 PROCEDURE — 84443 ASSAY THYROID STIM HORMONE: CPT | Performed by: EMERGENCY MEDICINE

## 2017-05-24 PROCEDURE — 12400003 ZZH R&B MH CRITICAL UMMC

## 2017-05-24 PROCEDURE — 25000132 ZZH RX MED GY IP 250 OP 250 PS 637: Performed by: EMERGENCY MEDICINE

## 2017-05-24 PROCEDURE — 85025 COMPLETE CBC W/AUTO DIFF WBC: CPT | Performed by: EMERGENCY MEDICINE

## 2017-05-24 PROCEDURE — 80053 COMPREHEN METABOLIC PANEL: CPT | Performed by: EMERGENCY MEDICINE

## 2017-05-24 PROCEDURE — 80178 ASSAY OF LITHIUM: CPT | Performed by: EMERGENCY MEDICINE

## 2017-05-24 PROCEDURE — 25000132 ZZH RX MED GY IP 250 OP 250 PS 637: Performed by: FAMILY MEDICINE

## 2017-05-24 PROCEDURE — 25000132 ZZH RX MED GY IP 250 OP 250 PS 637: Performed by: PSYCHIATRY & NEUROLOGY

## 2017-05-24 PROCEDURE — 25000132 ZZH RX MED GY IP 250 OP 250 PS 637: Performed by: NURSE PRACTITIONER

## 2017-05-24 RX ORDER — FOLIC ACID 1 MG/1
1 TABLET ORAL DAILY
Status: DISCONTINUED | OUTPATIENT
Start: 2017-05-24 | End: 2017-05-27 | Stop reason: HOSPADM

## 2017-05-24 RX ORDER — AMOXICILLIN 250 MG
1 CAPSULE ORAL 2 TIMES DAILY PRN
Status: DISCONTINUED | OUTPATIENT
Start: 2017-05-24 | End: 2017-05-27 | Stop reason: HOSPADM

## 2017-05-24 RX ORDER — OLANZAPINE 5 MG/1
5 TABLET ORAL DAILY
Status: DISCONTINUED | OUTPATIENT
Start: 2017-05-24 | End: 2017-05-27 | Stop reason: HOSPADM

## 2017-05-24 RX ORDER — OLANZAPINE 15 MG/1
15 TABLET ORAL AT BEDTIME
Status: DISCONTINUED | OUTPATIENT
Start: 2017-05-24 | End: 2017-05-27 | Stop reason: HOSPADM

## 2017-05-24 RX ORDER — DIPHENHYDRAMINE HYDROCHLORIDE 50 MG/ML
50 INJECTION INTRAMUSCULAR; INTRAVENOUS EVERY 4 HOURS PRN
Status: DISCONTINUED | OUTPATIENT
Start: 2017-05-24 | End: 2017-05-27 | Stop reason: HOSPADM

## 2017-05-24 RX ORDER — HALOPERIDOL 5 MG/1
5 TABLET ORAL EVERY 4 HOURS PRN
Status: DISCONTINUED | OUTPATIENT
Start: 2017-05-24 | End: 2017-05-27 | Stop reason: HOSPADM

## 2017-05-24 RX ORDER — HYDROXYZINE HYDROCHLORIDE 25 MG/1
25-50 TABLET, FILM COATED ORAL 3 TIMES DAILY PRN
Status: DISCONTINUED | OUTPATIENT
Start: 2017-05-24 | End: 2017-05-27 | Stop reason: HOSPADM

## 2017-05-24 RX ORDER — PANTOPRAZOLE SODIUM 40 MG/1
40 TABLET, DELAYED RELEASE ORAL DAILY
Status: DISCONTINUED | OUTPATIENT
Start: 2017-05-24 | End: 2017-05-24

## 2017-05-24 RX ORDER — PANTOPRAZOLE SODIUM 40 MG/1
40 TABLET, DELAYED RELEASE ORAL EVERY MORNING
Status: DISCONTINUED | OUTPATIENT
Start: 2017-05-25 | End: 2017-05-27 | Stop reason: HOSPADM

## 2017-05-24 RX ORDER — MULTIPLE VITAMINS W/ MINERALS TAB 9MG-400MCG
1 TAB ORAL DAILY
Status: DISCONTINUED | OUTPATIENT
Start: 2017-05-24 | End: 2017-05-27 | Stop reason: HOSPADM

## 2017-05-24 RX ORDER — HALOPERIDOL 5 MG/ML
5 INJECTION INTRAMUSCULAR EVERY 4 HOURS PRN
Status: DISCONTINUED | OUTPATIENT
Start: 2017-05-24 | End: 2017-05-27 | Stop reason: HOSPADM

## 2017-05-24 RX ORDER — LITHIUM CARBONATE 300 MG/1
600 TABLET, FILM COATED, EXTENDED RELEASE ORAL 2 TIMES DAILY
Status: DISCONTINUED | OUTPATIENT
Start: 2017-05-24 | End: 2017-05-27 | Stop reason: HOSPADM

## 2017-05-24 RX ORDER — HYDROXYZINE HYDROCHLORIDE 25 MG/1
25-50 TABLET, FILM COATED ORAL EVERY 4 HOURS PRN
Status: DISCONTINUED | OUTPATIENT
Start: 2017-05-24 | End: 2017-05-24

## 2017-05-24 RX ORDER — ALUMINA, MAGNESIA, AND SIMETHICONE 2400; 2400; 240 MG/30ML; MG/30ML; MG/30ML
30 SUSPENSION ORAL EVERY 4 HOURS PRN
Status: DISCONTINUED | OUTPATIENT
Start: 2017-05-24 | End: 2017-05-27 | Stop reason: HOSPADM

## 2017-05-24 RX ORDER — OLANZAPINE 10 MG/1
10 TABLET ORAL
Status: DISCONTINUED | OUTPATIENT
Start: 2017-05-24 | End: 2017-05-27 | Stop reason: HOSPADM

## 2017-05-24 RX ORDER — DIPHENHYDRAMINE HCL 50 MG
50 CAPSULE ORAL EVERY 4 HOURS PRN
Status: DISCONTINUED | OUTPATIENT
Start: 2017-05-24 | End: 2017-05-27 | Stop reason: HOSPADM

## 2017-05-24 RX ORDER — AMOXICILLIN 250 MG
1-2 CAPSULE ORAL
Status: DISCONTINUED | OUTPATIENT
Start: 2017-05-24 | End: 2017-05-27 | Stop reason: HOSPADM

## 2017-05-24 RX ORDER — OLANZAPINE 5 MG/1
5 TABLET, ORALLY DISINTEGRATING ORAL DAILY
Status: DISCONTINUED | OUTPATIENT
Start: 2017-05-24 | End: 2017-05-24 | Stop reason: DRUGHIGH

## 2017-05-24 RX ORDER — LORAZEPAM 1 MG/1
1-2 TABLET ORAL EVERY 4 HOURS PRN
Status: DISCONTINUED | OUTPATIENT
Start: 2017-05-24 | End: 2017-05-27 | Stop reason: HOSPADM

## 2017-05-24 RX ORDER — LORAZEPAM 2 MG/ML
1-2 INJECTION INTRAMUSCULAR EVERY 4 HOURS PRN
Status: DISCONTINUED | OUTPATIENT
Start: 2017-05-24 | End: 2017-05-27 | Stop reason: HOSPADM

## 2017-05-24 RX ORDER — OLANZAPINE 5 MG/1
10 TABLET, ORALLY DISINTEGRATING ORAL ONCE
Status: COMPLETED | OUTPATIENT
Start: 2017-05-24 | End: 2017-05-24

## 2017-05-24 RX ADMIN — NICOTINE POLACRILEX 4 MG: 2 GUM, CHEWING ORAL at 15:49

## 2017-05-24 RX ADMIN — OLANZAPINE 10 MG: 5 TABLET, ORALLY DISINTEGRATING ORAL at 00:13

## 2017-05-24 RX ADMIN — HALOPERIDOL 5 MG: 5 TABLET ORAL at 19:27

## 2017-05-24 RX ADMIN — HYDROXYZINE HYDROCHLORIDE 50 MG: 25 TABLET ORAL at 16:27

## 2017-05-24 RX ADMIN — OLANZAPINE 15 MG: 15 TABLET, FILM COATED ORAL at 18:38

## 2017-05-24 RX ADMIN — NICOTINE POLACRILEX 4 MG: 2 GUM, CHEWING ORAL at 14:50

## 2017-05-24 RX ADMIN — DIPHENHYDRAMINE HYDROCHLORIDE 50 MG: 50 CAPSULE ORAL at 19:27

## 2017-05-24 RX ADMIN — PANTOPRAZOLE SODIUM 40 MG: 40 TABLET, DELAYED RELEASE ORAL at 07:40

## 2017-05-24 RX ADMIN — LORAZEPAM 2 MG: 1 TABLET ORAL at 19:27

## 2017-05-24 RX ADMIN — OLANZAPINE 10 MG: 5 TABLET, ORALLY DISINTEGRATING ORAL at 07:40

## 2017-05-24 RX ADMIN — OLANZAPINE 10 MG: 10 TABLET, FILM COATED ORAL at 16:27

## 2017-05-24 RX ADMIN — LITHIUM CARBONATE 600 MG: 300 TABLET, EXTENDED RELEASE ORAL at 18:38

## 2017-05-24 ASSESSMENT — ACTIVITIES OF DAILY LIVING (ADL)
ORAL_HYGIENE: INDEPENDENT
HYGIENE/GROOMING: INDEPENDENT
LAUNDRY: UNABLE TO COMPLETE
DRESS: SCRUBS (BEHAVIORAL HEALTH)

## 2017-05-24 NOTE — ED NOTES
Order to draw blood but patient refused blood draw. MD updated. 2nd dose Zyprexa given. Per MD, nursing to try again once patient calm down. At this time patient pacing in room and getting agitated and  attempt to come out multiple time and redirected but unsuccessful. Patient placed in seclusions door closed. Nursing staff monitoring patient every 15 minute

## 2017-05-24 NOTE — ED NOTES
Bed: [facility]  Expected date:   Expected time:   Means of arrival:   Comments:  [place] [id number] 31F anxious, needs med refill, 15 min
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
## 2017-05-24 NOTE — DOWNTIME EVENT NOTE
The EMR was down for 3.75 hours on 5/24/2017.    Rosy was responsible for completing the paper charting during this time period.     The following information was re-entered into the system by Bhavani Day: Flowsheet data    The following information will remain in the paper chart: none    Bhavani Day  5/24/2017

## 2017-05-24 NOTE — SUMMARY OF CARE
Patient search completed; pt wearing scrub top, pt wanded with metal detector and belongings given to security to be stored. Explained to patient that they would be evaluated by a nurse here in the ER and then would go over to the [facility] when a bed is available where they will meet with a doctor and an accessor; plan will be determined from there.

## 2017-05-24 NOTE — SUMMARY OF CARE
"Pt pacing the halls, asked for crackers then when PA brought them to her she said \"what the fuck are these bitch, get these out of my face.\"   "

## 2017-05-24 NOTE — ED NOTES
In person face to face assessment completed, including an evaluation of the patient's immediate reaction to the intervention, complete review of systems assessment, behavioral assessment and review/assessment of history, drugs and medications, recent labs, etc., and behavioral condition.  The patient experienced: No adverse physical outcome from seclusion/restraint initiation.  The intervention of restraint or seclusion needs to continue.       Mel Barreto MD  05/24/17 0152

## 2017-05-24 NOTE — ED NOTES
Patient signed out at shift change.  Patient is continually trying to come out of seclusion.  She has not been violent or self-injurious but is somewhat disruptive.  She has been treated with oral Zyprexa and we will treat with another dose.     Celso Ponce MD  05/24/17 8850

## 2017-05-24 NOTE — SUMMARY OF CARE
"Pt frequently pacing, randomly swearing at staff, told RN \"get the fuck out of my face\" while the nurse was completing the assessment and told another as they walked by \"don't breath your hot ass breath on me.\"  "
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
## 2017-05-25 PROCEDURE — 99207 ZZC CDG-MDM COMPONENT: MEETS LOW - DOWN CODED: CPT | Performed by: PSYCHIATRY & NEUROLOGY

## 2017-05-25 PROCEDURE — 12400003 ZZH R&B MH CRITICAL UMMC

## 2017-05-25 PROCEDURE — 25000132 ZZH RX MED GY IP 250 OP 250 PS 637: Performed by: PSYCHIATRY & NEUROLOGY

## 2017-05-25 PROCEDURE — 99222 1ST HOSP IP/OBS MODERATE 55: CPT | Mod: AI | Performed by: PSYCHIATRY & NEUROLOGY

## 2017-05-25 PROCEDURE — 25000132 ZZH RX MED GY IP 250 OP 250 PS 637: Performed by: NURSE PRACTITIONER

## 2017-05-25 RX ADMIN — LITHIUM CARBONATE 600 MG: 300 TABLET, EXTENDED RELEASE ORAL at 09:40

## 2017-05-25 RX ADMIN — LORAZEPAM 2 MG: 1 TABLET ORAL at 12:27

## 2017-05-25 RX ADMIN — OLANZAPINE 15 MG: 15 TABLET, FILM COATED ORAL at 20:38

## 2017-05-25 RX ADMIN — NICOTINE POLACRILEX 4 MG: 2 GUM, CHEWING ORAL at 09:48

## 2017-05-25 RX ADMIN — PANTOPRAZOLE SODIUM 40 MG: 40 TABLET, DELAYED RELEASE ORAL at 09:40

## 2017-05-25 RX ADMIN — HALOPERIDOL 5 MG: 5 TABLET ORAL at 16:06

## 2017-05-25 RX ADMIN — LITHIUM CARBONATE 600 MG: 300 TABLET, EXTENDED RELEASE ORAL at 20:37

## 2017-05-25 RX ADMIN — HALOPERIDOL 5 MG: 5 TABLET ORAL at 12:27

## 2017-05-25 RX ADMIN — MULTIPLE VITAMINS W/ MINERALS TAB 1 TABLET: TAB at 09:40

## 2017-05-25 RX ADMIN — DIPHENHYDRAMINE HYDROCHLORIDE 50 MG: 50 CAPSULE ORAL at 12:27

## 2017-05-25 RX ADMIN — LORAZEPAM 2 MG: 1 TABLET ORAL at 16:06

## 2017-05-25 RX ADMIN — FOLIC ACID 1 MG: 1 TABLET ORAL at 09:41

## 2017-05-25 RX ADMIN — OLANZAPINE 5 MG: 5 TABLET, FILM COATED ORAL at 09:44

## 2017-05-25 RX ADMIN — DIPHENHYDRAMINE HYDROCHLORIDE 50 MG: 50 CAPSULE ORAL at 16:06

## 2017-05-25 ASSESSMENT — ACTIVITIES OF DAILY LIVING (ADL)
ORAL_HYGIENE: INDEPENDENT
GROOMING: SHOWER
LAUNDRY: UNABLE TO COMPLETE
DRESS: SCRUBS (BEHAVIORAL HEALTH)
DRESS: SCRUBS (BEHAVIORAL HEALTH)
ORAL_HYGIENE: INDEPENDENT
HYGIENE/GROOMING: INDEPENDENT
LAUNDRY: WITH SUPERVISION

## 2017-05-25 NOTE — PROGRESS NOTES
"Pt appeared anxious during the shift. Pt was pacing the halls and frequently entering and exiting her room. Pt had paranoia, asking repeatedly \"Am I going to die?\" Pt needed redirection for intrusive behaviors.      05/24/17 2230   Behavioral Health   Hallucinations denies / not responding to hallucinations   Thinking paranoid;delusional;confused;distractable   Orientation time: oriented;date: oriented;place: oriented;person: oriented   Memory confabulation   Insight poor   Judgement impaired   Eye Contact at examiner   Affect tense;full range affect   Mood irritable;labile;anxious   Physical Appearance/Attire attire appropriate to age and situation   Hygiene well groomed   Suicidality other (see comments)  (none reported)   Self Injury other (see comment)  (none reported/none observed)   Activity hyperactive (agitated, impulsive);restless   Speech clear;rambling   Medication Sensitivity no observed side effects;no stated side effects   Psychomotor / Gait balanced;steady;hyperactive;paces   Activities of Daily Living   Hygiene/Grooming independent   Oral Hygiene independent   Dress scrubs (behavioral health)   Laundry unable to complete   Room Organization independent   Behavioral Health Interventions   Psychotic Symptoms maintain safety precautions;monitor need to revise level of observation;maintain safe secure environment;reality orientation;simple, clear language;decrease environmental stimulation;redirection of intrusive behaviors;redirection of aggressive behaviors;assist patient in developing safety plan;assist patient in following safety plan;provide emotional support;encourage participation / independence with adls;encourage nutrition and hydration;build upon strengths;assist with developing & utilizing healthy coping strategies;establish therapeutic relationship;monitor need for prn medication;monitor confusion, memory loss, decision making ability and reorient / intervent as needed   Social and " Therapeutic Interventions (Psychotic Symptoms) encourage participation in therapeutic groups and milieu activities;encourage effective boundaries with peers;encourage socialization with peers

## 2017-05-25 NOTE — PROGRESS NOTES
05/25/17 1215   Education   Discontinuation Criteria Cessation of behavior that precipitated seclusion or restraint   Daisy has been experiencing inner agitation most of the am. She has been shouting in the lounge at staff and peers.  She comes out of her room and acuses staff of stalking her. She comes to the medication window and shouts at the RN for candy.  She did take offered po medications without incident and staff requested that she remain in her room.  She could not comply and immediatly came out of her room and was glaring at staff. She was unable to contract for safety, code 21 was paged and patient walked to seclusion.

## 2017-05-25 NOTE — PROGRESS NOTES
Patient has been misinterpreting events in the lounge. She feels like patients/staff are following her or trying to kill her. She was very restless. She was given 10 mg PRN Zyprexa followed by her HS dose of 15 mg Zyprexa 2 hours later. This had little effect and patient was becoming more paranoid and delusional as the evening progressed.She was very fearful that she was going to die. Order was obtained for PRN Benadryl, Haldol, Ativan. This was helpful and she was finally able to settle down.

## 2017-05-25 NOTE — DISCHARGE INSTRUCTIONS
Behavioral Discharge Planning and Instructions      Summary:  You were admitted to the [facility] with paranoia and psychosis in relation to non-compliance with medication.  During your hospitalization, you met daily with the staff and were encouraged to attend all therapeutic programming. You met with the Clinical Treatment Coordinator and participated in your discharge planning. Medications were adjusted.  You are now stabilized and are discharged.  Referrals and recommendations are listed below.     Your provisional discharge was revoked.     Primary Diagnoses:   Schizoaffective Disorder, bipolar type  Cocaine Use Disorder  Benzodiazepine Use Disorder      Mental Health Follow-Up:  ACT team through Mental Health Resources:  : Jyotsna Lee [phone]  Psychiatrist: Dr.Barbara Day  Nurse: Manpreet Sheehan: [phone]        Primary Care Physician: Khadijah Quintero [phone]            Attend all scheduled appointments with your outpatient providers. Call at least 24 hours in advance if you need to reschedule an appointment to ensure continued access to your outpatient providers.   Major Treatments, Procedures and Findings:  You were provided with: a psychiatric assessment, medication evaluation and/or management, group therapy and milieu management    Symptoms to Report: feeling more aggressive, increased confusion, losing more sleep, mood getting worse or thoughts of suicide    Early warning signs can include: increased depression or anxiety sleep disturbances increased thoughts or behaviors of suicide or self-harm  increased unusual thinking, such as paranoia or hearing voices    Safety and Wellness:  Take all medicines as directed.  Make no changes unless your doctor suggests them.      Follow treatment recommendations.  Refrain from alcohol and non-prescribed drugs.  Ask your support system to help you reduce your access to  "items that could harm yourself or others. Items could include:  Firearms  Medicines (both prescribed and over-the-counter)  Knives and other sharp objects  Ropes and like materials  Car keys  If there is a concern for safety, call 911. If there is a concern for safety, call 911.    Resources:   Crisis Intervention: 729.272.4984 or 914-237-2596 (TTY: 148.458.1200).  Call anytime for help.  National [place] Pool on Mental Illness (www.mn.pari.org): 409.252.5205 or 181-331-4400.  [place] Association for Children's Mental Health (www.mac.org): 545.193.1369.  Alcoholics Anonymous (www.alcoholics-anonymous.org): Check your phone book for your local chapter.  Suicide Awareness Voices of Education (SAVE) (www.save.org): 278-803-MQFH (1612)  National Suicide Prevention Line (www.mentalhealthmn.org): 206-715-UAMW (2800)  Mental Health Consumer/Survivor Network of [place] (www.mhcsn.net): 976.914.4545 or 824-352-1635  Mental Health Association of [place] (www.mentalhealth.org): 504.937.7836 or 442-810-8961  Self- Management and Recovery Training., SMART-- Toll free: 703.475.2344  www.Snupps.org  [facility] Crisis (COPE) Response - Adult 811 144-8701  [facility] Crisis Response - Adult 547 719-8381  Text 4 Life: txt \"LIFE\" to 67222 for immediate support and crisis intervention  Crisis text line: Text \"START\" to 530-440. Free, confidential, 24/7.  Crisis Intervention: 717.614.8747 or 457-241-9235. Call anytime for help.   [facility] Health Crisis Team - Child: [phone]  [facility] Mental Health Crisis Response Team - Child: [phone]    The treatment team has appreciated the opportunity to work with you. If you have any questions or concerns our unit number is [phone].        "
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
## 2017-05-25 NOTE — PROGRESS NOTES
05/25/17 1230   Seclusion or Restraint Order   In Person Face to Face Assessment Conducted Yes-Eval of pt's immediate situation, reaction to intervention, complete review of systems assessment, behavioral assessment & review/assessment of hx, drugs & meds, recent labs, etc, behavioral condition, need to continue/terminate restraint/seclusion   Patient Experienced No adverse physical outcome from seclusion/restraint initiation   Continuation of Seclus/Restraint indicated at this time Yes

## 2017-05-25 NOTE — PLAN OF CARE
Problem: General Plan of Care (Inpatient Behavioral)  Goal: Individualization/Patient Specific Goal (IP Behavioral)  The patient and/or their representative will achieve their patient-specific goals related to the plan of care.    The patient-specific goals include:   Personal Plan of Care     Reasons you are in the Hospital  1. paranoia  2. Not taking medications  3.  4.     Goals for Discharge   1. Meet w/ Psychiatrist  2. Work w/ ACT team  3.        Illness Management Recovery model: Personal Plan of Care     Patient completed Personal Plan of Care, identifying reasons for hospitalization and goals for discharge. Form reviewed in team meeting and signed by patient, physician, writer, CTC and RN. Form given to [facility] to be scanned into [employer].

## 2017-05-25 NOTE — PROGRESS NOTES
Pt's CCM faxed a copy of the revocation to us and this writer placed it in the pt's chart and clipboard to be scanned.

## 2017-05-25 NOTE — PROGRESS NOTES
05/25/17 1300   Debriefing   Debriefing DO   Does patient understand why the event happened? Yes   Does patient agree to safe behaviors? Yes   What can we do differently so this doesn't happen again? Other (comment)   Plan of care reviewed and modified Yes   Patient agreed to maintain unit safety.

## 2017-05-25 NOTE — PLAN OF CARE
Problem: General Plan of Care (Inpatient Behavioral)  Goal: Team Discussion  Team Plan:   BEHAVIORAL TEAM DISCUSSION     Continued Stay Criteria/Rationale: New Patient, admitted due to psychosis and disorganized behavior.  Plan: CTC will update psychosocial assessment.  Pt will meet with Psychiatrist and treatment team  Participants: Ynes Navarro RN, Kevin Ruiz RN, Hannah JOSEPH, [facility]- [facility] and Quita Moreno OT/R  Summary/Recommendation: [facility] will work with pt's OP ACT team for d/c planning.  Pt's provisional discharge was revoked by her ACT team as well.   Medical/Physical: See medical chart  Progress: continue to assess

## 2017-05-25 NOTE — PROGRESS NOTES
This writer spoke to pt's ACT  FRED Goyal - Phone: [phone].  She is sending a copy of the revocation paperwork for us to place in pt's chart.      She stated that the pt returning to  Residence is not a good idea.  While she was there she was posturing towards other pt's and staff and making verbal threats.  Mom will NOT take her back.  She is running out of options for placement.  Her CCM was wondering if any other IRTS would make accommodations for her to have her own room.  She does better when she does not have a roommate.      This writer will check with the additional IRTS that the pt was referred to on her last hospitalization and see if there are any accommodations that can be made.

## 2017-05-25 NOTE — H&P
"PATIENT IDENTIFICATION:  Daisy Curtis is a 31-year-old  female.      CHIEF COMPLAINT:  The patient called for an ambulance herself and requested transport to the ER.  She had missed several doses of her medications and was becoming paranoid.  She was admitted on a voluntary basis.      HISTORY OF PRESENT ILLNESS:  This patient has had many previous psychiatric hospitalizations, most recently here from 04/12 to 05/15.  She was [facility] of [facility] led by Dr. Khan, but was transferred to station 12 under my care because of disruptive behavior on station 22.  She is under a mental health commitment, was discharged to [facility], with anticipated 3-month stay there.  However, after just a couple of days she left the [facility] and went to her mother's house.  She tells me today that she did this because \"they kept trying to fight with me.\"  She clarifies by saying that peers were \"looking at me strange and talking and laughing about me.\"  It is unclear if this complaint stems from paranoia or if she was actually being targeted by peers.  She says that she got along fine with staff at the [facility].  After going to her mother's home, she says she missed a couple of doses of medications and became more paranoid.  She then wanted to come back to the hospital.  She is distracted during the interview, occasionally talks to herself, and is hyperalert and vigilant.  During my morning on station 12, several times she yelled loudly and with angry tones, apparently responding to internal stimuli and seemingly arguing with staff.  This was not occurring at all during her last 1-2 weeks on station 12 in early May.  The patient was uniformly calm, pleasant and cooperative during the last couple of weeks on station 12.  The patient is agreeable to having the treatment team call her mother.  The  did speak with the patient's mother, who says that the " patient is not currently welcome to stay at her home.  She feels that the patient cannot maintain stability there and recommends placement in an IRTS.  She feels that the patient would do better with a private room in an IRTS, since she has typically had problems with roommates in past residential treatments.  The patient today is perfectly willing to be back on her medications that were working well for her during her last stay, namely Zyprexa and lithium.  She has no complaints about side effects from these medications.  She says that she wanted to be compliant with them as an outpatient, but it appears that she was too disorganized to do so.      REVIEW OF SYSTEMS:  A 10-point review of systems was completed.  All systems were negative except as noted above.      PAST MEDICAL HISTORY:  The patient denies ongoing medical concerns, denies history of serious illnesses.      SOCIAL HISTORY:  The patient was staying with her mother over the past week, after leaving her IRTS.  She says she gets along well with her mother and no one else lives there.  She is okay with her mother monitoring her medications, but this did not prevent her from missing several doses over the past week and becoming increasingly paranoid.      FAMILY HISTORY:  The patient says her aunt has depression and anxiety.  She denies any family history of chemical dependence or suicides.      PHYSICAL EXAMINATION:  Please see the emergency room note by Dr. Barreto dated 05/23, which I have reviewed.      MENTAL STATUS EXAMINATION:  Vital signs that were taken this morning on the unit  include temperature 97.4, pulse 103, respirations 16, blood pressure 124/86 and O2 sats 98% room air.        Her general appearance is well groomed.  She makes poor eye contact, with her gaze scanning the room and she appears quite distractible.  She is hyperalert and has an exaggerated startle response to benign noises in the environment.  Her speech is somewhat delayed,  indicating thought blocking.  Her thought process is fairly well organized during the interview, but she does make some comments under her breath as if she is talking to an unseen other or an auditory hallucination.  She denies any suicidal or homicidal ideations.  She is clearly paranoid.  Her associations are loose at times.  Her insight and judgment are fair, she does acknowledge having a significant mental health problem and requiring medications for it.  She is agreeable to hospitalization at this time.  She is oriented to time, place and person.  Her recent and remote memory are somewhat impaired.  Her attention span and concentration are impaired.  Her language is appropriate and her fund of knowledge is average.  Her mood is anxious, and her affect is blunted and congruent.  Her muscle strength and tone are normal, as are her gait and station.      DIAGNOSES:   Axis I:  Schizoaffective disorder, bipolar type.         History of cocaine use disorder.         Benzodiazepine use disorder.  (Urine drug screen on admission was negative).   Axis II:  Deferred.     Axis III:  Asthma, ventral abdominal hernia, constipation, hyperprolactinemia, galactorrhea.   Axis IV:  Stressors include lack of stable housing, unemployed, lack of primary support, difficulty functioning in the community due to her mental illness.   Axis V:  Global Assessment of Functioning on admission is 25.      PLAN:  The patient is admitted to station 12 with routine precautions, activities and p.r.n. medications.  Her care will be assumed by Dr. Sandhu starting tomorrow.  After discussion of the indications, risks, benefits, alternatives and consequences of no treatment, the patient is agreeable to restarting lithium and Zyprexa.  Her lithium level yesterday morning was 0.6, indicating at least partial compliance.  Her urine drug screen was negative, so it does not seem that alcohol or drug use contributed to her relapse of psychosis.  She will  likely require IRTS placement, preferably with a private room since she has a history of getting into conflict with roommates due to her paranoia.  The patient is under a mental health commitment, but she is here as a voluntary patient currently.  The patient was agreeable to the above plan and did not ask about discharge yet and does not seem overly rushed about this.  She does contract for safety.         NURIS MUKHERJEE MD             D: 2017 15:17   T: 2017 18:33   MT: DAVI      Name:     JULY UNDERWOOD   MRN:      [id number]        Account:      [id number]   :      1985           Admitted:     [id number]      Document: [id number]

## 2017-05-26 PROCEDURE — 12400003 ZZH R&B MH CRITICAL UMMC

## 2017-05-26 PROCEDURE — 25000132 ZZH RX MED GY IP 250 OP 250 PS 637: Performed by: NURSE PRACTITIONER

## 2017-05-26 PROCEDURE — 25000132 ZZH RX MED GY IP 250 OP 250 PS 637: Performed by: PSYCHIATRY & NEUROLOGY

## 2017-05-26 PROCEDURE — 99231 SBSQ HOSP IP/OBS SF/LOW 25: CPT | Performed by: PSYCHIATRY & NEUROLOGY

## 2017-05-26 RX ADMIN — LITHIUM CARBONATE 600 MG: 300 TABLET, EXTENDED RELEASE ORAL at 19:08

## 2017-05-26 RX ADMIN — HYDROXYZINE HYDROCHLORIDE 50 MG: 25 TABLET ORAL at 05:25

## 2017-05-26 RX ADMIN — OLANZAPINE 15 MG: 15 TABLET, FILM COATED ORAL at 19:08

## 2017-05-26 RX ADMIN — NICOTINE POLACRILEX 4 MG: 2 GUM, CHEWING ORAL at 18:07

## 2017-05-26 RX ADMIN — HALOPERIDOL 5 MG: 5 TABLET ORAL at 10:58

## 2017-05-26 RX ADMIN — NICOTINE POLACRILEX 4 MG: 2 GUM, CHEWING ORAL at 10:25

## 2017-05-26 RX ADMIN — PANTOPRAZOLE SODIUM 40 MG: 40 TABLET, DELAYED RELEASE ORAL at 08:53

## 2017-05-26 RX ADMIN — DIPHENHYDRAMINE HYDROCHLORIDE 50 MG: 50 CAPSULE ORAL at 10:58

## 2017-05-26 RX ADMIN — LORAZEPAM 2 MG: 1 TABLET ORAL at 10:58

## 2017-05-26 RX ADMIN — OLANZAPINE 5 MG: 5 TABLET, FILM COATED ORAL at 08:54

## 2017-05-26 RX ADMIN — MULTIPLE VITAMINS W/ MINERALS TAB 1 TABLET: TAB at 08:53

## 2017-05-26 RX ADMIN — LITHIUM CARBONATE 600 MG: 300 TABLET, EXTENDED RELEASE ORAL at 08:54

## 2017-05-26 RX ADMIN — FOLIC ACID 1 MG: 1 TABLET ORAL at 08:53

## 2017-05-26 RX ADMIN — NICOTINE POLACRILEX 4 MG: 2 GUM, CHEWING ORAL at 19:54

## 2017-05-26 ASSESSMENT — ACTIVITIES OF DAILY LIVING (ADL)
LAUNDRY: WITH SUPERVISION
DRESS: SCRUBS (BEHAVIORAL HEALTH)
GROOMING: INDEPENDENT
ORAL_HYGIENE: INDEPENDENT
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
## 2017-05-26 NOTE — PROGRESS NOTES
Pt came into the group area for about 5 minutes, found a rock in the project cabinet, and painted it quickly.  Does not respond to writer attempting to interact with her.  Appeared preoccupied and tense, though kept to herself and abruptly left the area when done.

## 2017-05-26 NOTE — PROGRESS NOTES
This writer called to check on options:     [facility]- Diego - left message w/ his .  He is out until Tuesday next week.      [facility]- left vm for Jason Camacho-asked if they could make accommodations for her to have her own room due to her paranoia    [facility] - Spoke to Keenan- He told me there is possibly a female that is in a single room leaving sometime between June 14-22nd.  IF that is the case, they may consider this patient.  He asked for updated records to be faxed for consideration. Faxed to him @ [phone].  When I check back in next week he said to ask for Lcaey as she helps manage the referral list.

## 2017-05-26 NOTE — PROGRESS NOTES
"This writer spoke to Jason @ [facility]- He said that after she was placed at the other IRTS they took her off the list.  However, they will add her for a \"re-referral\" and she will go back on the list.  Faxed over her recent admit records to Jason @ [phone].        "

## 2017-05-26 NOTE — PROGRESS NOTES
Pt was isolative to her room the entire shift. Pt appears tense and has a flat affect. No behavioral concerns to be noted this shift.       05/25/17 2047   Behavioral Health   Hallucinations denies / not responding to hallucinations   Thinking paranoid;poor concentration   Orientation person: oriented;place: oriented   Insight poor   Judgement impaired   Eye Contact at examiner   Affect tense   Mood mood is calm;irritable   Physical Appearance/Attire disheveled   Hygiene other (see comment)  (fair)   Suicidality (WDL) WDL   Self Injury (WDL) WDL   Activity isolative;withdrawn   Speech pressured   Psychomotor Gait (WDL) WDL   Activities of Daily Living   Hygiene/Grooming independent   Oral Hygiene independent   Dress scrubs (behavioral health)   Laundry with supervision   Room Organization independent

## 2017-05-27 ENCOUNTER — HOSPITAL ENCOUNTER (INPATIENT)
Facility: CLINIC | Age: 32
LOS: 4 days | Discharge: PSYCHIATRIC OR SUBSTANCE USE FACILITY | DRG: 394 | End: 2017-05-31
Attending: INTERNAL MEDICINE | Admitting: INTERNAL MEDICINE
Payer: COMMERCIAL

## 2017-05-27 ENCOUNTER — APPOINTMENT (OUTPATIENT)
Dept: ULTRASOUND IMAGING | Facility: CLINIC | Age: 32
DRG: 394 | End: 2017-05-27
Attending: INTERNAL MEDICINE
Payer: COMMERCIAL

## 2017-05-27 VITALS
RESPIRATION RATE: 16 BRPM | DIASTOLIC BLOOD PRESSURE: 91 MMHG | SYSTOLIC BLOOD PRESSURE: 177 MMHG | BODY MASS INDEX: 31.83 KG/M2 | OXYGEN SATURATION: 98 % | HEART RATE: 103 BPM | TEMPERATURE: 97.9 F | WEIGHT: 174 LBS

## 2017-05-27 DIAGNOSIS — R11.2 NAUSEA AND VOMITING, INTRACTABILITY OF VOMITING NOT SPECIFIED, UNSPECIFIED VOMITING TYPE: ICD-10-CM

## 2017-05-27 DIAGNOSIS — R10.84 GENERALIZED ABDOMINAL PAIN: Primary | ICD-10-CM

## 2017-05-27 PROBLEM — R10.9 ABDOMINAL PAIN: Status: ACTIVE | Noted: 2017-05-27

## 2017-05-27 LAB
ANION GAP SERPL CALCULATED.3IONS-SCNC: 7 MMOL/L (ref 3–14)
BUN SERPL-MCNC: 8 MG/DL (ref 7–30)
CALCIUM SERPL-MCNC: 9.4 MG/DL (ref 8.5–10.1)
CHLORIDE SERPL-SCNC: 110 MMOL/L (ref 94–109)
CO2 SERPL-SCNC: 25 MMOL/L (ref 20–32)
CREAT SERPL-MCNC: 0.68 MG/DL (ref 0.52–1.04)
GFR SERPL CREATININE-BSD FRML MDRD: ABNORMAL ML/MIN/1.7M2
GLUCOSE SERPL-MCNC: 133 MG/DL (ref 70–99)
POTASSIUM SERPL-SCNC: 3.9 MMOL/L (ref 3.4–5.3)
SODIUM SERPL-SCNC: 142 MMOL/L (ref 133–144)

## 2017-05-27 PROCEDURE — S0166 INJ OLANZAPINE 2.5MG: HCPCS

## 2017-05-27 PROCEDURE — 25000125 ZZHC RX 250

## 2017-05-27 PROCEDURE — 25000125 ZZHC RX 250: Performed by: INTERNAL MEDICINE

## 2017-05-27 PROCEDURE — 25000132 ZZH RX MED GY IP 250 OP 250 PS 637: Performed by: PSYCHIATRY & NEUROLOGY

## 2017-05-27 PROCEDURE — 25000128 H RX IP 250 OP 636: Performed by: INTERNAL MEDICINE

## 2017-05-27 PROCEDURE — 76705 ECHO EXAM OF ABDOMEN: CPT

## 2017-05-27 PROCEDURE — 80048 BASIC METABOLIC PNL TOTAL CA: CPT | Performed by: PHYSICIAN ASSISTANT

## 2017-05-27 PROCEDURE — 25000128 H RX IP 250 OP 636: Performed by: NURSE PRACTITIONER

## 2017-05-27 PROCEDURE — 99221 1ST HOSP IP/OBS SF/LOW 40: CPT | Performed by: PHYSICIAN ASSISTANT

## 2017-05-27 PROCEDURE — 36416 COLLJ CAPILLARY BLOOD SPEC: CPT | Performed by: PHYSICIAN ASSISTANT

## 2017-05-27 PROCEDURE — 25000125 ZZHC RX 250: Performed by: PHYSICIAN ASSISTANT

## 2017-05-27 PROCEDURE — 12000001 ZZH R&B MED SURG/OB UMMC

## 2017-05-27 PROCEDURE — 99207 ZZC CONSULT E&M CHANGED TO INITIAL LEVEL: CPT | Performed by: PHYSICIAN ASSISTANT

## 2017-05-27 RX ORDER — CALCIUM CARBONATE 500 MG/1
500 TABLET, CHEWABLE ORAL DAILY PRN
Status: DISCONTINUED | OUTPATIENT
Start: 2017-05-27 | End: 2017-05-27 | Stop reason: HOSPADM

## 2017-05-27 RX ORDER — HYDROMORPHONE HYDROCHLORIDE 1 MG/ML
.3-.5 INJECTION, SOLUTION INTRAMUSCULAR; INTRAVENOUS; SUBCUTANEOUS
Status: DISCONTINUED | OUTPATIENT
Start: 2017-05-27 | End: 2017-05-28

## 2017-05-27 RX ORDER — ONDANSETRON 4 MG/1
4 TABLET, ORALLY DISINTEGRATING ORAL EVERY 6 HOURS PRN
Status: DISCONTINUED | OUTPATIENT
Start: 2017-05-27 | End: 2017-05-31 | Stop reason: HOSPADM

## 2017-05-27 RX ORDER — OLANZAPINE 15 MG/1
15 TABLET ORAL AT BEDTIME
Status: DISCONTINUED | OUTPATIENT
Start: 2017-05-27 | End: 2017-05-31 | Stop reason: HOSPADM

## 2017-05-27 RX ORDER — LITHIUM CARBONATE 300 MG/1
600 TABLET, FILM COATED, EXTENDED RELEASE ORAL 2 TIMES DAILY
Status: DISCONTINUED | OUTPATIENT
Start: 2017-05-27 | End: 2017-05-31 | Stop reason: HOSPADM

## 2017-05-27 RX ORDER — OLANZAPINE 10 MG/1
10 TABLET ORAL
Status: DISCONTINUED | OUTPATIENT
Start: 2017-05-27 | End: 2017-05-31 | Stop reason: HOSPADM

## 2017-05-27 RX ORDER — OLANZAPINE 10 MG/2ML
INJECTION, POWDER, FOR SOLUTION INTRAMUSCULAR
Status: COMPLETED
Start: 2017-05-27 | End: 2017-05-27

## 2017-05-27 RX ORDER — PANTOPRAZOLE SODIUM 40 MG/1
40 TABLET, DELAYED RELEASE ORAL EVERY MORNING
Status: DISCONTINUED | OUTPATIENT
Start: 2017-05-28 | End: 2017-05-31 | Stop reason: HOSPADM

## 2017-05-27 RX ORDER — FOLIC ACID 1 MG/1
1 TABLET ORAL DAILY
Status: DISCONTINUED | OUTPATIENT
Start: 2017-05-28 | End: 2017-05-31 | Stop reason: HOSPADM

## 2017-05-27 RX ORDER — LORAZEPAM 2 MG/ML
1 INJECTION INTRAMUSCULAR EVERY 4 HOURS PRN
Status: DISCONTINUED | OUTPATIENT
Start: 2017-05-27 | End: 2017-05-28

## 2017-05-27 RX ORDER — ONDANSETRON 4 MG/1
4 TABLET, ORALLY DISINTEGRATING ORAL EVERY 6 HOURS PRN
Status: DISCONTINUED | OUTPATIENT
Start: 2017-05-27 | End: 2017-05-27 | Stop reason: HOSPADM

## 2017-05-27 RX ORDER — MULTIPLE VITAMINS W/ MINERALS TAB 9MG-400MCG
1 TAB ORAL DAILY
Status: DISCONTINUED | OUTPATIENT
Start: 2017-05-28 | End: 2017-05-31 | Stop reason: HOSPADM

## 2017-05-27 RX ORDER — SODIUM CHLORIDE AND POTASSIUM CHLORIDE 150; 900 MG/100ML; MG/100ML
INJECTION, SOLUTION INTRAVENOUS CONTINUOUS
Status: DISCONTINUED | OUTPATIENT
Start: 2017-05-27 | End: 2017-05-28

## 2017-05-27 RX ORDER — AMOXICILLIN 250 MG
1 CAPSULE ORAL 2 TIMES DAILY PRN
Status: DISCONTINUED | OUTPATIENT
Start: 2017-05-27 | End: 2017-05-29

## 2017-05-27 RX ORDER — ACETAMINOPHEN 325 MG/1
650 TABLET ORAL EVERY 4 HOURS PRN
Status: DISCONTINUED | OUTPATIENT
Start: 2017-05-27 | End: 2017-05-27 | Stop reason: HOSPADM

## 2017-05-27 RX ORDER — NALOXONE HYDROCHLORIDE 0.4 MG/ML
.1-.4 INJECTION, SOLUTION INTRAMUSCULAR; INTRAVENOUS; SUBCUTANEOUS
Status: DISCONTINUED | OUTPATIENT
Start: 2017-05-27 | End: 2017-05-31 | Stop reason: HOSPADM

## 2017-05-27 RX ORDER — OLANZAPINE 10 MG/2ML
10 INJECTION, POWDER, FOR SOLUTION INTRAMUSCULAR ONCE
Status: COMPLETED | OUTPATIENT
Start: 2017-05-27 | End: 2017-05-27

## 2017-05-27 RX ORDER — ONDANSETRON 2 MG/ML
4 INJECTION INTRAMUSCULAR; INTRAVENOUS EVERY 6 HOURS PRN
Status: DISCONTINUED | OUTPATIENT
Start: 2017-05-27 | End: 2017-05-31 | Stop reason: HOSPADM

## 2017-05-27 RX ORDER — OLANZAPINE 2.5 MG/1
5 TABLET, FILM COATED ORAL DAILY
Status: DISCONTINUED | OUTPATIENT
Start: 2017-05-28 | End: 2017-05-31 | Stop reason: HOSPADM

## 2017-05-27 RX ORDER — HYDROXYZINE HYDROCHLORIDE 25 MG/1
25-50 TABLET, FILM COATED ORAL 3 TIMES DAILY PRN
Status: DISCONTINUED | OUTPATIENT
Start: 2017-05-27 | End: 2017-05-31 | Stop reason: HOSPADM

## 2017-05-27 RX ADMIN — ONDANSETRON 4 MG: 2 INJECTION INTRAMUSCULAR; INTRAVENOUS at 22:25

## 2017-05-27 RX ADMIN — OLANZAPINE 10 MG: 10 INJECTION, POWDER, LYOPHILIZED, FOR SOLUTION INTRAMUSCULAR at 20:04

## 2017-05-27 RX ADMIN — HALOPERIDOL LACTATE 5 MG: 5 INJECTION, SOLUTION INTRAMUSCULAR at 15:10

## 2017-05-27 RX ADMIN — POTASSIUM CHLORIDE AND SODIUM CHLORIDE: 900; 150 INJECTION, SOLUTION INTRAVENOUS at 22:25

## 2017-05-27 RX ADMIN — LITHIUM CARBONATE 600 MG: 300 TABLET, EXTENDED RELEASE ORAL at 09:38

## 2017-05-27 RX ADMIN — ONDANSETRON 4 MG: 4 TABLET, ORALLY DISINTEGRATING ORAL at 13:22

## 2017-05-27 RX ADMIN — DIPHENHYDRAMINE HYDROCHLORIDE 50 MG: 50 INJECTION, SOLUTION INTRAMUSCULAR; INTRAVENOUS at 15:10

## 2017-05-27 RX ADMIN — NICOTINE POLACRILEX 4 MG: 2 GUM, CHEWING ORAL at 11:17

## 2017-05-27 RX ADMIN — PANTOPRAZOLE SODIUM 40 MG: 40 TABLET, DELAYED RELEASE ORAL at 09:39

## 2017-05-27 RX ADMIN — FOLIC ACID 1 MG: 1 TABLET ORAL at 09:39

## 2017-05-27 RX ADMIN — MULTIPLE VITAMINS W/ MINERALS TAB 1 TABLET: TAB at 09:39

## 2017-05-27 RX ADMIN — HYDROMORPHONE HYDROCHLORIDE 0.5 MG: 1 INJECTION, SOLUTION INTRAMUSCULAR; INTRAVENOUS; SUBCUTANEOUS at 22:25

## 2017-05-27 RX ADMIN — OLANZAPINE 5 MG: 5 TABLET, FILM COATED ORAL at 09:39

## 2017-05-27 RX ADMIN — OLANZAPINE 10 MG: 10 INJECTION, POWDER, FOR SOLUTION INTRAMUSCULAR at 20:04

## 2017-05-27 RX ADMIN — LORAZEPAM 2 MG: 2 INJECTION INTRAMUSCULAR; INTRAVENOUS at 15:09

## 2017-05-27 ASSESSMENT — ACTIVITIES OF DAILY LIVING (ADL)
ORAL_HYGIENE: INDEPENDENT
HYGIENE/GROOMING: SHOWER;INDEPENDENT
DRESS: INDEPENDENT

## 2017-05-27 NOTE — PLAN OF CARE
Problem: Psychotic Symptoms  Goal: Psychotic Symptoms  Signs and symptoms of listed problems will be absent or manageable.   Outcome: Improving  Patient has continued to have episodes of paranoia. Denies voices. Less agitated tonight. Concerned about discharge plans and is scared that she is going to [facility]. Medication compliant. Not attending to ADLs.

## 2017-05-27 NOTE — PROGRESS NOTES
05/27/17 1419   Behavioral Health   Hallucinations appears responding   Thinking poor concentration   Orientation person: oriented;place: oriented;date: oriented;time: oriented   Memory baseline memory   Insight poor   Judgement impaired   Eye Contact staring;rolling   Affect irritable;angry;blunted, flat   Mood irritable   Physical Appearance/Attire neat   Hygiene well groomed   Suicidality other (see comments)  (none stated)   Self Injury other (see comment)  (none stated)   Activity restless;isolative   Speech coherent;clear   Medication Sensitivity no stated side effects;no observed side effects   Psychomotor / Gait balanced;steady   Activities of Daily Living   Hygiene/Grooming shower;independent   Oral Hygiene independent   Dress independent   Room Organization independent     Pt was isolative in her room and came out during meal time. Pt also took shower. Staff did not observe any SI or SIB. Pt refused to check in with staff. Pt complained feeling sick in her stomach.

## 2017-05-27 NOTE — CONSULTS
"  Internal Medicine Initial Visit    Daisy Curtis MRN# [id number]   Age: 31 year old YOB: 1985   Date of Admission: 2017     Reason for consult: Ventral Hernia       Requesting physician Dr. Maurice Sandhu      SUBJECTIVE   HPI:   Daisy Curtis is a 31 year old female with history of asthma, substance abuse, schizoaffective disorder, anxiety, and known ventral hernia admitted to station 12N for acute psychiatric decompensation. Medicine was consulted to evaluate patient's ventral hernia in setting of increased pain.    Patient presented to the ED  voluntarily due to increasing paranoia. Had previously been residing at a [facility], although patient reported having \"ran away\" several days prior to presenting to the ED. History of recurrent psychiatric hospitalizations, most recently -5/15/17.    Currently patient complains of abdominal pain that started several hours ago. Provides minimal responses throughout interview. She has a history of a ventral hernia which has caused her significant discomfort in the past. Previous work-up has been unrevealing for her abdominal pain. She has had 7 abdominal/pelvic CT scans in the past 13 months. Has also had several episodes of emesis today. Notes \"rocking back and forth\" helps the pain. Requesting something to help manage the pain. Otherwise denies fevers, chills, HA, SOB, chest pain.     Past Medical History:     Past Medical History:   Diagnosis Date     Anxiety      Substance abuse      Uncomplicated asthma      Ventral hernia       Reviewed & updated in [facility].     Past Surgical History:      Past Surgical History:   Procedure Laterality Date     CHOLECYSTECTOMY       GYN SURGERY       x 2     ORTHOPEDIC SURGERY      scoliosis repair      Reviewed & updated in Epic.     Medications prior to admission:     Prior to Admission Medications   Prescriptions Last Dose Informant Patient Reported? Taking? "   OLANZapine (ZYPREXA) 10 MG tablet Unknown at Unknown time  No No   Sig: Take 1 tablet (10 mg) by mouth every 2 hours as needed (agitation associated with psychosis or julio.)   OLANZapine (ZYPREXA) 15 MG tablet   No No   Sig: Take 1 tablet (15 mg) by mouth At Bedtime   OLANZapine (ZYPREXA) 5 MG tablet   No No   Sig: Take 1 tablet (5 mg) by mouth daily   folic acid (FOLVITE) 1 MG tablet 5/24/2017 at Unknown time  No Yes   Sig: Take 1 tablet (1 mg) by mouth daily   hydrOXYzine (ATARAX) 25 MG tablet Unknown at Unknown time  No No   Sig: Take 1-2 tablets (25-50 mg) by mouth 3 times daily as needed for anxiety   lithium (ESKALITH/LITHOBID) 300 MG CR tablet 5/23/2017 at Unknown time  No Yes   Sig: Take 2 tablets (600 mg) by mouth 2 times daily   multivitamin, therapeutic with minerals (THERA-VIT-M) TABS tablet Unknown at Unknown time  No No   Sig: Take 1 tablet by mouth daily   pantoprazole (PROTONIX) 40 MG EC tablet 5/22/2017 at Unknown time  No Yes   Sig: Take 1 tablet (40 mg) by mouth every morning   senna-docusate (SENOKOT-S;PERICOLACE) 8.6-50 MG per tablet   No No   Sig: Take 1 tablet by mouth 2 times daily as needed for constipation      Facility-Administered Medications: None         Allergies:     Allergies   Allergen Reactions     Codeine Sulfate      Compazine Rash     Morphine Nausea and Vomiting     Prochlorperazine Rash         Social History:   Tobacco Use: Former smoker  Alcohol Use: Denies  Illicit Drug Use: Denies  STI Testing: Declines at this time.     Family History:   No family history on file.     Reviewed & updated in Epic.     Review of Systems:   Ten point review of systems negative except as stated above in History of Present Illness.    OBJECTIVE   Physical Exam:   Blood pressure (!) 177/91, pulse 103, temperature 98.5  F (36.9  C), temperature source Oral, resp. rate 16, weight 78.9 kg (174 lb), SpO2 98 %, not currently breastfeeding.  General: Well-appearing female laying comfortably in bed,  NAD.  HEENT: NC/AT. Anicteric sclera. Eyes symmetric and free of discharge bilaterally. Mucous membranes moist.  Neck: Supple  Cardiovascular: RRR. S1/S2. No murmurs appreciated.  Lungs: Normal respiratory effort on RA. Lungs CTAB without wheezes, rales, or rhonchi.  Abdomen: Soft, non-distended. Diffuse generalized tenderness. Large reducible ventral hernia. Bowel sounds normoactive.  Extremities: Warm & well perfused. No peripheral edema.  Skin: No rashes, jaundice, or lesions on exposed areas of skin.  Neurologic: A&O X 3. Answers questions appropriately. Moves all extremities symmetrically.     Laboratory Data:   CMP    Recent Labs  Lab 05/27/17  1348 05/24/17  0930    140   POTASSIUM 3.9 3.6   CHLORIDE 110* 107   CO2 25 26   ANIONGAP 7 7   * 119*   BUN 8 6*   CR 0.68 0.58   GFRESTIMATED >90Non  GFR Calc >90Non  GFR Calc   GFRESTBLACK >90African American GFR Calc >90African American GFR Calc   GIBRAN 9.4 9.8   PROTTOTAL  --  8.2   ALBUMIN  --  4.0   BILITOTAL  --  0.4   ALKPHOS  --  81   AST  --  24   ALT  --  45       CBC    Recent Labs  Lab 05/24/17  0930   WBC 9.3   RBC 4.08   HGB 12.6   HCT 38.2   MCV 94   MCH 30.9   MCHC 33.0   RDW 12.4          TSH    Recent Labs  Lab 05/24/17  0930   TSH 2.52      Assessment and Plan/Recommendations:   Daisy Curtis is a 31 year old female with history of asthma, substance abuse, schizoaffective disorder, anxiety, and known ventral hernia admitted to station 12 for acute psychiatric decompensation. Medicine was consulted to evaluate patient's ventral hernia in setting of increased pain.    Schizoaffective Disorder, Anxiety, Substance Abuse. Presented to the ED 5/23 with increased paranoia. Has had multiple psychiatric hospitalizations, most recently 4/12-5/15/17.  - Management per Psych    Ventral Hernia. Has been recurrent issue throughout previous hospitalizations. Currently complaining of increased nausea,  pain. CT scan 3/2017 with no acute abnormality in abdomen or pelvis; small fat-containing ventral hernia evident in supraumbilical region. Has previously had surgery consult placed to pursue repair as an outpatient, although this has been difficult to follow up on as patient has spent the majority of the past 6 months in the hospital. BMP today with normal electrolytes, hernia reducible on examination. No apparent evidence for acute pathology.  - Tylenol PRN for pain - patient on lithium and NSAID's relative contraindication  - Zofran, Maalox, Tums PRN  - Hot pack ordered  - Follow up with surgery as an OP for elective repair    HTN. BP transiently elevated at 177/91 today, previously has been normotensive. Likely in setting of acute pain/anxiety, no indication for initiating scheduled antihypertensive medications at this time. BP down to 130's/70's on recheck.    Medicine will sign off at this time. Please page the Internal Medicine job code pager for any intercurrent medical issues which arise. Thank you for the opportunity to be a part of this patient's care.    Venus Chaudhari PA-C  Hospitalist Service  [phone]

## 2017-05-27 NOTE — PROGRESS NOTES
Pt vomiting apox 700 ml bile. Pain 10/10, an able to hold any fluid or food, not able to sit or stand steal, site looks bigger and tender. Pt diagnosed chronic Ventra hernia. House officer was paged 3rd attempt. Psych medical flyer paged and came to assess and we both agree patient needs to be seen tonight. Dr Ohara was informed and suggested pt to be admitted in medical unit. Waiting replay.

## 2017-05-27 NOTE — PROGRESS NOTES
Patient began complaining of significant abdominal pain late morning.  She is wailing and rocking back and forth. She was also throwing up what appeared to be bile.  She requested a hot pack to help ease the pain.  She was given one of the makeshift hot packs made from a washcloth soaked in warm water.  This was not helpful as the washcloth did not stay warm for more than a couple minutes. This did not relieve the pain. Patient continued to have emesis of bile.  Call was placed to Internal Medicine.  Vitals were taken. /90, pulse 74, temp 97.9.

## 2017-05-28 ENCOUNTER — APPOINTMENT (OUTPATIENT)
Dept: GENERAL RADIOLOGY | Facility: CLINIC | Age: 32
DRG: 394 | End: 2017-05-28
Attending: PHYSICIAN ASSISTANT
Payer: COMMERCIAL

## 2017-05-28 LAB
ALBUMIN SERPL-MCNC: 3.6 G/DL (ref 3.4–5)
ALP SERPL-CCNC: 70 U/L (ref 40–150)
ALT SERPL W P-5'-P-CCNC: 48 U/L (ref 0–50)
ANION GAP SERPL CALCULATED.3IONS-SCNC: 7 MMOL/L (ref 3–14)
AST SERPL W P-5'-P-CCNC: 15 U/L (ref 0–45)
BASOPHILS # BLD AUTO: 0 10E9/L (ref 0–0.2)
BASOPHILS NFR BLD AUTO: 0.1 %
BILIRUB SERPL-MCNC: 0.6 MG/DL (ref 0.2–1.3)
BUN SERPL-MCNC: 10 MG/DL (ref 7–30)
CALCIUM SERPL-MCNC: 9.2 MG/DL (ref 8.5–10.1)
CHLORIDE SERPL-SCNC: 114 MMOL/L (ref 94–109)
CO2 SERPL-SCNC: 25 MMOL/L (ref 20–32)
CREAT SERPL-MCNC: 0.6 MG/DL (ref 0.52–1.04)
CRP SERPL-MCNC: 19.1 MG/L (ref 0–8)
DIFFERENTIAL METHOD BLD: NORMAL
EOSINOPHIL # BLD AUTO: 0 10E9/L (ref 0–0.7)
EOSINOPHIL NFR BLD AUTO: 0.1 %
ERYTHROCYTE [DISTWIDTH] IN BLOOD BY AUTOMATED COUNT: 12.3 % (ref 10–15)
GFR SERPL CREATININE-BSD FRML MDRD: ABNORMAL ML/MIN/1.7M2
GLUCOSE SERPL-MCNC: 104 MG/DL (ref 70–99)
HCT VFR BLD AUTO: 37 % (ref 35–47)
HGB BLD-MCNC: 12.1 G/DL (ref 11.7–15.7)
IMM GRANULOCYTES # BLD: 0 10E9/L (ref 0–0.4)
IMM GRANULOCYTES NFR BLD: 0.4 %
LACTATE BLD-SCNC: 2 MMOL/L (ref 0.7–2.1)
LACTATE BLD-SCNC: 2.8 MMOL/L (ref 0.7–2.1)
LYMPHOCYTES # BLD AUTO: 2.5 10E9/L (ref 0.8–5.3)
LYMPHOCYTES NFR BLD AUTO: 25.3 %
MCH RBC QN AUTO: 31.1 PG (ref 26.5–33)
MCHC RBC AUTO-ENTMCNC: 32.7 G/DL (ref 31.5–36.5)
MCV RBC AUTO: 95 FL (ref 78–100)
MONOCYTES # BLD AUTO: 0.6 10E9/L (ref 0–1.3)
MONOCYTES NFR BLD AUTO: 5.6 %
NEUTROPHILS # BLD AUTO: 6.8 10E9/L (ref 1.6–8.3)
NEUTROPHILS NFR BLD AUTO: 68.5 %
NRBC # BLD AUTO: 0 10*3/UL
NRBC BLD AUTO-RTO: 0 /100
PLATELET # BLD AUTO: 315 10E9/L (ref 150–450)
POTASSIUM SERPL-SCNC: 3.8 MMOL/L (ref 3.4–5.3)
PROT SERPL-MCNC: 7.5 G/DL (ref 6.8–8.8)
RBC # BLD AUTO: 3.89 10E12/L (ref 3.8–5.2)
SODIUM SERPL-SCNC: 146 MMOL/L (ref 133–144)
WBC # BLD AUTO: 9.9 10E9/L (ref 4–11)

## 2017-05-28 PROCEDURE — 99232 SBSQ HOSP IP/OBS MODERATE 35: CPT | Performed by: INTERNAL MEDICINE

## 2017-05-28 PROCEDURE — 74020 XR ABDOMEN 2 VW: CPT

## 2017-05-28 PROCEDURE — 83605 ASSAY OF LACTIC ACID: CPT | Performed by: PHYSICIAN ASSISTANT

## 2017-05-28 PROCEDURE — 36415 COLL VENOUS BLD VENIPUNCTURE: CPT | Performed by: INTERNAL MEDICINE

## 2017-05-28 PROCEDURE — 80053 COMPREHEN METABOLIC PANEL: CPT | Performed by: INTERNAL MEDICINE

## 2017-05-28 PROCEDURE — 85025 COMPLETE CBC W/AUTO DIFF WBC: CPT | Performed by: INTERNAL MEDICINE

## 2017-05-28 PROCEDURE — 25000132 ZZH RX MED GY IP 250 OP 250 PS 637: Performed by: INTERNAL MEDICINE

## 2017-05-28 PROCEDURE — 25000128 H RX IP 250 OP 636: Performed by: PHYSICIAN ASSISTANT

## 2017-05-28 PROCEDURE — S5010 5% DEXTROSE AND 0.45% SALINE: HCPCS

## 2017-05-28 PROCEDURE — 36415 COLL VENOUS BLD VENIPUNCTURE: CPT | Performed by: PHYSICIAN ASSISTANT

## 2017-05-28 PROCEDURE — 25000128 H RX IP 250 OP 636: Performed by: INTERNAL MEDICINE

## 2017-05-28 PROCEDURE — 83605 ASSAY OF LACTIC ACID: CPT | Performed by: INTERNAL MEDICINE

## 2017-05-28 PROCEDURE — 25800025 ZZH RX 258: Performed by: PHYSICIAN ASSISTANT

## 2017-05-28 PROCEDURE — 25000125 ZZHC RX 250: Performed by: INTERNAL MEDICINE

## 2017-05-28 PROCEDURE — 25800025 ZZH RX 258

## 2017-05-28 PROCEDURE — 12000001 ZZH R&B MED SURG/OB UMMC

## 2017-05-28 PROCEDURE — 86140 C-REACTIVE PROTEIN: CPT | Performed by: PHYSICIAN ASSISTANT

## 2017-05-28 PROCEDURE — S5010 5% DEXTROSE AND 0.45% SALINE: HCPCS | Performed by: PHYSICIAN ASSISTANT

## 2017-05-28 PROCEDURE — 99207 ZZC APP CREDIT; MD BILLING SHARED VISIT: CPT | Performed by: PHYSICIAN ASSISTANT

## 2017-05-28 PROCEDURE — 25000132 ZZH RX MED GY IP 250 OP 250 PS 637: Performed by: PHYSICIAN ASSISTANT

## 2017-05-28 RX ORDER — KETOROLAC TROMETHAMINE 30 MG/ML
15-30 INJECTION, SOLUTION INTRAMUSCULAR; INTRAVENOUS EVERY 6 HOURS PRN
Status: DISCONTINUED | OUTPATIENT
Start: 2017-05-28 | End: 2017-05-31 | Stop reason: HOSPADM

## 2017-05-28 RX ORDER — ACETAMINOPHEN 325 MG/1
650 TABLET ORAL EVERY 4 HOURS PRN
Status: DISCONTINUED | OUTPATIENT
Start: 2017-05-28 | End: 2017-05-31 | Stop reason: HOSPADM

## 2017-05-28 RX ORDER — LORAZEPAM 2 MG/ML
1 INJECTION INTRAMUSCULAR EVERY 30 MIN PRN
Status: COMPLETED | OUTPATIENT
Start: 2017-05-28 | End: 2017-05-29

## 2017-05-28 RX ORDER — IBUPROFEN 200 MG
400 TABLET ORAL EVERY 6 HOURS PRN
Status: DISCONTINUED | OUTPATIENT
Start: 2017-05-28 | End: 2017-05-28

## 2017-05-28 RX ORDER — KETOROLAC TROMETHAMINE 30 MG/ML
15 INJECTION, SOLUTION INTRAMUSCULAR; INTRAVENOUS ONCE
Status: COMPLETED | OUTPATIENT
Start: 2017-05-28 | End: 2017-05-28

## 2017-05-28 RX ADMIN — MULTIPLE VITAMINS W/ MINERALS TAB 1 TABLET: TAB at 07:58

## 2017-05-28 RX ADMIN — ACETAMINOPHEN 650 MG: 325 TABLET, FILM COATED ORAL at 11:32

## 2017-05-28 RX ADMIN — LITHIUM CARBONATE 600 MG: 300 TABLET, EXTENDED RELEASE ORAL at 20:05

## 2017-05-28 RX ADMIN — FOLIC ACID 1 MG: 1 TABLET ORAL at 07:58

## 2017-05-28 RX ADMIN — DEXTROSE AND SODIUM CHLORIDE: 5; 450 INJECTION, SOLUTION INTRAVENOUS at 17:21

## 2017-05-28 RX ADMIN — IBUPROFEN 400 MG: 200 TABLET, FILM COATED ORAL at 15:47

## 2017-05-28 RX ADMIN — DEXTROSE AND SODIUM CHLORIDE: 5; 450 INJECTION, SOLUTION INTRAVENOUS at 09:15

## 2017-05-28 RX ADMIN — KETOROLAC TROMETHAMINE 15 MG: 30 INJECTION, SOLUTION INTRAMUSCULAR at 12:28

## 2017-05-28 RX ADMIN — HYDROMORPHONE HYDROCHLORIDE 0.5 MG: 1 INJECTION, SOLUTION INTRAMUSCULAR; INTRAVENOUS; SUBCUTANEOUS at 00:59

## 2017-05-28 RX ADMIN — POTASSIUM CHLORIDE AND SODIUM CHLORIDE: 900; 150 INJECTION, SOLUTION INTRAVENOUS at 05:50

## 2017-05-28 RX ADMIN — KETOROLAC TROMETHAMINE 30 MG: 30 INJECTION, SOLUTION INTRAMUSCULAR at 18:38

## 2017-05-28 RX ADMIN — LITHIUM CARBONATE 600 MG: 300 TABLET, EXTENDED RELEASE ORAL at 00:53

## 2017-05-28 RX ADMIN — HYDROMORPHONE HYDROCHLORIDE 0.5 MG: 1 INJECTION, SOLUTION INTRAMUSCULAR; INTRAVENOUS; SUBCUTANEOUS at 03:21

## 2017-05-28 RX ADMIN — LITHIUM CARBONATE 600 MG: 300 TABLET, EXTENDED RELEASE ORAL at 07:58

## 2017-05-28 RX ADMIN — PANTOPRAZOLE SODIUM 40 MG: 40 TABLET, DELAYED RELEASE ORAL at 07:58

## 2017-05-28 RX ADMIN — OLANZAPINE 15 MG: 15 TABLET, FILM COATED ORAL at 21:55

## 2017-05-28 RX ADMIN — OLANZAPINE 5 MG: 2.5 TABLET, FILM COATED ORAL at 07:58

## 2017-05-28 NOTE — H&P
"History and Physical  Daisy Curtis  ([id number])  2017      CC: Recurrent episode of chronic abdominal pain and vomiting    History of Present Illness:  Daisy Curtis is a 31 year old woman with schizoaffective disorder admitted for paranoia. She was medically well earlier in the hospitalization but then this morning began having severe abdominal pain and then vomited multiple times.     She has a long-standing history of a similar presentation going back years. She has had multiple CT scans done during these episodes. She has also reportedly had EGDs although I can not find those reports directly.     The most recent CT in March showed a \"small fat-containing ventral hernia in the supraumbilical region\". They have recommended in the past that this is taken care of as an outpatient.     There have been speculations about other causes of her abdominal pain in the past but no clear etiology found.     Currently the patient just tells me that her abdomen hurts. She does not provide much more history. The nurse tells that she vomited up a significant amount recently.    Review of Systems:  A complete and comprehensive ROS was otherwise unremarkable other than what is noted in the HPI.     Past Medical History:  Past Medical History:   Diagnosis Date     Anxiety      Substance abuse      Uncomplicated asthma      Ventral hernia        Past Surgical History:  Past Surgical History:   Procedure Laterality Date     CHOLECYSTECTOMY       GYN SURGERY       x 2     ORTHOPEDIC SURGERY      scoliosis repair       Medications:  Current Facility-Administered Medications   Medication     ondansetron (ZOFRAN-ODT) ODT tab 4 mg     acetaminophen (TYLENOL) tablet 650 mg     calcium carbonate (TUMS) chewable tablet 500 mg     nicotine polacrilex (NICORETTE) gum 4 mg     senna-docusate (SENOKOT-S;PERICOLACE) 8.6-50 MG per tablet 1-2 tablet     folic acid (FOLVITE) tablet 1 mg     hydrOXYzine (ATARAX) tablet 25-50 " mg     lithium (ESKALITH/LITHOBID) CR tablet 600 mg     multivitamin, therapeutic with minerals (THERA-VIT-M) tablet 1 tablet     OLANZapine (zyPREXA) tablet 10 mg     OLANZapine (zyPREXA) tablet 15 mg     OLANZapine (zyPREXA) tablet 5 mg     pantoprazole (PROTONIX) EC tablet 40 mg     senna-docusate (SENOKOT-S;PERICOLACE) 8.6-50 MG per tablet 1 tablet     haloperidol (HALDOL) tablet 5 mg    Or     haloperidol lactate (HALDOL) injection 5 mg     LORazepam (ATIVAN) tablet 1-2 mg    Or     LORazepam (ATIVAN) injection 1-2 mg     diphenhydrAMINE (BENADRYL) capsule 50 mg    Or     diphenhydrAMINE (BENADRYL) injection 50 mg     alum & mag hydroxide-simethicone (MYLANTA ES/MAALOX  ES) suspension 30 mL       Allergies:     Allergies   Allergen Reactions     Codeine Sulfate      Compazine Rash     Morphine Nausea and Vomiting     Prochlorperazine Rash       Family History:  No family history on file.    Social History:  Social History     Social History     Marital status: Single     Spouse name: N/A     Number of children: N/A     Years of education: N/A     Occupational History     Not on file.     Social History Main Topics     Smoking status: Former Smoker     Packs/day: 1.00     Smokeless tobacco: Not on file     Alcohol use No     Drug use: No     Sexual activity: Not Currently     Partners: Male     Other Topics Concern     Not on file     Social History Narrative       Physical Exam:  BP (!) 177/91  Pulse 103  Temp 97.9  F (36.6  C) (Oral)  Resp 16  Wt 78.9 kg (174 lb)  LMP  (LMP Unknown)  SpO2 98%  Breastfeeding? No  BMI 31.83 kg/m2  Constitutional: Sleeping when I went in, when awoken is wailing in pain  Eyes: No xanthelasma or conjunctivitis  HEENT: Moist oral mucosa  Respiratory: Clear to auscultation bilaterally.    Cardiovascular: Regular rate and rhythm. Normal S1 and S2. No murmurs.    Abdomen: Soft, mildly distended. Does not appear to be particularly tender, although she is yelling in pain prior to  palpation. She does not whince with palpation. No peritoneal signs. Hernia is palpable and reducible.    Skin: No major rashes.  Extremities: No peripheral edema.  Neurologic: Moving all extremities. No facial assymmetry.  Psychiatric: Alert. Does not answer questions well.      Laboratory Data:  CMP  Recent Labs  Lab 05/27/17  1348 05/24/17  0930    140   POTASSIUM 3.9 3.6   CHLORIDE 110* 107   CO2 25 26   ANIONGAP 7 7   * 119*   BUN 8 6*   CR 0.68 0.58   GFRESTIMATED >90Non  GFR Calc >90Non  GFR Calc   GFRESTBLACK >90African American GFR Calc >90African American GFR Calc   GIBRAN 9.4 9.8   PROTTOTAL  --  8.2   ALBUMIN  --  4.0   BILITOTAL  --  0.4   ALKPHOS  --  81   AST  --  24   ALT  --  45     CBC  Recent Labs  Lab 05/24/17  0930   WBC 9.3   RBC 4.08   HGB 12.6   HCT 38.2   MCV 94   MCH 30.9   MCHC 33.0   RDW 12.4          Imaging:  Ultrasound pending    Assessment/Plan:    #1 Chronic recurrent abdominal pain, possibly related to ventral hernia although unclear  #2 Shizoaffective disorder  #3 Anxiety  #4 Substance abuse history  #5 Asthma    - The cause of the patient's pain is unclear; there certainly seems to be a psychiatric overlay with the excessive wailing, however it is unclear what the underlying etiology is  -She does have a ventral hernia but it seems surprising that it would be causing this much pain; nonetheless I think we should get a surgery eval in the hospital; if they think repair would be beneficial then perhaps it should be pursued now to prevent further continuing episodes in the future  -The physical exam appears benign and the hernia seems reducible; I will get an ultrasound to ensure there are not other abnormalities in the area; CT may be better but I am trying to limit radiation exposure with all of her past CTs  -We will give IV fluids; if she keeps vomiting will place NG tube, Zofran; IV opiates for pain as needed  -I will continue her  Psych meds and we will ask Psych to continue to follow while this is clarified and then she can transfer back    Chance MD Alvin

## 2017-05-28 NOTE — CONSULTS
Patient seen and chart reviewed. Note dictated (initial)   Would continue current psychiatric medications. Will need to clarify disposition when d/cd from the hospital.

## 2017-05-28 NOTE — PLAN OF CARE
Problem: Goal Outcome Summary  Goal: Goal Outcome Summary  Outcome: No Change  Pt arrived from [facility] @2000 via cart. Pt was accompanied by psych tech. Is on 1:1 due to civil commitment. Pt vomited several time small amounts of bilious fluid. Intermittent moaning and sleeping. Now settled down and sleeping after IV dilaudid and zofran given. NPO/sips with meds. Abd ultrasound unremarkable. LS coarse posterior bilaterally. Continue POC.

## 2017-05-28 NOTE — PLAN OF CARE
Problem: Goal Outcome Summary  Goal: Goal Outcome Summary  Outcome: No Change  Pt sleeping most of shift.  Temp elevated but other VSS.  Lung sounds diminished.  Bowel sounds active.  Pt has complaints of pain and very agitated when in pain.  Rolling around in bed and moaning.  Given PRN dilaudid.  Pt able to rest after this.  PIV infusing.  NPO but had some sips of water with meds.  Attendant at bedside.  Pt is on court ordered commitment.  Pt is able to make needs known.  Will continue plan of care.

## 2017-05-28 NOTE — PLAN OF CARE
Problem: Goal Outcome Summary  Goal: Goal Outcome Summary  Outcome: No Change  Pt was c/o abdominal pain today. Tylenol given and then IV toradol. Pt continued c/o of abdominal pain, anxious, restless and Luke Lauryn notified, and pt went down for abdominal x ray.   Lung sounds diminished.  Bowel sounds active. Pt is on clear liquid diet, but has not had anything other htan apple juice. pt is on 1:1, as she is on court ordered commitment.  Pt is able to make needs known.  Will continue plan of care.

## 2017-05-28 NOTE — PROGRESS NOTES
Patient will be transferred to [facility] for IV treatment. They are aware that she will need to return when she is felling better as she is committed. Patient was given 10 mg Zyprexa IM in place of the 15 mg HS dose. [facility] will administer the lithium later this evening. There is nothing in security for her belongings. All other belongings were sent with.

## 2017-05-28 NOTE — PROGRESS NOTES
[facility]  Internal Medicine Progress Note    Daisy Curtis   MRN [id number]       Assessment & Plan Daisy Curtis is a 31 year old female with history of asthma, polysubstance abuse, schizoaffective disorder, anxiety, prolactinemia, and recurrent bouts of abdominal pain felt d/t ventral hernia who was admitted 5/27/2017 from inpatient psychiatry with abdominal pain, nausea and vomiting.    *Abdominal pain with intractable nausea/vomting:  Hx recurrent bouts of pain felt to be d/t ventral hernia (w/o incarceration). Extensive work up in the past, including numerus CT and EGD negative. Cannabis hyperemesis syndrome and gastritis also considered historically. Similar presentation to [facility] February 2017 with negative CT and labs. Follows with general surgery at [facility] w plans for elective outpatient hernia repair. Recurrence of pain 5/27 with intractable N/V. Afebrile. Vitals stable. LFT's normal. Abd US shows periumbilical hernia containing mesenteric fat w/o incarceration.  No other acute etiology apparent. Treated w bowel rest and supportive cares overnight with subsequent improvement. No acute abdomen. General surgery consulted but not available on this campus.  - Start clears and ADAT  - Cont PO/IV zofran PRN  - Discontinue opiates  - Pain control w ibuprofen and tylenol  - Follow up with established surgeon for elective hernia repair    *Schizoaffective disorder, Anxiety:  Patient admitted to inpatient psych 3/20-4/4/17, 4/12-5/15/17, and 5/25/17-present due to decompensation. No acute concerns at this time.  - Psychiatry consulted  - Cont zyprexa and lithium as previously prescribed    *Hypernatremia:  Suspect volume depletion and isotonic fluids contributing.  - encourage PO hydration  - switch to D5 1/2 NS at 125 ml/h  - repeat BMP in AM       Diet: NPO for Medical/Clinical Reasons Except for: Meds  Fluids: D5 1/2 NS at 125 ml/h  DVT Prophylaxis: Pneumatic Compression  Devices  Code Status: Full Code    Disposition Plan   Expected discharge: 2 - 3 days; recommended to psychiatry once clinically improved and able to tolerate PO med/hydration/nutrition.     Entered: Jake Combs 05/28/2017, 8:53 AM   Information in the above section will display in the discharge planner report.      The patient's care was discussed with the Attending Physician, Dr. Beltre.    Jake Combs  Internal Medicine Staff Hospitalist Service  [facility]  Pager: [phone]  _____________________________________________________________    Interval History Feeling better today. Mild nausea but no vomiting. Pain at ventral hernia site rated 4/10. No other complaints. Denies fevers, chills, diarrhea, hematemesis, melena or hematochezia. Patient anxious to start eating.      Data reviewed today: I reviewed all medications, new labs and imaging results over the last 24 hours.     Physical Exam   Vital Signs: Temp: 98.5  F (36.9  C) Temp src: Axillary BP: 92/52 Pulse: 78   Resp: 15 SpO2: 98 % O2 Device: None (Room air)    Weight: 0 lbs 0 oz    GENERAL:  Awake. Alert. Oriented x 3. NAD. On room air.  HEENT: No scleral icterus. Mucous membranes moist.   CV: RRR. S1, S2. No murmurs appreciated.   RESPIRATORY: Lungs CTAB with no wheezing, rales, rhonchi.   GI: Abdomen soft and non distended. Active bowel sounds. No tenderness, rebound, or guarding. No mass or HSM.    NEUROLOGICAL: No focal deficits. CN II-XII intact grossly. Moves all extremities.    EXTREMITIES: No peripheral edema. Intact bilateral pedal pulses.   SKIN: No jaundice. No rashes.      Data     CMP   Recent Labs  Lab 05/28/17  0558 05/27/17  1348 05/24/17  0930   * 142 140   POTASSIUM 3.8 3.9 3.6   CHLORIDE 114* 110* 107   CO2 25 25 26   ANIONGAP 7 7 7   * 133* 119*   BUN 10 8 6*   CR 0.60 0.68 0.58   GIBRAN 9.2 9.4 9.8   PROTTOTAL 7.5  --  8.2   ALBUMIN 3.6  --  4.0   BILITOTAL 0.6  --  0.4   ALKPHOS 70  --  81   AST 15  --   24   ALT 48  --  45     CBC   Recent Labs  Lab 05/28/17  0558 05/24/17  0930   WBC 9.9 9.3   RBC 3.89 4.08   HGB 12.1 12.6   HCT 37.0 38.2   MCV 95 94   MCH 31.1 30.9   MCHC 32.7 33.0   RDW 12.3 12.4    332     INR No lab results found in last 7 days.    OTHER:    US ABDOMEN LIMITED  5/27/2017 8:57 PM       FINDINGS: Grayscale and color Doppler ultrasound evaluation of the  periumbilical region was performed. There is a complex slightly  echogenic structure, herniating through the periumbilical region,  measuring 5.9 x 3.6 x 7.3 cm and demonstrates no peristalsis during  the exam to suggest bowel herniation. There is no bowel or blood flow.   Herniated contents are reduced with compression. No free fluid in the  hernia sac.       IMPRESSION: Periumbilical hernia without incarceration, containing  mesenteric fat. This appears similar to prior CT.

## 2017-05-28 NOTE — CONSULTS
DATE OF SERVICE:  05/28/2017       REQUESTING SOURCE:  Internal Medicine team.      IDENTIFYING DATA:  Daisy Curtis is a 31-year-old woman seen today for psychiatric consultation in regard to her history of schizoaffective disorder.  She has recently been admitted to inpatient psychiatry but was transferred to the Medicine Service due to development of nausea and vomiting.  She is currently under court hold so has a sitter present in the room.      HISTORY OF PRESENT ILLNESS:  Ms. Curtis has a long history of significant psychiatric illness with many previous hospitalizations, most recently here from 04/12 to 05/15.  She had been discharged to an IR facility, but left there and went to her mother's house for about a week.  Mother states that her returning home is not an option.  Apparently she missed several doses of his medications, became paranoid, so she called an ambulance for transfer to the Emergency Department where she was admitted to inpatient psychiatry.  On talking with her today, she says she does not want to go back to psychiatry as it is much more pleasant over here on the medicine waite.  She says that people were being mean to her on inpatient psychiatry and she has a lot of problems with other individuals with mental illness.  She denies being paranoid at this time, but she does have the paranoid stare when talking to her.  She denies voices.  She says her mood is fairly good and sleeping okay, not hopeless or suicidal.  She does admit to becoming irritable easily.  She has a long history of significant mood swings and says that lithium and Zyprexa worked well to control her symptoms.      PAST PSYCHIATRIC HISTORY:  As indicated above, she has had multiple hospitalizations, including 3 this year at our facility.  She is on court commitment.  She has tried a wide variety of psychiatric medications and says that lithium, Zyprexa which she was taking now work quite well for her.      SUBSTANCE USE  HISTORY:  Has history of abuse of benzodiazepines, cocaine and alcohol.  Recent urine toxic screen was negative, and she says she is not using drugs anymore but is still smoking cigarettes.      PAST MEDICAL HISTORY:  She says she has had about a year history of intermittent problems with her stomach.  Apparently she has a hernia she says that was going to be at [facility] but they did not follow up with the surgery.      REVIEW OF SYSTEMS:  Ten-point review of systems today is performed and is negative except for abdominal discomfort and recent vomiting.      CURRENT PSYCHIATRIC MEDICATIONS:   1.  Lithobid 600 mg twice a day.   2.  Zyprexa 15 mg at bedtime, 5 mg in the morning and 10 mg up to b.i.d. p.r.n. agitation.      FAMILY HISTORY:  Has an aunt with anxiety and depression.  Not aware of any family history of chemical dependence.  However, she is not aware father's side of the family since he was in group home for shooting a  while her mother was pregnant with her.      SOCIAL HISTORY:  She is originally from [place], moved up here with her mother when she was age 9.  She received her GED.  For some time, was working at [employer], but has not returned to work since she had her daughter 6 years ago.  Her daughter lives with her father and she has a belief that she could stay there, which apparently is not the case.      MENTAL STATUS EXAMINATION:  She is lying in bed and is for the most part pleasant and cooperative during my visit.  She is well groomed.  Speech is fluent.  There is no muscular rigidity present.  Associations tight.  Thought process is somewhat circumstantial.  She denies delusions, hallucinations, paranoia, but I am not sure this is true.  Described her mood as okay.  Affect is somewhat restricted.  She is oriented to place, but not day or date.  Recent and remote memory, attention span and concentration are somewhat impaired.  Use of language, fund of knowledge within normal limits.   Insight and judgment poor.      DIAGNOSES:  Schizoaffective disorder, bipolar type, posttraumatic stress disorder by history.  Cocaine and benzodiazepine use disorders in apparent early remission.      ASSESSMENT:  She does quite well on the lithium and Zyprexa and appears to be compliant with this.  However, there may be some problems with absorption due to the recurrent emesis.  However, at this point she seems to be keeping p.o. medicines down, so I do not think we need to change to parenteral formulations now.      RECOMMENDATIONS:  Continue current psychiatric medications and call mental health intake for transfer back to psychiatry when she is medically stable.  Disposition will need to be clarified by .  Please reconsult Psychiatry as needed.         ALEJANDRA SKINNER MD             D: 2017 15:48   T: 2017 17:06   MT: PINKY      Name:     JULY UNDERWOOD   MRN:      [id number]        Account:       [id number]   :      1985           Consult Date:  2017      Document: [id number]

## 2017-05-29 ENCOUNTER — ANESTHESIA (OUTPATIENT)
Dept: MEDSURG UNIT | Facility: CLINIC | Age: 32
DRG: 394 | End: 2017-05-29
Payer: COMMERCIAL

## 2017-05-29 ENCOUNTER — ANESTHESIA EVENT (OUTPATIENT)
Dept: MEDSURG UNIT | Facility: CLINIC | Age: 32
DRG: 394 | End: 2017-05-29
Payer: COMMERCIAL

## 2017-05-29 LAB
ALBUMIN SERPL-MCNC: 4.1 G/DL (ref 3.4–5)
ALP SERPL-CCNC: 67 U/L (ref 40–150)
ALT SERPL W P-5'-P-CCNC: 51 U/L (ref 0–50)
ANION GAP SERPL CALCULATED.3IONS-SCNC: 9 MMOL/L (ref 3–14)
AST SERPL W P-5'-P-CCNC: 21 U/L (ref 0–45)
BILIRUB SERPL-MCNC: 0.4 MG/DL (ref 0.2–1.3)
BUN SERPL-MCNC: 3 MG/DL (ref 7–30)
CALCIUM SERPL-MCNC: 9.9 MG/DL (ref 8.5–10.1)
CHLORIDE SERPL-SCNC: 112 MMOL/L (ref 94–109)
CO2 SERPL-SCNC: 24 MMOL/L (ref 20–32)
CREAT SERPL-MCNC: 0.68 MG/DL (ref 0.52–1.04)
CRP SERPL-MCNC: 8.5 MG/L (ref 0–8)
ERYTHROCYTE [DISTWIDTH] IN BLOOD BY AUTOMATED COUNT: 12 % (ref 10–15)
GFR SERPL CREATININE-BSD FRML MDRD: ABNORMAL ML/MIN/1.7M2
GLUCOSE SERPL-MCNC: 106 MG/DL (ref 70–99)
HCT VFR BLD AUTO: 36 % (ref 35–47)
HGB BLD-MCNC: 11.7 G/DL (ref 11.7–15.7)
MCH RBC QN AUTO: 30.5 PG (ref 26.5–33)
MCHC RBC AUTO-ENTMCNC: 32.5 G/DL (ref 31.5–36.5)
MCV RBC AUTO: 94 FL (ref 78–100)
PLATELET # BLD AUTO: 302 10E9/L (ref 150–450)
POTASSIUM SERPL-SCNC: 3.8 MMOL/L (ref 3.4–5.3)
PROT SERPL-MCNC: 7.8 G/DL (ref 6.8–8.8)
RBC # BLD AUTO: 3.84 10E12/L (ref 3.8–5.2)
SODIUM SERPL-SCNC: 145 MMOL/L (ref 133–144)
WBC # BLD AUTO: 8.5 10E9/L (ref 4–11)

## 2017-05-29 PROCEDURE — 25000128 H RX IP 250 OP 636: Performed by: INTERNAL MEDICINE

## 2017-05-29 PROCEDURE — S5010 5% DEXTROSE AND 0.45% SALINE: HCPCS | Performed by: PHYSICIAN ASSISTANT

## 2017-05-29 PROCEDURE — 12000001 ZZH R&B MED SURG/OB UMMC

## 2017-05-29 PROCEDURE — 25000132 ZZH RX MED GY IP 250 OP 250 PS 637: Performed by: INTERNAL MEDICINE

## 2017-05-29 PROCEDURE — 40000671 ZZH STATISTIC ANESTHESIA CASE

## 2017-05-29 PROCEDURE — 25000128 H RX IP 250 OP 636: Performed by: PHYSICIAN ASSISTANT

## 2017-05-29 PROCEDURE — 80053 COMPREHEN METABOLIC PANEL: CPT | Performed by: INTERNAL MEDICINE

## 2017-05-29 PROCEDURE — 85027 COMPLETE CBC AUTOMATED: CPT | Performed by: INTERNAL MEDICINE

## 2017-05-29 PROCEDURE — 36416 COLLJ CAPILLARY BLOOD SPEC: CPT | Performed by: INTERNAL MEDICINE

## 2017-05-29 PROCEDURE — 25000132 ZZH RX MED GY IP 250 OP 250 PS 637: Performed by: PHYSICIAN ASSISTANT

## 2017-05-29 PROCEDURE — 86140 C-REACTIVE PROTEIN: CPT | Performed by: INTERNAL MEDICINE

## 2017-05-29 PROCEDURE — 25800025 ZZH RX 258: Performed by: PHYSICIAN ASSISTANT

## 2017-05-29 RX ORDER — AMOXICILLIN 250 MG
1 CAPSULE ORAL 2 TIMES DAILY
Status: DISCONTINUED | OUTPATIENT
Start: 2017-05-29 | End: 2017-05-29

## 2017-05-29 RX ORDER — CALCIUM CARBONATE 500 MG/1
1000 TABLET, CHEWABLE ORAL 3 TIMES DAILY PRN
Status: DISCONTINUED | OUTPATIENT
Start: 2017-05-29 | End: 2017-05-31 | Stop reason: HOSPADM

## 2017-05-29 RX ORDER — ACETAMINOPHEN 325 MG/1
650 TABLET ORAL EVERY 4 HOURS PRN
Qty: 100 TABLET | Status: ON HOLD | DISCHARGE
Start: 2017-05-29 | End: 2017-06-14

## 2017-05-29 RX ORDER — AMOXICILLIN 250 MG
1 CAPSULE ORAL DAILY
Status: DISCONTINUED | OUTPATIENT
Start: 2017-05-29 | End: 2017-05-31 | Stop reason: HOSPADM

## 2017-05-29 RX ORDER — BISACODYL 10 MG
10 SUPPOSITORY, RECTAL RECTAL DAILY PRN
Status: DISCONTINUED | OUTPATIENT
Start: 2017-05-29 | End: 2017-05-31 | Stop reason: HOSPADM

## 2017-05-29 RX ORDER — POLYETHYLENE GLYCOL 3350 17 G/17G
17 POWDER, FOR SOLUTION ORAL DAILY
Status: DISCONTINUED | OUTPATIENT
Start: 2017-05-29 | End: 2017-05-31 | Stop reason: HOSPADM

## 2017-05-29 RX ORDER — ONDANSETRON 4 MG/1
4 TABLET, ORALLY DISINTEGRATING ORAL EVERY 6 HOURS PRN
Qty: 120 TABLET | Status: ON HOLD | DISCHARGE
Start: 2017-05-29 | End: 2017-06-14

## 2017-05-29 RX ADMIN — HYDROXYZINE HYDROCHLORIDE 50 MG: 25 TABLET ORAL at 18:01

## 2017-05-29 RX ADMIN — LITHIUM CARBONATE 600 MG: 300 TABLET, EXTENDED RELEASE ORAL at 09:10

## 2017-05-29 RX ADMIN — OLANZAPINE 10 MG: 10 TABLET, FILM COATED ORAL at 16:32

## 2017-05-29 RX ADMIN — HYDROXYZINE HYDROCHLORIDE 50 MG: 25 TABLET ORAL at 12:32

## 2017-05-29 RX ADMIN — LITHIUM CARBONATE 600 MG: 300 TABLET, EXTENDED RELEASE ORAL at 20:02

## 2017-05-29 RX ADMIN — ACETAMINOPHEN 650 MG: 325 TABLET, FILM COATED ORAL at 12:32

## 2017-05-29 RX ADMIN — OLANZAPINE 10 MG: 10 TABLET, FILM COATED ORAL at 12:32

## 2017-05-29 RX ADMIN — OLANZAPINE 10 MG: 10 TABLET, FILM COATED ORAL at 20:25

## 2017-05-29 RX ADMIN — BISACODYL 10 MG: 10 SUPPOSITORY RECTAL at 18:01

## 2017-05-29 RX ADMIN — DEXTROSE AND SODIUM CHLORIDE: 5; 450 INJECTION, SOLUTION INTRAVENOUS at 03:11

## 2017-05-29 RX ADMIN — FOLIC ACID 1 MG: 1 TABLET ORAL at 09:10

## 2017-05-29 RX ADMIN — PANTOPRAZOLE SODIUM 40 MG: 40 TABLET, DELAYED RELEASE ORAL at 09:10

## 2017-05-29 RX ADMIN — MULTIPLE VITAMINS W/ MINERALS TAB 1 TABLET: TAB at 09:10

## 2017-05-29 RX ADMIN — KETOROLAC TROMETHAMINE 30 MG: 30 INJECTION, SOLUTION INTRAMUSCULAR at 17:33

## 2017-05-29 RX ADMIN — POLYETHYLENE GLYCOL 3350 17 G: 17 POWDER, FOR SOLUTION ORAL at 20:01

## 2017-05-29 RX ADMIN — CALCIUM CARBONATE (ANTACID) CHEW TAB 500 MG 1000 MG: 500 CHEW TAB at 18:01

## 2017-05-29 RX ADMIN — LORAZEPAM 1 MG: 2 INJECTION INTRAMUSCULAR; INTRAVENOUS at 00:46

## 2017-05-29 RX ADMIN — KETOROLAC TROMETHAMINE 30 MG: 30 INJECTION, SOLUTION INTRAMUSCULAR at 00:46

## 2017-05-29 RX ADMIN — LORAZEPAM 1 MG: 2 INJECTION INTRAMUSCULAR; INTRAVENOUS at 17:33

## 2017-05-29 RX ADMIN — KETOROLAC TROMETHAMINE 30 MG: 30 INJECTION, SOLUTION INTRAMUSCULAR at 10:35

## 2017-05-29 RX ADMIN — OLANZAPINE 5 MG: 2.5 TABLET, FILM COATED ORAL at 09:10

## 2017-05-29 NOTE — PROGRESS NOTES
The patient's nurse notified me that her lactate came back this afternoon at 2.8. We will recheck it now. She has been NPO and had been vomiting so this may not be from a concerning cause.  However, if the lactate is still elevated then we will proceed with a CT with contrast to make sure we are not missing a different intra-abdominal process. Notably the patient's pain has been getting better, her vomiting has improved, and she was requesting advancement of her diet earlier tonight.    ADDENDUM: The patient's lactate is lower than before and now in the normal range at 2.0. She is doing better than before clinically. I will not make any changes to her medical regimen at this time.

## 2017-05-29 NOTE — PLAN OF CARE
"Problem: Goal Outcome Summary  Goal: Goal Outcome Summary  Outcome: No Change  May 29, 2017. Note for the night shift.  D: Pt very  loud and verbally abusive at the end of the evening shift. Threatening the sitter(female). Did switch for a male sitter and the sitter is outside the door. Pt keeps requesting food because she is \"starving\" . Wanting crackers. Pt informed she is on full liquids.  By 0300 she had had 4 puddings, an ice cream, 2 cups of chicken broth and a coffee.  Early in the shift, pt wanted Toradol for her abd pain.  Toradol given and was informed she was getting Ativan and she was fine with that. Did get very upset when this nurse started to open the pudding cup for her. She was afraid it had been tampered with.  Dozing off and on the rest of the night.Labs will be done on the day shift when she is awake. No more loud verbal outbursts or threats tonight. Call light with in reach.Able to make needs known.No more complaints of pain the rest of the night.  A: Doing OK.P: Monitor closely.  Supportive cares. Medicate for pain as needed.      "

## 2017-05-29 NOTE — PLAN OF CARE
Problem: Goal Outcome Summary  Goal: Goal Outcome Summary  Outcome: No Change  Pt continues to exhibit aggressive and loud behaviors. Pt agreed to take tylenol, zyprexa, and vistaril - meds have been uneffective at this point. Pt continues to pace, moaning loudly. Has not eaten the food she has ordered but drinking lots of apple juice. Pt refuses to lay down. 1:1 sitter present. Pt able to make needs known. Have not heard from psych.

## 2017-05-29 NOTE — PROGRESS NOTES
Attempted IV placement in right lower forearm, no flash so removed.  Pt difficult to handle, moving her body almost all the time.  Psych associate able to  while writer attempted.

## 2017-05-29 NOTE — PROGRESS NOTES
Patient being disruptive to staff and other patients on unit.  She has been running and jumping up and down in hallway making loud grunting noises.  Saying her stomach hurts, yet she continues to run, jump, and pace in hallway.  Every patient door has been shut as this patient is being so loud and disruptive to others.  Acting out of control.  Screaming at staff, shouting at staff when she feels like it.  Nursing supervisor notified as one patient was vomiting due to anxiety caused by this patient being continuously disruptive on the unit.  Staff and sitter have attempted many times to redirect patient, ask her to go back to her room, etc.  She has not been cooperative.  Psyche RN flyer asked to see patient as they are familiar with patient and what meds work for her.  Moonlighter notified of said meds and these meds were ordered.  Nursing supervisor assigned patient to stay in her room as she is disrupting other patients and visitors.  IV access done by Anesthesia for IV Toradol.  Xray obtained and showed a large amount of stool in abdomen.  Suppository given.  Will await results.  Psyche associate outside patients door.

## 2017-05-29 NOTE — PLAN OF CARE
Problem: Goal Outcome Summary  Goal: Goal Outcome Summary  Outcome: Improving  A&Ox4, Lung sounds clear, VSS, bowel sounds active, passing gas. X-Ray completed this afternoon. Pt states she still has some abdominal pain, but that it has improved. Pain managed with IV Toradol. Pt was very frustrated that she could not have something more to eat, verbally abusive to dietary and staff members. Spoke with carolyn, and diet was advanced to full liquid. Ice cream and Pudding was given, pt tolerated well and denies pain or N/V. Pt continues to be on a 1:1, as she is on court ordered commitment. Plan is to transfer back to [facility], once medically stable. Pt is able to make needs known, will continue with POC.     Charge nurse spoke with DAMION Frias @ [facility], she stated that when pt starts to become agitated, and yelling out she is becoming more paranoid.  It is best to approach her with a soft voice and ask her if she would like something to help calm her down, avoiding hand gestures and a firm voice. Also stated that when she is agitated a B52 (Benadryl, Haldol, and Ativan) helps calm her down most.         2245: Noticed patients Lactic Acid was elevated this afternoon at 2.8, notified moonlighter, lactic acid levels rechecked and dropped to 2.0 @ 2200.

## 2017-05-29 NOTE — DISCHARGE SUMMARY
Internal Medicine Discharge Summary   Date of Service: 5/29/2017    Daisy Curtis MRN# [id number]   YOB: 1985 Age: 31 year old     Date of Admission:  5/27/2017  Date of Discharge:  5/29/2017   Admitting Provider:  Dejuan Esquivel MD  Discharge Provider:  Digna Pappas CNP/Dr. Laci Glass   Discharging Service:  Internal Medicine     Primary Provider: Kary Quintero         Reason for Admission:   Daisy Curtis is a 31 year old female with history of asthma, polysubstance abuse, schizoaffective disorder, anxiety, prolactinemia, and recurrent bouts of abdominal pain felt d/t ventral hernia who was admitted 5/27/2017 from inpatient psychiatry with abdominal pain, nausea and vomiting.          Discharge Diagnoses:   # Abdominal pain w/ intractable nausea/vomiting   # Schizoaffective disorder, anxiety   # Hypernatremia         Procedures & Significant Findings:     Results for orders placed or performed during the hospital encounter of 05/27/17   XR Abdomen 2 Views    Narrative    ABDOMEN TWO VIEWS   5/28/2017 2:42 PM    HISTORY: Abdominal pain, nausea, vomiting.    COMPARISON: None.      Impression    IMPRESSION: There is a small to moderate amount of stool throughout  the colon. No distended loop of bowel or air-fluid level is seen to  suggest an obstruction. There are surgical changes in the spine and  surgical clips in the right upper abdomen. No other abnormality is  seen.     ERA GRUBBS MD              Consultations:   Psychiatry          Hospital Course by Problem:    # Abdominal pain w/ intractable nausea/vomiting - Patient has known ventral hernia without incarceration.  AXRs on 5/28 shows small to moderate stool, negative for obstruction.  Abd US on 5/27/17 shows periumbilical hernia containing mesenteric fat w/o incarceration.  LFTs unremarkable.  Tolerated full liquid diet overnight, no vomiting in > 24 hours.  Initially reported that pain was  improved today, could not localize pain on exam today.  Patient was observed to have agitated verbal exchange with sitter and afterwards began complaining of pain and wandering the hallways shouting.  Continued to complain despite IV Toradol.  Refused acetaminophen, PO medications, due to agitation.  Continue to offer PO medications for pain control.  Continue Zofran for nausea, pt has normal QTc. Patient should follow up OP to schedule elective hernia repair.     # Schizoaffective disorder, anxiety - Patient with recent psychiatric hospitalizations, 3/20/17 through 4/4/17, 4/12/17 through 5/15/17.  Patient was admitted again on 5/25/17 and transferred to Internal Medicine on Station 10A due to complaints of increased nausea, vomiting, and abdominal pain.  Psychiatry was consulted and patient was resumed on PTA Lithium and Zyprexa.  Patient will be transferred back to inpatient psychiatry today 5/29. Agitated    # Hypernatremia - Na 146 on 5/29.  Likely 2/2 dehydration in setting of recent vomiting, isotonic fluid administration.   Patient will need recheck of BMP in am.  Continue to encourage PO fluid intake.       Physical Exam on day of discharge:  Blood pressure (!) 114/99, pulse 57, temperature 98.1  F (36.7  C), resp. rate 16, SpO2 98 %, not currently breastfeeding.  GENERAL: Alert and oriented x 3. Well nourished, well developed. Resting comfortably in bed.   HEENT: Anicteric sclera. Mucous membranes moist.   CV: RRR. S1, S2. No murmurs appreciated.   RESPIRATORY: Effort normal on room air. Lungs CTAB with no wheezing, rales, rhonchi.   GI: Abdomen soft and non distended, bowel sounds present. No tenderness, rebound, guarding.   MUSCULOSKELETAL: No joint swelling or tenderness. Moves all extremities.   NEUROLOGICAL: No focal deficits.   EXTREMITIES: No peripheral edema. Intact bilateral pedal pulses.   SKIN: No jaundice. No rashes.      Lines/Tubes/Drains:   Peripheral IV 05/27/17 Right Lower forearm (Active)    Site Assessment WDL 5/29/2017  1:00 AM   Line Status Infusing 5/29/2017  1:00 AM   Phlebitis Scale 0-->no symptoms 5/29/2017  1:00 AM   Infiltration Scale 0 5/29/2017  1:00 AM   Extravasation? No 5/28/2017  8:00 PM   Dressing Intervention New dressing  5/27/2017 10:15 PM   IV Site Rotation Due Date 05/31/17 5/27/2017 10:15 PM   Number of days:2              Pending Results:     Unresulted Labs Ordered in the Past 30 Days of this Admission     No orders found from 3/28/2017 to 5/28/2017.               Discharge Medications:     Current Discharge Medication List      START taking these medications    Details   acetaminophen (TYLENOL) 325 MG tablet Take 2 tablets (650 mg) by mouth every 4 hours as needed for mild pain  Qty: 100 tablet    Associated Diagnoses: Generalized abdominal pain      ondansetron (ZOFRAN-ODT) 4 MG ODT tab Take 1 tablet (4 mg) by mouth every 6 hours as needed for nausea  Qty: 120 tablet    Associated Diagnoses: Nausea and vomiting, intractability of vomiting not specified, unspecified vomiting type         CONTINUE these medications which have NOT CHANGED    Details   lithium (ESKALITH/LITHOBID) 300 MG CR tablet Take 2 tablets (600 mg) by mouth 2 times daily  Qty: 120 tablet, Refills: 0    Associated Diagnoses: Schizoaffective disorder, bipolar type (H)      hydrOXYzine (ATARAX) 25 MG tablet Take 1-2 tablets (25-50 mg) by mouth 3 times daily as needed for anxiety  Qty: 120 tablet, Refills: 0    Associated Diagnoses: Schizoaffective disorder, bipolar type (H)      !! OLANZapine (ZYPREXA) 15 MG tablet Take 1 tablet (15 mg) by mouth At Bedtime  Qty: 30 tablet, Refills: 0    Associated Diagnoses: Schizoaffective disorder, bipolar type (H)      !! OLANZapine (ZYPREXA) 5 MG tablet Take 1 tablet (5 mg) by mouth daily  Qty: 30 tablet, Refills: 0    Associated Diagnoses: Schizoaffective disorder, bipolar type (H)      folic acid (FOLVITE) 1 MG tablet Take 1 tablet (1 mg) by mouth daily  Qty: 30 tablet,  Refills: 0    Associated Diagnoses: Schizoaffective disorder, bipolar type (H)      senna-docusate (SENOKOT-S;PERICOLACE) 8.6-50 MG per tablet Take 1 tablet by mouth 2 times daily as needed for constipation  Qty: 100 tablet, Refills: 0    Associated Diagnoses: Slow transit constipation      multivitamin, therapeutic with minerals (THERA-VIT-M) TABS tablet Take 1 tablet by mouth daily  Qty: 30 each, Refills: 0    Associated Diagnoses: Schizoaffective disorder, bipolar type (H)      pantoprazole (PROTONIX) 40 MG EC tablet Take 1 tablet (40 mg) by mouth every morning  Qty: 30 tablet, Refills: 0    Associated Diagnoses: Gastroesophageal reflux disease without esophagitis       !! - Potential duplicate medications found. Please discuss with provider.               Discharge Instructions and Follow-Up:     Discharge Procedure Orders  General info for SNF   Order Comments: Transfer to in-patient psychiatry     Reason for your hospital stay   Order Comments: You were transferred to Internal Medicine for management of severe nausea and vomiting.     Activity - Up ad pravin   Order Specific Question Answer Comments   Is discharge order? Yes      Follow Up ([facility])   Order Comments: Follow up with primary care provider, Kary Quintero, within 7 days for hospital follow- up.  Will need to reschedule elective hernia surgery once stabilized from psychiatric standpoint.     Appointments on [place] and/or [facility] (with [facility] or [facility] provider or service). Call [phone] if you haven't heard regarding these appointments within 7 days of discharge.     Full Code     Advance Diet as Tolerated   Order Comments: Follow this diet upon discharge: Regular   Order Specific Question Answer Comments   Is discharge order? Yes                  Discharge Disposition:   Transfer to inpatient psychiatry          Condition on Discharge:   Discharge condition: Stable   Code status on discharge: Full Code        35 minutes spent in  discharge, including >50% in counseling and coordination of care, medication review and plan of care recommended on follow up.     Patient was evaluated on day of discharge by attending physician, Michael Beltre MD, who agrees with plan of care.    Discharge summary was forwarded to Kary Quintero (PCP) at the time of discharge, so as to bridge from hospital to outpatient care.     It was our pleasure to care for Daisy Curtis during this hospitalization. Please do not hesitate to contact me should there be questions regarding the hospital course or discharge plan.      Digna Pappas CNP  Hospitalist Service   Pager: [phone]

## 2017-05-29 NOTE — PLAN OF CARE
"Problem: Goal Outcome Summary  Goal: Goal Outcome Summary  Outcome: No Change  Pt exhibiting extreme behaviors since 1015. Pt screaming at staff. Yelled at 1:1 sitter \"Bitch, you don't need to follow me around\". Charge RN stepped in trying to deescalate situation. Pt screaming and swearing loudly in hallway. Asking for pain meds. Writer administered IV toradol. Within several minutes, pt out at  asking for more pain meds. Notified pt that the IV toradol is only every 6 hours. Offered pt PO tylenol but pt refused stating \"I'm not taking anything oral\". Pt moaning loudly. Pacing and gyrating. Wandering in hallway. Continues to request pain meds. After speaking with MD, she suggested that pt take prn zyprexa and/or vistaril. Writer went into pt room and again offered tylenol as well as zyprexa and vistaril. Pt again refused to take anything PO. Pt getting up to bathroom and vomiting and spitting into toilet. At one point pt witnessed physically sticking her finger down her throat to induce vomiting. Pt continues to wander. Accused sitter of bumping into her, screaming at sitter and threatening to hit her in the face. Charge RN again stepped in and told pt to return to her room. Pt continues to moan and grunt loudly, pacing hallways. Psych has been contacted as pt is medically stable and needs to be transferred to psych unit for continued care.       "

## 2017-05-30 VITALS
HEART RATE: 83 BPM | DIASTOLIC BLOOD PRESSURE: 67 MMHG | TEMPERATURE: 97 F | SYSTOLIC BLOOD PRESSURE: 105 MMHG | OXYGEN SATURATION: 98 % | RESPIRATION RATE: 16 BRPM

## 2017-05-30 PROCEDURE — 25000132 ZZH RX MED GY IP 250 OP 250 PS 637: Performed by: NURSE PRACTITIONER

## 2017-05-30 PROCEDURE — 12000001 ZZH R&B MED SURG/OB UMMC

## 2017-05-30 PROCEDURE — 25000128 H RX IP 250 OP 636: Performed by: PHYSICIAN ASSISTANT

## 2017-05-30 PROCEDURE — 25000132 ZZH RX MED GY IP 250 OP 250 PS 637: Performed by: INTERNAL MEDICINE

## 2017-05-30 PROCEDURE — 99232 SBSQ HOSP IP/OBS MODERATE 35: CPT | Performed by: NURSE PRACTITIONER

## 2017-05-30 RX ADMIN — KETOROLAC TROMETHAMINE 30 MG: 30 INJECTION, SOLUTION INTRAMUSCULAR at 20:06

## 2017-05-30 RX ADMIN — PANTOPRAZOLE SODIUM 40 MG: 40 TABLET, DELAYED RELEASE ORAL at 10:17

## 2017-05-30 RX ADMIN — OLANZAPINE 15 MG: 15 TABLET, FILM COATED ORAL at 21:05

## 2017-05-30 RX ADMIN — NICOTINE POLACRILEX 2 MG: 2 GUM, CHEWING ORAL at 16:54

## 2017-05-30 RX ADMIN — MULTIPLE VITAMINS W/ MINERALS TAB 1 TABLET: TAB at 10:17

## 2017-05-30 RX ADMIN — FOLIC ACID 1 MG: 1 TABLET ORAL at 10:17

## 2017-05-30 RX ADMIN — SENNOSIDES AND DOCUSATE SODIUM 1 TABLET: 8.6; 5 TABLET ORAL at 10:17

## 2017-05-30 RX ADMIN — NICOTINE POLACRILEX 2 MG: 2 GUM, CHEWING ORAL at 17:02

## 2017-05-30 RX ADMIN — OLANZAPINE 5 MG: 2.5 TABLET, FILM COATED ORAL at 10:18

## 2017-05-30 RX ADMIN — LITHIUM CARBONATE 600 MG: 300 TABLET, EXTENDED RELEASE ORAL at 10:17

## 2017-05-30 RX ADMIN — HYDROXYZINE HYDROCHLORIDE 50 MG: 25 TABLET ORAL at 17:02

## 2017-05-30 RX ADMIN — KETOROLAC TROMETHAMINE 30 MG: 30 INJECTION, SOLUTION INTRAMUSCULAR at 13:52

## 2017-05-30 RX ADMIN — HYDROXYZINE HYDROCHLORIDE 50 MG: 25 TABLET ORAL at 21:05

## 2017-05-30 RX ADMIN — LITHIUM CARBONATE 600 MG: 300 TABLET, EXTENDED RELEASE ORAL at 20:06

## 2017-05-30 RX ADMIN — OLANZAPINE 10 MG: 10 TABLET, FILM COATED ORAL at 17:02

## 2017-05-30 NOTE — PROVIDER NOTIFICATION
Pt is widely awake and moaning loud inside the room. Claimed Abdomen is painful and want IV pain medication. Refused Tylenol. Toradol not due yet. Took Lithium. Updated Moonlighter and will not order any IV medication for now.

## 2017-05-30 NOTE — PLAN OF CARE
Problem: Goal Outcome Summary  Goal: Goal Outcome Summary  Outcome: Improving  VSS. Pt slept until 1000 and is now awake and alert, calm, flat affect. Agreeable to taking medications, refusing any other cares. 1:1 sitter, monitering pt from doorway. Pt tolerating reg diet, no c/o pain, no nausea or emesis. Plan is for transfer to psych floor when bed available. Continue POC.

## 2017-05-30 NOTE — PLAN OF CARE
Problem: Goal Outcome Summary  Goal: Goal Outcome Summary  Outcome: No Change  In the beginning of the shift around 8 pm pt had been looking for IV pain medication. No PRn is due and Moonlighter was updated but MD said will not order any medications for pt. Pt took another dose of Zyprexa. IV access was lost and Anesthesia came to insert one in right foot. After Iv was inserted writer went to check pt after 10 minutes but then pt sleeping. Woke up again but when writer went to check again pt she is sleeping in a prone position. Scheduled Zyprexa wasn't given at HS due to pt sleeping. Pt had been sleeping until this time. Sitter outside the door monitoring pt with door open. Possible D/C to in pt Psyche if bed is available. Will to follow POC.

## 2017-05-30 NOTE — PLAN OF CARE
Problem: Goal Outcome Summary  Goal: Goal Outcome Summary  Outcome: No Change  Pt still sleeping. Observed to be in different position in bed. Prone right side then left side.

## 2017-05-30 NOTE — PLAN OF CARE
Problem: Goal Outcome Summary  Goal: Goal Outcome Summary  Outcome: No Change  Pt moving around in bed moaning loud looking for IV medication. Writer noticed vomitus in the floor and pt took Lithium 30 minutes ago. Writer about to give IV Zofran but pt IV is not flushing and when Coban was removed pt already out. Updated Anesthesia and will come to do it one more time.

## 2017-05-30 NOTE — PROGRESS NOTES
"Patient resting in bed at 1600.  Around 1630, patient requesting nicotine gum, did not request specific strength when requesting gum. Dr Glass ordered 2mg every hour. Gave patient 2mg piece of gum and a few minutes later patient came out to nurses station and yelled \"this isn't 4mg\". Patient did not request 4mg piece of gum, or any particular strength. Informed patient we would get extra piece of gum for her. Charge RN gave patient extra piece of gum and Vistaril/Zyprexa. Patient refused full assessment and skin check. Continue to monitor.  "

## 2017-05-30 NOTE — PROGRESS NOTES
"Pt got up to get coffee from the family lounge.  This staff attempted to assist pt in making more coffee.  Pt was very irritated and yelled out at this writer to \"get up off my back.\", and \"leave me the fuck alone\".  Pt obviously was not in a state of mind to correctly use the .  Pt was left alone and given space.  This writer assured pt that help was all that was being offered.  Pt left the lounge and this writer left the lounge with pt.  Pt turned around and stated, \"Get the fuck away from me, you bitch!\".  This writer explained to pt that she did not have to yell, that the writer is only doing the job at hand, etc.  Pt continued to be agitated, yell, swear and disrupt the otherwise calm unit.    Pt continued to be rude, assuming and swear / yell at nursing staff who attempted to help the pt by offering medication options.  "

## 2017-05-31 ENCOUNTER — HOSPITAL ENCOUNTER (INPATIENT)
Facility: CLINIC | Age: 32
LOS: 15 days | Discharge: HOME | DRG: 885 | End: 2017-06-15
Attending: PSYCHIATRY & NEUROLOGY | Admitting: PSYCHIATRY & NEUROLOGY
Payer: COMMERCIAL

## 2017-05-31 DIAGNOSIS — F25.0 SCHIZOAFFECTIVE DISORDER, BIPOLAR TYPE (H): Primary | ICD-10-CM

## 2017-05-31 DIAGNOSIS — K21.9 GASTROESOPHAGEAL REFLUX DISEASE WITHOUT ESOPHAGITIS: ICD-10-CM

## 2017-05-31 LAB
ANION GAP SERPL CALCULATED.3IONS-SCNC: 7 MMOL/L (ref 3–14)
BUN SERPL-MCNC: 7 MG/DL (ref 7–30)
CALCIUM SERPL-MCNC: 9 MG/DL (ref 8.5–10.1)
CHLORIDE SERPL-SCNC: 111 MMOL/L (ref 94–109)
CO2 SERPL-SCNC: 24 MMOL/L (ref 20–32)
CREAT SERPL-MCNC: 0.72 MG/DL (ref 0.52–1.04)
GFR SERPL CREATININE-BSD FRML MDRD: ABNORMAL ML/MIN/1.7M2
GLUCOSE SERPL-MCNC: 102 MG/DL (ref 70–99)
POTASSIUM SERPL-SCNC: 5.4 MMOL/L (ref 3.4–5.3)
SODIUM SERPL-SCNC: 142 MMOL/L (ref 133–144)

## 2017-05-31 PROCEDURE — 25000132 ZZH RX MED GY IP 250 OP 250 PS 637: Performed by: NURSE PRACTITIONER

## 2017-05-31 PROCEDURE — 12400003 ZZH R&B MH CRITICAL UMMC

## 2017-05-31 PROCEDURE — 80048 BASIC METABOLIC PNL TOTAL CA: CPT | Performed by: NURSE PRACTITIONER

## 2017-05-31 PROCEDURE — 36415 COLL VENOUS BLD VENIPUNCTURE: CPT | Performed by: NURSE PRACTITIONER

## 2017-05-31 PROCEDURE — 99239 HOSP IP/OBS DSCHRG MGMT >30: CPT | Performed by: NURSE PRACTITIONER

## 2017-05-31 PROCEDURE — 25000132 ZZH RX MED GY IP 250 OP 250 PS 637: Performed by: INTERNAL MEDICINE

## 2017-05-31 PROCEDURE — 25000132 ZZH RX MED GY IP 250 OP 250 PS 637: Performed by: PSYCHIATRY & NEUROLOGY

## 2017-05-31 RX ORDER — AMOXICILLIN 250 MG
1 CAPSULE ORAL 2 TIMES DAILY PRN
Status: DISCONTINUED | OUTPATIENT
Start: 2017-05-31 | End: 2017-06-15 | Stop reason: HOSPADM

## 2017-05-31 RX ORDER — LITHIUM CARBONATE 300 MG/1
600 TABLET, FILM COATED, EXTENDED RELEASE ORAL 2 TIMES DAILY
Status: DISCONTINUED | OUTPATIENT
Start: 2017-05-31 | End: 2017-06-15 | Stop reason: HOSPADM

## 2017-05-31 RX ORDER — MULTIPLE VITAMINS W/ MINERALS TAB 9MG-400MCG
1 TAB ORAL DAILY
Status: DISCONTINUED | OUTPATIENT
Start: 2017-05-31 | End: 2017-06-15 | Stop reason: HOSPADM

## 2017-05-31 RX ORDER — PANTOPRAZOLE SODIUM 40 MG/1
40 TABLET, DELAYED RELEASE ORAL EVERY MORNING
Status: DISCONTINUED | OUTPATIENT
Start: 2017-06-01 | End: 2017-06-15 | Stop reason: HOSPADM

## 2017-05-31 RX ORDER — OLANZAPINE 15 MG/1
15 TABLET ORAL AT BEDTIME
Status: DISCONTINUED | OUTPATIENT
Start: 2017-05-31 | End: 2017-06-15 | Stop reason: HOSPADM

## 2017-05-31 RX ORDER — OLANZAPINE 5 MG/1
5 TABLET ORAL DAILY
Status: DISCONTINUED | OUTPATIENT
Start: 2017-05-31 | End: 2017-06-15 | Stop reason: HOSPADM

## 2017-05-31 RX ORDER — HYDROXYZINE HYDROCHLORIDE 25 MG/1
25-50 TABLET, FILM COATED ORAL 3 TIMES DAILY PRN
Status: DISCONTINUED | OUTPATIENT
Start: 2017-05-31 | End: 2017-06-15 | Stop reason: HOSPADM

## 2017-05-31 RX ORDER — FOLIC ACID 1 MG/1
1 TABLET ORAL DAILY
Status: DISCONTINUED | OUTPATIENT
Start: 2017-05-31 | End: 2017-06-15 | Stop reason: HOSPADM

## 2017-05-31 RX ORDER — ACETAMINOPHEN 325 MG/1
650 TABLET ORAL EVERY 4 HOURS PRN
Status: DISCONTINUED | OUTPATIENT
Start: 2017-05-31 | End: 2017-06-15 | Stop reason: HOSPADM

## 2017-05-31 RX ORDER — ONDANSETRON 4 MG/1
4 TABLET, ORALLY DISINTEGRATING ORAL EVERY 6 HOURS PRN
Status: DISCONTINUED | OUTPATIENT
Start: 2017-05-31 | End: 2017-06-15 | Stop reason: HOSPADM

## 2017-05-31 RX ADMIN — NICOTINE POLACRILEX 4 MG: 2 GUM, CHEWING ORAL at 15:39

## 2017-05-31 RX ADMIN — MULTIPLE VITAMINS W/ MINERALS TAB 1 TABLET: TAB at 10:41

## 2017-05-31 RX ADMIN — NICOTINE POLACRILEX 4 MG: 2 GUM, CHEWING ORAL at 18:24

## 2017-05-31 RX ADMIN — OLANZAPINE 5 MG: 2.5 TABLET, FILM COATED ORAL at 10:41

## 2017-05-31 RX ADMIN — FOLIC ACID 1 MG: 1 TABLET ORAL at 10:41

## 2017-05-31 RX ADMIN — LITHIUM CARBONATE 600 MG: 300 TABLET, EXTENDED RELEASE ORAL at 10:41

## 2017-05-31 RX ADMIN — ACETAMINOPHEN 650 MG: 325 TABLET, FILM COATED ORAL at 23:07

## 2017-05-31 RX ADMIN — NICOTINE POLACRILEX 4 MG: 2 GUM, CHEWING ORAL at 19:48

## 2017-05-31 RX ADMIN — OLANZAPINE 15 MG: 15 TABLET, FILM COATED ORAL at 19:49

## 2017-05-31 RX ADMIN — PANTOPRAZOLE SODIUM 40 MG: 40 TABLET, DELAYED RELEASE ORAL at 10:42

## 2017-05-31 RX ADMIN — NICOTINE POLACRILEX 4 MG: 2 GUM, CHEWING ORAL at 12:05

## 2017-05-31 RX ADMIN — NICOTINE POLACRILEX 4 MG: 2 GUM, CHEWING ORAL at 16:58

## 2017-05-31 RX ADMIN — LITHIUM CARBONATE 600 MG: 300 TABLET, EXTENDED RELEASE ORAL at 19:48

## 2017-05-31 NOTE — DISCHARGE SUMMARY
Internal Medicine Discharge Summary   Date of Service: 5/31/2017    Daisy Curtis MRN# [id number]   YOB: 1985 Age: 31 year old     Date of Admission:  5/27/2017  Date of Discharge:  5/31/2017   Admitting Provider:  Dejuan Esquivel MD  Discharge Provider:  Digna Pappas CNP/Dr. Laci Glass   Discharging Service:  Internal Medicine     Primary Provider: Kary Quintero         Reason for Admission:   Daisy Curtis is a 31 year old female with history of asthma, polysubstance abuse, schizoaffective disorder, anxiety, prolactinemia, and recurrent bouts of abdominal pain felt d/t ventral hernia who was admitted 5/27/2017 from inpatient psychiatry with abdominal pain, nausea and vomiting.           Discharge Diagnoses:   # Abdominal pain w/ intractable nausea/vomiting   # Schizoaffective disorder, anxiety   # Hypernatremia         Procedures & Significant Findings:     Results for orders placed or performed during the hospital encounter of 05/27/17   XR Abdomen 2 Views    Narrative    ABDOMEN TWO VIEWS   5/28/2017 2:42 PM    HISTORY: Abdominal pain, nausea, vomiting.    COMPARISON: None.      Impression    IMPRESSION: There is a small to moderate amount of stool throughout  the colon. No distended loop of bowel or air-fluid level is seen to  suggest an obstruction. There are surgical changes in the spine and  surgical clips in the right upper abdomen. No other abnormality is  seen.     EAR GRUBBS MD              Consultations:   Psychiatry          Hospital Course by Problem:    # Abdominal pain w/ intractable nausea/vomiting - Patient has known ventral hernia without incarceration.  She is well known to [facility] and has been unable to follow up on plans for elective surgical repair due to decompensated psychiatric illness necessitating readmission to inpatient Psychiatry. She has had multiple hospital admissions (at least since August 2015) with similar  presentation of nausea, vomiting, and abdominal pain without need for emergent surgical intervention.  AXRs on 5/28 shows small to moderate stool, negative for obstruction.  Abd US on 5/27/17 shows periumbilical hernia containing mesenteric fat w/o incarceration.  LFTs unremarkable. Patient currently tolerated PO diet and liquids, however she had multiple episodes of self-induced emesis following medication administration on 5/30.  Continue to offer PO medications for pain control.  Continue Zofran for nausea, pt has normal QTc. Patient should follow up OP to schedule elective hernia repair.     # Schizoaffective disorder, anxiety - Patient with recent psychiatric hospitalizations, 3/20/17 through 4/4/17, 4/12/17 through 5/15/17.  Patient was admitted again on 5/25/17 and transferred to Internal Medicine on Station 10A due to complaints of increased nausea, vomiting, and abdominal pain.  Psychiatry was consulted and patient was resumed on PTA Lithium and Zyprexa.  Patient will be transferred back to inpatient psychiatry today 5/31. Currently in no acute distress with subdued behaviors.     # Hypernatremia - Na 146 on 5/29, improved to 142 at time of discharge.  Likely 2/2 dehydration in setting of recent vomiting, isotonic fluid administration.  Continue to encourage PO fluid intake.       Physical Exam on day of discharge:  not currently breastfeeding.  GENERAL: Alert and oriented x 3. Well nourished, well developed. Resting comfortably in bed.   HEENT: Anicteric sclera. Mucous membranes moist.   CV: RRR. S1, S2. No murmurs appreciated.   RESPIRATORY: Effort normal on room air. Lungs CTAB with no wheezing, rales, rhonchi.   GI: Abdomen soft and non distended, bowel sounds present. Palpable ventral hernia. No tenderness, rebound, guarding.   MUSCULOSKELETAL: No joint swelling or tenderness. Moves all extremities.   NEUROLOGICAL: No focal deficits.   EXTREMITIES: No peripheral edema. Intact bilateral pedal pulses.    SKIN: No jaundice. No rashes.               Pending Results:     Unresulted Labs Ordered in the Past 30 Days of this Admission     No orders found from 4/1/2017 to 6/1/2017.               Discharge Medications:     Current Discharge Medication List      CONTINUE these medications which have NOT CHANGED    Details   acetaminophen (TYLENOL) 325 MG tablet Take 2 tablets (650 mg) by mouth every 4 hours as needed for mild pain  Qty: 100 tablet    Associated Diagnoses: Generalized abdominal pain      ondansetron (ZOFRAN-ODT) 4 MG ODT tab Take 1 tablet (4 mg) by mouth every 6 hours as needed for nausea  Qty: 120 tablet    Associated Diagnoses: Nausea and vomiting, intractability of vomiting not specified, unspecified vomiting type      lithium (ESKALITH/LITHOBID) 300 MG CR tablet Take 2 tablets (600 mg) by mouth 2 times daily  Qty: 120 tablet, Refills: 0    Associated Diagnoses: Schizoaffective disorder, bipolar type (H)      hydrOXYzine (ATARAX) 25 MG tablet Take 1-2 tablets (25-50 mg) by mouth 3 times daily as needed for anxiety  Qty: 120 tablet, Refills: 0    Associated Diagnoses: Schizoaffective disorder, bipolar type (H)      !! OLANZapine (ZYPREXA) 15 MG tablet Take 1 tablet (15 mg) by mouth At Bedtime  Qty: 30 tablet, Refills: 0    Associated Diagnoses: Schizoaffective disorder, bipolar type (H)      !! OLANZapine (ZYPREXA) 5 MG tablet Take 1 tablet (5 mg) by mouth daily  Qty: 30 tablet, Refills: 0    Associated Diagnoses: Schizoaffective disorder, bipolar type (H)      folic acid (FOLVITE) 1 MG tablet Take 1 tablet (1 mg) by mouth daily  Qty: 30 tablet, Refills: 0    Associated Diagnoses: Schizoaffective disorder, bipolar type (H)      senna-docusate (SENOKOT-S;PERICOLACE) 8.6-50 MG per tablet Take 1 tablet by mouth 2 times daily as needed for constipation  Qty: 100 tablet, Refills: 0    Associated Diagnoses: Slow transit constipation      multivitamin, therapeutic with minerals (THERA-VIT-M) TABS tablet Take 1  tablet by mouth daily  Qty: 30 each, Refills: 0    Associated Diagnoses: Schizoaffective disorder, bipolar type (H)      pantoprazole (PROTONIX) 40 MG EC tablet Take 1 tablet (40 mg) by mouth every morning  Qty: 30 tablet, Refills: 0    Associated Diagnoses: Gastroesophageal reflux disease without esophagitis       !! - Potential duplicate medications found. Please discuss with provider.               Discharge Instructions and Follow-Up:   No discharge procedures on file.            Discharge Disposition:   Transfer to inpatient psychiatry          Condition on Discharge:   Discharge condition: Stable   Code status on discharge: Full Code        35 minutes spent in discharge, including >50% in counseling and coordination of care, medication review and plan of care recommended on follow up.     Patient was evaluated on day of discharge by attending physician, Michael Beltre MD, who agrees with plan of care.    Discharge summary was forwarded to Kary Quintero (PCP) at the time of discharge, so as to bridge from hospital to outpatient care.     It was our pleasure to care for Daisy Curtis during this hospitalization. Please do not hesitate to contact me should there be questions regarding the hospital course or discharge plan.      Digna Pappas CNP  Hospitalist Service   Pager: [phone]

## 2017-05-31 NOTE — PLAN OF CARE
Problem: Goal Outcome Summary  Goal: Goal Outcome Summary  VSS. Alert and oriented. LS clear. Refused other assessments. Tolerating diet, no n/v. Ambulated independently. Pt had psych tech sitter for safety/hold. Pt was cooperative and calm. Pt was transferred to psych Station 12 at 1430 with security. Report was given to station 12 prior to DC.

## 2017-05-31 NOTE — PLAN OF CARE
Problem: Goal Outcome Summary  Goal: Goal Outcome Summary  Outcome: No Change  Pt has been resting in bed. Attendant is outside room. Checking in on pt at least every 2 hours. Refuses full skin assessment. No acute issues overnight.

## 2017-05-31 NOTE — PLAN OF CARE
Problem: Goal Outcome Summary  Goal: Goal Outcome Summary  Outcome: No Change  Called pass 8pm for IV pain med for abdominal pain.Ketorolac IV given via PIV line R foot.PIV site patent,saline coban.Wrapped with coban.Not cooperative with full skin assessment.Agitated when attendant in room.Out to family lounge for coffee.Attendant([employer]) states pt has been drinking couple more cups of coffee earlier.Was able to change to decaf.No attempts getting out of unit.Yelling out loud on her phone.Charge nurse able to give PRN atarax and sched HS zyprexa.Calming down later.Continue with pysche tech for safety.

## 2017-05-31 NOTE — IP AVS SNAPSHOT
[address] [address]    Phone:  [phone]                                       After Visit Summary   5/31/2017    aDisy Curtis    MRN: [id number]           After Visit Summary Signature Page     I have received my discharge instructions, and my questions have been answered. I have discussed any challenges I see with this plan with the nurse or doctor.    ..........................................................................................................................................  Patient/Patient Representative Signature      ..........................................................................................................................................  Patient Representative Print Name and Relationship to Patient    ..................................................               ................................................  Date                                            Time    ..........................................................................................................................................  Reviewed by Signature/Title    ...................................................              ..............................................  Date                                                            Time

## 2017-05-31 NOTE — PROGRESS NOTES
[facility], [facility]    Internal Medicine Progress Note      Assessment & Plan   Daisy Curtis is a 31 year old female with history of asthma, polysubstance abuse, schizoaffective disorder, anxiety, prolactinemia, and recurrent bouts of abdominal pain felt d/t ventral hernia who was admitted 5/27/2017 from inpatient psychiatry with abdominal pain, nausea and vomiting.    # Abdominal pain w/ intractable nausea/vomiting - Improved.  Currently complaints of pain possibly 2/2 constipation vs stable hernia, though complicated by decompensated schizoaffective disorder.  Patient has had multiple episodes of self induced emesis last 24 hrs.  Patient has known ventral hernia without incarceration.  AXRs on 5/28 shows small to moderate stool, negative for obstruction.  Abd US on 5/27/17 shows periumbilical hernia containing mesenteric fat w/o incarceration.  LFTs unremarkable.  Tolerated full liquid diet overnight, no vomiting in > 24 hours.  Initially reported that pain was improved today, could not localize pain on exam today.  Patient was observed to have agitated verbal exchange with sitter on 5/29 and afterwards began complaining of pain and wandering the hallways shouting.  Continued to complain despite IV Toradol.  Refused acetaminophen, PO medications, due to agitation.    - Continue to offer PO medications for pain control    - Continue Zofran for nausea, pt has normal QTc   - Scheduled bowel regimen - Daily Senna-Docusate    - Patient should follow up OP to schedule elective hernia repair - currently stable and no indication for emergent operative management at this time      # Schizoaffective disorder, anxiety - Decompensating, increased agitated behaviors/mood last 24 hours. Patient with recent psychiatric hospitalizations, 3/20/17 through 4/4/17, 4/12/17 through 5/15/17.  Patient was admitted again on 5/25/17 and transferred to Internal Medicine on Station 10A due to complaints of increased nausea,  vomiting, and abdominal pain.  Psychiatry was consulted and patient was resumed on PTA Lithium and Zyprexa.    - Continue scheduled Zyprexa   - Continue Lithium   - PRN Zyprexa for agitation, psychosis        # Hypernatremia - Na 146 on 5/29.  Likely 2/2 dehydration in setting of recent vomiting, isotonic fluid administration.   Patient will need recheck of BMP in am.  Continue to encourage PO fluid intake.  - Recheck BMP in am     Diet: Advance Diet as Tolerated: Regular Diet Adult  Advance Diet as Tolerated  Fluids: N/a  DVT Prophylaxis: Mechanical  Code Status: Prior    Disposition Plan   Expected discharge: Tomorrow; recommended to transfer to inpatient psychiatry once bed available..     Entered: Digna Pappas 05/30/2017, 9:10 PM   Information in the above section will display in the discharge planner report.      The patient's care was discussed with the Attending Physician, Dr. Laci Glass.    Digna Pappas  Internal Medicine Staff Hospitalist Service  [facility]  Pager: [phone]  Please see sticky note for cross cover information    Interval History   Patient agitated last night, continued to wander the halls, grunting, and asking for pain medications.  Noted to have episodes of self-induced vomiting, notably occurring after taking her PO meds. Patient refused all PO pain meds.  Psych RN floater contacted for medication administration due to continued worsening agitation.     Data reviewed today: I reviewed all medications, new labs and imaging results over the last 24 hours.     Physical Exam   Vital Signs: Temp: 97.4  F (36.3  C) Temp src: Oral BP: 104/63 Pulse: 94   Resp: 16        Weight: 0 lbs 0 oz  Patient refused exam, asked me to leave room.          Data   Medications        senna-docusate  1 tablet Oral Daily     polyethylene glycol  17 g Oral Daily     folic acid  1 mg Oral Daily     lithium  600 mg Oral BID     multivitamin, therapeutic with minerals  1 tablet Oral  Daily     OLANZapine  15 mg Oral At Bedtime     OLANZapine  5 mg Oral Daily     pantoprazole  40 mg Oral QAM

## 2017-05-31 NOTE — PROGRESS NOTES
"Patient transferred back over from 10A. Patient settled into the unit well and states she is feeling good. She states that she feels she could go to a less restrictive unit and that she \"has no mental health symptoms\" at this time. MD paged for orders. Continue to monitor and assess.   "

## 2017-05-31 NOTE — IP AVS SNAPSHOT
MRN:[id number]                      After Visit Summary   5/31/2017    Daisy Curtis    MRN: [id number]           Thank you!     Thank you for choosing [facility] for your care. Our goal is always to provide you with excellent care.        Patient Information     Date Of Birth          1985        About your hospital stay     You were admitted on:  May 31, 2017 You last received care in the:  [facility]    You were discharged on:  Francesca 15, 2017       Who to Call     For medical emergencies, please call 911.  For non-urgent questions about your medical care, please call your primary care provider or clinic, [phone]          Attending Provider     Provider Specialty    Yunior Yañez MD Psychiatry    [facility] Psychiatry    Norma Schultz MD Psychiatry    Edson, Maurice Payne MD Psychiatry       Primary Care Provider Office Phone # Fax #    Kary Quintero [phone]      Further instructions from your care team       Behavioral Discharge Planning and Instructions                                                               Summary:  You were admitted for paranoia and mood lability after medication noncompliance. Your provisional discharge was revoked. You are now stable and safe to discharge. Upon discharge you will continue to work with your ACT Team. You will be discharged to [facility] in [place].      Main Diagnosis: Schizoaffective Disorder      Mental Health Follow-Up:  - ACT Team through [facility]:  : Jyotsna Catherine [phone]  ACT RN: Manpreet Sheehan -[phone]  ACT : Ai Mendiola [phone]  Psychiatrist: Dr. Gisela Day MD Monday June 19th @ 1:30, Dr. Day will come to [facility]  Primary Care: Khadijah Quintero [phone]      Attend all scheduled appointments with your outpatient providers. Call at least 24 hours in advance if you need to  reschedule an appointment to ensure continued access to your outpatient providers.   Major Treatments, Procedures and Findings:  You were provided with: a psychiatric assessment, assessed for medical stability, medication evaluation and/or management and group therapy    Symptoms to Report: feeling more aggressive, increased confusion, losing more sleep, mood getting worse or thoughts of suicide    Early warning signs can include: increased depression or anxiety sleep disturbances increased thoughts or behaviors of suicide or self-harm  increased unusual thinking, such as paranoia or hearing voices    Safety and Wellness:  Take all medicines as directed.  Make no changes unless your doctor suggests them.      Follow treatment recommendations.  Refrain from alcohol and non-prescribed drugs.  If there is a concern for safety, call 911.    Resources:   [facility] ([facility])  634.639.9304  Crisis Intervention: 632.906.4497 or 771-275-4626 (TTY: 369.370.1189).  Call anytime for help.  National [place] on Mental Illness (www.mn.pari.org): 562.778.5791 or 041-922-6834.  National Suicide Prevention Line (www.mentalhealthmn.org): 299-797-PUXF (3103)    The treatment team has appreciated the opportunity to work with you. If you have any questions or concerns our unit number is [phone].        Pending Results     No orders found from 5/29/2017 to 6/1/2017.            Statement of Approval     Ordered          06/14/17 1126  I have reviewed and agree with all the recommendations and orders detailed in this document.  EFFECTIVE NOW     Approved and electronically signed by:  Maurice Sandhu MD             Admission Information     Date & Time Provider Department Dept. Phone    5/31/2017 Maurice Sandhu MD [facility] [phone]      Your Vitals Were     Blood Pressure Pulse Temperature Respirations Weight Last Period    114/82 102 98  F (36.7  C) (Oral) 16 79.8 kg (176 lb)  (LMP Unknown)    Pulse Oximetry BMI (Body Mass Index)                98% 32.19 kg/m2          Care EveryWhere ID     This is your Care EveryWhere ID. This could be used by other organizations to access your [facility] medical records  [id number]           Review of your medicines      START taking        Dose / Directions    ARIPiprazole  MG extended release injection   Commonly known as:  ABILIFY MAINTENA   Used for:  Schizoaffective disorder, bipolar type (H)        Dose:  300 mg   Start taking on:  7/9/2017   Inject 1.5 mLs (300 mg) into the muscle every 30 days Last injection on 6/9/2017   Quantity:  1 each   Refills:  0       benztropine 0.5 MG tablet   Commonly known as:  COGENTIN   Used for:  Schizoaffective disorder, bipolar type (H)        Dose:  0.5 mg   Take 1 tablet (0.5 mg) by mouth 2 times daily   Quantity:  60 tablet   Refills:  0         CONTINUE these medicines which may have CHANGED, or have new prescriptions. If we are uncertain of the size of tablets/capsules you have at home, strength may be listed as something that might have changed.        Dose / Directions    OLANZapine 15 MG tablet   Commonly known as:  zyPREXA   This may have changed:  Another medication with the same name was removed. Continue taking this medication, and follow the directions you see here.   Used for:  Schizoaffective disorder, bipolar type (H)        Dose:  15 mg   Take 1 tablet (15 mg) by mouth At Bedtime   Quantity:  30 tablet   Refills:  0         CONTINUE these medicines which have NOT CHANGED        Dose / Directions    folic acid 1 MG tablet   Commonly known as:  FOLVITE   Used for:  Schizoaffective disorder, bipolar type (H)        Dose:  1 mg   Take 1 tablet (1 mg) by mouth daily   Quantity:  30 tablet   Refills:  0       lithium 300 MG CR tablet   Commonly known as:  ESKALITH/LITHOBID   Used for:  Schizoaffective disorder, bipolar type (H)        Dose:  600 mg   Take 2 tablets (600 mg) by mouth 2 times  daily   Quantity:  120 tablet   Refills:  0       multivitamin, therapeutic with minerals Tabs tablet   Used for:  Schizoaffective disorder, bipolar type (H)        Dose:  1 tablet   Take 1 tablet by mouth daily   Quantity:  30 each   Refills:  0       pantoprazole 40 MG EC tablet   Commonly known as:  PROTONIX   Used for:  Gastroesophageal reflux disease without esophagitis        Dose:  40 mg   Take 1 tablet (40 mg) by mouth every morning   Quantity:  30 tablet   Refills:  0         STOP taking     acetaminophen 325 MG tablet   Commonly known as:  TYLENOL           hydrOXYzine 25 MG tablet   Commonly known as:  ATARAX           ondansetron 4 MG ODT tab   Commonly known as:  ZOFRAN-ODT           senna-docusate 8.6-50 MG per tablet   Commonly known as:  SENOKOT-S;PERICOLACE                Where to get your medicines      These medications were sent to [facility] [place], [place] - [address]  [address] [address]     Phone:  [phone]     ARIPiprazole  MG extended release injection    benztropine 0.5 MG tablet    folic acid 1 MG tablet    lithium 300 MG CR tablet    multivitamin, therapeutic with minerals Tabs tablet    OLANZapine 15 MG tablet    pantoprazole 40 MG EC tablet                Protect others around you: Learn how to safely use, store and throw away your medicines at www.disposemymeds.org.             Medication List: This is a list of all your medications and when to take them. Check marks below indicate your daily home schedule. Keep this list as a reference.      Medications           Morning Afternoon Evening Bedtime As Needed    ARIPiprazole  MG extended release injection   Commonly known as:  ABILIFY MAINTENA   Inject 1.5 mLs (300 mg) into the muscle every 30 days Last injection on 6/9/2017   Start taking on:  7/9/2017   Last time this was given:  300 mg on 6/13/2017  8:22 PM                                benztropine 0.5 MG tablet    Commonly known as:  COGENTIN   Take 1 tablet (0.5 mg) by mouth 2 times daily   Last time this was given:  0.5 mg on 6/15/2017  8:54 AM                                   folic acid 1 MG tablet   Commonly known as:  FOLVITE   Take 1 tablet (1 mg) by mouth daily   Last time this was given:  1 mg on 6/15/2017  8:54 AM                                   lithium 300 MG CR tablet   Commonly known as:  ESKALITH/LITHOBID   Take 2 tablets (600 mg) by mouth 2 times daily   Last time this was given:  600 mg on 6/15/2017  9:25 AM                                      multivitamin, therapeutic with minerals Tabs tablet   Take 1 tablet by mouth daily   Last time this was given:  1 tablet on 6/15/2017  8:54 AM                                   OLANZapine 15 MG tablet   Commonly known as:  zyPREXA   Take 1 tablet (15 mg) by mouth At Bedtime   Last time this was given:  5 mg on 6/15/2017  8:55 AM                                   pantoprazole 40 MG EC tablet   Commonly known as:  PROTONIX   Take 1 tablet (40 mg) by mouth every morning   Last time this was given:  40 mg on 6/15/2017  8:54 AM

## 2017-05-31 NOTE — PROGRESS NOTES
05/31/17 1511   Patient Belongings   Patient Belongings clothing;money (see comment);purse;shoes   Disposition of Belongings in pt locker; sent to security   Belongings Search Yes     In pt locker: purse, cell phone, gum, cigarettes, lighter, lipstick, $13 USD, bra, black pants, black turtleneck top, , brown shoes (no laces).    Sent to security: [place] ID     ADMISSION:  I am responsible for any personal items that are not sent to the safe or pharmacy. [facility] is not responsible for loss, theft or damage of any property in my possession.    Patient Signature _____________________ Date/Time _____________________    Staff Signature _______________________ Date/Time _____________________    2nd Staff person, if patient is unable/unwilling to sign  ___________________________________ Date/Time _____________________    DISCHARGE:  My personal items have been returned to me.   Patient Signature _____________________ Date/Time _____________________

## 2017-06-01 PROCEDURE — 99207 ZZC CDG-HISTORY COMP: MEETS EXP. PROBLEM FOCUSED-DOWN CODED LACK OF ROS: CPT | Performed by: PSYCHIATRY & NEUROLOGY

## 2017-06-01 PROCEDURE — 99221 1ST HOSP IP/OBS SF/LOW 40: CPT | Mod: AI | Performed by: PSYCHIATRY & NEUROLOGY

## 2017-06-01 PROCEDURE — 25000132 ZZH RX MED GY IP 250 OP 250 PS 637: Performed by: PSYCHIATRY & NEUROLOGY

## 2017-06-01 PROCEDURE — 12400003 ZZH R&B MH CRITICAL UMMC

## 2017-06-01 RX ADMIN — GUAIFENESIN AND DEXTROMETHORPHAN HYDROBROMIDE 1 TABLET: 600; 30 TABLET, EXTENDED RELEASE ORAL at 19:29

## 2017-06-01 RX ADMIN — LITHIUM CARBONATE 600 MG: 300 TABLET, EXTENDED RELEASE ORAL at 19:29

## 2017-06-01 RX ADMIN — PANTOPRAZOLE SODIUM 40 MG: 40 TABLET, DELAYED RELEASE ORAL at 10:04

## 2017-06-01 RX ADMIN — OLANZAPINE 15 MG: 15 TABLET, FILM COATED ORAL at 19:29

## 2017-06-01 RX ADMIN — LITHIUM CARBONATE 600 MG: 300 TABLET, EXTENDED RELEASE ORAL at 10:04

## 2017-06-01 RX ADMIN — NICOTINE POLACRILEX 4 MG: 2 GUM, CHEWING ORAL at 14:10

## 2017-06-01 RX ADMIN — OLANZAPINE 5 MG: 5 TABLET, FILM COATED ORAL at 10:03

## 2017-06-01 RX ADMIN — MULTIPLE VITAMINS W/ MINERALS TAB 1 TABLET: TAB at 10:04

## 2017-06-01 RX ADMIN — NICOTINE POLACRILEX 4 MG: 2 GUM, CHEWING ORAL at 19:56

## 2017-06-01 RX ADMIN — FOLIC ACID 1 MG: 1 TABLET ORAL at 10:04

## 2017-06-01 ASSESSMENT — ACTIVITIES OF DAILY LIVING (ADL)
GROOMING: INDEPENDENT
DRESS: SCRUBS (BEHAVIORAL HEALTH)
HYGIENE/GROOMING: INDEPENDENT
LAUNDRY: UNABLE TO COMPLETE
ORAL_HYGIENE: INDEPENDENT
ORAL_HYGIENE: INDEPENDENT
DRESS: SCRUBS (BEHAVIORAL HEALTH)
LAUNDRY: UNABLE TO COMPLETE
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
## 2017-06-01 NOTE — DISCHARGE SUMMARY
Medicine Staff:  Agree with discharge plan and summary as discussed with and documented by KACEY.

## 2017-06-01 NOTE — PROGRESS NOTES
called and left vm message for pt's MH Resources ACT team worker, Jyotsna Lee.   I let her know when pt returned to Station 12 from the medical unit and that I am the  with whom she'll be working regarding pt's discharge planning.    Pt is homeless and under full commitment.    The last time the PD was revoked was May 22, 2017.    At time of discharge she will need a PD Agreement.

## 2017-06-01 NOTE — PLAN OF CARE
Problem: General Plan of Care (Inpatient Behavioral)  Goal: Team Discussion  Team Plan:   BEHAVIORAL TEAM DISCUSSION     Participants:   Wandy Campbell, Andi Santamaria RN, Quita Moreno OT  Progress: none yet  Continued Stay Criteria/Rationale: she was just readmitted from the medical unit.  Medical/Physical: per internal medicine  Precautions:   Behavioral Orders   Procedures     Code 1 - Restrict to Unit     Routine Programming       As clinically indicated     Status 15       Every 15 minutes.     Plan: Determine living situation.    PD pt at time of discharge.   Coordinate with ACT team  Rationale for change in precautions or plan:  No change

## 2017-06-01 NOTE — PLAN OF CARE
Problem: Psychotic Symptoms  Goal: Psychotic Symptoms  Signs and symptoms of listed problems will be absent or manageable.   Outcome: Therapy, progress toward functional goals as expected  Pt reports feeling unwell, citing a head cold this shift, and has been isolative and withdrawn to her room sleeping the majority of the shift. Affect is blunted but congruent to reported mood and somatic complaints. Pt denies SI/HI/SIB. Denies positive symptoms of psychosis including VH, AH and delusions. None observed, and pt does not appear to be responding. Pt is medication compliant. Denies SEs to medication however is noted to have a bilateral lower leg tremor at rest which she states is normal for her. Denies that this is bothersome and is consistent with pts presentation during past admissions. Pt does appear congested but declined interventions offered (tylenol, warm cloth, aroma-therapy to clear sinuses). Afebrile. Will continue to monitor closely and reassess.

## 2017-06-01 NOTE — DISCHARGE SUMMARY
Internal Medicine Discharge Summary   Date of Service: 5/31/2017    Daisy Curtis MRN# [id number]   YOB: 1985 Age: 31 year old     Date of Admission:  5/27/2017  Date of Discharge:  5/31/2017   Admitting Provider:  Dejuan Esquivel MD  Discharge Provider:  Digna Pappas CNP/Dr. Laci Glass   Discharging Service:  Internal Medicine     Primary Provider: Kary Quintero         Reason for Admission:   Daisy Curtis is a 31 year old female with history of asthma, polysubstance abuse, schizoaffective disorder, anxiety, prolactinemia, and recurrent bouts of abdominal pain felt d/t ventral hernia who was admitted 5/27/2017 from inpatient psychiatry with abdominal pain, nausea and vomiting.           Discharge Diagnoses:   # Abdominal pain w/ intractable nausea/vomiting   # Schizoaffective disorder, anxiety   # Hypernatremia         Procedures & Significant Findings:     Results for orders placed or performed during the hospital encounter of 05/27/17   XR Abdomen 2 Views    Narrative    ABDOMEN TWO VIEWS   5/28/2017 2:42 PM    HISTORY: Abdominal pain, nausea, vomiting.    COMPARISON: None.      Impression    IMPRESSION: There is a small to moderate amount of stool throughout  the colon. No distended loop of bowel or air-fluid level is seen to  suggest an obstruction. There are surgical changes in the spine and  surgical clips in the right upper abdomen. No other abnormality is  seen.     ERA GRUBBS MD              Consultations:   Psychiatry          Hospital Course by Problem:    # Abdominal pain w/ intractable nausea/vomiting - Patient has known ventral hernia without incarceration.  She is well known to [facility] and has been unable to follow up on plans for elective surgical repair due to decompensated psychiatric illness necessitating readmission to inpatient Psychiatry. She has had multiple hospital admissions (at least since August 2015) with similar  presentation of nausea, vomiting, and abdominal pain without need for emergent surgical intervention.  AXRs on 5/28 shows small to moderate stool, negative for obstruction.  Abd US on 5/27/17 shows periumbilical hernia containing mesenteric fat w/o incarceration.  LFTs unremarkable. Patient currently tolerated PO diet and liquids, however she had multiple episodes of self-induced emesis following medication administration on 5/30.  Continue to offer PO medications for pain control.  Continue Zofran for nausea, pt has normal QTc. Patient should follow up OP to schedule elective hernia repair.     # Schizoaffective disorder, anxiety - Patient with recent psychiatric hospitalizations, 3/20/17 through 4/4/17, 4/12/17 through 5/15/17.  Patient was admitted again on 5/25/17 and transferred to Internal Medicine on Station 10A due to complaints of increased nausea, vomiting, and abdominal pain.  Psychiatry was consulted and patient was resumed on PTA Lithium and Zyprexa.  Patient will be transferred back to inpatient psychiatry today 5/31. Currently in no acute distress with subdued behaviors.     # Hypernatremia - Na 146 on 5/29, improved to 142 at time of discharge.  Likely 2/2 dehydration in setting of recent vomiting, isotonic fluid administration.  Continue to encourage PO fluid intake.       Physical Exam on day of discharge:  Blood pressure 105/67, pulse 83, temperature 97  F (36.1  C), temperature source Oral, resp. rate 16, SpO2 98 %, not currently breastfeeding.  GENERAL: Alert and oriented x 3. Well nourished, well developed. Resting comfortably in bed.   HEENT: Anicteric sclera. Mucous membranes moist.   CV: RRR. S1, S2. No murmurs appreciated.   RESPIRATORY: Effort normal on room air. Lungs CTAB with no wheezing, rales, rhonchi.   GI: Abdomen soft and non distended, bowel sounds present. Palpable ventral hernia. No tenderness, rebound, guarding.   MUSCULOSKELETAL: No joint swelling or tenderness. Moves all  extremities.   NEUROLOGICAL: No focal deficits.   EXTREMITIES: No peripheral edema. Intact bilateral pedal pulses.   SKIN: No jaundice. No rashes.               Pending Results:     Unresulted Labs Ordered in the Past 30 Days of this Admission     No orders found from 3/28/2017 to 5/28/2017.               Discharge Medications:     Discharge Medication List as of 5/31/2017  2:52 PM      START taking these medications    Details   acetaminophen (TYLENOL) 325 MG tablet Take 2 tablets (650 mg) by mouth every 4 hours as needed for mild pain, Disp-100 tablet, Transitional      ondansetron (ZOFRAN-ODT) 4 MG ODT tab Take 1 tablet (4 mg) by mouth every 6 hours as needed for nausea, Disp-120 tablet, Transitional         CONTINUE these medications which have NOT CHANGED    Details   lithium (ESKALITH/LITHOBID) 300 MG CR tablet Take 2 tablets (600 mg) by mouth 2 times daily, Disp-120 tablet, R-0, E-Prescribe      hydrOXYzine (ATARAX) 25 MG tablet Take 1-2 tablets (25-50 mg) by mouth 3 times daily as needed for anxiety, Disp-120 tablet, R-0, E-Prescribe      !! OLANZapine (ZYPREXA) 15 MG tablet Take 1 tablet (15 mg) by mouth At Bedtime, Disp-30 tablet, R-0, E-Prescribe      !! OLANZapine (ZYPREXA) 5 MG tablet Take 1 tablet (5 mg) by mouth daily, Disp-30 tablet, R-0, E-Prescribe      folic acid (FOLVITE) 1 MG tablet Take 1 tablet (1 mg) by mouth daily, Disp-30 tablet, R-0, E-Prescribe      senna-docusate (SENOKOT-S;PERICOLACE) 8.6-50 MG per tablet Take 1 tablet by mouth 2 times daily as needed for constipation, Disp-100 tablet, R-0, E-Prescribe      multivitamin, therapeutic with minerals (THERA-VIT-M) TABS tablet Take 1 tablet by mouth daily, Disp-30 each, R-0, E-Prescribe      pantoprazole (PROTONIX) 40 MG EC tablet Take 1 tablet (40 mg) by mouth every morning, Disp-30 tablet, R-0, E-Prescribe       !! - Potential duplicate medications found. Please discuss with provider.               Discharge Instructions and Follow-Up:      Discharge Procedure Orders  General info for SNF   Order Comments: Transfer to in-patient psychiatry     Reason for your hospital stay   Order Comments: You were transferred to Internal Medicine for management of severe nausea and vomiting.     Activity - Up ad pravin   Order Specific Question Answer Comments   Is discharge order? Yes      Follow Up ([facility])   Order Comments: Follow up with primary care provider, Kary Quintero, within 7 days for hospital follow- up.  Will need to reschedule elective hernia surgery once stabilized from psychiatric standpoint.     Appointments on [place] and/or [facility] (with [facility] or [facility] provider or service). Call [phone] if you haven't heard regarding these appointments within 7 days of discharge.     Full Code     Advance Diet as Tolerated   Order Comments: Follow this diet upon discharge: Regular   Order Specific Question Answer Comments   Is discharge order? Yes                  Discharge Disposition:   Transfer to inpatient psychiatry          Condition on Discharge:   Discharge condition: Stable   Code status on discharge: Full Code        35 minutes spent in discharge, including >50% in counseling and coordination of care, medication review and plan of care recommended on follow up.     Patient was evaluated on day of discharge by attending physician, Michael Beltre MD, who agrees with plan of care.    Discharge summary was forwarded to Kary Quintero (PCP) at the time of discharge, so as to bridge from hospital to outpatient care.     It was our pleasure to care for Daisy Curtis during this hospitalization. Please do not hesitate to contact me should there be questions regarding the hospital course or discharge plan.      Digna Pappas CNP  Hospitalist Service   Pager: [phone]

## 2017-06-01 NOTE — PLAN OF CARE
Problem: Psychotic Symptoms  Goal: Psychotic Symptoms  Signs and symptoms of listed problems will be absent or manageable.   Outcome: No Change  Patient readmitted today during the day shift from medical unit (10A). She was admitted to 10A due to several abdominal pains, nausea and vomiting. She is medically cleared and appears stable at this time. She denies any pain or discomfort.  She is fully alert and oriented. No psychosis symptoms observed on her at this evening. She is visible in milieu pacing back and forth.  She request transfer to less restrictive unit; she was advised to talk with her primary doctor tomorrow. No agitating behaviors. No SI/SIB.

## 2017-06-02 PROCEDURE — 12400003 ZZH R&B MH CRITICAL UMMC

## 2017-06-02 PROCEDURE — 25000132 ZZH RX MED GY IP 250 OP 250 PS 637: Performed by: PSYCHIATRY & NEUROLOGY

## 2017-06-02 PROCEDURE — 99232 SBSQ HOSP IP/OBS MODERATE 35: CPT | Performed by: PSYCHIATRY & NEUROLOGY

## 2017-06-02 RX ADMIN — PANTOPRAZOLE SODIUM 40 MG: 40 TABLET, DELAYED RELEASE ORAL at 10:59

## 2017-06-02 RX ADMIN — LITHIUM CARBONATE 600 MG: 300 TABLET, EXTENDED RELEASE ORAL at 19:06

## 2017-06-02 RX ADMIN — MULTIPLE VITAMINS W/ MINERALS TAB 1 TABLET: TAB at 10:58

## 2017-06-02 RX ADMIN — LITHIUM CARBONATE 600 MG: 300 TABLET, EXTENDED RELEASE ORAL at 10:57

## 2017-06-02 RX ADMIN — OLANZAPINE 15 MG: 15 TABLET, FILM COATED ORAL at 19:06

## 2017-06-02 RX ADMIN — NICOTINE POLACRILEX 4 MG: 2 GUM, CHEWING ORAL at 15:06

## 2017-06-02 RX ADMIN — NICOTINE POLACRILEX 4 MG: 2 GUM, CHEWING ORAL at 21:49

## 2017-06-02 RX ADMIN — NICOTINE POLACRILEX 4 MG: 2 GUM, CHEWING ORAL at 13:01

## 2017-06-02 RX ADMIN — NICOTINE POLACRILEX 4 MG: 2 GUM, CHEWING ORAL at 16:13

## 2017-06-02 RX ADMIN — NICOTINE POLACRILEX 4 MG: 2 GUM, CHEWING ORAL at 12:03

## 2017-06-02 RX ADMIN — OLANZAPINE 5 MG: 5 TABLET, FILM COATED ORAL at 10:59

## 2017-06-02 RX ADMIN — NICOTINE POLACRILEX 4 MG: 2 GUM, CHEWING ORAL at 20:13

## 2017-06-02 RX ADMIN — FOLIC ACID 1 MG: 1 TABLET ORAL at 10:59

## 2017-06-02 RX ADMIN — NICOTINE POLACRILEX 4 MG: 2 GUM, CHEWING ORAL at 10:59

## 2017-06-02 RX ADMIN — NICOTINE POLACRILEX 4 MG: 2 GUM, CHEWING ORAL at 18:04

## 2017-06-02 RX ADMIN — DEXTRAN 70 AND HYPROMELLOSE 2910 1 DROP: 1; 3 SOLUTION/ DROPS OPHTHALMIC at 17:01

## 2017-06-02 RX ADMIN — NICOTINE POLACRILEX 4 MG: 2 GUM, CHEWING ORAL at 17:01

## 2017-06-02 ASSESSMENT — ACTIVITIES OF DAILY LIVING (ADL)
DRESS: SCRUBS (BEHAVIORAL HEALTH)
HYGIENE/GROOMING: INDEPENDENT
ORAL_HYGIENE: INDEPENDENT
LAUNDRY: UNABLE TO COMPLETE

## 2017-06-02 NOTE — PROGRESS NOTES
faxed a referral to [facility] today.  Intake person is Margaret at [phone].    I also called [facility] in [place] to check on the status of that referral that was made within the past few weeks.   (Updated records were sent to them on 5-26).   Their intake person Aracelis will call me back on Mon June 5th.

## 2017-06-02 NOTE — PROGRESS NOTES
"Patient presented with blunted, flat affect and calm, pleasant mood this shift. Patient complained of feeling \"like shit\", congested and lethargic for much of shift. Patient expressed desire to shower in hopes that shower steam might temporarily ease her congestion, but fell asleep in room before staff could retrieve shower supplies for her. Staff allowed patient to continue sleeping. Patient was alert and oriented while speaking with staff and demonstrated an organized thought process. Patient denied any other physical discomfort or emotional distress. Will continue to monitor and assess.         06/01/17 3710   Behavioral Health   Hallucinations denies / not responding to hallucinations   Thinking intact   Orientation person: oriented;place: oriented;date: oriented   Memory baseline memory   Insight insight appropriate to situation   Judgement impaired   Eye Contact at examiner   Affect blunted, flat   Mood mood is calm   Physical Appearance/Attire disheveled   Hygiene neglected grooming - unclean body, hair, teeth   Suicidality other (see comments)  (none endorsed or observed)   Self Injury other (see comment)  (none endorsed or observed)   Activity withdrawn;other (see comment)  (lethargic/tired)   Speech clear;coherent   Medication Sensitivity no stated side effects;no observed side effects   Psychomotor / Gait slow;balanced;steady   Activities of Daily Living   Hygiene/Grooming independent  (expressed desire to shower, but fell asleep early )   Oral Hygiene independent   Dress scrubs (behavioral health)   Laundry unable to complete   Room Organization independent   Behavioral Health Interventions   Psychotic Symptoms maintain safety precautions;monitor need to revise level of observation;maintain safe secure environment;reality orientation;simple, clear language;decrease environmental stimulation;redirection of intrusive behaviors;redirection of aggressive behaviors;assist patient in developing safety " plan;assist patient in following safety plan;encourage nutrition and hydration;encourage participation / independence with adls;provide emotional support;assist with developing & utilizing healthy coping strategies;establish therapeutic relationship;build upon strengths;assess patient response to medication;assess medication adherance;monitor need for prn medication;monitor confusion, memory loss, decision making ability and reorient / intervent as needed;assess & implement appropriate substance withdrawal protocol;monitor substance withdrawal process   Social and Therapeutic Interventions (Psychotic Symptoms) encourage socialization with peers;encourage effective boundaries with peers;encourage participation in therapeutic groups and milieu activities
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
## 2017-06-03 PROCEDURE — 12400003 ZZH R&B MH CRITICAL UMMC

## 2017-06-03 PROCEDURE — 25000132 ZZH RX MED GY IP 250 OP 250 PS 637: Performed by: PSYCHIATRY & NEUROLOGY

## 2017-06-03 RX ADMIN — FOLIC ACID 1 MG: 1 TABLET ORAL at 11:52

## 2017-06-03 RX ADMIN — MULTIPLE VITAMINS W/ MINERALS TAB 1 TABLET: TAB at 11:52

## 2017-06-03 RX ADMIN — PANTOPRAZOLE SODIUM 40 MG: 40 TABLET, DELAYED RELEASE ORAL at 11:52

## 2017-06-03 RX ADMIN — NICOTINE POLACRILEX 4 MG: 2 GUM, CHEWING ORAL at 12:34

## 2017-06-03 RX ADMIN — NICOTINE POLACRILEX 4 MG: 2 GUM, CHEWING ORAL at 18:23

## 2017-06-03 RX ADMIN — LITHIUM CARBONATE 600 MG: 300 TABLET, EXTENDED RELEASE ORAL at 19:30

## 2017-06-03 RX ADMIN — NICOTINE POLACRILEX 4 MG: 2 GUM, CHEWING ORAL at 16:00

## 2017-06-03 RX ADMIN — NICOTINE POLACRILEX 4 MG: 2 GUM, CHEWING ORAL at 17:22

## 2017-06-03 RX ADMIN — NICOTINE POLACRILEX 4 MG: 2 GUM, CHEWING ORAL at 13:41

## 2017-06-03 RX ADMIN — OLANZAPINE 15 MG: 15 TABLET, FILM COATED ORAL at 19:31

## 2017-06-03 RX ADMIN — NICOTINE POLACRILEX 4 MG: 2 GUM, CHEWING ORAL at 19:31

## 2017-06-03 RX ADMIN — NICOTINE POLACRILEX 4 MG: 2 GUM, CHEWING ORAL at 20:32

## 2017-06-03 RX ADMIN — LITHIUM CARBONATE 600 MG: 300 TABLET, EXTENDED RELEASE ORAL at 11:52

## 2017-06-03 RX ADMIN — OLANZAPINE 5 MG: 5 TABLET, FILM COATED ORAL at 11:52

## 2017-06-03 RX ADMIN — DEXTRAN 70 AND HYPROMELLOSE 2910 1 DROP: 1; 3 SOLUTION/ DROPS OPHTHALMIC at 16:46

## 2017-06-03 ASSESSMENT — ACTIVITIES OF DAILY LIVING (ADL)
ORAL_HYGIENE: INDEPENDENT
HYGIENE/GROOMING: INDEPENDENT
LAUNDRY: UNABLE TO COMPLETE
ORAL_HYGIENE: INDEPENDENT
DRESS: SCRUBS (BEHAVIORAL HEALTH);INDEPENDENT
DRESS: SCRUBS (BEHAVIORAL HEALTH)
GROOMING: INDEPENDENT

## 2017-06-03 NOTE — PROGRESS NOTES
Patient presented with bright affect this shift and reported feeling much better than the previous day, due to decreased congestion and lethargy. Patient was pleasant upon approach and visible in the milieu for much of shift. Patient made no complaints of physical discomfort or emotional distress. Patient did appear to have some contentious conversations with family members over the phone this evening, but didn't exhibit any agitation or aggressive behaviors. Will continue to monitor and assess.       06/02/17 2080   Behavioral Health   Hallucinations denies / not responding to hallucinations   Thinking intact   Orientation person: oriented;place: oriented   Memory baseline memory   Insight insight appropriate to situation   Judgement impaired   Eye Contact at examiner   Affect full range affect   Mood mood is calm   Physical Appearance/Attire appears stated age;attire appropriate to age and situation   Hygiene neglected grooming - unclean body, hair, teeth   Suicidality other (see comments)  (none endorsed or observed)   Self Injury other (see comment)  (none endorsed or observed)   Activity other (see comment)  (unremarkable/appropriate)   Speech clear;coherent   Medication Sensitivity no stated side effects;no observed side effects   Psychomotor / Gait balanced;steady   Activities of Daily Living   Hygiene/Grooming independent   Oral Hygiene independent   Dress scrubs (behavioral health)   Laundry unable to complete   Room Organization independent   Behavioral Health Interventions   Psychotic Symptoms maintain safe secure environment;maintain safety precautions;monitor need to revise level of observation;reality orientation;simple, clear language;decrease environmental stimulation;redirection of intrusive behaviors;redirection of aggressive behaviors;assist patient in developing safety plan;assist patient in following safety plan;encourage nutrition and hydration;encourage participation / independence with  adls;provide emotional support;establish therapeutic relationship;assist with developing & utilizing healthy coping strategies;build upon strengths;assess patient response to medication;assess medication adherance;monitor need for prn medication;monitor confusion, memory loss, decision making ability and reorient / intervent as needed;assess & implement appropriate substance withdrawal protocol;monitor substance withdrawal process   Social and Therapeutic Interventions (Psychotic Symptoms) encourage socialization with peers;encourage effective boundaries with peers;encourage participation in therapeutic groups and milieu activities

## 2017-06-03 NOTE — PROGRESS NOTES
"[facility], [place]   Psychiatric Progress Note, Hospital Day #2        Interim History:   The patient's care was discussed with the treatment team during the daily team meeting and/or staff's chart notes were reviewed. Staff report pt slept 7 hours, has had no behavior problems, has been calm and pleasant.  Pt says today her mood is \"all right\" and she denies having SE's.  She says she slept well.  She denies feeling scared or having AH.  She denies having racing or bothersome thoughts.  She says she wants to discharge soon, says she thinks she could succeed at [facility] even if she had to have a roommate there.  The patient had no further complaints or requests.          Medications:       lithium  600 mg Oral BID     folic acid  1 mg Oral Daily     multivitamin, therapeutic with minerals  1 tablet Oral Daily     OLANZapine  15 mg Oral At Bedtime     OLANZapine  5 mg Oral Daily     pantoprazole  40 mg Oral QAM          Allergies:     Allergies   Allergen Reactions     Codeine Sulfate      Compazine Rash     Morphine Nausea and Vomiting     Prochlorperazine Rash          Labs/Imaging:     No results found for this or any previous visit (from the past 24 hour(s)).     Brain MRI (4/25):  IMPRESSION: Negative brain and sella MRI examination.    - ALAINA GRAFF MD         Psychiatric Examination:   /77  Pulse 94  Temp 98.1  F (36.7  C)  Resp 14  LMP  (LMP Unknown)  SpO2 98%  Weight is 0 lbs 0 oz  There is no height or weight on file to calculate BMI.    Appearance: adequately groomed, casually dressed in scrubs, no acute distress lying in bed  Attitude:  Cooperative  Eye Contact: good  Mood:  \"all right\"  Affect: mood congruent, neutral  Speech:  clear, coherent and normal prosody  Language: English language w/ appropriate syntax and vocabulary  Psychomotor Behavior: less fidgeting, no tremor noted  Gait/Station: normal gait  Thought Process: continues to be more logical and " linear, reports decreased racing thoughts  Associations: less loosening of associations present   Thought Content:  no evidence of suicidal ideation or homicidal ideation, not responding to internal stimuli  Insight:  limited, but improving  Judgement:  limited, but improving  Oriented to:  time, person, and place  Attention Span and Concentration:  fair  Recent and Remote Memory:  intact  Fund of Knowledge: likely below average, not formally evaluated         Precautions:     Behavioral Orders   Procedures     Code 1 - Restrict to Unit     Routine Programming     As clinically indicated     Status 15     Every 15 minutes.          Diagnoses:     Schizoaffective Disorder, bipolar type  Cocaine Use Disorder  Benzodiazepine Use Disorder         Plan:     Continue current meds    Pt's manic sxs seem to be improving steadily.  Since she failed so quickly after her last discharge (to [facility]) the [facility] recommended an IRTS with no roommate (since she tends to become paranoid about her roommates).  However, this may not be an option.  Pt has done well at [facility] in the past and would like to return there, so CM will check on this.  Pt is committed and has an ACT team.

## 2017-06-03 NOTE — PLAN OF CARE
"Problem: Psychotic Symptoms  Goal: Psychotic Symptoms  Signs and symptoms of listed problems will be absent or manageable.   Outcome: Improving  Patient is alert and fully oriented.  She presents as calm, pleasant, and cooperative on approach.   No episodes of agitation or aggressive behavior.  She spent the first half of the shift napping and resting in bed.  In the afternoon, Daisy was visible in the milieu and social with staff and peers.  Accepted all of her scheduled AM medications without incident, and she reports that she is tolerating her current regimen without any adverse side effects reported or observed.  She denies that she is experiencing any psychotic symptoms.  She is hopeful that the [employer] IRTS program will accept her, as she has been in this program in the past and really enjoyed her stay there.  She denies any acute physical concerns today. She declined to have her vital signs assessed, stating \"I'm fine!\".      "

## 2017-06-04 LAB
ALBUMIN SERPL-MCNC: 4.2 G/DL (ref 3.4–5)
ALP SERPL-CCNC: 78 U/L (ref 40–150)
ALT SERPL W P-5'-P-CCNC: 53 U/L (ref 0–50)
ANION GAP SERPL CALCULATED.3IONS-SCNC: 10 MMOL/L (ref 3–14)
AST SERPL W P-5'-P-CCNC: 19 U/L (ref 0–45)
BILIRUB SERPL-MCNC: 0.4 MG/DL (ref 0.2–1.3)
BUN SERPL-MCNC: 7 MG/DL (ref 7–30)
CALCIUM SERPL-MCNC: 10.2 MG/DL (ref 8.5–10.1)
CHLORIDE SERPL-SCNC: 106 MMOL/L (ref 94–109)
CO2 SERPL-SCNC: 27 MMOL/L (ref 20–32)
CREAT SERPL-MCNC: 0.69 MG/DL (ref 0.52–1.04)
CRP SERPL-MCNC: 4.6 MG/L (ref 0–8)
GFR SERPL CREATININE-BSD FRML MDRD: ABNORMAL ML/MIN/1.7M2
GLUCOSE SERPL-MCNC: 116 MG/DL (ref 70–99)
LACTATE BLD-SCNC: 1.9 MMOL/L (ref 0.7–2.1)
MAGNESIUM SERPL-MCNC: 2 MG/DL (ref 1.6–2.3)
PHOSPHATE SERPL-MCNC: 3.3 MG/DL (ref 2.5–4.5)
POTASSIUM SERPL-SCNC: 3.8 MMOL/L (ref 3.4–5.3)
PROT SERPL-MCNC: 8.3 G/DL (ref 6.8–8.8)
SODIUM SERPL-SCNC: 143 MMOL/L (ref 133–144)

## 2017-06-04 PROCEDURE — 25000132 ZZH RX MED GY IP 250 OP 250 PS 637: Performed by: PSYCHIATRY & NEUROLOGY

## 2017-06-04 PROCEDURE — 36415 COLL VENOUS BLD VENIPUNCTURE: CPT | Performed by: NURSE PRACTITIONER

## 2017-06-04 PROCEDURE — 84100 ASSAY OF PHOSPHORUS: CPT | Performed by: NURSE PRACTITIONER

## 2017-06-04 PROCEDURE — 25000125 ZZHC RX 250: Performed by: PSYCHIATRY & NEUROLOGY

## 2017-06-04 PROCEDURE — 12400003 ZZH R&B MH CRITICAL UMMC

## 2017-06-04 PROCEDURE — 86140 C-REACTIVE PROTEIN: CPT | Performed by: NURSE PRACTITIONER

## 2017-06-04 PROCEDURE — 83735 ASSAY OF MAGNESIUM: CPT | Performed by: NURSE PRACTITIONER

## 2017-06-04 PROCEDURE — 83605 ASSAY OF LACTIC ACID: CPT | Performed by: NURSE PRACTITIONER

## 2017-06-04 PROCEDURE — 80053 COMPREHEN METABOLIC PANEL: CPT | Performed by: NURSE PRACTITIONER

## 2017-06-04 PROCEDURE — 25000125 ZZHC RX 250: Performed by: INTERNAL MEDICINE

## 2017-06-04 PROCEDURE — 25000128 H RX IP 250 OP 636: Performed by: INTERNAL MEDICINE

## 2017-06-04 RX ORDER — KETOROLAC TROMETHAMINE 30 MG/ML
30 INJECTION, SOLUTION INTRAMUSCULAR; INTRAVENOUS ONCE
Status: COMPLETED | OUTPATIENT
Start: 2017-06-04 | End: 2017-06-04

## 2017-06-04 RX ORDER — ONDANSETRON 4 MG/1
4 TABLET, FILM COATED ORAL ONCE
Status: COMPLETED | OUTPATIENT
Start: 2017-06-04 | End: 2017-06-04

## 2017-06-04 RX ADMIN — KETOROLAC TROMETHAMINE 30 MG: 30 INJECTION, SOLUTION INTRAMUSCULAR at 03:45

## 2017-06-04 RX ADMIN — HYDROXYZINE HYDROCHLORIDE 50 MG: 25 TABLET ORAL at 02:30

## 2017-06-04 RX ADMIN — NICOTINE POLACRILEX 4 MG: 2 GUM, CHEWING ORAL at 18:47

## 2017-06-04 RX ADMIN — HYDROXYZINE HYDROCHLORIDE 50 MG: 25 TABLET ORAL at 03:01

## 2017-06-04 RX ADMIN — ACETAMINOPHEN 650 MG: 325 TABLET, FILM COATED ORAL at 02:30

## 2017-06-04 RX ADMIN — ONDANSETRON HYDROCHLORIDE 4 MG: 4 TABLET, FILM COATED ORAL at 02:09

## 2017-06-04 RX ADMIN — ONDANSETRON 4 MG: 4 TABLET, ORALLY DISINTEGRATING ORAL at 01:29

## 2017-06-04 RX ADMIN — LITHIUM CARBONATE 600 MG: 300 TABLET, EXTENDED RELEASE ORAL at 19:16

## 2017-06-04 RX ADMIN — ACETAMINOPHEN 650 MG: 325 TABLET, FILM COATED ORAL at 03:01

## 2017-06-04 RX ADMIN — OLANZAPINE 15 MG: 15 TABLET, FILM COATED ORAL at 19:16

## 2017-06-04 ASSESSMENT — ACTIVITIES OF DAILY LIVING (ADL)
GROOMING: INDEPENDENT
LAUNDRY: UNABLE TO COMPLETE
ORAL_HYGIENE: INDEPENDENT
DRESS: SCRUBS (BEHAVIORAL HEALTH)

## 2017-06-04 NOTE — PROGRESS NOTES
Patient was up most part of the night complaining of nausea, abdominal, and she had an emesis. Zofran 4mg was given to patient right after the emesis but she threw up right after taking the medication and requested to be transferred to medical. The house officer was called and a one time dose of zofran 4mg was ordered for her in addition to tylenol for pain and hydroxyzine for anxiety. Patient was given the Zofran first and she did express some relief with the nausea and vomiting but continues to complain of abdominal. Patient was given tylenol and vistaril but she threw up the medication right after swallowing it. The tylenol and vistaril was repeated with no relief. Patient was loud and disruptive the whole time, making noise in the hallway and in her room. Patient requested to be transferred to the medical floor several times so IV pain medications could be administered. The house officer was called a second time and an order for Toradol 30mg IM was ordered with warm pack to help with abdominal pain. A few minutes after getting the toradol, she asked writer if she can get another dose but writer told her the order is only for a one time dose. Patient continues to complain of abdominal pain, pacing, and sometimes writhing in pain and requesting to go to the medical floor. Patient finally settled down around 5:00am and appears to be sleeping. Patient will continue to be monitored per unit protocol.

## 2017-06-04 NOTE — PROGRESS NOTES
Daisy has been resting comfortably in her room. No s/s of acute distress. No c/o nausea or vomiting. No episodes of emesis. Seen by internal medicine service today. She has not ate or drank anything today.  Diet will advance as tolerated. Will continues to monitor symptoms.

## 2017-06-04 NOTE — CONSULTS
Medicine Transfer Note, 6/4/17:    Internal Medicine consulted for worsening lower abdominal pain.  Patient is a 31 year old female with past medical history significant for asthma, chronic abdominal pain 2/2 known ventral hernia, recurrent nausea and vomiting, polysubstance abuse, schizoaffective disorder, and anxiety.     Internal medicine was contacted today 6/4/17 due to continued complaints of nausea, vomiting, and abdominal pain consistent with previous medicine admission from 5/27 - 5/31.  RN on Sta 12 reports that patient had two episodes of emesis during the evening 6/3 after taking oral pain medication. This morning she continues to report lower abdominal pain, and wandering the halls moaning and grunting.  Per chart review, the patient is asking to transferred to medicine for IV pain medication.  Per chart review, no episodes of pain, nausea, or emesis during day shift 6/3, with patient refusing vital sign assessment.     Patient seen and examined today 6/4 at the request of [facility] 12 RN staff.  She denied abdominal pain on my interview and exam.  She is asking to eat and drink.  She had no further episodes of emesis today 6/4.  CMP added. Mag, Phos within normal limits. CRP unremarkable. Hgb, hct, WBC wnl. Lactic acid 1.9.  ALT slightly elevated to 53.  Calcium 10.2, corrected value w/ albumin 4.2 wnl at 10.0.  UA added, not yet obtained.       Given this is a chronic problem for the patient, IM will follow peripherally and monitor electrolytes, CRP,  lactic acid levels, and vitals, given recurrent episodes of vomiting.  Recommend treating pain with Tylenol and heat packs.  Avoid IM, IV pain medications as much as possible.  Continue to monitor for worsening emesis, bloody emesis, inability to ambulate/perform ADLs d/t pain.  Continue Protonix QAM.  Patient will need follow up with surgery at [facility] where she currently has established care.      Medicine will follow peripherally - please feel free to contact  us if patient's condition changes or worsens.      Digna Pappas CNP  Hospitalist Service   Pager: [phone]

## 2017-06-04 NOTE — PROGRESS NOTES
Daisy is periodically running up and down the halls moaning. She reports lower abdominal pain above the pubis area. She is redirected to her room. She has requested to take a shower but was encourage to rest in her room.  She was informed to not eat or drink anything. Vital signs obtained. Currently in her room laying on her belly.

## 2017-06-04 NOTE — PROGRESS NOTES
Patient is quiet and sleeping in her room.  She has remained NPO.  No observed emisis. No c/o nausea and vomiting.  Patient requested and took shower. Labs drawn.

## 2017-06-04 NOTE — PROGRESS NOTES
Patient presented with bright affect and was pleasant upon approach this evening. Patient expressed some frustration that she was not able to reach her mother on the phone all shift, but exhibited no episodes of agitation or aggression. Patient continues to display insight and demonstrate an organized thought process in conversations with staff. Patient made no complaints of physical discomfort or emotional distress. Will continue to monitor and assess.       06/03/17 2503   Behavioral Health   Hallucinations denies / not responding to hallucinations   Thinking intact   Orientation person: oriented;place: oriented;date: oriented;time: oriented   Memory baseline memory   Insight insight appropriate to situation   Judgement impaired   Eye Contact at examiner   Affect full range affect   Mood mood is calm   Physical Appearance/Attire other (see comment)  (appropriate/unremarkable)   Hygiene other (see comment)  (appropriate/unremarkable)   Suicidality other (see comments)  (none endorsed or observed)   Self Injury other (see comment)  (none endorsed or observed)   Activity withdrawn   Speech clear;coherent   Medication Sensitivity no stated side effects;no observed side effects   Psychomotor / Gait balanced;steady   Coping/Psychosocial   Verbalized Emotional State frustration   Supportive Measures active listening utilized;positive reinforcement provided;relaxation techniques promoted;self-care encouraged;verbalization of feelings encouraged   Trust Relationship/Rapport empathic listening provided;thoughts/feelings acknowledged   Activities of Daily Living   Hygiene/Grooming independent   Oral Hygiene independent   Dress scrubs (behavioral health)   Laundry unable to complete   Room Organization independent   Behavioral Health Interventions   Psychotic Symptoms maintain safety precautions;monitor need to revise level of observation;maintain safe secure environment;reality orientation;simple, clear language;decrease  environmental stimulation;redirection of intrusive behaviors;redirection of aggressive behaviors;assist patient in developing safety plan;assist patient in following safety plan;encourage nutrition and hydration;encourage participation / independence with adls;provide emotional support;establish therapeutic relationship;assist with developing & utilizing healthy coping strategies;build upon strengths;assess patient response to medication;assess medication adherance;monitor need for prn medication;monitor confusion, memory loss, decision making ability and reorient / intervent as needed;assess & implement appropriate substance withdrawal protocol;monitor substance withdrawal process   Social and Therapeutic Interventions (Psychotic Symptoms) encourage socialization with peers;encourage effective boundaries with peers;encourage participation in therapeutic groups and milieu activities

## 2017-06-05 PROCEDURE — 25000125 ZZHC RX 250: Performed by: PHYSICIAN ASSISTANT

## 2017-06-05 PROCEDURE — 25000132 ZZH RX MED GY IP 250 OP 250 PS 637: Performed by: PHYSICIAN ASSISTANT

## 2017-06-05 PROCEDURE — 25000132 ZZH RX MED GY IP 250 OP 250 PS 637: Performed by: PSYCHIATRY & NEUROLOGY

## 2017-06-05 PROCEDURE — 99232 SBSQ HOSP IP/OBS MODERATE 35: CPT | Performed by: PSYCHIATRY & NEUROLOGY

## 2017-06-05 PROCEDURE — 12400003 ZZH R&B MH CRITICAL UMMC

## 2017-06-05 PROCEDURE — H2032 ACTIVITY THERAPY, PER 15 MIN: HCPCS

## 2017-06-05 RX ORDER — SCOLOPAMINE TRANSDERMAL SYSTEM 1 MG/1
1 PATCH, EXTENDED RELEASE TRANSDERMAL
Status: DISCONTINUED | OUTPATIENT
Start: 2017-06-05 | End: 2017-06-13

## 2017-06-05 RX ORDER — LIDOCAINE 50 MG/G
2 PATCH TOPICAL
Status: DISCONTINUED | OUTPATIENT
Start: 2017-06-05 | End: 2017-06-13

## 2017-06-05 RX ADMIN — NICOTINE POLACRILEX 4 MG: 2 GUM, CHEWING ORAL at 18:11

## 2017-06-05 RX ADMIN — NICOTINE POLACRILEX 4 MG: 2 GUM, CHEWING ORAL at 13:30

## 2017-06-05 RX ADMIN — NICOTINE POLACRILEX 4 MG: 2 GUM, CHEWING ORAL at 12:03

## 2017-06-05 RX ADMIN — PANTOPRAZOLE SODIUM 40 MG: 40 TABLET, DELAYED RELEASE ORAL at 10:33

## 2017-06-05 RX ADMIN — LITHIUM CARBONATE 600 MG: 300 TABLET, EXTENDED RELEASE ORAL at 10:33

## 2017-06-05 RX ADMIN — MULTIPLE VITAMINS W/ MINERALS TAB 1 TABLET: TAB at 10:33

## 2017-06-05 RX ADMIN — SCOPALAMINE 1 PATCH: 1 PATCH, EXTENDED RELEASE TRANSDERMAL at 10:34

## 2017-06-05 RX ADMIN — NICOTINE POLACRILEX 4 MG: 2 GUM, CHEWING ORAL at 14:57

## 2017-06-05 RX ADMIN — NICOTINE POLACRILEX 4 MG: 2 GUM, CHEWING ORAL at 19:51

## 2017-06-05 RX ADMIN — OLANZAPINE 5 MG: 5 TABLET, FILM COATED ORAL at 10:33

## 2017-06-05 RX ADMIN — NICOTINE POLACRILEX 4 MG: 2 GUM, CHEWING ORAL at 16:24

## 2017-06-05 RX ADMIN — LITHIUM CARBONATE 600 MG: 300 TABLET, EXTENDED RELEASE ORAL at 19:48

## 2017-06-05 RX ADMIN — OLANZAPINE 15 MG: 15 TABLET, FILM COATED ORAL at 19:48

## 2017-06-05 RX ADMIN — NICOTINE POLACRILEX 4 MG: 2 GUM, CHEWING ORAL at 21:29

## 2017-06-05 RX ADMIN — Medication: at 10:35

## 2017-06-05 RX ADMIN — LIDOCAINE 2 PATCH: 50 PATCH CUTANEOUS at 11:01

## 2017-06-05 RX ADMIN — Medication: at 16:54

## 2017-06-05 RX ADMIN — FOLIC ACID 1 MG: 1 TABLET ORAL at 10:33

## 2017-06-05 ASSESSMENT — ACTIVITIES OF DAILY LIVING (ADL)
DRESS: SCRUBS (BEHAVIORAL HEALTH)
HYGIENE/GROOMING: INDEPENDENT
ORAL_HYGIENE: INDEPENDENT
LAUNDRY: UNABLE TO COMPLETE
GROOMING: INDEPENDENT
ORAL_HYGIENE: INDEPENDENT
DRESS: SCRUBS (BEHAVIORAL HEALTH)

## 2017-06-05 NOTE — PROGRESS NOTES
"   06/05/17 0718   Significant Event   Significant Event Other (see comments)  (shift summary)   Pt had an uneventful shift. She was out in the milieu, appropriate with staff and peers and participated in groups. In 1:1 with writer she stated that her pain was \"a lot better.\" She stated that she had no more sx of nausea and asked to be changed back to a regular diet, the order was amended. Pt expressed excitement to go to another unit \"now that I'm a lot better.\"  "

## 2017-06-05 NOTE — PROGRESS NOTES
Brief Medicine Note:    Internal medicine peripherally following after consult 6/4 for chronic abdomina pain, N/V, known ventral hernia. At that time, there were not further episodes of emesis. Lytes stable, CRP, lactic acid normal 6/4. WBC normal 5/29. Very minimal ALT elevation at 53, other LFTs normal. Of note, ALT was 51 5/29. UA ordered. Per nursing, staff no urinary symptoms at this time.  - Cancel UA given lack of symptoms  - Contact medicine if emesis or pain increase, icterus/jaundice noted, urinary symptoms, or if patient has vital sign abnormalities indicating worsening pain  - Continue protonix  - Hot packs, cold packs, gabapentin cream, Lidoderm patches, scopolamine patch     Medicine will sign off. Please contact with future questions or concerns.    Jamila Hermosillo PA-C  Internal Medicine [facility]  [phone]

## 2017-06-05 NOTE — PROGRESS NOTES
" made follow up calls to IRT's today, where pt has been referred.    -[facility]: They have the referral and we're supposed to call back on June 19th as to whether they'll accept her.   The waiting list is running about 4-6 out.    -[facility]:  Aracelis says they have pt's referral.  They expect to have a bed for a female the week of June 12th.   There are three females including patient who want the bed.    Aracelis says she will call me back as the vacancy approaches.    -[facility]:  I left a vm message for Jason stating we are still interested in patient being placed at one of the [facility] locations ([facility] or [place].)   I asked for a return call.    -[facility]:   I left a vm message for Francine stating we are still interested in patient being placed there.   I asked for a return call.    -[facility]:  I spoke with Diego and he says his group home is very \"acute\" right now so he's not taking new referrals at this time.          "

## 2017-06-05 NOTE — PLAN OF CARE
"Problem: Psychotic Symptoms  Goal: Psychotic Symptoms  Signs and symptoms of listed problems will be absent or manageable.   Outcome: Therapy, progress toward functional goals as expected  Pt continues to present with good behavioral control this shift, denies AH and does not appear to be actively responding. Mood is calm, and affect is somewhat blunted but full range. Re: GI upset and abdominal pain, pt states she was feeling \"much better\" when her request for nicotine gum was denied as she is currently on a clear liquid diet. Pt is initially receptive to feedback and encouragement to minimize oral intake to clear liquids only with the plan to increase her diet as tolerated, however pt states she \"just wanted to go to medical\" and that her previous pain/discomfort was not severe. States she will not falsify symptoms, and was given saltines without issue at HS snack. Pt had eaten 100% of dinner without issue (all clear liquids), denies abdominal discomfort this evening, and has not required or requested PRNs for pain management. Will continue to offer supportive and comfort cares.       "

## 2017-06-06 PROCEDURE — 25000132 ZZH RX MED GY IP 250 OP 250 PS 637: Performed by: PSYCHIATRY & NEUROLOGY

## 2017-06-06 PROCEDURE — 12400001 ZZH R&B MH UMMC

## 2017-06-06 RX ADMIN — NICOTINE POLACRILEX 4 MG: 2 GUM, CHEWING ORAL at 14:33

## 2017-06-06 RX ADMIN — NICOTINE POLACRILEX 4 MG: 2 GUM, CHEWING ORAL at 17:53

## 2017-06-06 RX ADMIN — OLANZAPINE 15 MG: 15 TABLET, FILM COATED ORAL at 20:15

## 2017-06-06 RX ADMIN — LITHIUM CARBONATE 600 MG: 300 TABLET, EXTENDED RELEASE ORAL at 11:35

## 2017-06-06 RX ADMIN — OLANZAPINE 5 MG: 5 TABLET, FILM COATED ORAL at 11:36

## 2017-06-06 RX ADMIN — NICOTINE POLACRILEX 4 MG: 2 GUM, CHEWING ORAL at 22:03

## 2017-06-06 RX ADMIN — NICOTINE POLACRILEX 4 MG: 2 GUM, CHEWING ORAL at 20:13

## 2017-06-06 RX ADMIN — MULTIPLE VITAMINS W/ MINERALS TAB 1 TABLET: TAB at 11:36

## 2017-06-06 RX ADMIN — LITHIUM CARBONATE 600 MG: 300 TABLET, EXTENDED RELEASE ORAL at 20:13

## 2017-06-06 RX ADMIN — FOLIC ACID 1 MG: 1 TABLET ORAL at 11:35

## 2017-06-06 RX ADMIN — NICOTINE POLACRILEX 4 MG: 2 GUM, CHEWING ORAL at 12:51

## 2017-06-06 RX ADMIN — PANTOPRAZOLE SODIUM 40 MG: 40 TABLET, DELAYED RELEASE ORAL at 11:35

## 2017-06-06 RX ADMIN — NICOTINE POLACRILEX 4 MG: 2 GUM, CHEWING ORAL at 18:55

## 2017-06-06 NOTE — PLAN OF CARE
Problem: General Plan of Care (Inpatient Behavioral)  Goal: Team Discussion  Team Plan:   BEHAVIORAL TEAM DISCUSSION     Participants: dr gold, mehreen booth rn, fco pink ot, geraldo mccarty msw  Progress: pt has made progress/improvement.   She is not hostile and psychotic symptoms have improved.   She is med compliant and cooperative with treatment.    She agrees to try another IRT's.     Continued Stay Criteria/Rationale: We are working on getting her placed in an IRT's  Medical/Physical: per internal medicine.   Pt has a hernia.  Precautions:   Behavioral Orders   Procedures     Code 1 - Restrict to Unit     Routine Programming       As clinically indicated     Status 15       Every 15 minutes.     Plan: Continue working on placement.   Pt will be interviewed on [address] at 9am Wed June 7th by Keenan mcdonald [facility].  Coordinate with her Co Jyotsna FLANNERY  [phone].    Pt is under commitment and will need a Provisional Discharge Agreement at time of discharge.  Rationale, if change in precautions or plan

## 2017-06-06 NOTE — PROGRESS NOTES
Pt will be interviewed on the unit Wed 6-7 at 9am by Keenan mcdonald [facility].   If accepted, they have a bed around June 15th.

## 2017-06-06 NOTE — PROGRESS NOTES
"[facility], [place]   Psychiatric Progress Note, Hospital Day #5        Interim History:   The patient's care was discussed with the treatment team during the daily team meeting and/or staff's chart notes were reviewed. Staff report pt c/o abdominal pain throughout the day on Saturday, had some vomiting but by Sunday she was feeling better and tolerating food.  She has not been paranoid or hostile.  She says today her mood is \"all right\" and she says she slept well last night.  She denies SE's.  She denies having abdominal pain today.  She asks about transferring to another unit.  The patient had no further complaints or requests.          Medications:       lidocaine  2 patch Transdermal Q24H     lidocaine   Transdermal Q24h     lidocaine   Transdermal Q8H     gabapentin   Topical Q8H     scopolamine  1 patch Transdermal Q72H    And     scopolamine   Transdermal Q8H    And     [START ON 6/8/2017] scopolamine   Transdermal Q72H     lithium  600 mg Oral BID     folic acid  1 mg Oral Daily     multivitamin, therapeutic with minerals  1 tablet Oral Daily     OLANZapine  15 mg Oral At Bedtime     OLANZapine  5 mg Oral Daily     pantoprazole  40 mg Oral QAM          Allergies:     Allergies   Allergen Reactions     Codeine Sulfate      Compazine Rash     Morphine Nausea and Vomiting     Prochlorperazine Rash          Labs/Imaging:     No results found for this or any previous visit (from the past 24 hour(s)).     Brain MRI (4/25):  IMPRESSION: Negative brain and sella MRI examination.    - ALAINA GRAFF MD         Psychiatric Examination:   /78  Pulse 107  Temp 98.2  F (36.8  C) (Oral)  Resp 20  LMP  (LMP Unknown)  SpO2 98%  Weight is 0 lbs 0 oz  There is no height or weight on file to calculate BMI.    Appearance: adequately groomed, casually dressed in scrubs, no acute distress lying in bed  Attitude:  Cooperative  Eye Contact: good  Mood:  \"all right\"  Affect: mood congruent, " neutral  Speech:  clear, coherent and normal prosody  Language: English language w/ appropriate syntax and vocabulary  Psychomotor Behavior: less fidgeting, no tremor noted  Gait/Station: normal gait  Thought Process: continues to be more logical and linear, reports decreased racing thoughts  Associations: less loosening of associations present   Thought Content:  no evidence of suicidal ideation or homicidal ideation, not responding to internal stimuli  Insight:  limited, but improving  Judgement:  limited, but improving  Oriented to:  time, person, and place  Attention Span and Concentration:  fair  Recent and Remote Memory:  intact  Fund of Knowledge: likely below average, not formally evaluated         Precautions:     Behavioral Orders   Procedures     Code 1 - Restrict to Unit     Routine Programming     As clinically indicated     Status 15     Every 15 minutes.          Diagnoses:     Schizoaffective Disorder, bipolar type  Cocaine Use Disorder  Benzodiazepine Use Disorder         Plan:     Continue current meds    Pt's manic sxs have been improving steadily.  Since she failed so quickly after her last discharge (to [facility]) the [facility] recommended an IRTS with no roommate (since she tends to become paranoid about her roommates).  However, this may not be an option, and we are planning to transfer to another unit where she will share a room to ensure that she can tolerate this prior to discharge.  Pt has done well at [facility] in the past and would like to return there.  Pt is committed and has an ACT team.

## 2017-06-06 NOTE — PROGRESS NOTES
06/05/17 2137   Behavioral Health   Hallucinations denies / not responding to hallucinations   Thinking poor concentration   Orientation person: oriented;place: oriented;date: oriented;time: oriented   Memory baseline memory   Insight insight appropriate to situation;insight appropriate to events   Judgement impaired   Eye Contact at examiner   Affect full range affect   Mood mood is calm   Physical Appearance/Attire appears stated age   Hygiene other (see comment)  (adequate )   Suicidality other (see comments)  (none stated)   Self Injury other (see comment)  (none stated/observed)   Activity restless;withdrawn   Speech clear;coherent   Medication Sensitivity no stated side effects;no observed side effects   Psychomotor / Gait balanced;steady   Activities of Daily Living   Hygiene/Grooming independent   Oral Hygiene independent   Dress scrubs (behavioral health)   Laundry unable to complete   Room Organization independent   Behavioral Health Interventions   Psychotic Symptoms maintain safety precautions;simple, clear language;encourage participation / independence with adls;assist with developing & utilizing healthy coping strategies   Social and Therapeutic Interventions (Psychotic Symptoms) encourage effective boundaries with peers     Pt had an uneventful shift.  Pt mostly stayed in her room.  Pt had a conversation on the phone.  Pt asked about transferring units.  Pt stated she is feeling a lot better today.

## 2017-06-07 PROCEDURE — 25000132 ZZH RX MED GY IP 250 OP 250 PS 637: Performed by: PSYCHIATRY & NEUROLOGY

## 2017-06-07 PROCEDURE — 90853 GROUP PSYCHOTHERAPY: CPT

## 2017-06-07 PROCEDURE — 99233 SBSQ HOSP IP/OBS HIGH 50: CPT | Performed by: PSYCHIATRY & NEUROLOGY

## 2017-06-07 PROCEDURE — 12400001 ZZH R&B MH UMMC

## 2017-06-07 RX ORDER — BENZTROPINE MESYLATE 0.5 MG/1
0.5 TABLET ORAL 2 TIMES DAILY
Status: DISCONTINUED | OUTPATIENT
Start: 2017-06-07 | End: 2017-06-15 | Stop reason: HOSPADM

## 2017-06-07 RX ADMIN — NICOTINE POLACRILEX 4 MG: 2 GUM, CHEWING ORAL at 13:08

## 2017-06-07 RX ADMIN — NICOTINE POLACRILEX 4 MG: 2 GUM, CHEWING ORAL at 10:04

## 2017-06-07 RX ADMIN — LITHIUM CARBONATE 600 MG: 300 TABLET, EXTENDED RELEASE ORAL at 19:58

## 2017-06-07 RX ADMIN — OLANZAPINE 15 MG: 15 TABLET, FILM COATED ORAL at 19:58

## 2017-06-07 RX ADMIN — NICOTINE POLACRILEX 4 MG: 2 GUM, CHEWING ORAL at 22:20

## 2017-06-07 RX ADMIN — NICOTINE POLACRILEX 4 MG: 2 GUM, CHEWING ORAL at 20:20

## 2017-06-07 RX ADMIN — LITHIUM CARBONATE 600 MG: 300 TABLET, EXTENDED RELEASE ORAL at 09:01

## 2017-06-07 RX ADMIN — NICOTINE POLACRILEX 4 MG: 2 GUM, CHEWING ORAL at 15:08

## 2017-06-07 RX ADMIN — BENZTROPINE MESYLATE 0.5 MG: 0.5 TABLET ORAL at 20:20

## 2017-06-07 RX ADMIN — NICOTINE POLACRILEX 4 MG: 2 GUM, CHEWING ORAL at 16:43

## 2017-06-07 RX ADMIN — NICOTINE POLACRILEX 4 MG: 2 GUM, CHEWING ORAL at 11:22

## 2017-06-07 RX ADMIN — MULTIPLE VITAMINS W/ MINERALS TAB 1 TABLET: TAB at 09:01

## 2017-06-07 RX ADMIN — FOLIC ACID 1 MG: 1 TABLET ORAL at 09:01

## 2017-06-07 RX ADMIN — PANTOPRAZOLE SODIUM 40 MG: 40 TABLET, DELAYED RELEASE ORAL at 09:01

## 2017-06-07 RX ADMIN — NICOTINE POLACRILEX 4 MG: 2 GUM, CHEWING ORAL at 19:10

## 2017-06-07 RX ADMIN — OLANZAPINE 5 MG: 5 TABLET, FILM COATED ORAL at 09:01

## 2017-06-07 ASSESSMENT — ACTIVITIES OF DAILY LIVING (ADL)
GROOMING: INDEPENDENT
LAUNDRY: WITH SUPERVISION
ORAL_HYGIENE: INDEPENDENT
DRESS: INDEPENDENT

## 2017-06-07 NOTE — PROGRESS NOTES
Initially seen by OT on this date. More observation needed to complete initial evaluation at this time.  Stayed for full session and did enter into conversation on topic.  Some distraction as she completed her menu for tomorrow.

## 2017-06-07 NOTE — PROGRESS NOTES
"Pt was isolative to her room, occasionally walking into the lounge area. She interacted appropriately with staff and other patients. When asked how her pain has been today, pt stated, \"It's better.\"    No additional concerns at this time. Will continue to monitor and assess.  "

## 2017-06-07 NOTE — PROGRESS NOTES
06/07/17 1354   Behavioral Health   Hallucinations denies / not responding to hallucinations   Thinking distractable   Orientation person: oriented;place: oriented;date: oriented;time: oriented   Memory baseline memory   Insight insight appropriate to situation   Judgement impaired   Eye Contact at examiner   Affect blunted, flat   Mood mood is calm   Physical Appearance/Attire attire appropriate to age and situation   Hygiene well groomed   Suicidality (denies)   Self Injury (denies)   Activity isolative;withdrawn   Speech clear;coherent   Medication Sensitivity no stated side effects   Psychomotor / Gait balanced   Psycho Education   Type of Intervention structured groups   Response participates, initiates socially appropriate   Hours 0.5   Activities of Daily Living   Hygiene/Grooming independent   Oral Hygiene independent   Dress independent   Laundry with supervision   Room Organization independent   pt isolative in room and withdrawn. Pt not attending groups only out for phone calls and meal times mainly. Pt has flat affect and pleasant.

## 2017-06-07 NOTE — PROGRESS NOTES
Keenan from [facility] in [place] came to interview pt and faxed intake paperwork for physician to complete. They will have an opening on June 15th.

## 2017-06-07 NOTE — PROGRESS NOTES
The pt was withdrawn but pleasant.  Her behavior was much more subdued than on previous shifts.     06/06/17 6339   Behavioral Health   Hallucinations denies / not responding to hallucinations   Thinking distractable   Orientation person: oriented;place: oriented   Memory baseline memory   Insight insight appropriate to situation   Judgement impaired   Eye Contact at examiner   Affect full range affect   Mood mood is calm   Physical Appearance/Attire attire appropriate to age and situation   Hygiene well groomed   Suicidality other (see comments)  (pt denies)   Self Injury other (see comment)  (none stated)   Activity isolative;restless   Speech clear;coherent   Medication Sensitivity no stated side effects   Psychomotor / Gait balanced;steady   Substance Withdrawal Interventions   Social and Therapeutic Interventions (Substance Withdrawal) encourage effective boundaries with peers;encourage socialization with peers;encourage participation in therapeutic groups and milieu activities   Safety   Suicidality status 15   Coping/Psychosocial   Verbalized Emotional State acceptance   Psycho Education   Type of Intervention 1:1 intervention   Response participates, initiates socially appropriate   Hours 0.5   Treatment Detail check-in   Group Therapy Session   Group Attendance refused to attend group session   Activities of Daily Living   Hygiene/Grooming independent   Oral Hygiene independent   Dress scrubs (behavioral health)   Laundry unable to complete   Room Organization independent   Activity   Activity Level of Assistance independent   Behavioral Health Interventions   Psychotic Symptoms maintain safety precautions;monitor need to revise level of observation;maintain safe secure environment;reality orientation;simple, clear language;decrease environmental stimulation;redirection of intrusive behaviors;redirection of aggressive behaviors;assist patient in following safety plan;encourage nutrition and  hydration;encourage participation / independence with adls;provide emotional support   Social and Therapeutic Interventions (Psychotic Symptoms) encourage socialization with peers;encourage effective boundaries with peers;encourage participation in therapeutic groups and milieu activities

## 2017-06-08 PROCEDURE — 25000132 ZZH RX MED GY IP 250 OP 250 PS 637: Performed by: PSYCHIATRY & NEUROLOGY

## 2017-06-08 PROCEDURE — 12400001 ZZH R&B MH UMMC

## 2017-06-08 PROCEDURE — 25000125 ZZHC RX 250: Performed by: PHYSICIAN ASSISTANT

## 2017-06-08 PROCEDURE — 99232 SBSQ HOSP IP/OBS MODERATE 35: CPT | Performed by: PSYCHIATRY & NEUROLOGY

## 2017-06-08 RX ADMIN — FOLIC ACID 1 MG: 1 TABLET ORAL at 09:32

## 2017-06-08 RX ADMIN — PANTOPRAZOLE SODIUM 40 MG: 40 TABLET, DELAYED RELEASE ORAL at 09:32

## 2017-06-08 RX ADMIN — NICOTINE POLACRILEX 4 MG: 2 GUM, CHEWING ORAL at 16:15

## 2017-06-08 RX ADMIN — LITHIUM CARBONATE 600 MG: 300 TABLET, EXTENDED RELEASE ORAL at 20:25

## 2017-06-08 RX ADMIN — NICOTINE POLACRILEX 4 MG: 2 GUM, CHEWING ORAL at 19:30

## 2017-06-08 RX ADMIN — OLANZAPINE 5 MG: 5 TABLET, FILM COATED ORAL at 09:32

## 2017-06-08 RX ADMIN — NICOTINE POLACRILEX 4 MG: 2 GUM, CHEWING ORAL at 13:27

## 2017-06-08 RX ADMIN — OLANZAPINE 15 MG: 15 TABLET, FILM COATED ORAL at 20:25

## 2017-06-08 RX ADMIN — LITHIUM CARBONATE 600 MG: 300 TABLET, EXTENDED RELEASE ORAL at 09:32

## 2017-06-08 RX ADMIN — NICOTINE POLACRILEX 4 MG: 2 GUM, CHEWING ORAL at 14:39

## 2017-06-08 RX ADMIN — NICOTINE POLACRILEX 4 MG: 2 GUM, CHEWING ORAL at 20:25

## 2017-06-08 RX ADMIN — BENZTROPINE MESYLATE 0.5 MG: 0.5 TABLET ORAL at 09:32

## 2017-06-08 RX ADMIN — BENZTROPINE MESYLATE 0.5 MG: 0.5 TABLET ORAL at 20:25

## 2017-06-08 RX ADMIN — MULTIPLE VITAMINS W/ MINERALS TAB 1 TABLET: TAB at 09:32

## 2017-06-08 RX ADMIN — NICOTINE POLACRILEX 4 MG: 2 GUM, CHEWING ORAL at 17:21

## 2017-06-08 RX ADMIN — NICOTINE POLACRILEX 4 MG: 2 GUM, CHEWING ORAL at 18:35

## 2017-06-08 ASSESSMENT — ACTIVITIES OF DAILY LIVING (ADL)
ORAL_HYGIENE: INDEPENDENT
DRESS: STREET CLOTHES;INDEPENDENT
GROOMING: INDEPENDENT

## 2017-06-08 NOTE — PROGRESS NOTES
"[facility], [place]   Psychiatric Progress Note, Hospital Day #7        Interim History:   The patient's care was discussed with the treatment team during the daily team meeting and/or staff's chart notes were reviewed. The patient was transferred from [facility] yesterday, today was my first visit with her. She went to [facility] group, was pleasant. Described reason for admission as: \"anxiety\". Denied Auditory hallucinations and Visual hallucinations, was seen frequently blinking during today's meeting. Said that excessive blinking started about 2 months ago.  [place] [facility] [place] acknowledged that she had no other choice than going to [facility], but made it clear that she would prefer to stay at home.          Medications:       lidocaine  2 patch Transdermal Q24H     lidocaine   Transdermal Q24h     lidocaine   Transdermal Q8H     gabapentin   Topical Q8H     scopolamine  1 patch Transdermal Q72H    And     scopolamine   Transdermal Q8H    And     [START ON 6/8/2017] scopolamine   Transdermal Q72H     lithium  600 mg Oral BID     folic acid  1 mg Oral Daily     multivitamin, therapeutic with minerals  1 tablet Oral Daily     OLANZapine  15 mg Oral At Bedtime     OLANZapine  5 mg Oral Daily     pantoprazole  40 mg Oral QAM          Allergies:     Allergies   Allergen Reactions     Codeine Sulfate      Compazine Rash     Morphine Nausea and Vomiting     Prochlorperazine Rash          Labs/Imaging:     No results found for this or any previous visit (from the past 24 hour(s)).     Brain MRI (4/25):  IMPRESSION: Negative brain and sella MRI examination.    - ALAINA GRAFF MD         Psychiatric Examination:   /78  Pulse 107  Temp 98.2  F (36.8  C) (Oral)  Resp 20  LMP  (LMP Unknown)  SpO2 98%  Weight is 0 lbs 0 oz  There is no height or weight on file to calculate BMI.    Appearance: adequately groomed, casually dressed in scrubs, no " "acute distress   Attitude:  Cooperative  Eye Contact: good, frequent blinking  Mood:  \"all right\"  Affect: mood congruent, neutral  Speech:  clear, coherent and normal prosody  Language: English language w/ appropriate syntax and vocabulary  Psychomotor Behavior: less fidgeting, no tremor noted  Gait/Station: normal gait  Thought Process: continues to be somewhat more logical and linear, reports decreased racing thoughts  Associations: less loosening of associations present   Thought Content:  no evidence of suicidal ideation or homicidal ideation, not responding to internal stimuli  Insight:  limited, but improving  Judgement:  limited, but improving  Oriented to:  time, person, and place  Attention Span and Concentration:  fair  Recent and Remote Memory:  intact  Fund of Knowledge: likely below average, not formally evaluated         Precautions:     Behavioral Orders   Procedures     Code 1 - Restrict to Unit     Routine Programming     As clinically indicated     Status 15     Every 15 minutes.          Diagnoses:     Schizoaffective Disorder, bipolar type  Cocaine Use Disorder  Benzodiazepine Use Disorder         Plan:     Continue current meds    Pt's manic sxs have been improving steadily.  Will start on Cogentin for excessive blinking. She had an intake with [facility]. Per [facility] they won't have a bed for the patient until 6/15, even, if she is accepted. Pt is committed and has an ACT team.     "

## 2017-06-08 NOTE — PROGRESS NOTES
The pt was isolative except for meals and telephone calls.  She was pleasant upon approach, and appropriate in the milieu.  She reports wanting to leave, and thinking about moving in with a friend before treatment placement.     06/07/17 7819   Behavioral Health   Hallucinations denies / not responding to hallucinations   Thinking distractable;poor concentration   Orientation person: oriented;place: oriented;date: oriented;time: oriented   Memory baseline memory   Insight insight appropriate to situation   Judgement impaired   Eye Contact at examiner   Affect full range affect   Mood mood is calm   Physical Appearance/Attire attire appropriate to age and situation   Hygiene well groomed   Suicidality other (see comments)  (pt denies)   Self Injury other (see comment)  (pt denies)   Activity isolative;withdrawn   Speech clear;coherent   Medication Sensitivity no stated side effects;no observed side effects   Psychomotor / Gait balanced   Substance Withdrawal Interventions   Social and Therapeutic Interventions (Substance Withdrawal) encourage socialization with peers;encourage participation in therapeutic groups and milieu activities;encourage effective boundaries with peers   Safety   Suicidality status 15   Psycho Education   Type of Intervention 1:1 intervention   Response participates, initiates socially appropriate   Hours 0.5   Treatment Detail check-in   Group Therapy Session   Group Attendance refused to attend group session   Behavioral Health Interventions   Psychotic Symptoms maintain safety precautions;monitor need to revise level of observation;maintain safe secure environment;reality orientation;simple, clear language;decrease environmental stimulation;redirection of aggressive behaviors;redirection of intrusive behaviors;encourage nutrition and hydration   Social and Therapeutic Interventions (Psychotic Symptoms) encourage socialization with peers;encourage effective boundaries with peers;encourage  participation in therapeutic groups and milieu activities

## 2017-06-08 NOTE — PLAN OF CARE
Problem: Psychotic Symptoms  Goal: Psychotic Symptoms  Signs and symptoms of listed problems will be absent or manageable.   Outcome: Improving  48 Hour Nursing Assessment:  Day 8 of hospitalization.  Daisy has spent her entire day in bed.  She did not get up for breakfast and lunch has not yet been served.  She denies being suicidal and denies hallucinations but had little else to say.  She would prefer to go home rather that to an [facility].

## 2017-06-09 PROCEDURE — 25000132 ZZH RX MED GY IP 250 OP 250 PS 637: Performed by: PSYCHIATRY & NEUROLOGY

## 2017-06-09 PROCEDURE — 12400001 ZZH R&B MH UMMC

## 2017-06-09 PROCEDURE — 99232 SBSQ HOSP IP/OBS MODERATE 35: CPT | Performed by: PSYCHIATRY & NEUROLOGY

## 2017-06-09 PROCEDURE — 99207 ZZC CDG-MDM COMPONENT: MEETS LOW - DOWN CODED: CPT | Performed by: PSYCHIATRY & NEUROLOGY

## 2017-06-09 RX ADMIN — PANTOPRAZOLE SODIUM 40 MG: 40 TABLET, DELAYED RELEASE ORAL at 09:04

## 2017-06-09 RX ADMIN — NICOTINE POLACRILEX 4 MG: 2 GUM, CHEWING ORAL at 14:27

## 2017-06-09 RX ADMIN — BENZTROPINE MESYLATE 0.5 MG: 0.5 TABLET ORAL at 19:34

## 2017-06-09 RX ADMIN — BENZTROPINE MESYLATE 0.5 MG: 0.5 TABLET ORAL at 09:04

## 2017-06-09 RX ADMIN — NICOTINE POLACRILEX 4 MG: 2 GUM, CHEWING ORAL at 19:34

## 2017-06-09 RX ADMIN — OLANZAPINE 15 MG: 15 TABLET, FILM COATED ORAL at 19:34

## 2017-06-09 RX ADMIN — NICOTINE POLACRILEX 4 MG: 2 GUM, CHEWING ORAL at 22:02

## 2017-06-09 RX ADMIN — FOLIC ACID 1 MG: 1 TABLET ORAL at 09:04

## 2017-06-09 RX ADMIN — NICOTINE POLACRILEX 4 MG: 2 GUM, CHEWING ORAL at 13:20

## 2017-06-09 RX ADMIN — MULTIPLE VITAMINS W/ MINERALS TAB 1 TABLET: TAB at 09:04

## 2017-06-09 RX ADMIN — NICOTINE POLACRILEX 4 MG: 2 GUM, CHEWING ORAL at 18:24

## 2017-06-09 RX ADMIN — OLANZAPINE 5 MG: 5 TABLET, FILM COATED ORAL at 09:04

## 2017-06-09 RX ADMIN — NICOTINE POLACRILEX 4 MG: 2 GUM, CHEWING ORAL at 16:00

## 2017-06-09 RX ADMIN — LITHIUM CARBONATE 600 MG: 300 TABLET, EXTENDED RELEASE ORAL at 19:33

## 2017-06-09 RX ADMIN — LITHIUM CARBONATE 600 MG: 300 TABLET, EXTENDED RELEASE ORAL at 09:04

## 2017-06-09 ASSESSMENT — ACTIVITIES OF DAILY LIVING (ADL)
DRESS: SCRUBS (BEHAVIORAL HEALTH)
DRESS: SCRUBS (BEHAVIORAL HEALTH)
GROOMING: HANDWASHING;SHOWER;INDEPENDENT
ORAL_HYGIENE: INDEPENDENT
ORAL_HYGIENE: INDEPENDENT
GROOMING: INDEPENDENT

## 2017-06-09 NOTE — PROGRESS NOTES
Pt has order for ARIPiprazole ER (ABILIFY MAINTENA) extended release injection 300 mg.  Pt stated that her ACT nurse will bring the dose on Monday 6/12.  The order states that the medication will be patient supplied.      Pt reported she has some anxiety towards the medication.  Writer educated pt on the importance of the medication.  Pt agreed that the medication was important.

## 2017-06-09 NOTE — PLAN OF CARE
Problem: General Plan of Care (Inpatient Behavioral)  Goal: Team Discussion  Team Plan:   BEHAVIORAL TEAM DISCUSSION     Participants: Dr. Vargas, JANELLE Virgen, RN Hugo Garza  Progress: moderate  Continued Stay Criteria/Rationale: waiting for a bed at [facility] in [facility]     Precautions:   Behavioral Orders   Procedures     Code 1 - Restrict to Unit     Routine Programming       As clinically indicated     Status 15       Every 15 minutes.     Plan: transfer to [facility]- needs PD  Rationale for change in precautions or plan: no change

## 2017-06-09 NOTE — PROGRESS NOTES
06/09/17 1514   Behavioral Health   Hallucinations denies / not responding to hallucinations   Thinking confused;distractable   Orientation person: oriented;place: oriented;date: oriented;time: oriented   Memory baseline memory   Insight poor   Judgement impaired   Eye Contact at examiner   Affect tense;full range affect   Mood mood is calm   Physical Appearance/Attire neat   Hygiene well groomed   Suicidality other (see comments)  (pt denies)   Self Injury other (see comment)  (pt denies)   Activity other (see comment)  (slept most of the day)   Speech clear;coherent   Medication Sensitivity no stated side effects;no observed side effects   Psychomotor / Gait balanced;steady   Activities of Daily Living   Hygiene/Grooming handwashing;shower;independent   Oral Hygiene independent   Dress scrubs (behavioral health)   Room Organization independent   Behavioral Health Interventions   Psychotic Symptoms maintain safety precautions;monitor need to revise level of observation;maintain safe secure environment;reality orientation;simple, clear language;decrease environmental stimulation;redirection of intrusive behaviors;redirection of aggressive behaviors;assist patient in developing safety plan;assist patient in following safety plan;encourage nutrition and hydration;encourage participation / independence with adls;provide emotional support;establish therapeutic relationship;assist with developing & utilizing healthy coping strategies;build upon strengths   Social and Therapeutic Interventions (Psychotic Symptoms) encourage socialization with peers;encourage effective boundaries with peers;encourage participation in therapeutic groups and milieu activities

## 2017-06-10 PROCEDURE — 25000132 ZZH RX MED GY IP 250 OP 250 PS 637: Performed by: PSYCHIATRY & NEUROLOGY

## 2017-06-10 PROCEDURE — 12400001 ZZH R&B MH UMMC

## 2017-06-10 RX ADMIN — BENZTROPINE MESYLATE 0.5 MG: 0.5 TABLET ORAL at 09:17

## 2017-06-10 RX ADMIN — MULTIPLE VITAMINS W/ MINERALS TAB 1 TABLET: TAB at 09:16

## 2017-06-10 RX ADMIN — FOLIC ACID 1 MG: 1 TABLET ORAL at 09:16

## 2017-06-10 RX ADMIN — LITHIUM CARBONATE 600 MG: 300 TABLET, EXTENDED RELEASE ORAL at 20:20

## 2017-06-10 RX ADMIN — NICOTINE POLACRILEX 4 MG: 2 GUM, CHEWING ORAL at 19:37

## 2017-06-10 RX ADMIN — NICOTINE POLACRILEX 4 MG: 2 GUM, CHEWING ORAL at 17:59

## 2017-06-10 RX ADMIN — LITHIUM CARBONATE 600 MG: 300 TABLET, EXTENDED RELEASE ORAL at 09:17

## 2017-06-10 RX ADMIN — NICOTINE POLACRILEX 4 MG: 2 GUM, CHEWING ORAL at 16:31

## 2017-06-10 RX ADMIN — NICOTINE POLACRILEX 4 MG: 2 GUM, CHEWING ORAL at 20:51

## 2017-06-10 RX ADMIN — OLANZAPINE 15 MG: 15 TABLET, FILM COATED ORAL at 20:20

## 2017-06-10 RX ADMIN — BENZTROPINE MESYLATE 0.5 MG: 0.5 TABLET ORAL at 20:20

## 2017-06-10 RX ADMIN — PANTOPRAZOLE SODIUM 40 MG: 40 TABLET, DELAYED RELEASE ORAL at 09:16

## 2017-06-10 RX ADMIN — NICOTINE POLACRILEX 4 MG: 2 GUM, CHEWING ORAL at 15:16

## 2017-06-10 RX ADMIN — NICOTINE POLACRILEX 4 MG: 2 GUM, CHEWING ORAL at 13:48

## 2017-06-10 RX ADMIN — NICOTINE POLACRILEX 4 MG: 2 GUM, CHEWING ORAL at 12:41

## 2017-06-10 RX ADMIN — OLANZAPINE 5 MG: 5 TABLET, FILM COATED ORAL at 09:16

## 2017-06-10 ASSESSMENT — ACTIVITIES OF DAILY LIVING (ADL)
ORAL_HYGIENE: INDEPENDENT
ORAL_HYGIENE: INDEPENDENT
DRESS: INDEPENDENT
GROOMING: INDEPENDENT
GROOMING: INDEPENDENT
LAUNDRY: WITH SUPERVISION
DRESS: STREET CLOTHES;INDEPENDENT

## 2017-06-10 NOTE — PROGRESS NOTES
[facility], [place]   Psychiatric Progress Note, Hospital Day #9        Interim History:   The patient's care was discussed with the treatment team during the daily team meeting and/or staff's chart notes were reviewed. The patient was seen on two occasions. She was very somnolent in am and denied having any questions or concerns, but later on approached me and asked about discharging her not to [facility] program in [place], but to her friend. I explained to her that the decision about where to discharge her had been made by all members of her treatment team including outpatient providers and CM, reminded that placement to I[facility] would provide her with more structure and support. She was not receptive to my explanations and appeared to be irritated. I also told her that I would not be her psychiatrist starting Monday.          Medications:       ARIPiprazole ER  300 mg Intramuscular Q30 Days     benztropine  0.5 mg Oral BID     lidocaine  2 patch Transdermal Q24H     lidocaine   Transdermal Q24h     lidocaine   Transdermal Q8H     gabapentin   Topical Q8H     scopolamine  1 patch Transdermal Q72H    And     scopolamine   Transdermal Q8H    And     scopolamine   Transdermal Q72H     lithium  600 mg Oral BID     folic acid  1 mg Oral Daily     multivitamin, therapeutic with minerals  1 tablet Oral Daily     OLANZapine  15 mg Oral At Bedtime     OLANZapine  5 mg Oral Daily     pantoprazole  40 mg Oral QAM          Allergies:     Allergies   Allergen Reactions     Codeine Sulfate      Compazine Rash     Morphine Nausea and Vomiting     Prochlorperazine Rash          Labs/Imaging:     No results found for this or any previous visit (from the past 24 hour(s)).     Brain MRI (4/25):  IMPRESSION: Negative brain and sella MRI examination.    - ALAINA GRAFF MD         Psychiatric Examination:   /85  Pulse 127  Temp 97.8  F (36.6  C) (Oral)  Resp 18  LMP  (LMP Unknown)  SpO2  "98%  Weight is 0 lbs 0 oz  There is no height or weight on file to calculate BMI.    Appearance: adequately groomed, casually dressed in scrubs, no acute distress   Attitude: somewhat Cooperative  Eye Contact: good,  Mood:  \"all right\"  Affect: mood congruent, neutral  Speech:  clear, coherent and normal prosody  Language: English language w/ appropriate syntax and vocabulary  Psychomotor Behavior: less fidgeting, no tremor noted  Gait/Station: normal gait  Thought Process: continues to be somewhat more logical and linear, reports decreased racing thoughts  Associations: less loosening of associations present   Thought Content:  no evidence of suicidal ideation or homicidal ideation, not responding to internal stimuli  Insight:  limited, but improving  Judgement:  limited, but improving  Oriented to:  time, person, and place  Attention Span and Concentration:  fair  Recent and Remote Memory:  intact  Fund of Knowledge: likely below average, not formally evaluated         Precautions:     Behavioral Orders   Procedures     Code 1 - Restrict to Unit     Routine Programming     As clinically indicated     Status 15     Every 15 minutes.          Diagnoses:     Schizoaffective Disorder, bipolar type  Cocaine Use Disorder  Benzodiazepine Use Disorder         Plan:     Continue current meds    Patient is more stable. She had an intake with [facility]. Per [facility] they won't have a bed for the patient until 6/15. Pt is committed and has an ACT team. I called [facility] pharmacy to start patient on Abilify Maintena 300 mg qmonth. Medication will be brought to the hospital by her ACT team on Monday.      "

## 2017-06-10 NOTE — PROGRESS NOTES
06/10/17 1514   Behavioral Health   Hallucinations denies / not responding to hallucinations   Thinking poor concentration   Orientation place: oriented;person: oriented   Memory baseline memory   Insight poor   Judgement impaired   Eye Contact at examiner   Affect blunted, flat   Mood mood is calm   Physical Appearance/Attire attire appropriate to age and situation   Hygiene well groomed   Suicidality other (see comments)  (Denies)   Self Injury other (see comment)  (Denies)   Activity isolative;withdrawn   Speech clear;coherent   Psychomotor / Gait balanced   Psycho Education   Type of Intervention 1:1 intervention   Response participates, initiates socially appropriate   Hours 0.5   Treatment Detail check in   Activities of Daily Living   Hygiene/Grooming independent   Oral Hygiene independent   Dress independent   Room Organization independent   Patient was in her room sleeping most of the shift.  Patient did not get up for breakfast meal, but did come out for lunch.  Later lunch the patient did make a few phone calls and then returned to her room and slept.  Patient denies having any SI or SiB and reports that she is feeling much better then when she arrived.  Patient reports she is waiting for a bed at an [facility] and thinks she will be discharging on Thurs.

## 2017-06-10 NOTE — PROGRESS NOTES
"Patient refused her Lidocaine and Scopolamine patches this morning, stating that \"I don;t them anymore; I don;t feel any pain right now.\" She has been out in the general milieu socializing with peers and staff. No objective signs of acute physical pains noted this shift. Will continue to monitor and to provide for her safety and comfort as needed.  "

## 2017-06-10 NOTE — PROGRESS NOTES
06/09/17 8112   Significant Event   Significant Event Other (see comments)  (shift summary)     Pt was visible in the milieu this evening, social with peers ands staff.  Cooperative and calm this evening.

## 2017-06-11 PROCEDURE — 25000132 ZZH RX MED GY IP 250 OP 250 PS 637: Performed by: PSYCHIATRY & NEUROLOGY

## 2017-06-11 PROCEDURE — 12400001 ZZH R&B MH UMMC

## 2017-06-11 RX ADMIN — OLANZAPINE 15 MG: 15 TABLET, FILM COATED ORAL at 19:56

## 2017-06-11 RX ADMIN — OLANZAPINE 5 MG: 5 TABLET, FILM COATED ORAL at 09:31

## 2017-06-11 RX ADMIN — BENZTROPINE MESYLATE 0.5 MG: 0.5 TABLET ORAL at 19:56

## 2017-06-11 RX ADMIN — MULTIPLE VITAMINS W/ MINERALS TAB 1 TABLET: TAB at 09:31

## 2017-06-11 RX ADMIN — FOLIC ACID 1 MG: 1 TABLET ORAL at 09:31

## 2017-06-11 RX ADMIN — PANTOPRAZOLE SODIUM 40 MG: 40 TABLET, DELAYED RELEASE ORAL at 09:31

## 2017-06-11 RX ADMIN — NICOTINE POLACRILEX 4 MG: 2 GUM, CHEWING ORAL at 19:56

## 2017-06-11 RX ADMIN — LITHIUM CARBONATE 600 MG: 300 TABLET, EXTENDED RELEASE ORAL at 19:56

## 2017-06-11 RX ADMIN — NICOTINE POLACRILEX 4 MG: 2 GUM, CHEWING ORAL at 17:24

## 2017-06-11 RX ADMIN — NICOTINE POLACRILEX 4 MG: 2 GUM, CHEWING ORAL at 16:17

## 2017-06-11 RX ADMIN — BENZTROPINE MESYLATE 0.5 MG: 0.5 TABLET ORAL at 09:31

## 2017-06-11 RX ADMIN — NICOTINE POLACRILEX 4 MG: 2 GUM, CHEWING ORAL at 13:02

## 2017-06-11 RX ADMIN — LITHIUM CARBONATE 600 MG: 300 TABLET, EXTENDED RELEASE ORAL at 09:31

## 2017-06-11 RX ADMIN — NICOTINE POLACRILEX 4 MG: 2 GUM, CHEWING ORAL at 14:37

## 2017-06-11 RX ADMIN — NICOTINE POLACRILEX 4 MG: 2 GUM, CHEWING ORAL at 18:44

## 2017-06-11 ASSESSMENT — ACTIVITIES OF DAILY LIVING (ADL)
LAUNDRY: WITH SUPERVISION
ORAL_HYGIENE: INDEPENDENT
LAUNDRY: WITH SUPERVISION
GROOMING: INDEPENDENT
DRESS: SCRUBS (BEHAVIORAL HEALTH)
HYGIENE/GROOMING: INDEPENDENT
DRESS: INDEPENDENT
ORAL_HYGIENE: INDEPENDENT

## 2017-06-11 NOTE — PROGRESS NOTES
When asked if she is experiencing any pain today, pt states that she is not in any pain. In addition, pt states that she hasn't been needing her lidocaine patch or her scopolomine.    No additional concerns at this time. Will continue to monitor and assess.

## 2017-06-11 NOTE — PROGRESS NOTES
06/11/17 1418   Behavioral Health   Hallucinations denies / not responding to hallucinations   Thinking poor concentration   Orientation time: oriented;date: oriented;place: oriented;person: oriented   Memory baseline memory   Insight poor   Judgement impaired   Eye Contact at examiner   Affect blunted, flat   Mood depressed   Physical Appearance/Attire appears stated age   Hygiene well groomed   Suicidality other (see comments)  (Denies)   Self Injury other (see comment)  (Denies)   Activity isolative;withdrawn   Speech clear;coherent   Psychomotor / Gait balanced;steady   Psycho Education   Type of Intervention 1:1 intervention   Response participates, initiates socially appropriate   Hours 0.5   Activities of Daily Living   Hygiene/Grooming independent   Oral Hygiene independent   Dress scrubs (behavioral health)   Laundry with supervision   Room Organization independent   Activity   Activity Level of Assistance independent   Behavioral Health Interventions   Psychotic Symptoms maintain safety precautions;assist patient in developing safety plan;assist patient in following safety plan   Social and Therapeutic Interventions (Psychotic Symptoms) encourage socialization with peers;encourage participation in therapeutic groups and milieu activities   Pt spent most of her time in room sleeping and she was out for lunch and making phone call. She denies suicidal ideation or being depressed. She had minimal interaction with peers.

## 2017-06-11 NOTE — PROGRESS NOTES
"Pt was visible in the milieu periodically throughout the shift. She was withdrawn, but interacted appropriate with staff and other patients. Pt was pleasant, and open to checking in with writer. She states \"I am hopeful for discharge.\" She says that she can stay with a friend of hers after leaving the hospital, but will most likely go to an [facility] facility. Pt took several phone calls from friends throughout the evening.    SI/SIB: Denies  AH/VH: Denies  Aggression: No aggression displayed    Pt took all [facility] medications.    Will continue to monitor and assess.    "

## 2017-06-12 PROCEDURE — 99232 SBSQ HOSP IP/OBS MODERATE 35: CPT | Performed by: PSYCHIATRY & NEUROLOGY

## 2017-06-12 PROCEDURE — 25000132 ZZH RX MED GY IP 250 OP 250 PS 637: Performed by: PSYCHIATRY & NEUROLOGY

## 2017-06-12 PROCEDURE — 12400001 ZZH R&B MH UMMC

## 2017-06-12 RX ADMIN — BENZTROPINE MESYLATE 0.5 MG: 0.5 TABLET ORAL at 20:07

## 2017-06-12 RX ADMIN — NICOTINE POLACRILEX 4 MG: 2 GUM, CHEWING ORAL at 16:42

## 2017-06-12 RX ADMIN — OLANZAPINE 5 MG: 5 TABLET, FILM COATED ORAL at 08:48

## 2017-06-12 RX ADMIN — NICOTINE POLACRILEX 4 MG: 2 GUM, CHEWING ORAL at 20:30

## 2017-06-12 RX ADMIN — NICOTINE POLACRILEX 4 MG: 2 GUM, CHEWING ORAL at 17:46

## 2017-06-12 RX ADMIN — LITHIUM CARBONATE 600 MG: 300 TABLET, EXTENDED RELEASE ORAL at 20:07

## 2017-06-12 RX ADMIN — NICOTINE POLACRILEX 4 MG: 2 GUM, CHEWING ORAL at 21:19

## 2017-06-12 RX ADMIN — LITHIUM CARBONATE 600 MG: 300 TABLET, EXTENDED RELEASE ORAL at 08:48

## 2017-06-12 RX ADMIN — PANTOPRAZOLE SODIUM 40 MG: 40 TABLET, DELAYED RELEASE ORAL at 08:48

## 2017-06-12 RX ADMIN — NICOTINE POLACRILEX 4 MG: 2 GUM, CHEWING ORAL at 22:40

## 2017-06-12 RX ADMIN — OLANZAPINE 15 MG: 15 TABLET, FILM COATED ORAL at 20:07

## 2017-06-12 RX ADMIN — MULTIPLE VITAMINS W/ MINERALS TAB 1 TABLET: TAB at 08:48

## 2017-06-12 RX ADMIN — BENZTROPINE MESYLATE 0.5 MG: 0.5 TABLET ORAL at 08:48

## 2017-06-12 RX ADMIN — FOLIC ACID 1 MG: 1 TABLET ORAL at 08:48

## 2017-06-12 ASSESSMENT — ACTIVITIES OF DAILY LIVING (ADL)
DRESS: INDEPENDENT
ORAL_HYGIENE: INDEPENDENT
GROOMING: INDEPENDENT
LAUNDRY: WITH SUPERVISION

## 2017-06-12 NOTE — PROGRESS NOTES
"   06/11/17 2200   Behavioral Health   Hallucinations denies / not responding to hallucinations   Thinking intact   Orientation person: oriented;place: oriented   Memory baseline memory   Insight poor   Judgement impaired   Eye Contact at examiner   Affect full range affect   Mood depressed;anxious;mood is calm   Physical Appearance/Attire neat   Hygiene well groomed   Suicidality other (see comments)  (denies )   Self Injury other (see comment)  (denies )   Activity other (see comment)  (attended group )   Speech clear;coherent   Activities of Daily Living   Hygiene/Grooming independent   Oral Hygiene independent   Dress independent   Laundry with supervision   Room Organization independent       Patient denied SI and SIB.  Patient reported feeling depressed (7), irritated (5) and confused (7) \" my baby daddy got a chic pregnant.\"  Overall patient reported feeling \" I feel good.\"  Patient seems calm, visible in the milieu, attended group and social with others. Patient goal after discharge \" just take my meds and stay out of trouble.\"   Patient is independent with ADL's.  Patient wants visitors.    "

## 2017-06-13 PROCEDURE — 25000132 ZZH RX MED GY IP 250 OP 250 PS 637: Performed by: PSYCHIATRY & NEUROLOGY

## 2017-06-13 PROCEDURE — 12400001 ZZH R&B MH UMMC

## 2017-06-13 PROCEDURE — 97150 GROUP THERAPEUTIC PROCEDURES: CPT | Mod: GO

## 2017-06-13 PROCEDURE — 99231 SBSQ HOSP IP/OBS SF/LOW 25: CPT | Performed by: PSYCHIATRY & NEUROLOGY

## 2017-06-13 PROCEDURE — 90853 GROUP PSYCHOTHERAPY: CPT

## 2017-06-13 RX ADMIN — MULTIPLE VITAMINS W/ MINERALS TAB 1 TABLET: TAB at 09:24

## 2017-06-13 RX ADMIN — NICOTINE POLACRILEX 4 MG: 2 GUM, CHEWING ORAL at 12:41

## 2017-06-13 RX ADMIN — BENZTROPINE MESYLATE 0.5 MG: 0.5 TABLET ORAL at 20:01

## 2017-06-13 RX ADMIN — OLANZAPINE 5 MG: 5 TABLET, FILM COATED ORAL at 09:24

## 2017-06-13 RX ADMIN — NICOTINE POLACRILEX 4 MG: 2 GUM, CHEWING ORAL at 21:35

## 2017-06-13 RX ADMIN — FOLIC ACID 1 MG: 1 TABLET ORAL at 09:24

## 2017-06-13 RX ADMIN — LITHIUM CARBONATE 600 MG: 300 TABLET, EXTENDED RELEASE ORAL at 20:01

## 2017-06-13 RX ADMIN — OLANZAPINE 15 MG: 15 TABLET, FILM COATED ORAL at 20:01

## 2017-06-13 RX ADMIN — NICOTINE POLACRILEX 4 MG: 2 GUM, CHEWING ORAL at 19:55

## 2017-06-13 RX ADMIN — NICOTINE POLACRILEX 4 MG: 2 GUM, CHEWING ORAL at 18:22

## 2017-06-13 RX ADMIN — BENZTROPINE MESYLATE 0.5 MG: 0.5 TABLET ORAL at 09:24

## 2017-06-13 RX ADMIN — NICOTINE POLACRILEX 4 MG: 2 GUM, CHEWING ORAL at 15:12

## 2017-06-13 RX ADMIN — LITHIUM CARBONATE 600 MG: 300 TABLET, EXTENDED RELEASE ORAL at 09:24

## 2017-06-13 RX ADMIN — NICOTINE POLACRILEX 4 MG: 2 GUM, CHEWING ORAL at 17:18

## 2017-06-13 RX ADMIN — PANTOPRAZOLE SODIUM 40 MG: 40 TABLET, DELAYED RELEASE ORAL at 09:24

## 2017-06-13 RX ADMIN — NICOTINE POLACRILEX 4 MG: 2 GUM, CHEWING ORAL at 13:34

## 2017-06-13 ASSESSMENT — ACTIVITIES OF DAILY LIVING (ADL)
LAUNDRY: UNABLE TO COMPLETE
ORAL_HYGIENE: INDEPENDENT
ORAL_HYGIENE: INDEPENDENT
GROOMING: INDEPENDENT
DRESS: SCRUBS (BEHAVIORAL HEALTH);INDEPENDENT
DRESS: INDEPENDENT
HYGIENE/GROOMING: INDEPENDENT

## 2017-06-13 NOTE — PLAN OF CARE
"Problem: Psychotic Symptoms  Goal: Psychotic Symptoms  Signs and symptoms of listed problems will be absent or manageable.   Outcome: Improving    Met with pt for 1:1.  Pt presents with a blunted and flat affect.  Pt reports that she feels agitated due to \"personal issues\" and reported she needed to make a phone call to resolve the issues.  Pt would not give further information.  Pt reports that her goal for the day was \"to stay out of trouble\" which she reports she met.      Pt denies depression.  Pt reports anxiety 7/10, related to DC.  Pt denies SI and SIB thoughts, urges, and plans.  Pt denies AH, VH, and paranoia.      Pt's ACT nurse visited the unit to drop off the pt's abilify maintena (See MAR).  Pt visited with the nurse for a few minutes and the pt stated that the visit was \"good\" and that she had been anxious to meet the nurse.      Abilify was verified by pharmacy.  Dose was administered to pt with HS meds.  Pt had reported anxiety related to the IM, but reported no adverse reactions or pain from the IM.  Next dose should be 7/13/17 (30 days from first dose).    Pt reports dry mouth from cogentin.  States it is uncomfortable, but bearable.    Pt was calm and pleasant when in the milieu.  Pt attended groups.  After dinner, pt was isolative to her room, except for making phone calls and asking for nicotine gum.  She was compliant with her HS medications.  Pt stated that she would take a shower, but has not done so.    Will continue to monitor and assess.      "

## 2017-06-13 NOTE — PROGRESS NOTES
"   06/12/17 2200   Behavioral Health   Hallucinations denies / not responding to hallucinations   Thinking intact   Orientation person: oriented;place: oriented   Memory baseline memory   Insight insight appropriate to situation   Judgement impaired   Eye Contact at examiner   Affect full range affect   Mood mood is calm   Physical Appearance/Attire neat   Hygiene well groomed   Suicidality other (see comments)  (denies )   Self Injury other (see comment)  (denies )   Activity other (see comment)  (socialized with others)   Speech clear;coherent   Activities of Daily Living   Hygiene/Grooming independent   Oral Hygiene independent   Dress independent   Laundry with supervision   Room Organization independent       Patient denied SI and SIB.  Patient reported feeling confused (7) \" I don't know why I'm here.  Patient reported overall she feeling \" I feel good I get out Thursday.\"  Patient seems calm, visible in the milieu and socialized with others.  Patient goal after discharge \" I'm going to [facility] facility group home I'm going to take my meds all the time.\"  Patient is independent with ADL's.  Patient reported no nutritional concerns.  Patient wants visitors.    "

## 2017-06-13 NOTE — PROGRESS NOTES
[facility], [place]   Psychiatric Progress Note, Hospital Day #13        Interim History:   The patient's care was discussed with the treatment team during the daily team meeting and/or staff's chart notes were reviewed. Staff reported that patient has been doing well.    Patient denies any issues with medications. She is looking forward to discharge on Thursday.         Medications:       ARIPiprazole ER  300 mg Intramuscular Q30 Days     benztropine  0.5 mg Oral BID     lithium  600 mg Oral BID     folic acid  1 mg Oral Daily     multivitamin, therapeutic with minerals  1 tablet Oral Daily     OLANZapine  15 mg Oral At Bedtime     OLANZapine  5 mg Oral Daily     pantoprazole  40 mg Oral QAM          Allergies:     Allergies   Allergen Reactions     Codeine Sulfate      Compazine Rash     Morphine Nausea and Vomiting     Prochlorperazine Rash          Labs/Imaging:     No results found for this or any previous visit (from the past 24 hour(s)).     Brain MRI (4/25):  IMPRESSION: Negative brain and sella MRI examination.    - ALAINA GRAFF MD         Psychiatric Examination:   BP 93/53  Pulse 73  Temp 97.8  F (36.6  C) (Oral)  Resp 17  LMP  (LMP Unknown)  SpO2 98%  Weight is 0 lbs 0 oz  There is no height or weight on file to calculate BMI.    Appearance: adequately groomed, casually dressed in scrubs, no acute distress   Attitude: Cooperative and engaged  Eye Contact: looking around the room  Mood:  euthymic  Affect: somewhat anxious and blunted  Speech:  clear, coherent and normal prosody  Language: English language w/ appropriate syntax and vocabulary  Psychomotor Behavior: intact station, gait and muscle tone, no tremor noted  Gait/Station: shifting weight back and forth between feet  Thought Process: goal directed and linear.  Associations: no loose associations   Thought Content:  no evidence of suicidal ideation or homicidal ideation, not responding to internal  stimuli  Insight:  limited, but improving  Judgement:  limited, but improving  Oriented to:  time, person, and place  Attention Span and Concentration:  fair  Recent and Remote Memory:  intact  Fund of Knowledge: likely below average, not formally evaluated         Precautions:     Behavioral Orders   Procedures     Code 1 - Restrict to Unit     Routine Programming     As clinically indicated     Status 15     Every 15 minutes.          Diagnoses:     Schizoaffective Disorder, bipolar type  Cocaine Use Disorder  Benzodiazepine Use Disorder         Plan:     Continue current meds    Patient is more stable. She had an intake with [facility]. Per CTC they won't have a bed for the patient until 6/15.

## 2017-06-13 NOTE — PROGRESS NOTES
[facility], [place]   Psychiatric Progress Note, Hospital Day #12        Interim History:   The patient's care was discussed with the treatment team during the daily team meeting and/or staff's chart notes were reviewed. Staff reported that patient keep to self and sleeps during the day at times. No irritability or outbursts. Distracted and responses delayed at times. No SI or SHARRON. Compliant with care and medications. Improved ADL and hygiene. Out for meals and eating well. Sleeping well at night. No overt julio or confusion.     The patient was pleasant and pleasant. Noted that she is doing well. Noted feeling slightly anxious but denied depr, racing thoughts and irritability. Tolerating medications well. No SI or SHARRON. Eating well. No physical complaints. Denied hallucinations and paranoia. Noted feeling upset an her boyfriend stated that she learned that he had not been faithful. The patient was seen on two occasions. Believes that she is ready to discharge to [facility].     Discussed medications and care plan.           Medications:       ARIPiprazole ER  300 mg Intramuscular Q30 Days     benztropine  0.5 mg Oral BID     lidocaine  2 patch Transdermal Q24H     lidocaine   Transdermal Q24h     lidocaine   Transdermal Q8H     gabapentin   Topical Q8H     scopolamine  1 patch Transdermal Q72H    And     scopolamine   Transdermal Q8H    And     scopolamine   Transdermal Q72H     lithium  600 mg Oral BID     folic acid  1 mg Oral Daily     multivitamin, therapeutic with minerals  1 tablet Oral Daily     OLANZapine  15 mg Oral At Bedtime     OLANZapine  5 mg Oral Daily     pantoprazole  40 mg Oral QAM          Allergies:     Allergies   Allergen Reactions     Codeine Sulfate      Compazine Rash     Morphine Nausea and Vomiting     Prochlorperazine Rash          Labs/Imaging:     No results found for this or any previous visit (from the past 24 hour(s)).     Brain MRI (4/25):  IMPRESSION: Negative  "brain and sella MRI examination.    - ALAINA GRAFF MD         Psychiatric Examination:   BP 93/53  Pulse 73  Temp 97.8  F (36.6  C) (Oral)  Resp 17  LMP  (LMP Unknown)  SpO2 98%  Weight is 0 lbs 0 oz  There is no height or weight on file to calculate BMI.    Appearance: adequately groomed, casually dressed in scrubs, no acute distress   Attitude: Cooperative and engaged  Eye Contact: good,  Mood:  \"better\"  Affect: mood congruent, neutral  Speech:  clear, coherent and normal prosody  Language: English language w/ appropriate syntax and vocabulary  Psychomotor Behavior: intact station, gait and muscle tone, no tremor noted  Gait/Station: normal gait  Thought Process: goal directed and linear.  Associations: no loose associations   Thought Content:  no evidence of suicidal ideation or homicidal ideation, not responding to internal stimuli  Insight:  limited, but improving  Judgement:  limited, but improving  Oriented to:  time, person, and place  Attention Span and Concentration:  fair  Recent and Remote Memory:  intact  Fund of Knowledge: likely below average, not formally evaluated         Precautions:     Behavioral Orders   Procedures     Code 1 - Restrict to Unit     Routine Programming     As clinically indicated     Status 15     Every 15 minutes.          Diagnoses:     Schizoaffective Disorder, bipolar type  Cocaine Use Disorder  Benzodiazepine Use Disorder         Plan:     Continue current meds    Patient is more stable. She had an intake with [facility]. Per CTC they won't have a bed for the patient until 6/15. Pt is committed and has an ACT team. [facility] pharmacy to start patient on Abilify Maintena 300 mg qmonth. Medication will be brought to the hospital by her ACT team on Monday.      "

## 2017-06-13 NOTE — PROGRESS NOTES
Attended 1.5 of 2.0 OT groups today.  Participated in Mental Health management coping skills group focused on setting boundaries.  Pt demonstrated ability to verbalize a clear boundary and apply concepts to her life circumstances.     Pt participated in OT clinic and was able to initiate task.  Pt has been given a Self Assessment form, explained the purpose of including them in their treatment plan and offered options for meeting their needs.    Pt identified reason for hospitalization (anxiety, stress), minimal coping skills (talking to someone, exercise, relaxation), GOOD supports outside the hospital   and did not identify personal strengths.      Pt verbalized understanding of symptoms on function. Pt identified experiencing the following symptoms, selected from checklist provided:     Feelings - sadness, anxiety / fear, anger / rage, mood swings, numb,  depressed,   insecure      Thoughts - negative, racing,   day-dreaming, obsessive,    thoughts of harming others     Behaviors - withdrawal / spending too much time alone,  problems with relationship(s),   financial concerns / spending problems,   procrastinating, eating too much or to little,      Pt identifies as need area:    Identify & Express Feelings  Improve decision making  Improve problem solving skills  Set & finish goals     OT PLAN:  Encourage ongoing attendance to support pt self-stated goals. Provide opportunities for education, reinforcement and practice of positive coping skills, engage in graded opportunities for success, and provide ongoing assessment as needed.

## 2017-06-13 NOTE — PROGRESS NOTES
Pt was isolative/napping most of the shift. Refused breakfast and lunch. Came out for meds only. Pt's mood continuous  to improve, appropriate on the unit. Denied SI/SIB and hallucinations.     06/13/17 1506   Behavioral Health   Hallucinations denies / not responding to hallucinations   Thinking intact   Orientation person: oriented;place: oriented;date: oriented;time: oriented   Memory baseline memory   Insight admits / accepts;insight appropriate to situation   Judgement impaired   Eye Contact at examiner   Affect blunted, flat   Mood mood is calm   Physical Appearance/Attire appears stated age   Hygiene well groomed   Suicidality other (see comments)  (denied )   Self Injury other (see comment)  (denied)   Activity isolative;restless   Speech clear;coherent   Medication Sensitivity no stated side effects   Psychomotor / Gait balanced   Psycho Education   Type of Intervention 1:1 intervention   Response participates, initiates socially appropriate   Hours 0.5   Treatment Detail (check-in)   Activities of Daily Living   Hygiene/Grooming independent   Oral Hygiene independent   Dress independent   Room Organization independent   Groups   Details (isolative )   Behavioral Health Interventions   Psychotic Symptoms maintain safety precautions;encourage nutrition and hydration;encourage participation / independence with adls;provide emotional support;establish therapeutic relationship;assist with developing & utilizing healthy coping strategies;build upon strengths   Social and Therapeutic Interventions (Psychotic Symptoms) encourage socialization with peers;encourage participation in therapeutic groups and milieu activities

## 2017-06-14 PROCEDURE — 25000132 ZZH RX MED GY IP 250 OP 250 PS 637: Performed by: PSYCHIATRY & NEUROLOGY

## 2017-06-14 PROCEDURE — 99239 HOSP IP/OBS DSCHRG MGMT >30: CPT | Performed by: PSYCHIATRY & NEUROLOGY

## 2017-06-14 PROCEDURE — 12400001 ZZH R&B MH UMMC

## 2017-06-14 RX ORDER — PANTOPRAZOLE SODIUM 40 MG/1
40 TABLET, DELAYED RELEASE ORAL EVERY MORNING
Qty: 30 TABLET | Refills: 0 | Status: SHIPPED | OUTPATIENT
Start: 2017-06-14 | End: 2018-09-16

## 2017-06-14 RX ORDER — BENZTROPINE MESYLATE 0.5 MG/1
0.5 TABLET ORAL 2 TIMES DAILY
Qty: 60 TABLET | Refills: 0 | Status: SHIPPED | OUTPATIENT
Start: 2017-06-14 | End: 2018-09-16

## 2017-06-14 RX ORDER — MULTIPLE VITAMINS W/ MINERALS TAB 9MG-400MCG
1 TAB ORAL DAILY
Qty: 30 EACH | Refills: 0 | Status: SHIPPED | OUTPATIENT
Start: 2017-06-14 | End: 2018-09-16

## 2017-06-14 RX ORDER — FOLIC ACID 1 MG/1
1 TABLET ORAL DAILY
Qty: 30 TABLET | Refills: 0 | Status: SHIPPED | OUTPATIENT
Start: 2017-06-14 | End: 2018-09-16

## 2017-06-14 RX ORDER — LITHIUM CARBONATE 300 MG/1
600 TABLET, FILM COATED, EXTENDED RELEASE ORAL 2 TIMES DAILY
Qty: 120 TABLET | Refills: 0 | Status: SHIPPED | OUTPATIENT
Start: 2017-06-14 | End: 2018-09-16

## 2017-06-14 RX ORDER — OLANZAPINE 15 MG/1
15 TABLET ORAL AT BEDTIME
Qty: 30 TABLET | Refills: 0 | Status: SHIPPED | OUTPATIENT
Start: 2017-06-14 | End: 2018-09-16

## 2017-06-14 RX ADMIN — BENZTROPINE MESYLATE 0.5 MG: 0.5 TABLET ORAL at 20:05

## 2017-06-14 RX ADMIN — LITHIUM CARBONATE 600 MG: 300 TABLET, EXTENDED RELEASE ORAL at 20:05

## 2017-06-14 RX ADMIN — BENZTROPINE MESYLATE 0.5 MG: 0.5 TABLET ORAL at 08:53

## 2017-06-14 RX ADMIN — PANTOPRAZOLE SODIUM 40 MG: 40 TABLET, DELAYED RELEASE ORAL at 08:53

## 2017-06-14 RX ADMIN — LITHIUM CARBONATE 600 MG: 300 TABLET, EXTENDED RELEASE ORAL at 08:53

## 2017-06-14 RX ADMIN — NICOTINE POLACRILEX 4 MG: 2 GUM, CHEWING ORAL at 13:19

## 2017-06-14 RX ADMIN — FOLIC ACID 1 MG: 1 TABLET ORAL at 08:53

## 2017-06-14 RX ADMIN — NICOTINE POLACRILEX 4 MG: 2 GUM, CHEWING ORAL at 20:05

## 2017-06-14 RX ADMIN — OLANZAPINE 15 MG: 15 TABLET, FILM COATED ORAL at 20:05

## 2017-06-14 RX ADMIN — NICOTINE POLACRILEX 4 MG: 2 GUM, CHEWING ORAL at 18:29

## 2017-06-14 RX ADMIN — NICOTINE POLACRILEX 4 MG: 2 GUM, CHEWING ORAL at 16:16

## 2017-06-14 RX ADMIN — NICOTINE POLACRILEX 4 MG: 2 GUM, CHEWING ORAL at 12:34

## 2017-06-14 RX ADMIN — MULTIPLE VITAMINS W/ MINERALS TAB 1 TABLET: TAB at 08:53

## 2017-06-14 RX ADMIN — OLANZAPINE 5 MG: 5 TABLET, FILM COATED ORAL at 08:53

## 2017-06-14 ASSESSMENT — ACTIVITIES OF DAILY LIVING (ADL)
DRESS: SCRUBS (BEHAVIORAL HEALTH)
DRESS: SCRUBS (BEHAVIORAL HEALTH)
LAUNDRY: UNABLE TO COMPLETE
ORAL_HYGIENE: INDEPENDENT
GROOMING: PROMPTS
GROOMING: PROMPTS
ORAL_HYGIENE: INDEPENDENT

## 2017-06-14 NOTE — PLAN OF CARE
"Problem: Psychotic Symptoms  Goal: Psychotic Symptoms  Signs and symptoms of listed problems will be absent or manageable.   Outcome: Improving    Met with pt for 1:1.  Pt is scheduled to D/C on 6/15 at 0930.  Pt states that she feels \"hopeful\" and \"optimistic\" about discharge.  Pt presents as calm.  Pt is calm and pleasant when interacting with staff and peers.  Pt denies depression.  Pt reports anxiety 8/10, but did not request PRN medications.  Pt denies SI and SIB thoughts, urges, and plans.  Pt denies AH, VH, HI, and paranoia.  Pt reports irritability 3/10, but states it is not intrusive.  Pt stated she plans on taking a shower during this shift.     Pt reported some pain from last evening IM (See MAR), but reports it is not unbearable.    Pt has no further concerns for staff. Pt can approach staff if she has a concern.     Will continue to monitor and assess.      "

## 2017-06-14 NOTE — DISCHARGE INSTRUCTIONS
Behavioral Discharge Planning and Instructions                                                               Summary:  You were admitted for paranoia and mood lability after medication noncompliance. Your provisional discharge was revoked. You are now stable and safe to discharge. Upon discharge you will continue to work with your ACT Team. You will be discharged to [facility] in [place].      Main Diagnosis: Schizoaffective Disorder      Mental Health Follow-Up:  - ACT Team through [facility]:  : Jyotsna Florni [phone]  ACT RN: Manpreet Sheehan [phone]  ACT : Ai Mendiola [phone]  Psychiatrist: Dr. Gisela Day MD Monday June 19th @ 1:30, Dr. Day will come to [facility]  Primary Care: Khadijah Quintero [phone]      Attend all scheduled appointments with your outpatient providers. Call at least 24 hours in advance if you need to reschedule an appointment to ensure continued access to your outpatient providers.   Major Treatments, Procedures and Findings:  You were provided with: a psychiatric assessment, assessed for medical stability, medication evaluation and/or management and group therapy    Symptoms to Report: feeling more aggressive, increased confusion, losing more sleep, mood getting worse or thoughts of suicide    Early warning signs can include: increased depression or anxiety sleep disturbances increased thoughts or behaviors of suicide or self-harm  increased unusual thinking, such as paranoia or hearing voices    Safety and Wellness:  Take all medicines as directed.  Make no changes unless your doctor suggests them.      Follow treatment recommendations.  Refrain from alcohol and non-prescribed drugs.  If there is a concern for safety, call 911.    Resources:   [facility] ([facility]  [phone]  Crisis Intervention: 396.750.1937 or 471-827-7542 (TTY: 643.193.6425).   Call anytime for help.  National [place] on Mental Illness (www.mn.pari.org): 780-216-2944 or 323-094-8348.  National Suicide Prevention Line (www.mentalhealthmn.org): 517-915-AIBA (0030)    The treatment team has appreciated the opportunity to work with you. If you have any questions or concerns our unit number is [phone].

## 2017-06-14 NOTE — PROGRESS NOTES
Pt slept throughout the morning. Pt woke up for lunch and was calm and pleasant while she was out in the milieu. Pt was prompted on her hygiene because it did not appear she had showered. Pt expressed her hopefulness in leaving tomorrow and says she looks forward to starting a new chapter and relieved to be leaving after being in the hospital for so long. Pt denies depression as well as hallucinations.      06/14/17 1326   Behavioral Health   Hallucinations denies / not responding to hallucinations   Thinking intact   Orientation place: oriented;time: oriented;date: oriented;person: oriented   Memory baseline memory   Insight insight appropriate to situation   Eye Contact at examiner   Affect full range affect   Mood mood is calm   Physical Appearance/Attire attire appropriate to age and situation   Hygiene neglected grooming - unclean body, hair, teeth   Suicidality other (see comments)  (denies)   Self Injury other (see comment)  (denies)   Activity isolative  (social when on milieu)   Speech clear;coherent   Medication Sensitivity no observed side effects;no stated side effects   Psychomotor / Gait balanced;steady   Sleep/Rest/Relaxation   Day/Evening Time Hours up all shift   Safety   Suicidality status 15   Coping/Psychosocial   Verbalized Emotional State hopefulness;happiness;relief   Psycho Education   Type of Intervention 1:1 intervention   Response participates, initiates socially appropriate   Hours 0.5   Activities of Daily Living   Hygiene/Grooming prompts   Oral Hygiene independent   Dress scrubs (behavioral health)   Room Organization independent   Activity   Activity Level of Assistance independent   Behavioral Health Interventions   Psychotic Symptoms maintain safety precautions;reality orientation;maintain safe secure environment;simple, clear language;decrease environmental stimulation;provide emotional support;establish therapeutic relationship;assist with developing & utilizing healthy coping  strategies;build upon strengths   Social and Therapeutic Interventions (Psychotic Symptoms) encourage socialization with peers;encourage participation in therapeutic groups and milieu activities;encourage effective boundaries with peers

## 2017-06-14 NOTE — DISCHARGE SUMMARY
"Psychiatric Discharge Summary    Daisy Curtis MRN# [id number]   Age: 31 year old YOB: 1985     Date of Admission:  5/31/2017  Date of Discharge:  6/15/2017  Admitting Physician:  Norma Schultz MD  Discharge Physician:  Maurice Sandhu MD         Event Leading to Hospitalization:   Daisy Curtis is a 31-year-old  female with history of schizoaffective disorder, bipolar type, chemical dependency and personality disorder who was initially admitted to [address] on 05/23/2017 due to increase in paranoia and mood lability in the context of medication noncompliance.  She has history of responding well to combination of lithium and Risperdal Consta, but has longstanding history of medication noncompliance.  She has had several hospitalizations recently.  She was discharged to an [facility] facility, [facility], but left a few days later  possibly due to paranoia when she returned to stay with her mother.  She missed a few medications and subsequently was brought back to the emergency department due to increase in paranoia and mood lability.  While on station , the patient reported severe abdominal pain and emesis (? self-induced emesis).  She has history of ventral hernia.  She was subsequently transferred to the medical unit for further evaluation.  While on the medical unit, she was seen by Dr. Malone who recommended transfer to psych unit for further stabilization.       Staff report that patient has been cooperative since arriving to the unit and compliant with medications; however, some disorganized thought process and paranoia has been noted.  No major agitations or combative behavior.  She has been generally compliant with care.  She slept well and engaged on approach.       At the time of this encounter, patient noted that she is doing \"a lot better now.\"  She reported no depression, but noted feeling anxious.  She is concerned about lengthy hospital " stay.  She understands that she has failed independent living and receptive to referral to IRTS facility.  She tells me that she is compliant with medications and tolerating them well.  Sleep, appetite, energy, drive and concentration intact.  She is feeling hopeful, future oriented and denies thoughts of suicide, homicide and self-harm.  She reported no racing thoughts or irritability.  She appeared to be somewhat suspicious, avoided direct eye contact and evaded answering certain questions.  Paranoia and internal stimuli were suspected.  She reported no symptoms related to PTSD, OCD or eating disorder.            See Admission note by Norma Schultz MD on 6/1/2017 for additional details.          Diagnoses:     Schizoaffective Disorder, bipolar type  Cocaine Use Disorder  Benzodiazepine Use Disorder  Borderline and antisocial traits         Labs:     Results for orders placed or performed during the hospital encounter of 05/31/17   CRP inflammation   Result Value Ref Range    CRP Inflammation 4.6 0.0 - 8.0 mg/L   Magnesium   Result Value Ref Range    Magnesium 2.0 1.6 - 2.3 mg/dL   Phosphorus   Result Value Ref Range    Phosphorus 3.3 2.5 - 4.5 mg/dL   Lactic acid whole blood   Result Value Ref Range    Lactic Acid 1.9 0.7 - 2.1 mmol/L   Comprehensive metabolic panel   Result Value Ref Range    Sodium 143 133 - 144 mmol/L    Potassium 3.8 3.4 - 5.3 mmol/L    Chloride 106 94 - 109 mmol/L    Carbon Dioxide 27 20 - 32 mmol/L    Anion Gap 10 3 - 14 mmol/L    Glucose 116 (H) 70 - 99 mg/dL    Urea Nitrogen 7 7 - 30 mg/dL    Creatinine 0.69 0.52 - 1.04 mg/dL    GFR Estimate >90  Non  GFR Calc   >60 mL/min/1.7m2    GFR Estimate If Black >90   GFR Calc   >60 mL/min/1.7m2    Calcium 10.2 (H) 8.5 - 10.1 mg/dL    Bilirubin Total 0.4 0.2 - 1.3 mg/dL    Albumin 4.2 3.4 - 5.0 g/dL    Protein Total 8.3 6.8 - 8.8 g/dL    Alkaline Phosphatase 78 40 - 150 U/L    ALT 53 (H) 0 - 50 U/L    AST 19 0 - 45  U/L   Internal Medicine Hospitalist Psych Adult IP Consult - GENERAL: Patient to be seen: Routine within 24 hrs; Call back #: [id number]; c/o increased abdominal pain, N & V; Consultant may enter orders: Yes    Narrative    Mian DignaZACK Davis CNP     6/4/2017  5:59 PM  Medicine Transfer Note, 6/4/17:    Internal Medicine consulted for worsening lower abdominal pain.    Patient is a 31 year old female with past medical history   significant for asthma, chronic abdominal pain 2/2 known ventral   hernia, recurrent nausea and vomiting, polysubstance abuse,   schizoaffective disorder, and anxiety.     Internal medicine was contacted today 6/4/17 due to continued   complaints of nausea, vomiting, and abdominal pain consistent   with previous medicine admission from 5/27 - 5/31.  RN on Sta 12   reports that patient had two episodes of emesis during the   evening 6/3 after taking oral pain medication. This morning she   continues to report lower abdominal pain, and wandering the halls   moaning and grunting.  Per chart review, the patient is asking to   transferred to medicine for IV pain medication.  Per chart   review, no episodes of pain, nausea, or emesis during day shift   6/3, with patient refusing vital sign assessment.     Patient seen and examined today 6/4 at the request of [facility] RN   staff.  She denied abdominal pain on my interview and exam.  She   is asking to eat and drink.  She had no further episodes of   emesis today 6/4.  CMP added. Mag, Phos within normal limits. CRP   unremarkable. Hgb, hct, WBC wnl. Lactic acid 1.9.  ALT slightly   elevated to 53.  Calcium 10.2, corrected value w/ albumin 4.2 wnl   at 10.0.  UA added, not yet obtained.       Given this is a chronic problem for the patient, IM will follow   peripherally and monitor electrolytes, CRP,  lactic acid levels,   and vitals, given recurrent episodes of vomiting.  Recommend   treating pain with Tylenol and heat packs.  Avoid IM, IV  pain   medications as much as possible.  Continue to monitor for   worsening emesis, bloody emesis, inability to ambulate/perform   ADLs d/t pain.  Continue Protonix QAM.  Patient will need follow   up with surgery at [facility] where she currently has established care.        Medicine will follow peripherally - please feel free to contact   us if patient's condition changes or worsens.      Digna Pappas CNP  Hospitalist Service   Pager: [phone]              Consults:   Internal medicine consult as noted above         Hospital Course:   Daisy Curtis was admitted to Station 12 with attending Norma Schultz MD under an ongoing civil commitment. The patient was placed under status 15 (15 minute checks) to ensure patient safety.     I assumed care for this patient on 6/13. By that time, she was already on her current discharge medications and was stable enough for discharge, however she still required IRTS/Group home treatment following hospitalization. She had a bed available on 6/15. The patient tolerated the medication changes noted below without any noted side effects. Her behaviors improved enough to allow transfer to a less restrictive unit (station 20). She did well on this unit, attending groups and following staff direction. She will be discharge to [facility] on 6/15.    Daisy Curtis will be released to IRTS facility. At the time of discharge Daisy Curtis was determined to not be a danger to herself or others.          Discharge Medications:     Current Discharge Medication List      START taking these medications    Details   benztropine (COGENTIN) 0.5 MG tablet Take 1 tablet (0.5 mg) by mouth 2 times daily  Qty: 60 tablet, Refills: 0    Associated Diagnoses: Schizoaffective disorder, bipolar type (H)      ARIPiprazole ER (ABILIFY MAINTENA) 300 MG extended release injection Inject 1.5 mLs (300 mg) into the muscle every 30 days Last injection on 6/9/2017  Qty: 1  each, Refills: 0    Associated Diagnoses: Schizoaffective disorder, bipolar type (H)         CONTINUE these medications which have CHANGED    Details   lithium (ESKALITH/LITHOBID) 300 MG CR tablet Take 2 tablets (600 mg) by mouth 2 times daily  Qty: 120 tablet, Refills: 0    Associated Diagnoses: Schizoaffective disorder, bipolar type (H)      OLANZapine (ZYPREXA) 15 MG tablet Take 1 tablet (15 mg) by mouth At Bedtime  Qty: 30 tablet, Refills: 0    Associated Diagnoses: Schizoaffective disorder, bipolar type (H)      folic acid (FOLVITE) 1 MG tablet Take 1 tablet (1 mg) by mouth daily  Qty: 30 tablet, Refills: 0    Associated Diagnoses: Schizoaffective disorder, bipolar type (H)      multivitamin, therapeutic with minerals (THERA-VIT-M) TABS tablet Take 1 tablet by mouth daily  Qty: 30 each, Refills: 0    Associated Diagnoses: Schizoaffective disorder, bipolar type (H)      pantoprazole (PROTONIX) 40 MG EC tablet Take 1 tablet (40 mg) by mouth every morning  Qty: 30 tablet, Refills: 0    Associated Diagnoses: Gastroesophageal reflux disease without esophagitis         STOP taking these medications       acetaminophen (TYLENOL) 325 MG tablet Comments:   Reason for Stopping:         ondansetron (ZOFRAN-ODT) 4 MG ODT tab Comments:   Reason for Stopping:         hydrOXYzine (ATARAX) 25 MG tablet Comments:   Reason for Stopping:         senna-docusate (SENOKOT-S;PERICOLACE) 8.6-50 MG per tablet Comments:   Reason for Stopping:                    Psychiatric Examination:   Appearance:  awake, alert, dressed in hospital scrubs and unkempt  Attitude:  cooperative  Eye Contact:  good  Mood:  anxious  Affect:  constricted mobility  Speech:  clear, coherent  Psychomotor Behavior:  no evidence of tardive dyskinesia, dystonia, or tics  Thought Process:  linear and goal oriented  Associations:  no loose associations  Thought Content:  no evidence of suicidal ideation or homicidal ideation and no evidence of psychotic  thought  Insight:  limited  Judgment:  limited  Oriented to:  time, person, and place  Attention Span and Concentration:  fair  Recent and Remote Memory:  intact  Language: Able to name objects and Able to repeat phrases  Fund of Knowledge: below average  Muscle Strength and Tone: normal  Gait and Station: Normal         Discharge Plan:   Psychiatric Appointments: Dr. Gisela Day MD on 6/19. [facility]  Patient has an ACT team.    Attestation:  The patient has been seen and evaluated by me,  Maurice Sandhu MD  On 6/14/2017, I saw the patient and performed the above examination, reviewed discharge medications, reviewed follow-up plan, and assessed safety for discharge. I spent greater than 30 minutes on these tasks.

## 2017-06-14 NOTE — PROGRESS NOTES
Pt signed PD and was faxed to CM for her records.     Signed med orders were faxed to [facility] and writer called to confirm. Dr. Sandhu ordered meds for discharge tomorrow. Pt needs to be at [facility] by mid-morning.

## 2017-06-15 VITALS
WEIGHT: 176 LBS | DIASTOLIC BLOOD PRESSURE: 82 MMHG | TEMPERATURE: 98 F | SYSTOLIC BLOOD PRESSURE: 114 MMHG | HEART RATE: 102 BPM | OXYGEN SATURATION: 98 % | RESPIRATION RATE: 16 BRPM | BODY MASS INDEX: 32.19 KG/M2

## 2017-06-15 PROCEDURE — 25000132 ZZH RX MED GY IP 250 OP 250 PS 637: Performed by: PSYCHIATRY & NEUROLOGY

## 2017-06-15 RX ADMIN — OLANZAPINE 5 MG: 5 TABLET, FILM COATED ORAL at 08:55

## 2017-06-15 RX ADMIN — MULTIPLE VITAMINS W/ MINERALS TAB 1 TABLET: TAB at 08:54

## 2017-06-15 RX ADMIN — BENZTROPINE MESYLATE 0.5 MG: 0.5 TABLET ORAL at 08:54

## 2017-06-15 RX ADMIN — LITHIUM CARBONATE 600 MG: 300 TABLET, EXTENDED RELEASE ORAL at 09:25

## 2017-06-15 RX ADMIN — NICOTINE POLACRILEX 4 MG: 2 GUM, CHEWING ORAL at 08:57

## 2017-06-15 RX ADMIN — PANTOPRAZOLE SODIUM 40 MG: 40 TABLET, DELAYED RELEASE ORAL at 08:54

## 2017-06-15 RX ADMIN — FOLIC ACID 1 MG: 1 TABLET ORAL at 08:54

## 2017-06-15 ASSESSMENT — ACTIVITIES OF DAILY LIVING (ADL)
DRESS: SCRUBS (BEHAVIORAL HEALTH)
LAUNDRY: WITH SUPERVISION
ORAL_HYGIENE: INDEPENDENT
GROOMING: INDEPENDENT

## 2017-06-15 NOTE — PROGRESS NOTES
Writer faxed provisional discharge to [facility] and verified per [employer]. Writer also sent a copy to be scanned.

## 2017-06-15 NOTE — PLAN OF CARE
RN from [facility] (Annelise [phone]) called about pt's zyprexa dose.  Pt was on a zyrexa 5mg daily and zyprexa 15 mg at bed-time.  Pt was only discharged with zyprexa 15 mg which is her bed-time dose and not the morning dose of zyprexa 5 mg.  Paged pt's doctor waiting to hear back.

## 2017-06-15 NOTE — PLAN OF CARE
Pt was discharged today and left by cab to go to [facility] in [place].  At the time of discharge pt denies SI and reports feeling optimistic about her future.  Discharge teaching including medication compliance and  f/u care completed with pt.  Pt verbalized understanding.

## 2017-06-16 DIAGNOSIS — F25.0 SCHIZOAFFECTIVE DISORDER, BIPOLAR TYPE (H): Primary | ICD-10-CM

## 2017-06-16 RX ORDER — OLANZAPINE 5 MG/1
5 TABLET ORAL DAILY
Qty: 30 TABLET | Refills: 0 | Status: SHIPPED | OUTPATIENT
Start: 2017-06-16 | End: 2018-09-16

## 2017-06-16 NOTE — PROGRESS NOTES
Olanzapine 5mg AM dose was accidentally stopped at discharge. I have ordered this again and sent it to her outpatient pharmacy.

## 2017-06-22 NOTE — DISCHARGE SUMMARY
Discharge Summary for stay on station 12, inpatient mental health, 5/24-5/27/17    PATIENT IDENTIFICATION:  Daisy Curtis is a 31-year-old  female.       CHIEF COMPLAINT:  The patient called for an ambulance herself and requested transport to the ER.  She had missed several doses of her medications and was becoming paranoid.  She was admitted on a voluntary basis.         Hospital Course:  Pt exhibited manic symptoms during her stay.  These had not improved considerably before she needed to be transferred to internal medicine for management of abdominal pain and vomiting.  She was compliant with her meds and did not require restraints or seclusion, but she was yelling and was disruptive in the milieu at times.  She exhibited paranoia, often yelling or accusing staff with no apparent provocation.     DIAGNOSES:   Axis I:  Schizoaffective disorder, bipolar type.         History of cocaine use disorder.         Benzodiazepine use disorder.  (Urine drug screen on admission was negative).   Axis II:  Deferred.     Axis III:  Asthma, ventral abdominal hernia, constipation, hyperprolactinemia, galactorrhea.   Axis IV:  Stressors include lack of stable housing, unemployed, lack of primary support, difficulty functioning in the community due to her mental illness.   Axis V:  Global Assessment of Functioning on admission is 25.       PLAN:  The patient was transferred to the internal medicine service on 5/27 d/t abdominal pain, vomiting and inability to tolerate PO intake in the context of a large ventral hernia.  She is under a mental health commitment and will need to return to psychiatry once her medical issues are resolved.

## 2018-09-16 ENCOUNTER — APPOINTMENT (OUTPATIENT)
Dept: CT IMAGING | Facility: CLINIC | Age: 33
End: 2018-09-16
Attending: FAMILY MEDICINE
Payer: COMMERCIAL

## 2018-09-16 ENCOUNTER — HOSPITAL ENCOUNTER (OUTPATIENT)
Facility: CLINIC | Age: 33
Setting detail: OBSERVATION
Discharge: HOME | End: 2018-09-17
Attending: FAMILY MEDICINE | Admitting: INTERNAL MEDICINE
Payer: COMMERCIAL

## 2018-09-16 DIAGNOSIS — F25.0 SCHIZOAFFECTIVE DISORDER, BIPOLAR TYPE (H): ICD-10-CM

## 2018-09-16 DIAGNOSIS — R11.15 CYCLICAL VOMITING, INTRACTABLE: ICD-10-CM

## 2018-09-16 LAB
ALBUMIN SERPL-MCNC: 4.1 G/DL (ref 3.4–5)
ALBUMIN UR-MCNC: 10 MG/DL
ALP SERPL-CCNC: 81 U/L (ref 40–150)
ALT SERPL W P-5'-P-CCNC: 32 U/L (ref 0–50)
AMPHETAMINES UR QL SCN: NEGATIVE
ANION GAP SERPL CALCULATED.3IONS-SCNC: 12 MMOL/L (ref 3–14)
APPEARANCE UR: ABNORMAL
AST SERPL W P-5'-P-CCNC: 25 U/L (ref 0–45)
BACTERIA #/AREA URNS HPF: ABNORMAL /HPF
BARBITURATES UR QL: NEGATIVE
BASOPHILS # BLD AUTO: 0 10E9/L (ref 0–0.2)
BASOPHILS NFR BLD AUTO: 0.2 %
BENZODIAZ UR QL: POSITIVE
BILIRUB SERPL-MCNC: 0.5 MG/DL (ref 0.2–1.3)
BILIRUB UR QL STRIP: NEGATIVE
BUN SERPL-MCNC: 6 MG/DL (ref 7–30)
CALCIUM SERPL-MCNC: 9.8 MG/DL (ref 8.5–10.1)
CANNABINOIDS UR QL SCN: NEGATIVE
CHLORIDE SERPL-SCNC: 108 MMOL/L (ref 94–109)
CK SERPL-CCNC: 484 U/L (ref 30–225)
CO2 SERPL-SCNC: 22 MMOL/L (ref 20–32)
COCAINE UR QL: NEGATIVE
COLOR UR AUTO: YELLOW
CREAT SERPL-MCNC: 0.73 MG/DL (ref 0.52–1.04)
DIFFERENTIAL METHOD BLD: ABNORMAL
EOSINOPHIL # BLD AUTO: 0 10E9/L (ref 0–0.7)
EOSINOPHIL NFR BLD AUTO: 0.3 %
ERYTHROCYTE [DISTWIDTH] IN BLOOD BY AUTOMATED COUNT: 12 % (ref 10–15)
ETHANOL UR QL SCN: NEGATIVE
GFR SERPL CREATININE-BSD FRML MDRD: >90 ML/MIN/1.7M2
GLUCOSE SERPL-MCNC: 102 MG/DL (ref 70–99)
GLUCOSE UR STRIP-MCNC: NEGATIVE MG/DL
HCG SERPL QL: NEGATIVE
HCT VFR BLD AUTO: 36.1 % (ref 35–47)
HGB BLD-MCNC: 11.7 G/DL (ref 11.7–15.7)
HGB UR QL STRIP: NEGATIVE
IMM GRANULOCYTES # BLD: 0 10E9/L (ref 0–0.4)
IMM GRANULOCYTES NFR BLD: 0.3 %
KETONES UR STRIP-MCNC: 10 MG/DL
LEUKOCYTE ESTERASE UR QL STRIP: ABNORMAL
LIPASE SERPL-CCNC: 104 U/L (ref 73–393)
LITHIUM SERPL-SCNC: 0.2 MMOL/L (ref 0.6–1.2)
LYMPHOCYTES # BLD AUTO: 2 10E9/L (ref 0.8–5.3)
LYMPHOCYTES NFR BLD AUTO: 20 %
MCH RBC QN AUTO: 31.2 PG (ref 26.5–33)
MCHC RBC AUTO-ENTMCNC: 32.4 G/DL (ref 31.5–36.5)
MCV RBC AUTO: 96 FL (ref 78–100)
MONOCYTES # BLD AUTO: 0.4 10E9/L (ref 0–1.3)
MONOCYTES NFR BLD AUTO: 4.4 %
MUCOUS THREADS #/AREA URNS LPF: PRESENT /LPF
NEUTROPHILS # BLD AUTO: 7.5 10E9/L (ref 1.6–8.3)
NEUTROPHILS NFR BLD AUTO: 74.8 %
NITRATE UR QL: NEGATIVE
NRBC # BLD AUTO: 0 10*3/UL
NRBC BLD AUTO-RTO: 0 /100
OPIATES UR QL SCN: NEGATIVE
PH UR STRIP: 6 PH (ref 5–7)
PLATELET # BLD AUTO: 300 10E9/L (ref 150–450)
POTASSIUM SERPL-SCNC: 3.9 MMOL/L (ref 3.4–5.3)
PROCALCITONIN SERPL-MCNC: <0.05 NG/ML
PROT SERPL-MCNC: 7.6 G/DL (ref 6.8–8.8)
RBC # BLD AUTO: 3.75 10E12/L (ref 3.8–5.2)
RBC #/AREA URNS AUTO: <1 /HPF (ref 0–2)
SODIUM SERPL-SCNC: 142 MMOL/L (ref 133–144)
SOURCE: ABNORMAL
SP GR UR STRIP: 1.02 (ref 1–1.03)
SQUAMOUS #/AREA URNS AUTO: 10 /HPF (ref 0–1)
UROBILINOGEN UR STRIP-MCNC: NORMAL MG/DL (ref 0–2)
WBC # BLD AUTO: 10 10E9/L (ref 4–11)
WBC #/AREA URNS AUTO: 1 /HPF (ref 0–5)

## 2018-09-16 PROCEDURE — 80053 COMPREHEN METABOLIC PANEL: CPT | Performed by: FAMILY MEDICINE

## 2018-09-16 PROCEDURE — 80178 ASSAY OF LITHIUM: CPT | Performed by: FAMILY MEDICINE

## 2018-09-16 PROCEDURE — 25000128 H RX IP 250 OP 636: Performed by: FAMILY MEDICINE

## 2018-09-16 PROCEDURE — 74176 CT ABD & PELVIS W/O CONTRAST: CPT

## 2018-09-16 PROCEDURE — G0378 HOSPITAL OBSERVATION PER HR: HCPCS

## 2018-09-16 PROCEDURE — 96375 TX/PRO/DX INJ NEW DRUG ADDON: CPT | Performed by: FAMILY MEDICINE

## 2018-09-16 PROCEDURE — 99285 EMERGENCY DEPT VISIT HI MDM: CPT | Mod: 25 | Performed by: FAMILY MEDICINE

## 2018-09-16 PROCEDURE — 99285 EMERGENCY DEPT VISIT HI MDM: CPT | Mod: Z6 | Performed by: FAMILY MEDICINE

## 2018-09-16 PROCEDURE — 25000132 ZZH RX MED GY IP 250 OP 250 PS 637: Performed by: PEDIATRICS

## 2018-09-16 PROCEDURE — 81001 URINALYSIS AUTO W/SCOPE: CPT | Performed by: FAMILY MEDICINE

## 2018-09-16 PROCEDURE — 25000132 ZZH RX MED GY IP 250 OP 250 PS 637: Performed by: FAMILY MEDICINE

## 2018-09-16 PROCEDURE — 84145 PROCALCITONIN (PCT): CPT | Performed by: PEDIATRICS

## 2018-09-16 PROCEDURE — 84703 CHORIONIC GONADOTROPIN ASSAY: CPT | Performed by: FAMILY MEDICINE

## 2018-09-16 PROCEDURE — 96374 THER/PROPH/DIAG INJ IV PUSH: CPT | Performed by: FAMILY MEDICINE

## 2018-09-16 PROCEDURE — 85025 COMPLETE CBC W/AUTO DIFF WBC: CPT | Performed by: FAMILY MEDICINE

## 2018-09-16 PROCEDURE — 96372 THER/PROPH/DIAG INJ SC/IM: CPT | Performed by: FAMILY MEDICINE

## 2018-09-16 PROCEDURE — 82550 ASSAY OF CK (CPK): CPT | Performed by: FAMILY MEDICINE

## 2018-09-16 PROCEDURE — 80307 DRUG TEST PRSMV CHEM ANLYZR: CPT | Performed by: FAMILY MEDICINE

## 2018-09-16 PROCEDURE — 80320 DRUG SCREEN QUANTALCOHOLS: CPT | Performed by: FAMILY MEDICINE

## 2018-09-16 PROCEDURE — 96376 TX/PRO/DX INJ SAME DRUG ADON: CPT | Performed by: FAMILY MEDICINE

## 2018-09-16 PROCEDURE — 83690 ASSAY OF LIPASE: CPT | Performed by: FAMILY MEDICINE

## 2018-09-16 PROCEDURE — 96361 HYDRATE IV INFUSION ADD-ON: CPT | Performed by: FAMILY MEDICINE

## 2018-09-16 RX ORDER — SODIUM CHLORIDE 9 MG/ML
1000 INJECTION, SOLUTION INTRAVENOUS CONTINUOUS
Status: DISCONTINUED | OUTPATIENT
Start: 2018-09-16 | End: 2018-09-17

## 2018-09-16 RX ORDER — OLANZAPINE 10 MG/2ML
10 INJECTION, POWDER, FOR SOLUTION INTRAMUSCULAR ONCE
Status: COMPLETED | OUTPATIENT
Start: 2018-09-16 | End: 2018-09-16

## 2018-09-16 RX ORDER — LORAZEPAM 2 MG/ML
2 INJECTION INTRAMUSCULAR ONCE
Status: COMPLETED | OUTPATIENT
Start: 2018-09-16 | End: 2018-09-16

## 2018-09-16 RX ORDER — HALOPERIDOL 5 MG/ML
5 INJECTION INTRAMUSCULAR ONCE
Status: COMPLETED | OUTPATIENT
Start: 2018-09-16 | End: 2018-09-16

## 2018-09-16 RX ORDER — LORAZEPAM 2 MG/ML
1 INJECTION INTRAMUSCULAR ONCE
Status: COMPLETED | OUTPATIENT
Start: 2018-09-16 | End: 2018-09-16

## 2018-09-16 RX ORDER — HALOPERIDOL 5 MG/ML
2 INJECTION INTRAMUSCULAR ONCE
Status: COMPLETED | OUTPATIENT
Start: 2018-09-16 | End: 2018-09-16

## 2018-09-16 RX ORDER — DIPHENHYDRAMINE HYDROCHLORIDE 50 MG/ML
12.5 INJECTION INTRAMUSCULAR; INTRAVENOUS ONCE
Status: COMPLETED | OUTPATIENT
Start: 2018-09-16 | End: 2018-09-16

## 2018-09-16 RX ORDER — ONDANSETRON 4 MG/1
4 TABLET, ORALLY DISINTEGRATING ORAL EVERY 6 HOURS PRN
Status: DISCONTINUED | OUTPATIENT
Start: 2018-09-16 | End: 2018-09-17 | Stop reason: HOSPADM

## 2018-09-16 RX ORDER — OLANZAPINE 10 MG/2ML
5 INJECTION, POWDER, FOR SOLUTION INTRAMUSCULAR ONCE
Status: DISCONTINUED | OUTPATIENT
Start: 2018-09-16 | End: 2018-09-16

## 2018-09-16 RX ORDER — PANTOPRAZOLE SODIUM 40 MG/1
40 TABLET, DELAYED RELEASE ORAL ONCE
Status: COMPLETED | OUTPATIENT
Start: 2018-09-16 | End: 2018-09-16

## 2018-09-16 RX ORDER — LITHIUM CARBONATE 300 MG/1
300 TABLET, FILM COATED, EXTENDED RELEASE ORAL 2 TIMES DAILY
COMMUNITY
Start: 2018-06-25

## 2018-09-16 RX ORDER — FOLIC ACID 1 MG/1
1 TABLET ORAL DAILY
Status: DISCONTINUED | OUTPATIENT
Start: 2018-09-17 | End: 2018-09-17 | Stop reason: HOSPADM

## 2018-09-16 RX ORDER — OLANZAPINE 10 MG/1
10 TABLET ORAL 2 TIMES DAILY
COMMUNITY
Start: 2018-06-25

## 2018-09-16 RX ORDER — ACETAMINOPHEN 325 MG/1
650 TABLET ORAL EVERY 4 HOURS PRN
Status: DISCONTINUED | OUTPATIENT
Start: 2018-09-16 | End: 2018-09-17 | Stop reason: HOSPADM

## 2018-09-16 RX ORDER — PANTOPRAZOLE SODIUM 40 MG/1
40 TABLET, DELAYED RELEASE ORAL EVERY MORNING
Status: DISCONTINUED | OUTPATIENT
Start: 2018-09-17 | End: 2018-09-17 | Stop reason: HOSPADM

## 2018-09-16 RX ORDER — ATROPINE SULFATE 10 MG/ML
1 SOLUTION/ DROPS OPHTHALMIC AT BEDTIME
COMMUNITY
Start: 2018-06-25

## 2018-09-16 RX ORDER — MULTIPLE VITAMINS W/ MINERALS TAB 9MG-400MCG
1 TAB ORAL DAILY
Status: DISCONTINUED | OUTPATIENT
Start: 2018-09-17 | End: 2018-09-17 | Stop reason: HOSPADM

## 2018-09-16 RX ORDER — KETOROLAC TROMETHAMINE 30 MG/ML
30 INJECTION, SOLUTION INTRAMUSCULAR; INTRAVENOUS ONCE
Status: COMPLETED | OUTPATIENT
Start: 2018-09-16 | End: 2018-09-16

## 2018-09-16 RX ORDER — KETOROLAC TROMETHAMINE 30 MG/ML
30 INJECTION, SOLUTION INTRAMUSCULAR; INTRAVENOUS EVERY 6 HOURS PRN
Status: DISCONTINUED | OUTPATIENT
Start: 2018-09-16 | End: 2018-09-17 | Stop reason: HOSPADM

## 2018-09-16 RX ORDER — DIPHENHYDRAMINE HYDROCHLORIDE 50 MG/ML
50 INJECTION INTRAMUSCULAR; INTRAVENOUS ONCE
Status: COMPLETED | OUTPATIENT
Start: 2018-09-16 | End: 2018-09-16

## 2018-09-16 RX ORDER — ONDANSETRON 2 MG/ML
4 INJECTION INTRAMUSCULAR; INTRAVENOUS EVERY 6 HOURS PRN
Status: DISCONTINUED | OUTPATIENT
Start: 2018-09-16 | End: 2018-09-17 | Stop reason: HOSPADM

## 2018-09-16 RX ORDER — ACETAMINOPHEN 650 MG/1
650 SUPPOSITORY RECTAL EVERY 4 HOURS PRN
Status: DISCONTINUED | OUTPATIENT
Start: 2018-09-16 | End: 2018-09-17 | Stop reason: HOSPADM

## 2018-09-16 RX ORDER — NALOXONE HYDROCHLORIDE 0.4 MG/ML
.1-.4 INJECTION, SOLUTION INTRAMUSCULAR; INTRAVENOUS; SUBCUTANEOUS
Status: DISCONTINUED | OUTPATIENT
Start: 2018-09-16 | End: 2018-09-17 | Stop reason: HOSPADM

## 2018-09-16 RX ORDER — POLYETHYLENE GLYCOL 3350 17 G/17G
17 POWDER, FOR SOLUTION ORAL DAILY
COMMUNITY
Start: 2018-06-26

## 2018-09-16 RX ORDER — OLANZAPINE 10 MG/2ML
10 INJECTION, POWDER, FOR SOLUTION INTRAMUSCULAR DAILY PRN
Status: DISCONTINUED | OUTPATIENT
Start: 2018-09-16 | End: 2018-09-17 | Stop reason: HOSPADM

## 2018-09-16 RX ORDER — LITHIUM CARBONATE 300 MG/1
600 TABLET, FILM COATED, EXTENDED RELEASE ORAL 2 TIMES DAILY
Status: DISCONTINUED | OUTPATIENT
Start: 2018-09-16 | End: 2018-09-17 | Stop reason: HOSPADM

## 2018-09-16 RX ORDER — CLOZAPINE 100 MG/1
100 TABLET ORAL EVERY MORNING
Status: ON HOLD | COMMUNITY
Start: 2018-06-25 | End: 2018-09-17

## 2018-09-16 RX ADMIN — LORAZEPAM 1 MG: 2 INJECTION INTRAMUSCULAR; INTRAVENOUS at 14:55

## 2018-09-16 RX ADMIN — KETOROLAC TROMETHAMINE 30 MG: 30 INJECTION, SOLUTION INTRAMUSCULAR at 10:53

## 2018-09-16 RX ADMIN — LORAZEPAM 2 MG: 2 INJECTION INTRAMUSCULAR; INTRAVENOUS at 13:15

## 2018-09-16 RX ADMIN — HALOPERIDOL LACTATE 2 MG: 5 INJECTION, SOLUTION INTRAMUSCULAR at 14:45

## 2018-09-16 RX ADMIN — SODIUM CHLORIDE 1000 ML: 9 INJECTION, SOLUTION INTRAVENOUS at 18:17

## 2018-09-16 RX ADMIN — DIPHENHYDRAMINE HYDROCHLORIDE 50 MG: 50 INJECTION, SOLUTION INTRAMUSCULAR; INTRAVENOUS at 12:22

## 2018-09-16 RX ADMIN — HALOPERIDOL LACTATE 5 MG: 5 INJECTION, SOLUTION INTRAMUSCULAR at 17:27

## 2018-09-16 RX ADMIN — ACETAMINOPHEN 650 MG: 325 TABLET, FILM COATED ORAL at 21:09

## 2018-09-16 RX ADMIN — PANTOPRAZOLE SODIUM 40 MG: 40 TABLET, DELAYED RELEASE ORAL at 12:04

## 2018-09-16 RX ADMIN — SODIUM CHLORIDE 1000 ML: 900 INJECTION, SOLUTION INTRAVENOUS at 19:30

## 2018-09-16 RX ADMIN — OLANZAPINE 10 MG: 10 INJECTION, POWDER, LYOPHILIZED, FOR SOLUTION INTRAMUSCULAR at 10:53

## 2018-09-16 RX ADMIN — LORAZEPAM 1 MG: 2 INJECTION INTRAMUSCULAR; INTRAVENOUS at 15:42

## 2018-09-16 RX ADMIN — DIPHENHYDRAMINE HYDROCHLORIDE 12.5 MG: 50 INJECTION, SOLUTION INTRAMUSCULAR; INTRAVENOUS at 14:18

## 2018-09-16 RX ADMIN — SODIUM CHLORIDE 1000 ML: 9 INJECTION, SOLUTION INTRAVENOUS at 14:17

## 2018-09-16 RX ADMIN — HALOPERIDOL LACTATE 2 MG: 5 INJECTION, SOLUTION INTRAMUSCULAR at 14:17

## 2018-09-16 NOTE — ED NOTES
Now cooperative after drawing labs from wrist.  Unable to remove blood from sabrina cath. Able to irrigate and irrigated with 10cc saline. Taken out of seclusion and moved to room 12.

## 2018-09-16 NOTE — ED NOTES
Patient sleeping soundly.  Respirations regular and unlabored.  Patient no longer exhibiting behaviors warranting continuation of restraints.  Restraints discontinued at this time.

## 2018-09-16 NOTE — ED NOTES
In person face to face assessment completed, including an evaluation of the patient's immediate reaction to the intervention, complete review of systems assessment, behavioral assessment and review/assessment of history, drugs and medications, recent labs, etc., and behavioral condition.  The patient experienced: No adverse physical outcome from seclusion/restraint initiation.  The intervention of restraint or seclusion needs to continue.     Celso Ponce MD  09/16/18 [id number]

## 2018-09-16 NOTE — ED NOTES
With a long history of polysubstance abuse, schizoaffective disorder, bipolar disorder has been escorted from the premises to previous emergency departments today presented complaining of abdominal pain.  In the course of attempting to evaluate and treat this patient she became verbally abusive towards the staff, threatening and posturing towards the nurses, refused to stay in her room or to stay on the bed even though she had been given psychoactive medications.  She appeared unstable on her feet and appeared to be unsafe.  Due to concerns for her own safety as well as the safety of staff and patients in the emergency department an order for seclusion was given.     Celso Ponce MD  09/16/18 [id number]

## 2018-09-16 NOTE — ED NOTES
"Pt moving constantly, walking around room or kneeling on cart and moving legs against mattress. When asking pt triage questions she would answer sometimes and the rest yell at writer stating \"your waisting my time.\"  Attempted to do vitals multiple times, but pt continued to remove cuff and pulse ox.  Pt needing continuous redirection to sit on cart and stay in room.  Pt moving thru ER hallways.  Unable to finish triage at this time.   "

## 2018-09-16 NOTE — ED NOTES
In person face to face assessment completed, including an evaluation of the patient's immediate reaction to the intervention, complete review of systems assessment, behavioral assessment and review/assessment of history, drugs and medications, recent labs, etc., and behavioral condition.  The patient experienced: No adverse physical outcome from seclusion/restraint initiation.  The intervention of restraint or seclusion needs to continue.       Celso Ponce MD  09/16/18 2743

## 2018-09-16 NOTE — ED NOTES
MD requesting code 21 for IV placement, using patients port and to give medications to enable CT exam to be done.

## 2018-09-16 NOTE — ED NOTES
Staff here for code green, but patient now compliant with this nurse. Willing to take a shot. Continues in room #12 at this time. Had vomited again on floor. MD aware.

## 2018-09-16 NOTE — ED NOTES
Patient needing frequent redirection, very condescending to staff with a rare apology; redirectable after a 3 minute discussion, but only briefly and leaves room again after 2 minutes.

## 2018-09-16 NOTE — ED NOTES
Patient opened door again, began posturing as if to hit the staff member who is providing 1:1; able to close door; Patient told many times to leave door closed; MD notified;

## 2018-09-16 NOTE — H&P
[facility] History and Physical    Daisy Curtis MRN# [id number]   Age: 32 year old YOB: 1985     Date of Admission:  9/16/2018    Primary care provider: Kary Quintero     32 year old woman with a h/o schizoaffective disorder (bipolar type), polysubstance abuse (cocaine, benzos), complex psychosocial difficulties, ventral history, and previous admission for abdominal pain presents to hospital today with complaints of abdominal pain and severe agitation.  Improved after receiving several sedating meds with benign exam and labs; now with slight fever    1. Abdominal pain: unclear etiology; has has previous admissions for abdominal pain without any clear positive findings other than a clearly reducible abdominal hernia (last 5/27- 5/31/18; on review, does not appear that positive objective organic findings found during any of these admissions, dating at least through 2015.  Very benign abdomin on exam when sedated; and CT and labs thus far without any positive findings  -add on lipase  -monitor, re-examine in AM  -Zofran prn  -Tylenol prn  -low dose Morphine prn (ketorolac and Lithium interaction per pharmacy; and not apporved)    2. Fever: unclear etiology; did not complain of fever nor give history of this earlier in hospitalization.  One possibility is this could be related to medications (given multiple medications (see HPI), and did have slightly elevated CK.  However, not rigid on exam.   ED gives report that patient may have been bundling self up in blankets prior to kelly.  Sources could also include viruses (no other clear viral symptoms, though poor historian)., bacterial infection (does not seem most likely).  No leukocytosis  -monitor for further fevers, rigidity, abnormal movements  -UA  -procal  -avoid anti-psychotics as able    3. Agitation, possible psychosis, schizoaffective disorder (bipolar type): presenting with severe agitation; and known to have severe  psychiatric illness. Lithium levels subtherapeutic (though detectable)  -1:1 sitter  -psych to see in AM  -hold olanzapine for now (how meds) given fevers, multiple psych meds    4. Elevated CK: unclear etiology. No clear large bruises, etc on exam to suggest trauma.  Could be related to agitation and/or restraints (currently off).  At current levels would not be expected to cause kidney damage though not sure if uptrending, etc  -recheck CK in AM  -IV fluids  -avoid restraints as able    5. Dispo: admit to medicine for observation of abdominal pain.   Suspect may need psychiatry admission               Chief Complaint:   Abdominal pain       Ms Curtis is a 32 year old woman with a h/o schizoaffective disorder (bipolar type) requiring numerous psychiatric admissions ([facility], [facility]), abdominal pain, and a large ventral hernia that came into our ED by ambulance c/o abdominal pain.  Per report she has presented to multiple other EDs today, and reportedly at at least one did not seem to permit abdominal examination due to agitation and was escorted out of at least one ED.  She reported to the ED physician at our facility that she has had abdominal pain similar to today for over a year; and requested Toradol and juice.  She has recevied Zyprexa at [facility].  Here she was reported to be very agitated and verbally aggressive towards staff, and was given Toradol (30mg), Protonix (40mg) Olanzapine (10mg)  Benadryl (50 mg), Haldol ( 2mg, 2mg, 5mg), Ativan (1mg, 1mg, 2mg), over the course of 7 hours in our ED.  She did become quite sleepy at this point, and an abdominal CT was performed, as were labs.    She unfortunately is very sedated when seen and does not permit any further interviewing.  Review of records do show a previous admissions to medicine during a psychiatric hospitalization for abdominal pain, with no positive findings on evaluation.         Past Medical History:   Includes:  Polysubstance abuse (including  cocaine, benzodiazapines)  Schizoaffective disorder (bipolar type)-- last psych admission in [place] 2018.  Hx of other admissions  Ventral hernia  Previous h/o abdominal pain  Asthma           Past Surgical History:      Past Surgical History:   Procedure Laterality Date     CHOLECYSTECTOMY       GYN SURGERY       x 2     ORTHOPEDIC SURGERY      scoliosis repair             Social History:   Not possible to currently obtain    Per 17 psych note-- has had issues including lack of stable housing, unemployment, lack of primary support, difficulty functioning in community due to her mental illness      Social History   Substance Use Topics     Smoking status: Former Smoker     Packs/day: 1.00     Smokeless tobacco: Never Used     Alcohol use No             Family History:   Family history not currently obtainable           Allergies:   Not currently obtainable.   Per Epic,   This patient is allergic to is allergic to codeine sulfate; compazine; morphine; and prochlorperazine.          Medications:     Current Facility-Administered Medications   Medication     0.9% sodium chloride BOLUS     sodium chloride 0.9% infusion     Current Outpatient Prescriptions   Medication Sig     ARIPiprazole ER (ABILIFY MAINTENA) 300 MG extended release injection Inject 1.5 mLs (300 mg) into the muscle every 30 days Last injection on 2017     benztropine (COGENTIN) 0.5 MG tablet Take 1 tablet (0.5 mg) by mouth 2 times daily     folic acid (FOLVITE) 1 MG tablet Take 1 tablet (1 mg) by mouth daily     lithium (ESKALITH/LITHOBID) 300 MG CR tablet Take 2 tablets (600 mg) by mouth 2 times daily     multivitamin, therapeutic with minerals (THERA-VIT-M) TABS tablet Take 1 tablet by mouth daily     OLANZapine (ZYPREXA) 15 MG tablet Take 1 tablet (15 mg) by mouth At Bedtime     OLANZapine (ZYPREXA) 5 MG tablet Take 1 tablet (5 mg) by mouth daily     pantoprazole (PROTONIX) 40 MG EC tablet Take 1 tablet (40 mg) by mouth every  morning             Review of Systems:   Not obtainable            Physical Exam:   Vitals were reviewed  Temp: 101.2  F (38.4  C) Temp src: Oral BP: (!) 136/93   Heart Rate: 79 Resp: 20 SpO2: 100 % O2 Device: None (Room air)    Constitutional:   Somnolent.  Responds (localizes) to examination, pulls clothes back over self, etc.  Does not answer questions   Eyes:   Lids and lashes normal, pupils equal, round and reactive to light, extra ocular muscles intact, sclera clear, conjunctiva normal   ENT:   Normocephalic, without obvious abnormality, atraumatic, sinuses nontender on palpation, external ears without lesions, oral pharynx with moist mucous membranes, tonsils without erythema or exudates, gums normal and good dentition.   Neck:   Supple, symmetrical, trachea midline, no adenopathy, thyroid symmetric, not enlarged and no tenderness, skin normal   Hematologic / Lymphatic:   no cervical lymphadenopathy   Lungs:   No increased work of breathing, good air exchange, clear to auscultation bilaterally, no crackles or wheezing   Cardiovascular:   Normal apical impulse, regular rate and rhythm, normal S1 and S2, no S3 or S4, and no murmur noted   Abdomen:   No scars normal bowel sounds, soft, non-distended, non-tender, no masses palpated, no hepatosplenomegally.  Large, easily reducible abdominal hernia   Musculoskeletal:   There is no redness, warmth, or swelling of the joints.  Full range of motion noted.  Motor strength is 5 out of 5 all extremities bilaterally.  Tone is normal.   Neurologic:   Mental Status Exam:  Level of Alertness:   Somnolent.   No upper or lower extremity rigidity   Skin:   no bruising or bleeding, normal skin color, texture, turgor, no redness, warmth, or swelling and no rashes.  Well-healed lacerations on anterior forearms, left abdomen.  No obvious s             Data:     Results for orders placed or performed during the hospital encounter of 09/16/18 (from the past 24 hour(s))   CBC with  platelets differential   Result Value Ref Range    WBC 10.0 4.0 - 11.0 10e9/L    RBC Count 3.75 (L) 3.8 - 5.2 10e12/L    Hemoglobin 11.7 11.7 - 15.7 g/dL    Hematocrit 36.1 35.0 - 47.0 %    MCV 96 78 - 100 fl    MCH 31.2 26.5 - 33.0 pg    MCHC 32.4 31.5 - 36.5 g/dL    RDW 12.0 10.0 - 15.0 %    Platelet Count 300 150 - 450 10e9/L    Diff Method Automated Method     % Neutrophils 74.8 %    % Lymphocytes 20.0 %    % Monocytes 4.4 %    % Eosinophils 0.3 %    % Basophils 0.2 %    % Immature Granulocytes 0.3 %    Nucleated RBCs 0 0 /100    Absolute Neutrophil 7.5 1.6 - 8.3 10e9/L    Absolute Lymphocytes 2.0 0.8 - 5.3 10e9/L    Absolute Monocytes 0.4 0.0 - 1.3 10e9/L    Absolute Eosinophils 0.0 0.0 - 0.7 10e9/L    Absolute Basophils 0.0 0.0 - 0.2 10e9/L    Abs Immature Granulocytes 0.0 0 - 0.4 10e9/L    Absolute Nucleated RBC 0.0    Comprehensive metabolic panel   Result Value Ref Range    Sodium 142 133 - 144 mmol/L    Potassium 3.9 3.4 - 5.3 mmol/L    Chloride 108 94 - 109 mmol/L    Carbon Dioxide 22 20 - 32 mmol/L    Anion Gap 12 3 - 14 mmol/L    Glucose 102 (H) 70 - 99 mg/dL    Urea Nitrogen 6 (L) 7 - 30 mg/dL    Creatinine 0.73 0.52 - 1.04 mg/dL    GFR Estimate >90 >60 mL/min/1.7m2    GFR Estimate If Black >90 >60 mL/min/1.7m2    Calcium 9.8 8.5 - 10.1 mg/dL    Bilirubin Total 0.5 0.2 - 1.3 mg/dL    Albumin 4.1 3.4 - 5.0 g/dL    Protein Total 7.6 6.8 - 8.8 g/dL    Alkaline Phosphatase 81 40 - 150 U/L    ALT 32 0 - 50 U/L    AST 25 0 - 45 U/L   CK total   Result Value Ref Range    CK Total 484 (H) 30 - 225 U/L   Lithium level   Result Value Ref Range    Lithium Level 0.20 (L) 0.60 - 1.20 mmol/L   HCG qualitative pregnancy (blood)   Result Value Ref Range    HCG Qualitative Serum Negative NEG^Negative   Lipase   Result Value Ref Range    Lipase 104 73 - 393 U/L   CT Abdomen Pelvis w/o Contrast    Narrative    CT ABDOMEN PELVIS WITHOUT CONTRAST 9/16/2018 4:35 PM    HISTORY: Abdominal pain and vomiting.  Known large  ventral hernia  which appears to be reducible.    TECHNIQUE: Helical axial scans from the dome of liver through the  pubic symphysis without contrast. Radiation dose for this scan was  reduced using automated exposure control, adjustment of the mA and/or  kV according to patient size, or iterative reconstruction technique.    COMPARISON: 3/70/2017.    FINDINGS: Prior cholecystectomy is again noted. The previously seen  fatty infiltration of the liver has resolved. The spleen, pancreas,  bilateral adrenal glands and kidneys bilaterally are unremarkable.  Metallic artifacts from spinal fixation hardware slightly compromise  images in the midabdomen. There is a midline supraumbilical hernia  measuring 4.5 cm in diameter, slightly larger than on the prior exam  (3.8 cm). The hernia contains a single loop of transverse colon and  some adjacent fat with no evidence for incarceration or strangulation.  The previous exam showed only fat in the hernia. The bowel and  mesentery in the upper abdomen are otherwise unremarkable without  contrast.    Scans through the pelvis show no acute abnormality air or free fluid.  There is a retrocecal appendix without appendicitis. Chronic spinal  deformity with curvature convex to the left in the upper lumbar region  again noted.      Impression    IMPRESSION:  1. Midline supraumbilical hernia slightly larger than on the prior  exam and now contains a loop of transverse colon without evidence for  incarceration or strangulation.  2. No other significant abnormality.          Michael Powell MD

## 2018-09-16 NOTE — ED NOTES
In person face to face assessment completed, including an evaluation of the patient's immediate reaction to the intervention, complete review of systems assessment, behavioral assessment and review/assessment of history, drugs and medications, recent labs, etc., and behavioral condition.  The patient experienced: No adverse physical outcome from seclusion/restraint initiation.  The intervention of restraint or seclusion needs to continue.       Celso Ponce MD  09/16/18 [id number]

## 2018-09-16 NOTE — ED NOTES
"Pt arrived extremely hyperactive and difficult to redirect. Pt started making derogatory statements to then the assigned RN. Pt was running around the Ed unit and directed numerous times to return to her room 6. Pt care was reassigned to this writer due to safety concerns by previous RN because of verbal threats and statements to do harm. Pt would take redirection from this writer for short amounts of time, but then would return to leaving room 6 and running around unit. Pt's motion was uncontrollable by herself.Pt went behind nurses station to [facility] at on point.  Pt was on her stomach on bed and kept trying to climb up the bed in constant motion. Pt would jump off bed and dance around the room eventually leaving the room and move around the unit. MD entered room several times to alert pt the necessity to stay in her room and stop disturbing other pt. Security was on alert in a safe distance out side of pt room and would advise pt to stay in her room. Pt was agreeable to receive n IM Zyprexa and Toradol. This writer stayed with pt in room 6 after injections were completed. Pt still could not settle down and remained in constant motion. This writer did make attempts to settle pt down with cool wash cloths on her neck, back and face. Pt stated, \"that makes me feel better\" and gave this writer a hug. Pt held still for a few minutes but returned to her constant motion pattern. MD stated, \"I am letting you know that we really need you to stay in your room now or we will have to move you to a seclusion room so not to disrupt the other patients. This is your last chance and I want to be clear that if you leave your room again we will have to move you to another room down the rosales\". PT stated she understood and stayed in her room briefly before walking out into the hallway out side room 6. Security at that time told the pt they were going to have to move her to another room. Pt became very disrespectfull to security " "stating \"I could kick all your assses before that will happen\". Three security officers moved in to physically move her to another room. Pt became very agitated and tried kicking the officers. Pt was then taken slowly and carefully to the floor to gain control of situation. Code 21 was called at this time. Security was respectful to pt as the situation escalated to this point. Pt did not resist at this point and remained calm though interjecting racial slurs to every member of the security team. Pt was carefully placed in restraints, onto board and then on cart to be rolled into room 14. PT has a port that needed to be accessed for lab work because she had no availability in either arm. This writer apologized directly to pt stating, \"I'm sorry things went this way\". Pt stated back to writer in a calm voice, \"Its ok I've had a lot worse, don't fell bad.\" Pt was told by oncoming RN that pt could tell her when she was ready to have the port accessed. Pt agreed and said she was willing to have labs drawn.  This writer gave report to oncoming RN.   "

## 2018-09-16 NOTE — ED NOTES
Discussed with patient injection ordered for patient and where to give IM. Patient hesitant about injection, refusing initially and then stated that she would take the injection but not go to seclusion. Filiberto Hudson called for back up regarding injection and possible need for seclusion.

## 2018-09-16 NOTE — ED NOTES
"Had vomited a small amount on floor. Black streaks. Continues to complain of \"heartburn\". MD aware.   "

## 2018-09-16 NOTE — ED NOTES
Continues to come out of room. Constant groaning, grunting, and not cooperative. Not able to stay in room. On knees on cart at time, rocking back and forth. Swearing at time. Calling staff names. Moved to room 14. No code called because patient walked in room. MD aware.

## 2018-09-16 NOTE — ED PROVIDER NOTES
History     Chief Complaint   Patient presents with     Abdominal Pain     HPI  Daisy Curtis is a 32 year old female who has a history of bipolar disorder, polysubstance abuse, chronic abdominal pain with known large ventral hernia.  She presents today after she has already been seen at 2 prior emergency departments.  Apparently was evaluated there for similar complaints, ultimately was escorted out by security and called EMS from the lobby.  They elected to transport her here.  The patient tells me she has abdominal pain similar to today for over a year.  She is unable to tell me how she generally manages the pain.  She is requesting juice, and a shot of Toradol.  I see from reviewing her chart she did receive Zyprexa I am at [facility] about an hour ago.  She denies other medical problems but has an indwelling Port-A-Cath apparently for infusions.  She will not answer any questions as to why she has a Port-A-Cath.  She frequently insults the nursing staff by using profanities.  Denies any allergies.  Currently denies that she is any type of psychiatric crisis.    Past Medical History:   Diagnosis Date     Anxiety      Substance abuse      Uncomplicated asthma      Ventral hernia      Past Surgical History:   Procedure Laterality Date     CHOLECYSTECTOMY       GYN SURGERY       x 2     ORTHOPEDIC SURGERY      scoliosis repair       I have reviewed the Medications, Allergies, Past Medical and Surgical History, and Social History in the Epic system.    Review of Systems  Patient will not answer any other questions  Physical Exam   BP: (!) 144/106  Heart Rate: 115  Temp: 100.4  F (38  C)  Resp: 20  SpO2: 100 %      Physical Exam   Constitutional: She is oriented to person, place, and time. She appears well-developed and well-nourished. She appears distressed.   HENT:   Head: Normocephalic and atraumatic.   Mouth/Throat: No oropharyngeal exudate.   Eyes: Pupils are equal, round, and  reactive to light.   Neck: Neck supple.   Cardiovascular: Normal rate.    No murmur heard.  Pulmonary/Chest: Effort normal and breath sounds normal.   Abdominal: There is tenderness. A hernia is present. Hernia confirmed positive in the ventral area.       Musculoskeletal: Normal range of motion.   Neurological: She is alert and oriented to person, place, and time. No cranial nerve deficit. She exhibits normal muscle tone.   Skin: Skin is warm and dry.   Psychiatric: Her speech is normal. Her affect is labile and inappropriate. She is agitated and hyperactive. Thought content is paranoid. Cognition and memory are impaired. She expresses inappropriate judgment.       ED Course     ED Course     Procedures             Critical Care time:  was 90 minutes for this patient excluding procedures.             Labs Ordered and Resulted from Time of ED Arrival Up to the Time of Departure from the ED   CBC WITH PLATELETS DIFFERENTIAL - Abnormal; Notable for the following:        Result Value    RBC Count 3.75 (*)     All other components within normal limits   COMPREHENSIVE METABOLIC PANEL - Abnormal; Notable for the following:     Glucose 102 (*)     Urea Nitrogen 6 (*)     All other components within normal limits   CK TOTAL - Abnormal; Notable for the following:     CK Total 484 (*)     All other components within normal limits   LITHIUM LEVEL - Abnormal; Notable for the following:     Lithium Level 0.20 (*)     All other components within normal limits   HCG QUALITATIVE   DRUG ABUSE SCREEN 6 CHEM DEP URINE ([facility])            Assessments & Plan (with Medical Decision Making)   Patient with a long-standing history of psychiatric illness including bipolar disorder schizoaffective disorder, substance abuse, cyclic vomiting syndrome.  She presents with acute behavioral agitation, manic behavior, delusional statements, and complaining of abdominal pain nausea and vomiting.  In reviewing the records from her chart this is a  frequent occurrence for this patient.  He has been seen in numerous emergency departments under similar circumstances on more than one occasion her  has reported to the physician that when she is noncompliant with her psychiatric medications these episodes are more frequent.  With regards to her abdominal pain in the epigastrium nausea and vomiting,Initial differential diagnosis includes but is not restricted to small bowel obstruction, peptic ulcer disease or non-ulcer dyspepsia, infectious gastroenteritis, pancreatitis, cholecystitis, hepatitis, gastroparesis, gastric outlet obstruction, adverse drug reaction, neoplasm, conditions that cause increased ICP, labyrinthine disorders, endocrine disorders, anxiety reaction, cyclic vomiting syndrome.  On exam, her vital signs are unremarkable, the patient was pacing in her room agitated frequently cursing at myself and the nurses, at times appeared to be actively responding to some internal stimuli.  Very difficult to redirect.  He does have a large ventral abdominal hernia which appears to be reducible and is not reproducibly tender.  Frequently walks to the sink and attempts to induce self emesis by placing her fingers down her throat.  The remainder of her physical exam is negative.  On her psychiatric exam again she appears to agitated, delusional, possibly hallucinating.  She lacks insight and judgment and at times is posturing and threatening towards the staff.  We had to call a code 21, and move her back into the seclusion room.  Treated with intramuscular Zyprexa, intramuscular Toradol at her own request, intramuscular Benadryl, intramuscular Ativan.  We were able to obtain lab studies which are for the most part unremarkable (slight elevation of CK at 484) consistent with her psychomotor agitation.  Ultimately in order to complete her medical evaluation we had to call another code 21 obtain intravenous access and treat with IV Haldol.  After several  doses of IV Haldol and Ativan the patient fell asleep.  We are able to obtain a CT scan which confirms a ventral hernia with no signs of incarceration.  The patient is now much more calm but still periodically waking up and dry heaving.  She is much more cooperative now.  I find no definitive life-threatening etiology of her symptoms, and I believe this is an exacerbation of her cyclic vomiting syndrome with consideration to comorbid psychiatric illness.  I do not believe she is appropriate to discharge, but medically would be difficult to manage on the inpatient psychiatric unit.  Would like to admit her for IV fluids, IV antiemetics, serial abdominal exam, psychiatric consultation.  The patient is in agreement with that plan.  I will discuss with the hospitalist.  Total critical care time includes extensive time spent at the bedside upon her initial presentation, multiple trips back to the bedside to redirect the patient, reexamined, discuss lab results.  Subsequently a behavioral code was called on 2 occasions that I had to attend the bedside for both of those.  More time was spent reviewing the patient's hospital chart from previous similar incidents, reviewing her diagnostic studies.  Total critical care time 90 minutes.    I have reviewed the nursing notes.    I have reviewed the findings, diagnosis, plan and need for follow up with the patient.    New Prescriptions    No medications on file       Final diagnoses:   Cyclical vomiting, intractable   Schizoaffective disorder, bipolar type (H)       9/16/2018   [facility], [place], EMERGENCY DEPARTMENT     Celso Ponce MD  09/16/18 1734       Celso Ponce MD  09/16/18 [id number]

## 2018-09-16 NOTE — ED NOTES
Bed: ED06  Expected date: 9/16/18  Expected time: 10:18 AM  Means of arrival: Ambulance  Comments:  417 33yo female, abd pain from a hernia, needs a crisis eval also

## 2018-09-16 NOTE — ED NOTES
Witnessed by psych tech sticking fingers in throat and vomiting. Vomited in corner of room. Emesis clear with dark streaks. No emesis on patient.

## 2018-09-17 VITALS
OXYGEN SATURATION: 98 % | TEMPERATURE: 98.1 F | RESPIRATION RATE: 18 BRPM | DIASTOLIC BLOOD PRESSURE: 80 MMHG | SYSTOLIC BLOOD PRESSURE: 116 MMHG

## 2018-09-17 PROCEDURE — G0378 HOSPITAL OBSERVATION PER HR: HCPCS

## 2018-09-17 PROCEDURE — 99217 ZZC OBSERVATION CARE DISCHARGE: CPT | Performed by: INTERNAL MEDICINE

## 2018-09-17 PROCEDURE — 25000132 ZZH RX MED GY IP 250 OP 250 PS 637: Performed by: PSYCHIATRY & NEUROLOGY

## 2018-09-17 PROCEDURE — 25000132 ZZH RX MED GY IP 250 OP 250 PS 637: Performed by: INTERNAL MEDICINE

## 2018-09-17 PROCEDURE — 99207 ZZC APP CREDIT; MD BILLING SHARED VISIT: CPT | Performed by: PHYSICIAN ASSISTANT

## 2018-09-17 PROCEDURE — 25000132 ZZH RX MED GY IP 250 OP 250 PS 637: Performed by: PEDIATRICS

## 2018-09-17 RX ORDER — OLANZAPINE 10 MG/1
10 TABLET ORAL 2 TIMES DAILY
Status: DISCONTINUED | OUTPATIENT
Start: 2018-09-17 | End: 2018-09-17 | Stop reason: HOSPADM

## 2018-09-17 RX ORDER — ATROPINE SULFATE 10 MG/ML
1 SOLUTION/ DROPS OPHTHALMIC AT BEDTIME
Status: DISCONTINUED | OUTPATIENT
Start: 2018-09-17 | End: 2018-09-17 | Stop reason: HOSPADM

## 2018-09-17 RX ORDER — MORPHINE SULFATE 2 MG/ML
1 INJECTION, SOLUTION INTRAMUSCULAR; INTRAVENOUS
Status: DISCONTINUED | OUTPATIENT
Start: 2018-09-17 | End: 2018-09-17

## 2018-09-17 RX ORDER — POLYETHYLENE GLYCOL 3350 17 G/17G
17 POWDER, FOR SOLUTION ORAL DAILY
Status: DISCONTINUED | OUTPATIENT
Start: 2018-09-17 | End: 2018-09-17 | Stop reason: HOSPADM

## 2018-09-17 RX ORDER — LITHIUM CARBONATE 300 MG/1
300 TABLET, FILM COATED, EXTENDED RELEASE ORAL 2 TIMES DAILY
Status: DISCONTINUED | OUTPATIENT
Start: 2018-09-17 | End: 2018-09-17 | Stop reason: HOSPADM

## 2018-09-17 RX ADMIN — OLANZAPINE 10 MG: 10 TABLET, FILM COATED ORAL at 09:57

## 2018-09-17 RX ADMIN — LITHIUM CARBONATE 600 MG: 300 TABLET, EXTENDED RELEASE ORAL at 07:36

## 2018-09-17 RX ADMIN — PANTOPRAZOLE SODIUM 40 MG: 40 TABLET, DELAYED RELEASE ORAL at 07:36

## 2018-09-17 RX ADMIN — BENZTROPINE MESYLATE 0.5 MG: 0.5 TABLET ORAL at 07:57

## 2018-09-17 RX ADMIN — FOLIC ACID 1 MG: 1 TABLET ORAL at 07:37

## 2018-09-17 RX ADMIN — NICOTINE POLACRILEX 2 MG: 2 GUM, CHEWING BUCCAL at 08:03

## 2018-09-17 NOTE — DISCHARGE SUMMARY
[facility]    Internal Medicine Discharge Summary- Gold Service    Date of Admission:  9/16/2018  Date of Discharge:  9/17/2018  Discharging Provider: Mai Zee/ Dr. Rosa Isela Saucedo   Discharge Team: Medicine     Discharge Diagnoses    Abdominal pain     Schizoaffective disorder (Bipolar type)   Polysubstance abuse   Vental hernia   Agitation   Elevated CK     Follow-ups Needed After Discharge   Follow up with ACT Team as outpatient   Follow up with PCP in one week for hospitalization follow up     Hospital Course   Daisy Curtis was admitted on 9/16/2018 for abdominal pain, nausea and vomiting.  The following problems were addressed during her hospitalization:    # Abdominal pain  Presented with abdominal pain and agitation. She has had previous admission without any clear positive finding other than known ventral hernia. Abdominal pain improved after receiving several sedating medications. Ventral hernia was reducible and painless. CT abdomen revealed her midline supraumbilical hernia was slightly larger than prior exams, now containing loop of transverse colon without evidence of incarceration or strangulation. Abdominal pain completely resolved prior to discharge and patient was tolerating regular diet with no nausea or vomiting.     #Fever   Mild fever on admission of tmax 101.2. Etiology possibly related to medications as she had received multiple medications (Zyprexa, toradol, protonix, olanzapine, benadryl, haldol, and ativan) in the ED for agitation. Her CK was mildly elevated, however she had no rigidity on exam. Possible viral syndrome. Unlikely bacterial infection with no leukocytosis, and normal procalcitonin. Urinalysis was unremarkable. Fever resolved prior to discharge.     #Agitation  #Schizoaffective disorder (Bipolar type)   Patient presented very agitated to the ED. Received multiple sedating medications in the ED, see above. She had an 1:1  sitter overnight. Her lithium level was low at 0.2. Patient reports she was not taking her medications as prescribed because she was away. She agreed to resume her medications. She was seen by psychiatry on day of discharge who recommended continuing current outpatient medications (Zyprexa, lithium) and follow up with ACT team as outpatient. She was not taking Clozaril PTA and was not willing to restart it. She was offered inpatient psychiatry admission for stabilization, however she declined. She did not meet criteria for involuntary admission to inpatient psychiatry.     #Elevated CK   CK was mildly elevated on admission at 484. Creatine was 0.73 (stable) and had no dysuria. She had no rigidity on exam. Patient refused recheck of CK.     Consultations This Hospital Stay   MEDICATION HISTORY IP PHARMACY CONSULT  PSYCHIATRY IP CONSULT     Code Status   Full Code    Time Spent on this Encounter   IMai, personally saw the patient today and spent greater than 30 minutes discharging this patient.       GAVI Swift  Internal Medicine Staff Hospitalist Service  [facility]  Pager: [phone]  ______________________________________________________________________    Physical Exam   Blood pressure 116/80, temperature 98.1  F (36.7  C), temperature source Oral, resp. rate 18, SpO2 98 %, not currently breastfeeding.  GENERAL: Alert and oriented x 3. Hyperactive.   HEENT: Anicteric sclera. PERRL. Mucous membranes moist and without lesions.   CV: RRR. S1, S2. No murmurs appreciated.   RESPIRATORY: Effort normal on room air. Lungs CTAB with no wheezing, rales, rhonchi.   GI: Abdomen soft and non distended, bowel sounds present. Large ventral hernia that is reducible and non tender on palpation. No tenderness, rebound, guarding.   MUSCULOSKELETAL: No joint swelling or tenderness. Moves all extremities.   NEUROLOGICAL: No focal deficits apprecaited. Up walking in room and hallways with  normal gait.   EXTREMITIES: No peripheral edema. Intact bilateral pedal pulses.   SKIN: No jaundice. No rashes.       Significant Results and Procedures   Results for orders placed or performed during the hospital encounter of 09/16/18   CT Abdomen Pelvis w/o Contrast    Narrative    CT ABDOMEN PELVIS WITHOUT CONTRAST 9/16/2018 4:35 PM    HISTORY: Abdominal pain and vomiting.  Known large ventral hernia  which appears to be reducible.    TECHNIQUE: Helical axial scans from the dome of liver through the  pubic symphysis without contrast. Radiation dose for this scan was  reduced using automated exposure control, adjustment of the mA and/or  kV according to patient size, or iterative reconstruction technique.    COMPARISON: 3/70/2017.    FINDINGS: Prior cholecystectomy is again noted. The previously seen  fatty infiltration of the liver has resolved. The spleen, pancreas,  bilateral adrenal glands and kidneys bilaterally are unremarkable.  Metallic artifacts from spinal fixation hardware slightly compromise  images in the midabdomen. There is a midline supraumbilical hernia  measuring 4.5 cm in diameter, slightly larger than on the prior exam  (3.8 cm). The hernia contains a single loop of transverse colon and  some adjacent fat with no evidence for incarceration or strangulation.  The previous exam showed only fat in the hernia. The bowel and  mesentery in the upper abdomen are otherwise unremarkable without  contrast.    Scans through the pelvis show no acute abnormality air or free fluid.  There is a retrocecal appendix without appendicitis. Chronic spinal  deformity with curvature convex to the left in the upper lumbar region  again noted.      Impression    IMPRESSION:  1. Midline supraumbilical hernia slightly larger than on the prior  exam and now contains a loop of transverse colon without evidence for  incarceration or strangulation.  2. No other significant abnormality.      FAB LATIF MD       Pending  Results   None       Primary Care Physician   Kary Quintero    Discharge Disposition   Discharged to home  Condition at discharge: Stable    Discharge Orders     Reason for your hospital stay   Admitted with abdominal pain that has resolvled.     Adult [facility] Follow-up and recommended labs and tests   Follow up with primary care provider, Kary Quintero, within 7 days for hospital follow up.      Appointments on [place] and/or [facility] (with [facility] or [facility] provider or service). Call [phone] if you haven't heard regarding these appointments within 7 days of discharge.     Full Code     Diet   Follow this diet upon discharge: Orders Placed This Encounter     Regular Diet Adult       Discharge Medications   Current Discharge Medication List      CONTINUE these medications which have NOT CHANGED    Details   atropine 1 % ophthalmic solution Place 1 drop under the tongue At Bedtime      lithium (ESKALITH/LITHOBID) 300 MG CR tablet Take 300 mg by mouth 2 times daily      OLANZapine (ZYPREXA) 10 MG tablet Take 10 mg by mouth 2 times daily      polyethylene glycol (MIRALAX/GLYCOLAX) powder Take 17 g by mouth daily         STOP taking these medications       benztropine (COGENTIN) 0.5 MG tablet Comments:   Reason for Stopping:         cloZAPine (CLOZARIL) 100 MG tablet Comments:   Reason for Stopping:         folic acid (FOLVITE) 1 MG tablet Comments:   Reason for Stopping:         multivitamin, therapeutic with minerals (THERA-VIT-M) TABS tablet Comments:   Reason for Stopping:         pantoprazole (PROTONIX) 40 MG EC tablet Comments:   Reason for Stopping:             Allergies   Allergies   Allergen Reactions     Codeine Sulfate      Compazine Rash     Morphine Nausea and Vomiting     Prochlorperazine Rash

## 2018-09-17 NOTE — PLAN OF CARE
Problem: Patient Care Overview  Goal: Discharge Needs Assessment  Outcome: No Change  [id number]  1. Pt's abdominal pain will be controlled: Pt sleeping  2. Pt will see psychiatry: In AM  3. Patient will have safe disposition: Pt agitated and hyperactive when awake, needs 1:1

## 2018-09-17 NOTE — ED NOTES
[facility] [place]   ED Nurse to Floor Handoff     Daisy Curtis is a 32 year old female who speaks English and lives unknown,  home status is unknown  They arrived in the ED by ambulance from outside hospital    ED Chief Complaint: Abdominal Pain    ED Dx;   Final diagnoses:   Cyclical vomiting, intractable   Schizoaffective disorder, bipolar type (H)         Needed?: No    Allergies:   Allergies   Allergen Reactions     Codeine Sulfate      Compazine Rash     Morphine Nausea and Vomiting     Prochlorperazine Rash   .  Past Medical Hx:   Past Medical History:   Diagnosis Date     Anxiety      Substance abuse      Uncomplicated asthma      Ventral hernia       Baseline Mental status: WDL  Current Mental Status changes: other impaired judgement, manic-like behaviors    Infection present or suspected this encounter: no  Sepsis suspected: No  Isolation type: No active isolations     Activity level - Baseline/Home:  Independent  Activity Level - Current:   Stand with Assist    Bariatric equipment needed?: No    In the ED these meds were given:   Medications   sodium chloride 0.9% infusion (not administered)   OLANZapine (zyPREXA) injection 10 mg (10 mg Intramuscular Given 9/16/18 1053)   ketorolac (TORADOL) injection 30 mg (30 mg Intramuscular Given 9/16/18 1053)   pantoprazole (PROTONIX) EC tablet 40 mg (40 mg Oral Given 9/16/18 1204)   diphenhydrAMINE (BENADRYL) injection 50 mg (50 mg Intramuscular Given 9/16/18 1222)   LORazepam (ATIVAN) injection 2 mg (2 mg Intramuscular Given 9/16/18 1315)   0.9% sodium chloride BOLUS (0 mLs Intravenous Stopped 9/16/18 1535)   haloperidol lactate (HALDOL) injection 2 mg (2 mg Intravenous Given 9/16/18 1417)   diphenhydrAMINE (BENADRYL) injection 12.5 mg (12.5 mg Intravenous Given 9/16/18 1418)   haloperidol lactate (HALDOL) injection 2 mg (2 mg Intravenous Given 9/16/18 1445)   LORazepam (ATIVAN) injection 1 mg (1 mg Intravenous  Given 9/16/18 4025)   LORazepam (ATIVAN) injection 1 mg (1 mg Intravenous Given 9/16/18 1542)   haloperidol lactate (HALDOL) injection 5 mg (5 mg Intravenous Given 9/16/18 1727)   0.9% sodium chloride BOLUS (0 mLs Intravenous Stopped 9/16/18 1930)       Drips running?  Yes    Home pump  No    Current LDAs  Peripheral IV 09/16/18 Right Upper forearm (Active)   Site Assessment WDL 9/16/2018  7:36 PM   Line Status Saline locked 9/16/2018  7:36 PM   Number of days:0       Labs results:   Labs Ordered and Resulted from Time of ED Arrival Up to the Time of Departure from the ED   CBC WITH PLATELETS DIFFERENTIAL - Abnormal; Notable for the following:        Result Value    RBC Count 3.75 (*)     All other components within normal limits   COMPREHENSIVE METABOLIC PANEL - Abnormal; Notable for the following:     Glucose 102 (*)     Urea Nitrogen 6 (*)     All other components within normal limits   DRUG ABUSE SCREEN 6 CHEM DEP URINE ([facility]) - Abnormal; Notable for the following:     Benzodiazepine Qual Urine Positive (*)     All other components within normal limits   CK TOTAL - Abnormal; Notable for the following:     CK Total 484 (*)     All other components within normal limits   LITHIUM LEVEL - Abnormal; Notable for the following:     Lithium Level 0.20 (*)     All other components within normal limits   ROUTINE UA WITH MICROSCOPIC REFLEX TO CULTURE - Abnormal; Notable for the following:     Ketones Urine 10 (*)     Protein Albumin Urine 10 (*)     Leukocyte Esterase Urine Small (*)     Bacteria Urine Few (*)     Squamous Epithelial /HPF Urine 10 (*)     Mucous Urine Present (*)     All other components within normal limits   HCG QUALITATIVE   LIPASE   PROCALCITONIN   URINALYSIS CHEMICAL SCREEN POCT       Imaging Studies:   Recent Results (from the past 24 hour(s))   CT Abdomen Pelvis w/o Contrast    Narrative    CT ABDOMEN PELVIS WITHOUT CONTRAST 9/16/2018 4:35 PM    HISTORY: Abdominal pain and vomiting.  Known large  ventral hernia  which appears to be reducible.    TECHNIQUE: Helical axial scans from the dome of liver through the  pubic symphysis without contrast. Radiation dose for this scan was  reduced using automated exposure control, adjustment of the mA and/or  kV according to patient size, or iterative reconstruction technique.    COMPARISON: 3/70/2017.    FINDINGS: Prior cholecystectomy is again noted. The previously seen  fatty infiltration of the liver has resolved. The spleen, pancreas,  bilateral adrenal glands and kidneys bilaterally are unremarkable.  Metallic artifacts from spinal fixation hardware slightly compromise  images in the midabdomen. There is a midline supraumbilical hernia  measuring 4.5 cm in diameter, slightly larger than on the prior exam  (3.8 cm). The hernia contains a single loop of transverse colon and  some adjacent fat with no evidence for incarceration or strangulation.  The previous exam showed only fat in the hernia. The bowel and  mesentery in the upper abdomen are otherwise unremarkable without  contrast.    Scans through the pelvis show no acute abnormality air or free fluid.  There is a retrocecal appendix without appendicitis. Chronic spinal  deformity with curvature convex to the left in the upper lumbar region  again noted.      Impression    IMPRESSION:  1. Midline supraumbilical hernia slightly larger than on the prior  exam and now contains a loop of transverse colon without evidence for  incarceration or strangulation.  2. No other significant abnormality.         Recent vital signs:   BP (!) 143/96  Temp 100.4  F (38  C) (Oral)  Resp 20  SpO2 97%    Cardiac Rhythm: Normal Sinus  Pt needs tele? No  Skin/wound Issues: ventral hernia    Code Status: Full Code    Pain control: good    Nausea control: good    Abnormal labs/tests/findings requiring intervention: refer to EMR    Family present during ED course? No   Family Comments/Social Situation comments: n/a    Tasks needing  completion: None    Her, Hortencia, RN  ascom-- [phone] [facility]  [phone] [facility]

## 2018-09-17 NOTE — PLAN OF CARE
Problem: Patient Care Overview  Goal: Plan of Care/Patient Progress Review  Pt. admitted from ED at 2100. Pt. arrived with personal belongings. Pt. is A&Ox4. Was hyperactive upon arrival and climbing around in her bed, attempting to get out of bed. Unable to stay still. Was very difficult to redirect. Pt eventually calmed down and fell asleep in bed. Would occasionally become restless again. Bedside attendant in room for pt's safety. Only able to get temperature on pt due to her agitation. Tylenol given once due to pt's abdominal pain. Abdominal hernia present. Continue to monitor.

## 2018-09-17 NOTE — PLAN OF CARE
Problem: Patient Care Overview  Goal: Discharge Needs Assessment  Outcome: Improving  [id number]  1. Pt's abdominal pain will be controlled: Pt sleeping  2. Pt will see psychiatry: In AM  3. Patient will have safe disposition: Pt awoke to use commode and was calm and cooperative at the time. At 0645 pt called and reported that she would like her Port-a cath de accessed, pt pulling at line, told pt to wait until doctor came to see her before it can be taken out, oncoming nurse informed.

## 2018-09-17 NOTE — PLAN OF CARE
"Problem: Pain, Acute (Adult)  Goal: Identify Related Risk Factors and Signs and Symptoms  Related risk factors and signs and symptoms are identified upon initiation of Human Response Clinical Practice Guideline (CPG).   Pt A/O X 4. Pt was on a 1:1 bedside attendant that was discontinued per orders. Afebrile. VSS. Lungs-Clear bilaterally with both anterior and posterior. Bowels-Hyperactive in all four quadrants. Voids spontaneously without difficulty in the bathroom. Denies nausea and vomiting. CMS and Neuro's are intact. Denies numbness and tingling in all extremities. Has occasional pain in the lower abdomin and pt declines medication or intervention. Is on a Regular diet and appetite was Good this shift. Pt up in room independently. Port-a-cath de-accessed, pt insistent that Port-a-cath come out before giving IV heparin lock. Bilateral heels are elevated off the bed. Pt is able to make needs known, and call light is within reach. Pt. discharged at 1050AM to home and left with personal belongings. Pt declined discharge paperwork and stated, \"I don't want anything and I am going to go home now because you guys are taking too long. I don't want a cab voucher because I will pay for it myself.\"      "

## 2018-09-17 NOTE — PHARMACY-ADMISSION MEDICATION HISTORY
"Admission medication history for the September 16, 2018 admission is complete.     Interview sources:  Care Everywhere     Reliability of source: Poor - pt unable to provide any information regarding her medications, patient was mumbling and unable to make out what she was saying.     Medication compliance: unknown, but likely poor. Since patient unable to provide information, \"unknown\" is listed as the last administration time.     Changes made to PTA medication list (reason)  Added: atropine, clozapine, Miralax (per Care Everywhere)  Deleted:   The following medications were from June 2017 and are no longer active- pt has since been hospitalized and had multiple medication changes:  - Abilify Maintena 300 mg q30 days  - benztropine 0.5 mg BID  - folic acid 1 mg daily   - lithium  mg BID  - olanzapine 5 mg AM + 15 mg HS  - pantoprazole EC 40 mg daily  - multivitamin daily  Changed:   - lithium  mg twice daily --> 300 mg twice daily (per care everywhere)  - olanzapine 5 mg every morning + 15 mg at bedtime --> 10 mg twice daily (per Care Everywhere)    Additional medication history information:   - recently hospitalized at [facility] 6/19-6/26/18. The following medications listed are what the patient was discharged on. It does not appear that the patient's medications have changed since this hospitalization, however unable to confirm this with the patient or with a pharmacy because pt is unable to answer questions given current status and she is not able to identify a pharmacy she fills at.   - attempted to contact the patient's mother Charu (at her consent), but the phone number listed ([phone]) does not allow incoming calls.   - clozapine - [facility] and her last [facility] reported was on 9/5/18 (every2 week reporting), therefore pt is likely prescribed to take clozapine.     Prior to Admission medications    Medication Sig Last Dose Taking? Auth Provider   atropine 1 % ophthalmic solution " Place 1 drop under the tongue At Bedtime Unknown  Unknown, Entered By History   cloZAPine (CLOZARIL) 100 MG tablet Take 100 mg by mouth every morning and take 2 tablets (200 mg) at bedtime. Unknown  Unknown, Entered By History   lithium (ESKALITH/LITHOBID) 300 MG CR tablet Take 300 mg by mouth 2 times daily Unknown  Unknown, Entered By History   OLANZapine (ZYPREXA) 10 MG tablet Take 10 mg by mouth 2 times daily Unknown  Unknown, Entered By History   polyethylene glycol (MIRALAX/GLYCOLAX) powder Take 17 g by mouth daily Unknown  Unknown, Entered By History       Time spent: 40 minutes    Medication history completed by:   Briseida Barry, PharmD  [facility] [place]  Available daily from 1-9 PM: phone [phone], pager [phone]

## 2018-09-17 NOTE — PLAN OF CARE
Problem: Patient Care Overview  Goal: Discharge Needs Assessment  Outcome: No Change  [id number]  1. Pt's abdominal pain will be controlled: Woke up and became restless in bed again upon hearing that RN was going to come in and administer medications pt immediately appear to fall back asleep.   2. Pt will see psychiatry: In AM  3. Patient will have safe disposition: Pt agitated and hyperactive when awake, needs 1:1

## 2018-09-17 NOTE — PLAN OF CARE
Problem: Patient Care Overview  Goal: Plan of Care/Patient Progress Review  [id number]  1. Pt's abdominal pain will be controlled: Pt sleeping  2. Pt will see psychiatry: In AM  3. Patient will have safe disposition: Pt agitated and hyperactive when awake, needs 1:1
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
## 2018-09-17 NOTE — PLAN OF CARE
Problem: Patient Care Overview  Goal: Discharge Needs Assessment  Outcome: Declining  [id number]  1. Pt's abdominal pain will be controlled: Pt arrived on unit, crawling around in bed for an hour unable to stay still. Tylenol given.  2. Pt will see psychiatry: In AM  3. Patient will have safe disposition: Pt agitated and hyperactive when awake, needs 1:1 . PIV came out during pt's hyperactivity.

## 2018-09-18 ENCOUNTER — PATIENT OUTREACH (OUTPATIENT)
Dept: CARE COORDINATION | Facility: CLINIC | Age: 33
End: 2018-09-18

## 2021-01-02 NOTE — ED NOTES
Attempted to call report to station 12N and they said they are working on 3 discharges. Charge nurse Derik RN will call when room is available.   Never

## 2022-03-11 NOTE — SUMMARY OF CARE
Pt continues to come out of room, getting in PA's face, attempting to push through PA.   Airway patent, Nasal mucosa clear. Mouth with normal mucosa. Throat has no vesicles, no oropharyngeal exudates and uvula is midline.